# Patient Record
Sex: FEMALE | Race: WHITE | NOT HISPANIC OR LATINO | Employment: OTHER | URBAN - METROPOLITAN AREA
[De-identification: names, ages, dates, MRNs, and addresses within clinical notes are randomized per-mention and may not be internally consistent; named-entity substitution may affect disease eponyms.]

---

## 2017-02-24 RX ORDER — BUDESONIDE AND FORMOTEROL FUMARATE DIHYDRATE 160; 4.5 UG/1; UG/1
AEROSOL RESPIRATORY (INHALATION)
Qty: 10.2 G | Refills: 0 | Status: SHIPPED | OUTPATIENT
Start: 2017-02-24 | End: 2017-03-21 | Stop reason: SDUPTHER

## 2017-03-02 ENCOUNTER — OFFICE VISIT (OUTPATIENT)
Dept: FAMILY MEDICINE CLINIC | Facility: CLINIC | Age: 37
End: 2017-03-02

## 2017-03-02 VITALS
BODY MASS INDEX: 39.38 KG/M2 | DIASTOLIC BLOOD PRESSURE: 68 MMHG | TEMPERATURE: 97.8 F | SYSTOLIC BLOOD PRESSURE: 138 MMHG | OXYGEN SATURATION: 97 % | HEART RATE: 86 BPM | HEIGHT: 62 IN | WEIGHT: 214 LBS

## 2017-03-02 DIAGNOSIS — J02.9 SORE THROAT: ICD-10-CM

## 2017-03-02 DIAGNOSIS — J01.00 ACUTE NON-RECURRENT MAXILLARY SINUSITIS: Primary | ICD-10-CM

## 2017-03-02 DIAGNOSIS — H66.001 ACUTE SUPPURATIVE OTITIS MEDIA OF RIGHT EAR WITHOUT SPONTANEOUS RUPTURE OF TYMPANIC MEMBRANE, RECURRENCE NOT SPECIFIED: ICD-10-CM

## 2017-03-02 LAB
EXPIRATION DATE: NORMAL
INTERNAL CONTROL: NORMAL
Lab: NORMAL
S PYO AG THROAT QL: NEGATIVE

## 2017-03-02 PROCEDURE — 87880 STREP A ASSAY W/OPTIC: CPT | Performed by: FAMILY MEDICINE

## 2017-03-02 PROCEDURE — 99213 OFFICE O/P EST LOW 20 MIN: CPT | Performed by: FAMILY MEDICINE

## 2017-03-02 RX ORDER — FLUTICASONE PROPIONATE 50 MCG
2 SPRAY, SUSPENSION (ML) NASAL DAILY PRN
COMMUNITY
End: 2020-12-14

## 2017-03-02 RX ORDER — AMOXICILLIN 875 MG/1
875 TABLET, COATED ORAL 2 TIMES DAILY
Qty: 20 TABLET | Refills: 0 | Status: SHIPPED | OUTPATIENT
Start: 2017-03-02 | End: 2017-07-10 | Stop reason: HOSPADM

## 2017-03-02 NOTE — PROGRESS NOTES
"Subjective   Mona Castillo is a 36 y.o. female.   Chief Complaint   Patient presents with   • Sore Throat     Sore Throat    This is a new problem. The current episode started yesterday. The problem has been gradually worsening. There has been no fever. Associated symptoms include congestion, coughing and ear pain. Pertinent negatives include no abdominal pain, diarrhea, shortness of breath or vomiting.        The following portions of the patient's history were reviewed and updated as appropriate: allergies, current medications, past medical history and problem list.    Review of Systems   Constitutional: Negative for chills and fever.   HENT: Positive for congestion, ear pain, postnasal drip, sinus pressure and sore throat.    Respiratory: Positive for cough. Negative for shortness of breath and wheezing.    Gastrointestinal: Negative for abdominal pain, diarrhea, nausea and vomiting.       Vitals:    03/02/17 1505   BP: 138/68   Pulse: 86   Temp: 97.8 °F (36.6 °C)   TempSrc: Oral   SpO2: 97%   Weight: 214 lb (97.1 kg)   Height: 62\" (157.5 cm)       Objective   Physical Exam   Constitutional: She appears well-developed and well-nourished.   HENT:   Head: Normocephalic and atraumatic.   Right Ear: Hearing, tympanic membrane, external ear and ear canal normal.   Left Ear: External ear and ear canal normal. Tympanic membrane is injected. A middle ear effusion is present.   Nose: Mucosal edema present. Right sinus exhibits maxillary sinus tenderness and frontal sinus tenderness.   Mouth/Throat: Uvula is midline and mucous membranes are normal. Posterior oropharyngeal erythema present. No oropharyngeal exudate or posterior oropharyngeal edema.   Neck: Neck supple. No JVD present. No thyromegaly present.   Cardiovascular: Normal rate, regular rhythm and normal heart sounds.  Exam reveals no gallop and no friction rub.    No murmur heard.  Pulmonary/Chest: Effort normal and breath sounds normal. No respiratory " distress. She has no wheezes. She has no rales.   Abdominal: Soft. Bowel sounds are normal. She exhibits no distension. There is no tenderness. There is no rebound and no guarding.   Musculoskeletal: She exhibits no edema.   Neurological: She is alert.   Skin: Skin is warm and dry.   Psychiatric: She has a normal mood and affect. Her behavior is normal.   Nursing note and vitals reviewed.  RST:    Assessment/Plan   Mona was seen today for sore throat.    Diagnoses and all orders for this visit:    Acute non-recurrent maxillary sinusitis  -     amoxicillin (AMOXIL) 875 MG tablet; Take 1 tablet by mouth 2 (Two) Times a Day.    Sore throat  -     POC Rapid Strep A    Acute suppurative otitis media of right ear without spontaneous rupture of tympanic membrane, recurrence not specified  -     amoxicillin (AMOXIL) 875 MG tablet; Take 1 tablet by mouth 2 (Two) Times a Day.      -fluids, rest;  Salt water gargles    Prn - RTC if worse or no improvement.

## 2017-03-22 RX ORDER — BUDESONIDE AND FORMOTEROL FUMARATE DIHYDRATE 160; 4.5 UG/1; UG/1
AEROSOL RESPIRATORY (INHALATION)
Qty: 10.2 G | Refills: 0 | Status: SHIPPED | OUTPATIENT
Start: 2017-03-22 | End: 2017-04-18 | Stop reason: SDUPTHER

## 2017-04-19 RX ORDER — BUDESONIDE AND FORMOTEROL FUMARATE DIHYDRATE 160; 4.5 UG/1; UG/1
AEROSOL RESPIRATORY (INHALATION)
Qty: 10.2 G | Refills: 0 | Status: SHIPPED | OUTPATIENT
Start: 2017-04-19 | End: 2017-05-16 | Stop reason: SDUPTHER

## 2017-05-12 RX ORDER — ALBUTEROL SULFATE 90 UG/1
AEROSOL, METERED RESPIRATORY (INHALATION)
Qty: 18 G | Refills: 0 | Status: SHIPPED | OUTPATIENT
Start: 2017-05-12 | End: 2017-07-01 | Stop reason: SDUPTHER

## 2017-05-16 RX ORDER — BUDESONIDE AND FORMOTEROL FUMARATE DIHYDRATE 160; 4.5 UG/1; UG/1
AEROSOL RESPIRATORY (INHALATION)
Qty: 10.2 G | Refills: 2 | Status: SHIPPED | OUTPATIENT
Start: 2017-05-16 | End: 2017-10-08 | Stop reason: SDUPTHER

## 2017-07-03 RX ORDER — ALBUTEROL SULFATE 90 UG/1
AEROSOL, METERED RESPIRATORY (INHALATION)
Qty: 18 G | Refills: 0 | Status: SHIPPED | OUTPATIENT
Start: 2017-07-03 | End: 2017-07-28 | Stop reason: SDUPTHER

## 2017-07-10 ENCOUNTER — OFFICE VISIT (OUTPATIENT)
Dept: FAMILY MEDICINE CLINIC | Facility: CLINIC | Age: 37
End: 2017-07-10

## 2017-07-10 VITALS
WEIGHT: 214 LBS | DIASTOLIC BLOOD PRESSURE: 70 MMHG | HEIGHT: 62 IN | BODY MASS INDEX: 39.38 KG/M2 | HEART RATE: 91 BPM | SYSTOLIC BLOOD PRESSURE: 110 MMHG | OXYGEN SATURATION: 99 %

## 2017-07-10 DIAGNOSIS — M54.16 LUMBAR RADICULOPATHY: Primary | ICD-10-CM

## 2017-07-10 PROCEDURE — 99213 OFFICE O/P EST LOW 20 MIN: CPT | Performed by: FAMILY MEDICINE

## 2017-07-10 PROCEDURE — 96372 THER/PROPH/DIAG INJ SC/IM: CPT | Performed by: FAMILY MEDICINE

## 2017-07-10 RX ORDER — MELOXICAM 7.5 MG/1
7.5 TABLET ORAL
COMMUNITY
Start: 2017-07-06 | End: 2017-08-05

## 2017-07-10 RX ORDER — METHYLPREDNISOLONE ACETATE 80 MG/ML
80 INJECTION, SUSPENSION INTRA-ARTICULAR; INTRALESIONAL; INTRAMUSCULAR; SOFT TISSUE ONCE
Status: COMPLETED | OUTPATIENT
Start: 2017-07-10 | End: 2017-07-10

## 2017-07-10 RX ORDER — GABAPENTIN 300 MG/1
CAPSULE ORAL
Qty: 60 CAPSULE | Refills: 5 | Status: SHIPPED | OUTPATIENT
Start: 2017-07-10 | End: 2018-02-09

## 2017-07-10 RX ORDER — OXYCODONE HYDROCHLORIDE AND ACETAMINOPHEN 5; 325 MG/1; MG/1
1 TABLET ORAL
COMMUNITY
Start: 2017-07-03 | End: 2018-02-09

## 2017-07-10 RX ORDER — NORETHINDRONE ACETATE AND ETHINYL ESTRADIOL 1; .02 MG/1; MG/1
TABLET ORAL
COMMUNITY
Start: 2017-06-20 | End: 2018-06-19

## 2017-07-10 RX ORDER — OMEPRAZOLE 20 MG/1
CAPSULE, DELAYED RELEASE ORAL
COMMUNITY
End: 2018-06-19

## 2017-07-10 RX ADMIN — METHYLPREDNISOLONE ACETATE 80 MG: 80 INJECTION, SUSPENSION INTRA-ARTICULAR; INTRALESIONAL; INTRAMUSCULAR; SOFT TISSUE at 13:56

## 2017-07-10 NOTE — PROGRESS NOTES
Subjective   Mona Castillo is a 36 y.o. female. Presents today for   Chief Complaint   Patient presents with   • Back Pain     discuss pain meds       Back Pain   This is a recurrent (Had fall 2 years ago with off/on pain since then, normally goes to chiropractor and went after doing yard work, carrying ellen sized mattress and felt better when saw, but temporarily and pain recurred with pain down left leg with numbness/tingling. ) problem. The current episode started 1 to 4 weeks ago. The problem occurs constantly. The problem is unchanged. The pain is present in the lumbar spine. The quality of the pain is described as aching. The pain radiates to the left thigh, left knee and left foot. The pain is moderate. The symptoms are aggravated by twisting, bending and sitting. Associated symptoms include numbness, paresthesias and tingling. Pertinent negatives include no abdominal pain, bladder incontinence, bowel incontinence, dysuria, fever, perianal numbness or weakness. ([facility] ([facility] for MRI for C-spine and L-spine), has this afternoon.  Having c-spine done as hyper-reflexia of extremities.  Patient mostly back pain, radiating into left hip laterally.  Numbness in toes and anterior lower extremity.  Cannot find comfortable position at night.) She has tried muscle relaxant, chiropractic manipulation and analgesics (Was placed on prednisone with some relief, then directed to start meloxicam 7.5mg, to pickup yet and start date.   MR with relief.) for the symptoms. The treatment provided mild relief.       Saw PA at [facility] center wet read and a/p as follows on 7/6/17:  Review of diagnostic data   Her x-rays of her lumbar showed no apparent spinal listhesis or fractures, her cervical x-ray showed cervical kyphosis.  Assessment and Plan   Ms. Castillo is a 36 yr/o with chronic low back and left leg pain. 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 The plan at this time is to  explore her cervical spine with the MRI to evaluate for spinal cord compression as well as a lumbar MRI to evaluate her left lower extremity pain. We also started her on a nonsteroidal anti-inflammatory. She has no contraindications to this. She is not to take any other nonsteroidal anti-inflammatories while on this and she is going to start at once her oral steroid is completed. We will see her back once her MRIs are complete or sooner if any problems arise  NORA Calderon 7/6/2017     IMAGING completed 7/3/17:  LSpine xray IMPRESSION:  1. Normal alignment of the lumbar spine. No acute traumatic deformities are seen.    Left Hip xray IMPRESSION:   1.Radiographically normal left hip.      Review of Systems   Constitutional: Negative for fever.   Gastrointestinal: Negative for abdominal pain and bowel incontinence.   Genitourinary: Negative for bladder incontinence and dysuria.   Musculoskeletal: Positive for back pain.   Neurological: Positive for tingling, numbness and paresthesias. Negative for weakness.       The following portions of the patient's history were reviewed and updated as appropriate: allergies, current medications, past medical history and problem list.    Patient Active Problem List   Diagnosis   • Asthma   • Seasonal allergic rhinitis       No Known Allergies    Current Outpatient Prescriptions on File Prior to Visit   Medication Sig Dispense Refill   • cyclobenzaprine (FLEXERIL) 10 MG tablet Take 1 tablet by mouth 3 (Three) Times a Day As Needed for muscle spasms. 30 tablet 0   • fexofenadine (ALLEGRA) 180 MG tablet TAKE 1 TABLET BY MOUTH EVERY DAY 30 tablet 3   • fluticasone (FLONASE) 50 MCG/ACT nasal spray 2 sprays into each nostril Daily.     • Prenatal Vit-DSS-Fe Cbn-FA (PRENATAL AD) tablet Take 1 tablet by mouth daily.     • SYMBICORT 160-4.5 MCG/ACT inhaler INHALE 2 PUFFS BY MOUTH TWICE DAILY. RINSE MOUTH AFTER USE 10.2 g 2   • VENTOLIN  (90 BASE) MCG/ACT inhaler INHALE 2 PUFFS AS  "NEEDED EVERY 4-6 HOURS AS NEEDED FOR COUGHING, WHEEZING, AND SHORTNESS OF BREATH 18 g 0   • HYDROcodone-acetaminophen (NORCO) 5-325 MG per tablet Take 1-2 tablets by mouth Every 6 (Six) Hours As Needed for moderate pain (4-6). 15 tablet 0   • [DISCONTINUED] amoxicillin (AMOXIL) 875 MG tablet Take 1 tablet by mouth 2 (Two) Times a Day. 20 tablet 0   • [DISCONTINUED] baclofen (LIORESAL) 10 MG tablet Take 1 tablet by mouth 3 (three) times a day. (Patient taking differently: Take 10 mg by mouth 3 (three) times a day as needed.) 90 tablet 1   • [DISCONTINUED] diclofenac (VOLTAREN) 50 MG EC tablet Take 1 tablet by mouth 3 (Three) Times a Day. 30 tablet 0   • [DISCONTINUED] letrozole (FEMARA) 2.5 MG tablet TK 2 TS PO QD ON DAYS 3 THROUGH 7 OF CYCLE  0   • [DISCONTINUED] nystatin (MYCOSTATIN) 271553 UNIT/GM cream Apply  topically 2 (two) times a day. 30 g 4   • [DISCONTINUED] progesterone (PROMETRIUM) 200 MG capsule INSERT 1 C VAGINALLY QHS FOR 3 DAYS AFTER POSITIVE OPK FOR 12 DAYS  12   • [DISCONTINUED] triamcinolone (KENALOG) 0.1 % cream Apply  topically 2 (two) times a day. 45 g 2     No current facility-administered medications on file prior to visit.        Objective   Vitals:    07/10/17 1307   BP: 110/70   BP Location: Right arm   Patient Position: Sitting   Cuff Size: Large Adult   Pulse: 91   SpO2: 99%   Weight: 214 lb (97.1 kg)   Height: 62\" (157.5 cm)       Physical Exam   Constitutional: She is oriented to person, place, and time. She appears well-developed and well-nourished.   Musculoskeletal:        Lumbar back: She exhibits decreased range of motion and spasm. She exhibits no tenderness, no bony tenderness and no deformity.   Left SLR 85 deg with pain down leg noted    B/l hip flexors +5/5;  B/l lower ext +5/5    B/l DTRs symmetric but brisk.   Neurological: She is alert and oriented to person, place, and time.   Skin: Skin is warm and dry.   Psychiatric: She has a normal mood and affect. Her behavior is " normal.   Nursing note and vitals reviewed.      Assessment/Plan   Mona was seen today for back pain.    Diagnoses and all orders for this visit:    Lumbar radiculopathy  -     Ambulatory Referral to Pain Management  -     gabapentin (NEURONTIN) 300 MG capsule; 1 po nightly x 7 days, then 1 po bid.  -     methylPREDNISolone acetate (DEPO-medrol) injection 80 mg; Inject 1 mL into the shoulder, thigh, or buttocks 1 (One) Time.    -warned gabapentin now controlled, has not tried before;  Will give temporarily until can undergo further treatment;  Is improving with steroids, will try depo medrol.  Start nsaid as directed.  -reviewed willie         -Follow up: with pain mgmt and Spine specialist        Current Outpatient Prescriptions:   •  cyclobenzaprine (FLEXERIL) 10 MG tablet, Take 1 tablet by mouth 3 (Three) Times a Day As Needed for muscle spasms., Disp: 30 tablet, Rfl: 0  •  fexofenadine (ALLEGRA) 180 MG tablet, TAKE 1 TABLET BY MOUTH EVERY DAY, Disp: 30 tablet, Rfl: 3  •  fluticasone (FLONASE) 50 MCG/ACT nasal spray, 2 sprays into each nostril Daily., Disp: , Rfl:   •  meloxicam (MOBIC) 7.5 MG tablet, Take 7.5 mg by mouth., Disp: , Rfl:   •  norethindrone-ethinyl estradiol (MICROGESTIN) 1-20 MG-MCG per tablet, TK 1 T PO QD, Disp: , Rfl:   •  omeprazole (priLOSEC) 20 MG capsule, Take  by mouth., Disp: , Rfl:   •  oxyCODONE-acetaminophen (PERCOCET) 5-325 MG per tablet, Take 1 tablet by mouth., Disp: , Rfl:   •  Prenatal Vit-DSS-Fe Cbn-FA (PRENATAL AD) tablet, Take 1 tablet by mouth daily., Disp: , Rfl:   •  SYMBICORT 160-4.5 MCG/ACT inhaler, INHALE 2 PUFFS BY MOUTH TWICE DAILY. RINSE MOUTH AFTER USE, Disp: 10.2 g, Rfl: 2  •  VENTOLIN  (90 BASE) MCG/ACT inhaler, INHALE 2 PUFFS AS NEEDED EVERY 4-6 HOURS AS NEEDED FOR COUGHING, WHEEZING, AND SHORTNESS OF BREATH, Disp: 18 g, Rfl: 0  •  HYDROcodone-acetaminophen (NORCO) 5-325 MG per tablet, Take 1-2 tablets by mouth Every 6 (Six) Hours As Needed for moderate  pain (4-6)., Disp: 15 tablet, Rfl: 0

## 2017-07-22 RX ORDER — ALBUTEROL SULFATE 90 UG/1
AEROSOL, METERED RESPIRATORY (INHALATION)
Qty: 18 G | Refills: 0 | Status: CANCELLED | OUTPATIENT
Start: 2017-07-22

## 2017-07-24 RX ORDER — ALBUTEROL SULFATE 90 UG/1
AEROSOL, METERED RESPIRATORY (INHALATION)
Qty: 18 G | Refills: 0 | Status: CANCELLED | OUTPATIENT
Start: 2017-07-24

## 2017-07-28 RX ORDER — ALBUTEROL SULFATE 90 UG/1
2 AEROSOL, METERED RESPIRATORY (INHALATION) EVERY 4 HOURS PRN
Qty: 18 G | Refills: 1 | Status: SHIPPED | OUTPATIENT
Start: 2017-07-28 | End: 2017-11-30 | Stop reason: SDUPTHER

## 2017-10-09 RX ORDER — FEXOFENADINE HCL 180 MG/1
TABLET ORAL
Qty: 30 TABLET | Refills: 0 | Status: SHIPPED | OUTPATIENT
Start: 2017-10-09 | End: 2017-11-30 | Stop reason: SDUPTHER

## 2017-10-09 RX ORDER — BUDESONIDE AND FORMOTEROL FUMARATE DIHYDRATE 160; 4.5 UG/1; UG/1
AEROSOL RESPIRATORY (INHALATION)
Qty: 10.2 G | Refills: 0 | Status: SHIPPED | OUTPATIENT
Start: 2017-10-09 | End: 2017-11-03 | Stop reason: SDUPTHER

## 2017-11-06 ENCOUNTER — OFFICE VISIT (OUTPATIENT)
Dept: FAMILY MEDICINE CLINIC | Facility: CLINIC | Age: 37
End: 2017-11-06

## 2017-11-06 VITALS
HEART RATE: 110 BPM | WEIGHT: 211 LBS | SYSTOLIC BLOOD PRESSURE: 144 MMHG | TEMPERATURE: 98 F | OXYGEN SATURATION: 97 % | HEIGHT: 62 IN | DIASTOLIC BLOOD PRESSURE: 72 MMHG | BODY MASS INDEX: 38.83 KG/M2

## 2017-11-06 DIAGNOSIS — J01.90 ACUTE SINUSITIS, RECURRENCE NOT SPECIFIED, UNSPECIFIED LOCATION: Primary | ICD-10-CM

## 2017-11-06 PROCEDURE — 99213 OFFICE O/P EST LOW 20 MIN: CPT | Performed by: NURSE PRACTITIONER

## 2017-11-06 RX ORDER — BUDESONIDE AND FORMOTEROL FUMARATE DIHYDRATE 160; 4.5 UG/1; UG/1
AEROSOL RESPIRATORY (INHALATION)
Qty: 10.2 G | Refills: 0 | Status: SHIPPED | OUTPATIENT
Start: 2017-11-06 | End: 2017-11-29 | Stop reason: SDUPTHER

## 2017-11-06 RX ORDER — AMOXICILLIN 875 MG/1
875 TABLET, COATED ORAL 2 TIMES DAILY
Qty: 20 TABLET | Refills: 0 | Status: SHIPPED | OUTPATIENT
Start: 2017-11-06 | End: 2018-02-09

## 2017-11-06 RX ORDER — BENZONATATE 100 MG/1
100 CAPSULE ORAL 3 TIMES DAILY PRN
Qty: 30 CAPSULE | Refills: 0 | Status: SHIPPED | OUTPATIENT
Start: 2017-11-06 | End: 2018-02-09

## 2017-11-06 NOTE — PROGRESS NOTES
Subjective   Mona Castillo is a 37 y.o. female.  Nasal congestion, cough, earache. She went to clinic at [facility] yesterday and was told she had a cold but was not given any medication. Started getting sick on Thurs last week, progressively worse since then. No has productive cough and drainage with yellow color. + headache and today with ear pain. She has taken her Allegra but was not able to take the Flonase because her nose was so stopped up.    Sinusitis   This is a new problem. The current episode started in the past 7 days. The problem has been gradually worsening since onset. Maximum temperature: didn't take. Associated symptoms include chills, congestion, coughing, ear pain, headaches, shortness of breath (little), sinus pressure and a sore throat. Pertinent negatives include no sneezing or swollen glands. Treatments tried: antihistamine. The treatment provided no relief.        The following portions of the patient's history were reviewed and updated as appropriate: allergies, current medications, past medical history, past social history, past surgical history and problem list.    Review of Systems   Constitutional: Positive for chills.   HENT: Positive for congestion, ear pain, postnasal drip, rhinorrhea, sinus pressure and sore throat. Negative for sneezing.    Respiratory: Positive for cough and shortness of breath (little).    Cardiovascular: Negative.    Neurological: Positive for headaches.       Objective   Physical Exam   Constitutional: Vital signs are normal. She appears well-developed and well-nourished. She is cooperative.   HENT:   Right Ear: Hearing normal. A middle ear effusion (cloudy) is present.   Left Ear: Hearing normal. A middle ear effusion (cloudy) is present.   Nose: Mucosal edema and rhinorrhea present. Right sinus exhibits frontal sinus tenderness. Left sinus exhibits frontal sinus tenderness.   Mouth/Throat: Uvula is midline and mucous membranes are normal. Posterior  "oropharyngeal erythema present. No tonsillar exudate.   Cardiovascular: Normal rate, regular rhythm and normal heart sounds.    Pulmonary/Chest: Effort normal and breath sounds normal.   Neurological: She is alert.   Nursing note and vitals reviewed.    Vitals:    11/06/17 0908   BP: 144/72   Pulse: 110   Temp: 98 °F (36.7 °C)   TempSrc: Oral   SpO2: 97%   Weight: 211 lb (95.7 kg)   Height: 62\" (157.5 cm)       Assessment/Plan   Diagnoses and all orders for this visit:    Acute sinusitis, recurrence not specified, unspecified location  -     amoxicillin (AMOXIL) 875 MG tablet; Take 1 tablet by mouth 2 (Two) Times a Day.  -     benzonatate (TESSALON PERLES) 100 MG capsule; Take 1 capsule by mouth 3 (Three) Times a Day As Needed for Cough.      Sinusitis: Treat with antibiotics, nasal corticosteroids, antihistamines and cough medication as needed.  Off work until Wed, note given  RTC if not improve         "

## 2017-11-29 RX ORDER — BUDESONIDE AND FORMOTEROL FUMARATE DIHYDRATE 160; 4.5 UG/1; UG/1
AEROSOL RESPIRATORY (INHALATION)
Qty: 10.2 G | Refills: 0 | Status: SHIPPED | OUTPATIENT
Start: 2017-11-29 | End: 2017-12-26 | Stop reason: SDUPTHER

## 2017-11-30 RX ORDER — FEXOFENADINE HCL 180 MG/1
TABLET ORAL
Qty: 30 TABLET | Refills: 0 | Status: SHIPPED | OUTPATIENT
Start: 2017-11-30 | End: 2017-12-26 | Stop reason: SDUPTHER

## 2017-12-01 RX ORDER — ALBUTEROL SULFATE 90 UG/1
AEROSOL, METERED RESPIRATORY (INHALATION)
Qty: 18 G | Refills: 0 | Status: SHIPPED | OUTPATIENT
Start: 2017-12-01 | End: 2017-12-26 | Stop reason: SDUPTHER

## 2017-12-27 RX ORDER — FEXOFENADINE HCL 180 MG/1
TABLET ORAL
Qty: 30 TABLET | Refills: 0 | Status: SHIPPED | OUTPATIENT
Start: 2017-12-27 | End: 2019-07-25

## 2017-12-27 RX ORDER — BUDESONIDE AND FORMOTEROL FUMARATE DIHYDRATE 160; 4.5 UG/1; UG/1
AEROSOL RESPIRATORY (INHALATION)
Qty: 10.2 G | Refills: 0 | Status: SHIPPED | OUTPATIENT
Start: 2017-12-27 | End: 2018-01-30 | Stop reason: SDUPTHER

## 2017-12-27 RX ORDER — ALBUTEROL SULFATE 90 UG/1
AEROSOL, METERED RESPIRATORY (INHALATION)
Qty: 18 G | Refills: 0 | Status: SHIPPED | OUTPATIENT
Start: 2017-12-27 | End: 2018-02-09 | Stop reason: SDUPTHER

## 2018-01-24 RX ORDER — BUDESONIDE AND FORMOTEROL FUMARATE DIHYDRATE 160; 4.5 UG/1; UG/1
AEROSOL RESPIRATORY (INHALATION)
Qty: 10.2 G | Refills: 0 | OUTPATIENT
Start: 2018-01-24

## 2018-01-30 ENCOUNTER — TELEPHONE (OUTPATIENT)
Dept: FAMILY MEDICINE CLINIC | Facility: CLINIC | Age: 38
End: 2018-01-30

## 2018-01-30 RX ORDER — BUDESONIDE AND FORMOTEROL FUMARATE DIHYDRATE 160; 4.5 UG/1; UG/1
2 AEROSOL RESPIRATORY (INHALATION)
Qty: 10.2 G | Refills: 0 | Status: SHIPPED | OUTPATIENT
Start: 2018-01-30 | End: 2018-03-04 | Stop reason: SDUPTHER

## 2018-02-09 ENCOUNTER — OFFICE VISIT (OUTPATIENT)
Dept: FAMILY MEDICINE CLINIC | Facility: CLINIC | Age: 38
End: 2018-02-09

## 2018-02-09 VITALS
BODY MASS INDEX: 39.01 KG/M2 | WEIGHT: 212 LBS | OXYGEN SATURATION: 96 % | HEART RATE: 86 BPM | HEIGHT: 62 IN | TEMPERATURE: 98.3 F | SYSTOLIC BLOOD PRESSURE: 108 MMHG | DIASTOLIC BLOOD PRESSURE: 72 MMHG

## 2018-02-09 DIAGNOSIS — J45.40 MODERATE PERSISTENT ASTHMA WITHOUT COMPLICATION: Primary | ICD-10-CM

## 2018-02-09 DIAGNOSIS — L30.8 OTHER ECZEMA: ICD-10-CM

## 2018-02-09 PROCEDURE — 99213 OFFICE O/P EST LOW 20 MIN: CPT | Performed by: FAMILY MEDICINE

## 2018-02-09 PROCEDURE — 96372 THER/PROPH/DIAG INJ SC/IM: CPT | Performed by: FAMILY MEDICINE

## 2018-02-09 RX ORDER — ALBUTEROL SULFATE 90 UG/1
2 AEROSOL, METERED RESPIRATORY (INHALATION) EVERY 6 HOURS PRN
Qty: 18 G | Refills: 3 | Status: SHIPPED | OUTPATIENT
Start: 2018-02-09 | End: 2018-07-11 | Stop reason: SDUPTHER

## 2018-02-09 RX ORDER — METHYLPREDNISOLONE ACETATE 80 MG/ML
80 INJECTION, SUSPENSION INTRA-ARTICULAR; INTRALESIONAL; INTRAMUSCULAR; SOFT TISSUE ONCE
Status: COMPLETED | OUTPATIENT
Start: 2018-02-09 | End: 2018-02-09

## 2018-02-09 RX ADMIN — METHYLPREDNISOLONE ACETATE 80 MG: 80 INJECTION, SUSPENSION INTRA-ARTICULAR; INTRALESIONAL; INTRAMUSCULAR; SOFT TISSUE at 13:44

## 2018-02-09 NOTE — PROGRESS NOTES
Subjective   Mona Castillo is a 37 y.o. female. Presents today for   Chief Complaint   Patient presents with   • Appointment     pt here for appt, she received letter in the mail to make an appt.       History of Present Illness  Hrre for asthma, doing well, as long as takes allergy medicaiton and uses symbicort every day.  Allergies stable on medications;  Has pet dog;   Rare wheeze, dyspnea;  No coughign; no fevers chills;  Asthma hx moderate persistent.    Went throught fertility tx, had 2 embryos implanted, but lost both.  Has 1 more, but taking.  Review of Systems   Constitutional: Negative for fever.   Respiratory: Negative for cough and wheezing.    Cardiovascular: Negative for chest pain.       The following portions of the patient's history were reviewed and updated as appropriate: allergies, current medications, past medical history and problem list.    Patient Active Problem List   Diagnosis   • Asthma   • Seasonal allergic rhinitis       Allergies   Allergen Reactions   • Gabapentin Mental Status Change       Current Outpatient Prescriptions on File Prior to Visit   Medication Sig Dispense Refill   • budesonide-formoterol (SYMBICORT) 160-4.5 MCG/ACT inhaler Inhale 2 puffs 2 (Two) Times a Day. 10.2 g 0   • fexofenadine (ALLEGRA) 180 MG tablet TAKE 1 TABLET BY MOUTH EVERY DAY 30 tablet 0   • fluticasone (FLONASE) 50 MCG/ACT nasal spray 2 sprays into each nostril Daily.     • omeprazole (priLOSEC) 20 MG capsule Take  by mouth.     • Prenatal Vit-DSS-Fe Cbn-FA (PRENATAL AD) tablet Take 1 tablet by mouth daily.     • VENTOLIN  (90 Base) MCG/ACT inhaler INHALE 2 PUFFS BY MOUTH EVERY 4 HOURS AS NEEDED FOR WHEEZING 18 g 0   • norethindrone-ethinyl estradiol (MICROGESTIN) 1-20 MG-MCG per tablet TK 1 T PO QD     • [DISCONTINUED] amoxicillin (AMOXIL) 875 MG tablet Take 1 tablet by mouth 2 (Two) Times a Day. 20 tablet 0   • [DISCONTINUED] benzonatate (TESSALON PERLES) 100 MG capsule Take 1 capsule by  "mouth 3 (Three) Times a Day As Needed for Cough. 30 capsule 0   • [DISCONTINUED] cyclobenzaprine (FLEXERIL) 10 MG tablet Take 1 tablet by mouth 3 (Three) Times a Day As Needed for muscle spasms. 30 tablet 0   • [DISCONTINUED] gabapentin (NEURONTIN) 300 MG capsule 1 po nightly x 7 days, then 1 po bid. 60 capsule 5   • [DISCONTINUED] HYDROcodone-acetaminophen (NORCO) 5-325 MG per tablet Take 1-2 tablets by mouth Every 6 (Six) Hours As Needed for moderate pain (4-6). 15 tablet 0   • [DISCONTINUED] oxyCODONE-acetaminophen (PERCOCET) 5-325 MG per tablet Take 1 tablet by mouth.       No current facility-administered medications on file prior to visit.        Objective   Vitals:    02/09/18 1301   BP: 108/72   BP Location: Left arm   Patient Position: Sitting   Cuff Size: Large Adult   Pulse: 86   Temp: 98.3 °F (36.8 °C)   TempSrc: Oral   SpO2: 96%   Weight: 96.2 kg (212 lb)   Height: 157.5 cm (62.01\")       Physical Exam   Constitutional: She appears well-developed and well-nourished.   HENT:   Head: Normocephalic and atraumatic.   Neck: Neck supple. No JVD present. No thyromegaly present.   Cardiovascular: Normal rate, regular rhythm and normal heart sounds.  Exam reveals no gallop and no friction rub.    No murmur heard.  Pulmonary/Chest: Effort normal and breath sounds normal. No respiratory distress. She has no wheezes. She has no rales.   Abdominal: Soft. Bowel sounds are normal. She exhibits no distension. There is no tenderness. There is no rebound and no guarding.   Musculoskeletal: She exhibits no edema.   Neurological: She is alert.   Skin: Skin is warm and dry.   Eczema -red, scaling   Psychiatric: She has a normal mood and affect. Her behavior is normal.   Nursing note and vitals reviewed.      Assessment/Plan   Mona was seen today for appointment.    Diagnoses and all orders for this visit:    Moderate persistent asthma without complication  -     albuterol (VENTOLIN HFA) 108 (90 Base) MCG/ACT inhaler; " Inhale 2 puffs Every 6 (Six) Hours As Needed for Wheezing.      Derm gave steroid injection for eczema and resolved, would like today in lieu of topical  Ok refill symbicort over next 12 months when receive message.         -Follow up: 1 year       Current Outpatient Prescriptions:   •  budesonide-formoterol (SYMBICORT) 160-4.5 MCG/ACT inhaler, Inhale 2 puffs 2 (Two) Times a Day., Disp: 10.2 g, Rfl: 0  •  fexofenadine (ALLEGRA) 180 MG tablet, TAKE 1 TABLET BY MOUTH EVERY DAY, Disp: 30 tablet, Rfl: 0  •  fluticasone (FLONASE) 50 MCG/ACT nasal spray, 2 sprays into each nostril Daily., Disp: , Rfl:   •  omeprazole (priLOSEC) 20 MG capsule, Take  by mouth., Disp: , Rfl:   •  Prenatal Vit-DSS-Fe Cbn-FA (PRENATAL AD) tablet, Take 1 tablet by mouth daily., Disp: , Rfl:   •  VENTOLIN  (90 Base) MCG/ACT inhaler, INHALE 2 PUFFS BY MOUTH EVERY 4 HOURS AS NEEDED FOR WHEEZING, Disp: 18 g, Rfl: 0  •  norethindrone-ethinyl estradiol (MICROGESTIN) 1-20 MG-MCG per tablet, TK 1 T PO QD, Disp: , Rfl:

## 2018-03-05 RX ORDER — BUDESONIDE AND FORMOTEROL FUMARATE DIHYDRATE 160; 4.5 UG/1; UG/1
AEROSOL RESPIRATORY (INHALATION)
Qty: 10.2 G | Refills: 11 | Status: SHIPPED | OUTPATIENT
Start: 2018-03-05 | End: 2018-08-24 | Stop reason: CLARIF

## 2018-06-19 ENCOUNTER — OFFICE VISIT (OUTPATIENT)
Dept: FAMILY MEDICINE CLINIC | Facility: CLINIC | Age: 38
End: 2018-06-19

## 2018-06-19 VITALS
OXYGEN SATURATION: 95 % | HEART RATE: 81 BPM | HEIGHT: 62 IN | DIASTOLIC BLOOD PRESSURE: 60 MMHG | TEMPERATURE: 98.5 F | BODY MASS INDEX: 38.64 KG/M2 | SYSTOLIC BLOOD PRESSURE: 118 MMHG | WEIGHT: 210 LBS

## 2018-06-19 DIAGNOSIS — L30.9 ECZEMA OF BOTH HANDS: Primary | ICD-10-CM

## 2018-06-19 DIAGNOSIS — M25.532 ACUTE PAIN OF LEFT WRIST: ICD-10-CM

## 2018-06-19 PROCEDURE — 96372 THER/PROPH/DIAG INJ SC/IM: CPT | Performed by: FAMILY MEDICINE

## 2018-06-19 PROCEDURE — 99213 OFFICE O/P EST LOW 20 MIN: CPT | Performed by: FAMILY MEDICINE

## 2018-06-19 RX ORDER — TRIAMCINOLONE ACETONIDE 1 MG/G
CREAM TOPICAL 2 TIMES DAILY PRN
Qty: 60 G | Refills: 2 | Status: SHIPPED | OUTPATIENT
Start: 2018-06-19 | End: 2019-02-08

## 2018-06-19 RX ORDER — METHYLPREDNISOLONE ACETATE 80 MG/ML
80 INJECTION, SUSPENSION INTRA-ARTICULAR; INTRALESIONAL; INTRAMUSCULAR; SOFT TISSUE ONCE
Status: COMPLETED | OUTPATIENT
Start: 2018-06-19 | End: 2018-06-19

## 2018-06-19 RX ADMIN — METHYLPREDNISOLONE ACETATE 80 MG: 80 INJECTION, SUSPENSION INTRA-ARTICULAR; INTRALESIONAL; INTRAMUSCULAR; SOFT TISSUE at 08:04

## 2018-06-19 NOTE — PROGRESS NOTES
Subjective   Mona Castillo is a 37 y.o. female. Presents today for   Chief Complaint   Patient presents with   • Rash     pt hands are getting worse again. they cleared up for a little bit but its back. she was seeing dermatologist for this but she does not have health insurance right now.       Rash   This is a recurrent problem. The current episode started in the past 7 days. The problem has been waxing and waning since onset. The affected locations include the left hand and right hand. The rash is characterized by itchiness and dryness. She was exposed to a new detergent/soap. Pertinent negatives include no shortness of breath or sore throat. 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 The treatment provided no relief. Her past medical history is significant for eczema.     Left wrist pain few days;  Doing truck washing and manual jobs;  Hurts to flex with pain over dorsal surface of left wrist.    Review of Systems   HENT: Negative for sore throat.    Respiratory: Negative for shortness of breath.    Skin: Positive for rash.       Patient Active Problem List   Diagnosis   • Asthma   • Seasonal allergic rhinitis       Social History     Social History   • Marital status:      Social History Main Topics   • Smoking status: Former Smoker   • Smokeless tobacco: Never Used      Comment: quit 10/2013   • Alcohol use Yes      Comment: Social use   • Drug use: No   • Sexual activity: Yes     Partners: Male     Birth control/ protection: None     Other Topics Concern   • Not on file       Allergies   Allergen Reactions   • Gabapentin Mental Status Change       Current Outpatient Prescriptions on File Prior to Visit   Medication Sig Dispense Refill   • albuterol (VENTOLIN HFA) 108 (90 Base) MCG/ACT inhaler Inhale 2 puffs Every 6 (Six) Hours As Needed for Wheezing. 18 g 3   •  "fexofenadine (ALLEGRA) 180 MG tablet TAKE 1 TABLET BY MOUTH EVERY DAY 30 tablet 0   • fluticasone (FLONASE) 50 MCG/ACT nasal spray 2 sprays into each nostril Daily.     • SYMBICORT 160-4.5 MCG/ACT inhaler INHALE 2 PUFFS BY MOUTH TWICE DAILY 10.2 g 11   • [DISCONTINUED] norethindrone-ethinyl estradiol (MICROGESTIN) 1-20 MG-MCG per tablet TK 1 T PO QD     • [DISCONTINUED] omeprazole (priLOSEC) 20 MG capsule Take  by mouth.     • [DISCONTINUED] Prenatal Vit-DSS-Fe Cbn-FA (PRENATAL AD) tablet Take 1 tablet by mouth daily.       No current facility-administered medications on file prior to visit.        Objective   Vitals:    06/19/18 0734   BP: 118/60   BP Location: Right arm   Patient Position: Sitting   Cuff Size: Large Adult   Pulse: 81   Temp: 98.5 °F (36.9 °C)   TempSrc: Oral   SpO2: 95%   Weight: 95.3 kg (210 lb)   Height: 157.5 cm (62.01\")       Physical Exam   Constitutional: She is oriented to person, place, and time. She appears well-developed and well-nourished.   Musculoskeletal:        Left wrist: She exhibits tenderness. She exhibits normal range of motion, no bony tenderness, no swelling, no effusion, no crepitus, no deformity and no laceration.   Neurological: She is alert and oriented to person, place, and time.   Skin: Skin is warm and dry. Capillary refill takes less than 2 seconds.   Web spaces red, scaly cracking.   Psychiatric: She has a normal mood and affect. Her behavior is normal.   Nursing note and vitals reviewed.      Assessment/Plan   Mona was seen today for rash.    Diagnoses and all orders for this visit:    Eczema of both hands  -     methylPREDNISolone acetate (DEPO-medrol) injection 80 mg; Inject 1 mL into the shoulder, thigh, or buttocks 1 (One) Time.  -     triamcinolone (KENALOG) 0.1 % cream; Apply  topically 2 (Two) Times a Day As Needed for Rash.      -left wrist pain - depo medrol 80mg IMx1  -eczema - continue gloves when handling detergents;  Topical steroid as directed.   "     -Follow up:  Prn - RTC if worse or no improvement.

## 2018-07-11 DIAGNOSIS — J45.40 MODERATE PERSISTENT ASTHMA WITHOUT COMPLICATION: ICD-10-CM

## 2018-07-11 RX ORDER — ALBUTEROL SULFATE 90 UG/1
AEROSOL, METERED RESPIRATORY (INHALATION)
Qty: 18 G | Refills: 0 | Status: SHIPPED | OUTPATIENT
Start: 2018-07-11 | End: 2018-08-04 | Stop reason: SDUPTHER

## 2018-08-04 DIAGNOSIS — J45.40 MODERATE PERSISTENT ASTHMA WITHOUT COMPLICATION: ICD-10-CM

## 2018-08-06 RX ORDER — ALBUTEROL SULFATE 90 UG/1
AEROSOL, METERED RESPIRATORY (INHALATION)
Qty: 18 G | Refills: 0 | Status: SHIPPED | OUTPATIENT
Start: 2018-08-06 | End: 2018-12-22 | Stop reason: SDUPTHER

## 2018-08-24 ENCOUNTER — TELEPHONE (OUTPATIENT)
Dept: FAMILY MEDICINE CLINIC | Facility: CLINIC | Age: 38
End: 2018-08-24

## 2018-12-22 DIAGNOSIS — J45.40 MODERATE PERSISTENT ASTHMA WITHOUT COMPLICATION: ICD-10-CM

## 2018-12-24 RX ORDER — ALBUTEROL SULFATE 90 UG/1
AEROSOL, METERED RESPIRATORY (INHALATION)
Qty: 18 G | Refills: 0 | Status: SHIPPED | OUTPATIENT
Start: 2018-12-24 | End: 2019-01-15 | Stop reason: SDUPTHER

## 2019-01-15 DIAGNOSIS — J45.40 MODERATE PERSISTENT ASTHMA WITHOUT COMPLICATION: ICD-10-CM

## 2019-01-15 RX ORDER — ALBUTEROL SULFATE 90 UG/1
AEROSOL, METERED RESPIRATORY (INHALATION)
Qty: 18 G | Refills: 0 | Status: SHIPPED | OUTPATIENT
Start: 2019-01-15 | End: 2019-02-07 | Stop reason: SDUPTHER

## 2019-02-07 DIAGNOSIS — J45.40 MODERATE PERSISTENT ASTHMA WITHOUT COMPLICATION: ICD-10-CM

## 2019-02-07 RX ORDER — ALBUTEROL SULFATE 90 UG/1
AEROSOL, METERED RESPIRATORY (INHALATION)
Qty: 18 G | Refills: 0 | Status: SHIPPED | OUTPATIENT
Start: 2019-02-07 | End: 2019-02-08 | Stop reason: SDUPTHER

## 2019-02-07 RX ORDER — ALBUTEROL SULFATE 90 UG/1
AEROSOL, METERED RESPIRATORY (INHALATION)
Qty: 18 G | Refills: 0 | Status: SHIPPED | OUTPATIENT
Start: 2019-02-07 | End: 2019-03-07 | Stop reason: SDUPTHER

## 2019-02-08 ENCOUNTER — OFFICE VISIT (OUTPATIENT)
Dept: FAMILY MEDICINE CLINIC | Facility: CLINIC | Age: 39
End: 2019-02-08

## 2019-02-08 VITALS
HEIGHT: 62 IN | BODY MASS INDEX: 38.83 KG/M2 | OXYGEN SATURATION: 100 % | SYSTOLIC BLOOD PRESSURE: 98 MMHG | DIASTOLIC BLOOD PRESSURE: 68 MMHG | HEART RATE: 81 BPM | WEIGHT: 211 LBS

## 2019-02-08 DIAGNOSIS — R55 NEAR SYNCOPE: ICD-10-CM

## 2019-02-08 DIAGNOSIS — R53.83 OTHER FATIGUE: ICD-10-CM

## 2019-02-08 DIAGNOSIS — R07.89 ATYPICAL CHEST PAIN: Primary | ICD-10-CM

## 2019-02-08 PROCEDURE — 93000 ELECTROCARDIOGRAM COMPLETE: CPT | Performed by: FAMILY MEDICINE

## 2019-02-08 PROCEDURE — 99214 OFFICE O/P EST MOD 30 MIN: CPT | Performed by: FAMILY MEDICINE

## 2019-02-08 NOTE — PROGRESS NOTES
Subjective   Mona Castillo is a 38 y.o. female. Presents today for   Chief Complaint   Patient presents with   • Dizziness     x2 weeks       Dizziness   This is a new problem. The current episode started in the past 7 days (2 episodes last week.). The problem occurs intermittently. The problem has been resolved. Associated symptoms include chest pain (heaviness couple times) and diaphoresis. Pertinent negatives include no fever, headaches, nausea, vertigo or vomiting. Associated symptoms comments: +lightheaded sensation;  Felt hot.   Did not have chest heaviness same time as light headed.  No syncope, but felt like going to pass out.  No family hx of cardiac issues, but not certain Father's history.  May be uncle hx of dm2;  Reports skips meals a lot, but doesn't recall during episodes.  Eating still felt not right.. Nothing aggravates the symptoms. She has tried eating for the symptoms. The treatment provided no relief.       Review of Systems   Constitutional: Positive for diaphoresis. Negative for fever.   Cardiovascular: Positive for chest pain (heaviness couple times).   Gastrointestinal: Negative for nausea and vomiting.   Neurological: Positive for dizziness. Negative for vertigo and headaches.       Patient Active Problem List   Diagnosis   • Asthma   • Seasonal allergic rhinitis       Social History     Socioeconomic History   • Marital status:      Spouse name: Not on file   • Number of children: Not on file   • Years of education: Not on file   • Highest education level: Not on file   Tobacco Use   • Smoking status: Former Smoker   • Smokeless tobacco: Never Used   • Tobacco comment: quit 10/2013   Substance and Sexual Activity   • Alcohol use: Yes     Comment: Social use   • Drug use: No   • Sexual activity: Yes     Partners: Male     Birth control/protection: None       Allergies   Allergen Reactions   • [place] Itching     SOMETIMES HAS SOA   • Gabapentin Mental Status Change       Current  "Outpatient Medications on File Prior to Visit   Medication Sig Dispense Refill   • fexofenadine (ALLEGRA) 180 MG tablet TAKE 1 TABLET BY MOUTH EVERY DAY 30 tablet 0   • fluticasone (FLONASE) 50 MCG/ACT nasal spray 2 sprays into each nostril Daily.     • fluticasone-salmeterol (ADVAIR DISKUS) 250-50 MCG/DOSE DISKUS Inhale 1 puff 2 (Two) Times a Day. 60 each 5   • VENTOLIN  (90 Base) MCG/ACT inhaler INHALE 2 PUFFS BY MOUTH EVERY 6 HOURS AS NEEDED FOR WHEEZING 18 g 0   • [DISCONTINUED] triamcinolone (KENALOG) 0.1 % cream Apply  topically 2 (Two) Times a Day As Needed for Rash. 60 g 2   • [DISCONTINUED] VENTOLIN  (90 Base) MCG/ACT inhaler INHALE 2 PUFFS BY MOUTH EVERY 6 HOURS AS NEEDED FOR WHEEZING 18 g 0     No current facility-administered medications on file prior to visit.        Objective   Vitals:    02/08/19 1355   BP: 98/68   BP Location: Right arm   Patient Position: Sitting   Cuff Size: Adult   Pulse: 81   SpO2: 100%   Weight: 95.7 kg (211 lb)   Height: 157.5 cm (62\")       Physical Exam   Constitutional: She appears well-developed and well-nourished.   HENT:   Head: Normocephalic and atraumatic.   Nose: Nose normal.   Mouth/Throat: Uvula is midline, oropharynx is clear and moist and mucous membranes are normal.   Eyes: Conjunctivae, EOM and lids are normal. Pupils are equal, round, and reactive to light.   Neck: Neck supple. No JVD present. No thyromegaly present.   Cardiovascular: Normal rate, regular rhythm and normal heart sounds. Exam reveals no gallop and no friction rub.   No murmur heard.  Pulmonary/Chest: Effort normal and breath sounds normal. No respiratory distress. She has no wheezes. She has no rales.   Abdominal: Soft. Bowel sounds are normal. She exhibits no distension. There is no tenderness. There is no rebound and no guarding.   Musculoskeletal: She exhibits no edema.   Neurological: She is alert.   Skin: Skin is warm and dry.   Psychiatric: She has a normal mood and affect. " Her behavior is normal.   Nursing note and vitals reviewed.    Had a misscarriage last summer;  Was going through fertility tx;   and Wife are self employed so stressed and not eating healthy.    ECG 12 Lead - atypical cp, near syncope  Date/Time: 2/8/2019 2:47 PM  Performed by: David Fernandez DO  Authorized by: David Fernandez DO   Rhythm: sinus rhythm  Rate: normal  BPM: 74  Conduction: incomplete RBBB  Conduction comments: QRS 90 ms  QTc 422 ms  ST Segments: ST segments normal  T Waves: T waves normal  QRS axis: normal  Other: no other findings  Clinical impression comment: 1) NSR 2) Incomlete RBB            Assessment/Plan   Mona was seen today for dizziness.    Diagnoses and all orders for this visit:    Atypical chest pain  -     ECG 12 Lead - atypical cp, near syncope  -     Comprehensive Metabolic Panel  -     TSH  -     Vitamin B12  -     CBC & Differential  -     Ambulatory Referral to Cardiology    Near syncope  -     ECG 12 Lead - atypical cp, near syncope  -     Comprehensive Metabolic Panel  -     TSH  -     Vitamin B12  -     CBC & Differential  -     Ambulatory Referral to Cardiology    Other fatigue  -     ECG 12 Lead - atypical cp, near syncope  -     Comprehensive Metabolic Panel  -     TSH  -     Vitamin B12  -     CBC & Differential  -     Ambulatory Referral to Cardiology    -d/w patient and  if recurs go ER;  Suspect component of stress and also ? Hypoglycemia;  Will check labs and refer to cardiology for chest heaviness, near syncope.  Recommend in interim hydrate daily, make sure avoid skipped meals         -Follow up: 10 to 12 weeks and prn

## 2019-02-09 LAB
ALBUMIN SERPL-MCNC: 4.3 G/DL (ref 3.5–5.5)
ALBUMIN/GLOB SERPL: 1.6 {RATIO} (ref 1.2–2.2)
ALP SERPL-CCNC: 84 IU/L (ref 39–117)
ALT SERPL-CCNC: 11 IU/L (ref 0–32)
AST SERPL-CCNC: 10 IU/L (ref 0–40)
BASOPHILS # BLD AUTO: 0 X10E3/UL (ref 0–0.2)
BASOPHILS NFR BLD AUTO: 0 %
BILIRUB SERPL-MCNC: 0.2 MG/DL (ref 0–1.2)
BUN SERPL-MCNC: 9 MG/DL (ref 6–20)
BUN/CREAT SERPL: 10 (ref 9–23)
CALCIUM SERPL-MCNC: 9.6 MG/DL (ref 8.7–10.2)
CHLORIDE SERPL-SCNC: 103 MMOL/L (ref 96–106)
CO2 SERPL-SCNC: 24 MMOL/L (ref 20–29)
CREAT SERPL-MCNC: 0.94 MG/DL (ref 0.57–1)
EOSINOPHIL # BLD AUTO: 0.4 X10E3/UL (ref 0–0.4)
EOSINOPHIL NFR BLD AUTO: 5 %
ERYTHROCYTE [DISTWIDTH] IN BLOOD BY AUTOMATED COUNT: 14 % (ref 12.3–15.4)
GLOBULIN SER CALC-MCNC: 2.7 G/DL (ref 1.5–4.5)
GLUCOSE SERPL-MCNC: 86 MG/DL (ref 65–99)
HCT VFR BLD AUTO: 38.2 % (ref 34–46.6)
HGB BLD-MCNC: 12.5 G/DL (ref 11.1–15.9)
IMM GRANULOCYTES # BLD AUTO: 0 X10E3/UL (ref 0–0.1)
IMM GRANULOCYTES NFR BLD AUTO: 0 %
LYMPHOCYTES # BLD AUTO: 2.1 X10E3/UL (ref 0.7–3.1)
LYMPHOCYTES NFR BLD AUTO: 24 %
MCH RBC QN AUTO: 29.6 PG (ref 26.6–33)
MCHC RBC AUTO-ENTMCNC: 32.7 G/DL (ref 31.5–35.7)
MCV RBC AUTO: 91 FL (ref 79–97)
MONOCYTES # BLD AUTO: 0.6 X10E3/UL (ref 0.1–0.9)
MONOCYTES NFR BLD AUTO: 7 %
NEUTROPHILS # BLD AUTO: 5.7 X10E3/UL (ref 1.4–7)
NEUTROPHILS NFR BLD AUTO: 64 %
PLATELET # BLD AUTO: 407 X10E3/UL (ref 150–379)
POTASSIUM SERPL-SCNC: 4.4 MMOL/L (ref 3.5–5.2)
PROT SERPL-MCNC: 7 G/DL (ref 6–8.5)
RBC # BLD AUTO: 4.22 X10E6/UL (ref 3.77–5.28)
SODIUM SERPL-SCNC: 141 MMOL/L (ref 134–144)
TSH SERPL DL<=0.005 MIU/L-ACNC: 1.41 UIU/ML (ref 0.45–4.5)
VIT B12 SERPL-MCNC: 364 PG/ML (ref 232–1245)
WBC # BLD AUTO: 8.9 X10E3/UL (ref 3.4–10.8)

## 2019-02-13 NOTE — PROGRESS NOTES
Call and mail copy of results to patient.  Kidney, liver function, electrolytes, thyroid and blood counts are normal.  B12 level is normal, but low normal.  Recommend OTC B12 500mcg po daily

## 2019-02-22 ENCOUNTER — OFFICE VISIT (OUTPATIENT)
Dept: CARDIOLOGY | Facility: CLINIC | Age: 39
End: 2019-02-22

## 2019-02-22 VITALS
SYSTOLIC BLOOD PRESSURE: 138 MMHG | DIASTOLIC BLOOD PRESSURE: 62 MMHG | WEIGHT: 207 LBS | HEART RATE: 79 BPM | HEIGHT: 62 IN | BODY MASS INDEX: 38.09 KG/M2

## 2019-02-22 DIAGNOSIS — R42 DIZZINESS: Primary | ICD-10-CM

## 2019-02-22 PROCEDURE — 93000 ELECTROCARDIOGRAM COMPLETE: CPT | Performed by: INTERNAL MEDICINE

## 2019-02-22 PROCEDURE — 99203 OFFICE O/P NEW LOW 30 MIN: CPT | Performed by: INTERNAL MEDICINE

## 2019-02-22 RX ORDER — BUDESONIDE AND FORMOTEROL FUMARATE DIHYDRATE 160; 4.5 UG/1; UG/1
2 AEROSOL RESPIRATORY (INHALATION)
COMMUNITY
End: 2019-05-15

## 2019-02-22 NOTE — PROGRESS NOTES
Subjective:     Encounter Date:02/22/2019      Patient ID: Mona Castillo is a 38 y.o. female.    Chief Complaint:  This is a 38-year-old lady with a past medical history of asthma.  She presents for evaluation of dizziness.  She has had 2 episodes of profound dizziness which occurred 2 or 3 weeks ago.  It was associated with changes in position, diaphoresis, lightheadedness.  She is never had a syncopal event.  Since then she has had almost daily episodes of dizziness which occur both while standing and occasionally while sitting.  Her blood pressure has been as low as 80s systolic while this occurs.  She admits to only drinking diet Pepsi throughout the day.  She rarely drinks water.  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  Because they are so busy she rarely eats regularly scheduled meals.  She has no new life changes.  She is not pregnant, she does not drink a lot of alcohol, she is not a smoker currently.  There is no family history of cardiac disease or sudden death.        The following portions of the patient's history were reviewed and updated as appropriate: allergies, current medications, past family history, past medical history, past social history, past surgical history and problem list.    Past Medical History:   Diagnosis Date   • Allergic rhinitis    • Asthma        Family History   Adopted: Yes   Problem Relation Age of Onset   • No Known Problems Mother    • No Known Problems Father    • Stroke Maternal Grandmother    • Cirrhosis Maternal Grandmother    • No Known Problems Maternal Grandfather        Social History     Socioeconomic History   • Marital status:      Spouse name: Not on file   • Number of children: Not on file   • Years of education: Not on file   • Highest education level: Not on file   Tobacco Use   • Smoking status: Former Smoker   • Smokeless tobacco: Never Used   • Tobacco comment: quit 10/2013   Substance and  Sexual Activity   • Alcohol use: Yes     Comment: Social use   • Drug use: No   • Sexual activity: Yes     Partners: Male     Birth control/protection: None       Allergies   Allergen Reactions   • [place] Itching     SOMETIMES HAS SOA   • Gabapentin Mental Status Change       Past Surgical History:   Procedure Laterality Date   • APPENDECTOMY     • CHOLECYSTECTOMY     • UTERINE FIBROID SURGERY         Review of Systems   Constitution: Positive for diaphoresis.   Cardiovascular: Positive for near-syncope. Negative for syncope.   Respiratory: Negative.    Neurological: Positive for dizziness.         ECG 12 Lead  Date/Time: 2/22/2019 11:33 AM  Performed by: Kathy Cowan MD  Authorized by: Kathy Cowan MD   Comparison: compared with previous ECG from 2/8/2019  Similar to previous ECG  Rhythm: sinus rhythm  Rate: normal  QRS axis: normal  Other findings: early transition    Clinical impression: normal ECG               Objective:     Physical Exam  GEN: no distress, alert and oriented.   HEENT: hair is pink  Lungs CTAB, no rales or wheezes  Chest: no abnormalities  Heart: RRR, no murmurs  Abdo: soft,  Nontender, nondistended  Extr: no edema, +2 DP and 2+ carotid pulses b/l  Skin: no rash or bruising  Psych: organized thought, normal behavior and affect    Lab Review:         Assessment:          Diagnosis Plan   1. Dizziness            Plan:       This is a 38-year-old with dizziness.    1.  Dizziness  I have encouraged her to drink 2-3 large bottles of water a day.  She should reduce the amount of diet Pepsi or other diet dehydrating fluids.  She needs to eat regularly scheduled MiraLAX meals throughout the day.  If this does not improve her dizziness then we can discuss using something like Midrin or fludrocortisone during increase her blood pressure.      She should follow-up with me as needed.  Thank you for referring this very kind patient to me.         Kathy Cowan MD  02/22/19

## 2019-03-07 DIAGNOSIS — J45.40 MODERATE PERSISTENT ASTHMA WITHOUT COMPLICATION: ICD-10-CM

## 2019-03-07 RX ORDER — ALBUTEROL SULFATE 90 UG/1
AEROSOL, METERED RESPIRATORY (INHALATION)
Qty: 18 G | Refills: 0 | Status: SHIPPED | OUTPATIENT
Start: 2019-03-07 | End: 2019-04-01 | Stop reason: SDUPTHER

## 2019-04-01 DIAGNOSIS — J45.40 MODERATE PERSISTENT ASTHMA WITHOUT COMPLICATION: ICD-10-CM

## 2019-04-01 RX ORDER — ALBUTEROL SULFATE 90 UG/1
AEROSOL, METERED RESPIRATORY (INHALATION)
Qty: 18 G | Refills: 0 | Status: SHIPPED | OUTPATIENT
Start: 2019-04-01 | End: 2019-05-12 | Stop reason: SDUPTHER

## 2019-05-02 ENCOUNTER — OFFICE VISIT (OUTPATIENT)
Dept: FAMILY MEDICINE CLINIC | Facility: CLINIC | Age: 39
End: 2019-05-02

## 2019-05-02 VITALS
BODY MASS INDEX: 39.51 KG/M2 | SYSTOLIC BLOOD PRESSURE: 116 MMHG | WEIGHT: 216 LBS | DIASTOLIC BLOOD PRESSURE: 60 MMHG | TEMPERATURE: 98.2 F

## 2019-05-02 DIAGNOSIS — J45.40 MODERATE PERSISTENT ASTHMA WITHOUT COMPLICATION: Primary | ICD-10-CM

## 2019-05-02 DIAGNOSIS — E53.8 B12 DEFICIENCY: ICD-10-CM

## 2019-05-02 DIAGNOSIS — J30.1 SEASONAL ALLERGIC RHINITIS DUE TO POLLEN: ICD-10-CM

## 2019-05-02 PROBLEM — O02.1 MISSED ABORTION: Status: ACTIVE | Noted: 2018-07-16

## 2019-05-02 PROCEDURE — 99213 OFFICE O/P EST LOW 20 MIN: CPT | Performed by: FAMILY MEDICINE

## 2019-05-02 PROCEDURE — 96372 THER/PROPH/DIAG INJ SC/IM: CPT | Performed by: FAMILY MEDICINE

## 2019-05-02 RX ORDER — CYANOCOBALAMIN 1000 UG/ML
1000 INJECTION, SOLUTION INTRAMUSCULAR; SUBCUTANEOUS ONCE
Status: COMPLETED | OUTPATIENT
Start: 2019-05-02 | End: 2019-05-02

## 2019-05-02 RX ADMIN — CYANOCOBALAMIN 1000 MCG: 1000 INJECTION, SOLUTION INTRAMUSCULAR; SUBCUTANEOUS at 14:01

## 2019-05-02 NOTE — PROGRESS NOTES
Subjective   Mona Castillo is a 38 y.o. female. Presents today for   Chief Complaint   Patient presents with   • Follow-up     having issues with symbicort and advair with insurance.  b12 level low       Shortness of Breath   This is a chronic (Hx of asthma) problem. The current episode started more than 1 year ago. The problem occurs intermittently. The problem has been waxing and waning. Pertinent negatives include no fever, hemoptysis, orthopnea, PND, sputum production or wheezing. Associated symptoms comments: Reports symbicort changed to advair, bu tnow generic advair wixela inhub, however has 1 year worth of symbicort so using.. She has tried beta agonist inhalers and steroid inhalers for the symptoms. The treatment provided moderate (stable on inhalers and allergy medications) relief. Her past medical history is significant for asthma.     Had episode of dizziness syncope;  Cardiology directed improve hydration, cut back on caffeine;  Had w/u again and was normal.  B12 was lower normal and notes fatigue    Review of Systems   Constitutional: Negative for fever.   Respiratory: Positive for shortness of breath. Negative for hemoptysis, sputum production and wheezing.    Cardiovascular: Negative for orthopnea and PND.       Patient Active Problem List   Diagnosis   • Asthma   • Seasonal allergic rhinitis   • Dizziness   • Missed        Social History     Socioeconomic History   • Marital status:      Spouse name: Not on file   • Number of children: Not on file   • Years of education: Not on file   • Highest education level: Not on file   Tobacco Use   • Smoking status: Former Smoker   • Smokeless tobacco: Never Used   • Tobacco comment: quit 10/2013   Substance and Sexual Activity   • Alcohol use: Yes     Comment: Social use   • Drug use: No   • Sexual activity: Yes     Partners: Male     Birth control/protection: None       Allergies   Allergen Reactions   • [place] Itching     SOMETIMES HAS  SOA   • Gabapentin Mental Status Change       Current Outpatient Medications on File Prior to Visit   Medication Sig Dispense Refill   • ALBUTEROL SULFATE  (90 Base) MCG/ACT inhaler INHALE 2 PUFFS BY MOUTH EVERY 6 HOURS AS NEEDED FOR WHEEZING 18 g 0   • budesonide-formoterol (SYMBICORT) 160-4.5 MCG/ACT inhaler Inhale 2 puffs 2 (Two) Times a Day.     • fexofenadine (ALLEGRA) 180 MG tablet TAKE 1 TABLET BY MOUTH EVERY DAY 30 tablet 0   • fluticasone (FLONASE) 50 MCG/ACT nasal spray 2 sprays into each nostril Daily.     • [DISCONTINUED] fluticasone-salmeterol (ADVAIR DISKUS) 250-50 MCG/DOSE DISKUS Inhale 1 puff 2 (Two) Times a Day. 60 each 5     No current facility-administered medications on file prior to visit.        Objective   Vitals:    05/02/19 1317   BP: 116/60   Temp: 98.2 °F (36.8 °C)   Weight: 98 kg (216 lb)       Physical Exam   Constitutional: She appears well-developed and well-nourished.   HENT:   Head: Normocephalic and atraumatic.   Neck: Neck supple. No JVD present. No thyromegaly present.   Cardiovascular: Normal rate, regular rhythm and normal heart sounds. Exam reveals no gallop and no friction rub.   No murmur heard.  Pulmonary/Chest: Effort normal and breath sounds normal. No respiratory distress. She has no wheezes. She has no rales.   Abdominal: Soft. Bowel sounds are normal. She exhibits no distension. There is no tenderness. There is no rebound and no guarding.   Musculoskeletal: She exhibits no edema.   Neurological: She is alert.   Skin: Skin is warm and dry.   Psychiatric: She has a normal mood and affect. Her behavior is normal.   Nursing note and vitals reviewed.      Assessment/Plan   Mona was seen today for follow-up.    Diagnoses and all orders for this visit:    Moderate persistent asthma without complication    Seasonal allergic rhinitis due to pollen    B12 deficiency  -     cyanocobalamin injection 1,000 mcg    -will try b12 injections; monthly x 6 months  -asthma  controlled, continue symbicort until gone since has a large supply  -encouraged to maintain hydration, sit down when feel lightheaded  -cntinue allergy medication         -Follow up: 6 months and prn

## 2019-05-12 DIAGNOSIS — J45.40 MODERATE PERSISTENT ASTHMA WITHOUT COMPLICATION: ICD-10-CM

## 2019-05-13 RX ORDER — ALBUTEROL SULFATE 90 UG/1
AEROSOL, METERED RESPIRATORY (INHALATION)
Qty: 18 G | Refills: 0 | Status: SHIPPED | OUTPATIENT
Start: 2019-05-13 | End: 2019-06-06 | Stop reason: SDUPTHER

## 2019-05-15 NOTE — TELEPHONE ENCOUNTER
Pt's insurance will not pay for the symbicort inhaler and she is completely out of symbicort. Insurance will only pay for Wixela inhaler. I have sent that in for pt.

## 2019-06-03 ENCOUNTER — CLINICAL SUPPORT (OUTPATIENT)
Dept: FAMILY MEDICINE CLINIC | Facility: CLINIC | Age: 39
End: 2019-06-03

## 2019-06-03 DIAGNOSIS — E53.8 B12 DEFICIENCY: Primary | ICD-10-CM

## 2019-06-03 PROCEDURE — 96372 THER/PROPH/DIAG INJ SC/IM: CPT | Performed by: FAMILY MEDICINE

## 2019-06-03 RX ORDER — CYANOCOBALAMIN 1000 UG/ML
1000 INJECTION, SOLUTION INTRAMUSCULAR; SUBCUTANEOUS
Status: COMPLETED | OUTPATIENT
Start: 2019-06-03 | End: 2019-09-30

## 2019-06-03 RX ADMIN — CYANOCOBALAMIN 1000 MCG: 1000 INJECTION, SOLUTION INTRAMUSCULAR; SUBCUTANEOUS at 13:35

## 2019-06-06 DIAGNOSIS — J45.40 MODERATE PERSISTENT ASTHMA WITHOUT COMPLICATION: ICD-10-CM

## 2019-06-06 RX ORDER — ALBUTEROL SULFATE 90 UG/1
AEROSOL, METERED RESPIRATORY (INHALATION)
Qty: 18 G | Refills: 0 | Status: SHIPPED | OUTPATIENT
Start: 2019-06-06 | End: 2019-07-25

## 2019-07-10 ENCOUNTER — CLINICAL SUPPORT (OUTPATIENT)
Dept: FAMILY MEDICINE CLINIC | Facility: CLINIC | Age: 39
End: 2019-07-10

## 2019-07-10 DIAGNOSIS — E53.8 B12 DEFICIENCY: ICD-10-CM

## 2019-07-10 PROCEDURE — 96372 THER/PROPH/DIAG INJ SC/IM: CPT | Performed by: FAMILY MEDICINE

## 2019-07-10 RX ADMIN — CYANOCOBALAMIN 1000 MCG: 1000 INJECTION, SOLUTION INTRAMUSCULAR; SUBCUTANEOUS at 11:11

## 2019-07-22 ENCOUNTER — OFFICE VISIT (OUTPATIENT)
Dept: FAMILY MEDICINE CLINIC | Facility: CLINIC | Age: 39
End: 2019-07-22

## 2019-07-22 VITALS
WEIGHT: 214 LBS | SYSTOLIC BLOOD PRESSURE: 94 MMHG | TEMPERATURE: 98.4 F | OXYGEN SATURATION: 98 % | BODY MASS INDEX: 39.38 KG/M2 | HEART RATE: 99 BPM | DIASTOLIC BLOOD PRESSURE: 70 MMHG | HEIGHT: 62 IN

## 2019-07-22 DIAGNOSIS — J06.9 ACUTE URI: ICD-10-CM

## 2019-07-22 DIAGNOSIS — R52 BODY ACHES: ICD-10-CM

## 2019-07-22 DIAGNOSIS — A08.4 VIRAL GASTROENTERITIS: Primary | ICD-10-CM

## 2019-07-22 DIAGNOSIS — J02.9 SORE THROAT: ICD-10-CM

## 2019-07-22 DIAGNOSIS — J45.41 MODERATE PERSISTENT ASTHMA WITH ACUTE EXACERBATION: ICD-10-CM

## 2019-07-22 LAB
EXPIRATION DATE: NORMAL
INTERNAL CONTROL: NORMAL
Lab: NORMAL
S PYO AG THROAT QL: NEGATIVE

## 2019-07-22 PROCEDURE — 99213 OFFICE O/P EST LOW 20 MIN: CPT | Performed by: FAMILY MEDICINE

## 2019-07-22 PROCEDURE — 87880 STREP A ASSAY W/OPTIC: CPT | Performed by: FAMILY MEDICINE

## 2019-07-22 PROCEDURE — 96372 THER/PROPH/DIAG INJ SC/IM: CPT | Performed by: FAMILY MEDICINE

## 2019-07-22 RX ORDER — PROMETHAZINE HCL/CODEINE 6.25-10/5
5 SYRUP ORAL EVERY 4 HOURS PRN
Qty: 240 ML | Refills: 0 | Status: SHIPPED | OUTPATIENT
Start: 2019-07-22 | End: 2019-07-30 | Stop reason: HOSPADM

## 2019-07-22 RX ORDER — KETOROLAC TROMETHAMINE 30 MG/ML
60 INJECTION, SOLUTION INTRAMUSCULAR; INTRAVENOUS ONCE
Status: COMPLETED | OUTPATIENT
Start: 2019-07-22 | End: 2019-07-22

## 2019-07-22 RX ORDER — METHYLPREDNISOLONE ACETATE 80 MG/ML
80 INJECTION, SUSPENSION INTRA-ARTICULAR; INTRALESIONAL; INTRAMUSCULAR; SOFT TISSUE ONCE
Status: COMPLETED | OUTPATIENT
Start: 2019-07-22 | End: 2019-07-22

## 2019-07-22 RX ORDER — LOPERAMIDE HYDROCHLORIDE 2 MG/1
2 TABLET ORAL 4 TIMES DAILY PRN
Qty: 24 TABLET | Refills: 0 | Status: SHIPPED | OUTPATIENT
Start: 2019-07-22 | End: 2019-07-30 | Stop reason: HOSPADM

## 2019-07-22 RX ADMIN — KETOROLAC TROMETHAMINE 60 MG: 30 INJECTION, SOLUTION INTRAMUSCULAR; INTRAVENOUS at 18:11

## 2019-07-22 RX ADMIN — METHYLPREDNISOLONE ACETATE 80 MG: 80 INJECTION, SUSPENSION INTRA-ARTICULAR; INTRALESIONAL; INTRAMUSCULAR; SOFT TISSUE at 18:11

## 2019-07-22 NOTE — PROGRESS NOTES
Subjective   Mona Castillo is a 38 y.o. female. Presents today for   Chief Complaint   Patient presents with   • Cough     dry   • Headache   • Diarrhea   • Sinusitis   • Sore Throat       Sinusitis   This is a new problem. The current episode started in the past 7 days. The problem has been gradually worsening since onset. There has been no fever. Associated symptoms include chills, coughing, headaches, shortness of breath and a sore throat (severe).   Diarrhea    This is a new problem. The current episode started in the past 7 days. The problem has been waxing and waning. The stool consistency is described as watery. The patient states that diarrhea does not awaken her from sleep. Associated symptoms include chills, coughing, a fever, headaches and myalgias. Pertinent negatives include no abdominal pain or vomiting. Risk factors include suspect food intake. She has tried nothing for the symptoms. The treatment provided no relief.       Review of Systems   Constitutional: Positive for chills and fever.   HENT: Positive for sore throat (severe).    Respiratory: Positive for cough, shortness of breath and wheezing.    Gastrointestinal: Positive for diarrhea. Negative for abdominal pain and vomiting.   Musculoskeletal: Positive for myalgias.   Allergic/Immunologic: Positive for environmental allergies.   Neurological: Positive for headaches.       Patient Active Problem List   Diagnosis   • Asthma   • Seasonal allergic rhinitis   • Dizziness   • Missed        Social History     Socioeconomic History   • Marital status:      Spouse name: Not on file   • Number of children: Not on file   • Years of education: Not on file   • Highest education level: Not on file   Tobacco Use   • Smoking status: Former Smoker   • Smokeless tobacco: Never Used   • Tobacco comment: quit 10/2013   Substance and Sexual Activity   • Alcohol use: Yes     Comment: Social use   • Drug use: No   • Sexual activity: Yes      "Partners: Male     Birth control/protection: None       Allergies   Allergen Reactions   • [place] Itching     SOMETIMES HAS SOA   • Gabapentin Mental Status Change       Current Outpatient Medications on File Prior to Visit   Medication Sig Dispense Refill   • ALBUTEROL SULFATE  (90 Base) MCG/ACT inhaler INHALE 2 PUFFS BY MOUTH EVERY 6 HOURS AS NEEDED FOR WHEEZING 18 g 0   • fexofenadine (ALLEGRA) 180 MG tablet TAKE 1 TABLET BY MOUTH EVERY DAY 30 tablet 0   • fluticasone (FLONASE) 50 MCG/ACT nasal spray 2 sprays into each nostril Daily.     • Fluticasone Furoate-Vilanterol (BREO ELLIPTA) 200-25 MCG/INH inhaler Inhale 1 puff Daily. 60 each 5   • fluticasone-salmeterol (ADVAIR) 250-50 MCG/DOSE DISKUS Inhale 1 puff 2 (Two) Times a Day. 60 each 5     Current Facility-Administered Medications on File Prior to Visit   Medication Dose Route Frequency Provider Last Rate Last Dose   • cyanocobalamin injection 1,000 mcg  1,000 mcg Intramuscular Q28 Days David Fernandez DO   1,000 mcg at 07/10/19 1111       Objective   Vitals:    07/22/19 1716   BP: 94/70   BP Location: Left arm   Patient Position: Sitting   Cuff Size: Adult   Pulse: 99   Temp: 98.4 °F (36.9 °C)   TempSrc: Oral   SpO2: 98%   Weight: 97.1 kg (214 lb)   Height: 157.5 cm (62\")       Physical Exam   Constitutional: She appears well-developed and well-nourished.   HENT:   Head: Normocephalic and atraumatic.   Nose: Mucosal edema present.   Mouth/Throat: Uvula is midline. Posterior oropharyngeal erythema present. No oropharyngeal exudate or posterior oropharyngeal edema.   Neck: Neck supple. No JVD present. No thyromegaly present.   Cardiovascular: Normal rate, regular rhythm and normal heart sounds. Exam reveals no gallop and no friction rub.   No murmur heard.  Pulmonary/Chest: Effort normal. No accessory muscle usage. No tachypnea. No respiratory distress. She has decreased breath sounds. She has no wheezes. She has no rales.   Abdominal: Soft. Bowel " sounds are normal. She exhibits no distension. There is no tenderness. There is no rebound and no guarding.   Musculoskeletal: She exhibits no edema.   Neurological: She is alert.   Skin: Skin is warm and dry.   Psychiatric: She has a normal mood and affect. Her behavior is normal.   Nursing note and vitals reviewed.      RST Negative  Assessment/Plan   Mona was seen today for cough, headache, diarrhea, sinusitis and sore throat.    Diagnoses and all orders for this visit:    Viral gastroenteritis  -     loperamide (IMODIUM A-D) 2 MG tablet; Take 1 tablet by mouth 4 (Four) Times a Day As Needed for Diarrhea.    Sore throat  -     POC Rapid Strep A    Moderate persistent asthma with acute exacerbation  -     methylPREDNISolone acetate (DEPO-medrol) injection 80 mg    Body aches  -     ketorolac (TORADOL) injection 60 mg    Acute URI  -     promethazine-codeine (PHENERGAN with CODEINE) 6.25-10 MG/5ML syrup; Take 5 mL by mouth Every 4 (Four) Hours As Needed for Cough.    push fluids, rest  Injections as above  Continue inhalers as Rx         -Follow up: Prn - RTC if worse or no improvement.

## 2019-07-25 ENCOUNTER — APPOINTMENT (OUTPATIENT)
Dept: CT IMAGING | Facility: HOSPITAL | Age: 39
End: 2019-07-25

## 2019-07-25 ENCOUNTER — HOSPITAL ENCOUNTER (INPATIENT)
Facility: HOSPITAL | Age: 39
LOS: 5 days | Discharge: HOME | End: 2019-07-30
Attending: EMERGENCY MEDICINE | Admitting: INTERNAL MEDICINE

## 2019-07-25 DIAGNOSIS — R19.7 DIARRHEA, UNSPECIFIED TYPE: ICD-10-CM

## 2019-07-25 DIAGNOSIS — N17.9 ACUTE RENAL FAILURE, UNSPECIFIED ACUTE RENAL FAILURE TYPE (HCC): Primary | ICD-10-CM

## 2019-07-25 PROBLEM — D64.9 ANEMIA: Status: ACTIVE | Noted: 2019-07-25

## 2019-07-25 PROBLEM — R10.9 ABDOMINAL PAIN: Status: ACTIVE | Noted: 2019-07-25

## 2019-07-25 LAB
ALBUMIN SERPL-MCNC: 3.5 G/DL (ref 3.5–5.2)
ALBUMIN/GLOB SERPL: 1.1 G/DL
ALP SERPL-CCNC: 81 U/L (ref 39–117)
ALT SERPL W P-5'-P-CCNC: 13 U/L (ref 1–33)
ANION GAP SERPL CALCULATED.3IONS-SCNC: 12.4 MMOL/L (ref 5–15)
AST SERPL-CCNC: 28 U/L (ref 1–32)
BASOPHILS # BLD AUTO: 0.02 10*3/MM3 (ref 0–0.2)
BASOPHILS NFR BLD AUTO: 0.2 % (ref 0–1.5)
BILIRUB SERPL-MCNC: 0.6 MG/DL (ref 0.2–1.2)
BUN BLD-MCNC: 21 MG/DL (ref 6–20)
BUN/CREAT SERPL: 8.7 (ref 7–25)
CALCIUM SPEC-SCNC: 8.7 MG/DL (ref 8.6–10.5)
CHLORIDE SERPL-SCNC: 104 MMOL/L (ref 98–107)
CK SERPL-CCNC: 81 U/L (ref 20–180)
CO2 SERPL-SCNC: 20.6 MMOL/L (ref 22–29)
CREAT BLD-MCNC: 2.41 MG/DL (ref 0.57–1)
CREAT UR-MCNC: 41.7 MG/DL
DEPRECATED RDW RBC AUTO: 46.2 FL (ref 37–54)
EOSINOPHIL # BLD AUTO: 0.13 10*3/MM3 (ref 0–0.4)
EOSINOPHIL NFR BLD AUTO: 1.5 % (ref 0.3–6.2)
ERYTHROCYTE [DISTWIDTH] IN BLOOD BY AUTOMATED COUNT: 13.2 % (ref 12.3–15.4)
GFR SERPL CREATININE-BSD FRML MDRD: 23 ML/MIN/1.73
GLOBULIN UR ELPH-MCNC: 3.2 GM/DL
GLUCOSE BLD-MCNC: 91 MG/DL (ref 65–99)
HCG SERPL QL: NEGATIVE
HCT VFR BLD AUTO: 29.9 % (ref 34–46.6)
HGB BLD-MCNC: 9.4 G/DL (ref 12–15.9)
IMM GRANULOCYTES # BLD AUTO: 0.03 10*3/MM3 (ref 0–0.05)
IMM GRANULOCYTES NFR BLD AUTO: 0.3 % (ref 0–0.5)
INR PPP: 1 (ref 0.9–1.1)
LIPASE SERPL-CCNC: 20 U/L (ref 13–60)
LYMPHOCYTES # BLD AUTO: 1.32 10*3/MM3 (ref 0.7–3.1)
LYMPHOCYTES NFR BLD AUTO: 15.4 % (ref 19.6–45.3)
MCH RBC QN AUTO: 30 PG (ref 26.6–33)
MCHC RBC AUTO-ENTMCNC: 31.4 G/DL (ref 31.5–35.7)
MCV RBC AUTO: 95.5 FL (ref 79–97)
MONOCYTES # BLD AUTO: 0.66 10*3/MM3 (ref 0.1–0.9)
MONOCYTES NFR BLD AUTO: 7.7 % (ref 5–12)
NEUTROPHILS # BLD AUTO: 6.42 10*3/MM3 (ref 1.7–7)
NEUTROPHILS NFR BLD AUTO: 74.9 % (ref 42.7–76)
NRBC BLD AUTO-RTO: 0 /100 WBC (ref 0–0.2)
PLATELET # BLD AUTO: 210 10*3/MM3 (ref 140–450)
PMV BLD AUTO: 9.9 FL (ref 6–12)
POTASSIUM BLD-SCNC: 5.3 MMOL/L (ref 3.5–5.2)
PROT SERPL-MCNC: 6.7 G/DL (ref 6–8.5)
PROTHROMBIN TIME: 12.9 SECONDS (ref 11.7–14.2)
RBC # BLD AUTO: 3.13 10*6/MM3 (ref 3.77–5.28)
SODIUM BLD-SCNC: 137 MMOL/L (ref 136–145)
SODIUM UR-SCNC: 37 MMOL/L
WBC NRBC COR # BLD: 8.58 10*3/MM3 (ref 3.4–10.8)

## 2019-07-25 PROCEDURE — 85025 COMPLETE CBC W/AUTO DIFF WBC: CPT | Performed by: EMERGENCY MEDICINE

## 2019-07-25 PROCEDURE — 80053 COMPREHEN METABOLIC PANEL: CPT | Performed by: EMERGENCY MEDICINE

## 2019-07-25 PROCEDURE — 82550 ASSAY OF CK (CPK): CPT | Performed by: INTERNAL MEDICINE

## 2019-07-25 PROCEDURE — 84300 ASSAY OF URINE SODIUM: CPT | Performed by: INTERNAL MEDICINE

## 2019-07-25 PROCEDURE — 84703 CHORIONIC GONADOTROPIN ASSAY: CPT | Performed by: EMERGENCY MEDICINE

## 2019-07-25 PROCEDURE — 81001 URINALYSIS AUTO W/SCOPE: CPT | Performed by: EMERGENCY MEDICINE

## 2019-07-25 PROCEDURE — 85610 PROTHROMBIN TIME: CPT | Performed by: EMERGENCY MEDICINE

## 2019-07-25 PROCEDURE — 74176 CT ABD & PELVIS W/O CONTRAST: CPT

## 2019-07-25 PROCEDURE — 25010000002 HYDROMORPHONE PER 4 MG: Performed by: EMERGENCY MEDICINE

## 2019-07-25 PROCEDURE — 87086 URINE CULTURE/COLONY COUNT: CPT | Performed by: INTERNAL MEDICINE

## 2019-07-25 PROCEDURE — 36415 COLL VENOUS BLD VENIPUNCTURE: CPT

## 2019-07-25 PROCEDURE — 83690 ASSAY OF LIPASE: CPT | Performed by: EMERGENCY MEDICINE

## 2019-07-25 PROCEDURE — 99284 EMERGENCY DEPT VISIT MOD MDM: CPT

## 2019-07-25 PROCEDURE — 82570 ASSAY OF URINE CREATININE: CPT | Performed by: INTERNAL MEDICINE

## 2019-07-25 PROCEDURE — 25010000002 ONDANSETRON PER 1 MG: Performed by: EMERGENCY MEDICINE

## 2019-07-25 RX ORDER — ACETAMINOPHEN 325 MG/1
650 TABLET ORAL EVERY 4 HOURS PRN
Status: DISCONTINUED | OUTPATIENT
Start: 2019-07-25 | End: 2019-07-30 | Stop reason: HOSPADM

## 2019-07-25 RX ORDER — CETIRIZINE HYDROCHLORIDE 10 MG/1
5 TABLET ORAL DAILY
Status: DISCONTINUED | OUTPATIENT
Start: 2019-07-25 | End: 2019-07-25

## 2019-07-25 RX ORDER — SODIUM CHLORIDE 0.9 % (FLUSH) 0.9 %
3-10 SYRINGE (ML) INJECTION AS NEEDED
Status: DISCONTINUED | OUTPATIENT
Start: 2019-07-25 | End: 2019-07-30 | Stop reason: HOSPADM

## 2019-07-25 RX ORDER — FEXOFENADINE HCL 180 MG/1
180 TABLET ORAL DAILY PRN
COMMUNITY
End: 2020-12-14

## 2019-07-25 RX ORDER — SODIUM CHLORIDE 9 MG/ML
125 INJECTION, SOLUTION INTRAVENOUS CONTINUOUS
Status: DISCONTINUED | OUTPATIENT
Start: 2019-07-25 | End: 2019-07-28

## 2019-07-25 RX ORDER — OXYCODONE HYDROCHLORIDE AND ACETAMINOPHEN 5; 325 MG/1; MG/1
1 TABLET ORAL EVERY 4 HOURS PRN
Status: DISCONTINUED | OUTPATIENT
Start: 2019-07-25 | End: 2019-07-30 | Stop reason: HOSPADM

## 2019-07-25 RX ORDER — FLUTICASONE PROPIONATE 50 MCG
2 SPRAY, SUSPENSION (ML) NASAL DAILY
Status: DISCONTINUED | OUTPATIENT
Start: 2019-07-26 | End: 2019-07-30 | Stop reason: HOSPADM

## 2019-07-25 RX ORDER — BISACODYL 5 MG/1
5 TABLET, DELAYED RELEASE ORAL DAILY PRN
Status: DISCONTINUED | OUTPATIENT
Start: 2019-07-25 | End: 2019-07-30 | Stop reason: HOSPADM

## 2019-07-25 RX ORDER — SODIUM CHLORIDE 0.9 % (FLUSH) 0.9 %
3 SYRINGE (ML) INJECTION EVERY 12 HOURS SCHEDULED
Status: DISCONTINUED | OUTPATIENT
Start: 2019-07-25 | End: 2019-07-30 | Stop reason: HOSPADM

## 2019-07-25 RX ORDER — ONDANSETRON 2 MG/ML
4 INJECTION INTRAMUSCULAR; INTRAVENOUS ONCE
Status: COMPLETED | OUTPATIENT
Start: 2019-07-25 | End: 2019-07-25

## 2019-07-25 RX ORDER — ALBUTEROL SULFATE 90 UG/1
2 AEROSOL, METERED RESPIRATORY (INHALATION) EVERY 6 HOURS PRN
COMMUNITY
End: 2020-01-17

## 2019-07-25 RX ORDER — BUDESONIDE AND FORMOTEROL FUMARATE DIHYDRATE 80; 4.5 UG/1; UG/1
2 AEROSOL RESPIRATORY (INHALATION)
Status: DISCONTINUED | OUTPATIENT
Start: 2019-07-25 | End: 2019-07-30 | Stop reason: HOSPADM

## 2019-07-25 RX ORDER — CETIRIZINE HYDROCHLORIDE 10 MG/1
5 TABLET ORAL DAILY
Status: DISCONTINUED | OUTPATIENT
Start: 2019-07-26 | End: 2019-07-30 | Stop reason: HOSPADM

## 2019-07-25 RX ORDER — ALBUTEROL SULFATE 2.5 MG/3ML
2 SOLUTION RESPIRATORY (INHALATION) EVERY 6 HOURS PRN
Status: DISCONTINUED | OUTPATIENT
Start: 2019-07-25 | End: 2019-07-30 | Stop reason: HOSPADM

## 2019-07-25 RX ORDER — ONDANSETRON 2 MG/ML
4 INJECTION INTRAMUSCULAR; INTRAVENOUS EVERY 6 HOURS PRN
Status: DISCONTINUED | OUTPATIENT
Start: 2019-07-25 | End: 2019-07-30 | Stop reason: HOSPADM

## 2019-07-25 RX ORDER — FLUTICASONE PROPIONATE 50 MCG
2 SPRAY, SUSPENSION (ML) NASAL DAILY
Status: DISCONTINUED | OUTPATIENT
Start: 2019-07-25 | End: 2019-07-25

## 2019-07-25 RX ORDER — PSEUDOEPHEDRINE HYDROCHLORIDE 60 MG/1
60 TABLET, FILM COATED ORAL DAILY PRN
COMMUNITY
End: 2019-07-30 | Stop reason: HOSPADM

## 2019-07-25 RX ORDER — HYDROMORPHONE HYDROCHLORIDE 1 MG/ML
0.5 INJECTION, SOLUTION INTRAMUSCULAR; INTRAVENOUS; SUBCUTANEOUS ONCE
Status: COMPLETED | OUTPATIENT
Start: 2019-07-25 | End: 2019-07-25

## 2019-07-25 RX ORDER — LOPERAMIDE HYDROCHLORIDE 2 MG/1
2 CAPSULE ORAL 3 TIMES DAILY PRN
Status: DISCONTINUED | OUTPATIENT
Start: 2019-07-25 | End: 2019-07-30 | Stop reason: HOSPADM

## 2019-07-25 RX ORDER — ONDANSETRON 4 MG/1
4 TABLET, FILM COATED ORAL EVERY 6 HOURS PRN
Status: DISCONTINUED | OUTPATIENT
Start: 2019-07-25 | End: 2019-07-30 | Stop reason: HOSPADM

## 2019-07-25 RX ORDER — FAMOTIDINE 10 MG/ML
20 INJECTION, SOLUTION INTRAVENOUS DAILY
Status: DISCONTINUED | OUTPATIENT
Start: 2019-07-25 | End: 2019-07-29

## 2019-07-25 RX ADMIN — HYDROMORPHONE HYDROCHLORIDE 0.5 MG: 1 INJECTION, SOLUTION INTRAMUSCULAR; INTRAVENOUS; SUBCUTANEOUS at 16:21

## 2019-07-25 RX ADMIN — ONDANSETRON HYDROCHLORIDE 4 MG: 2 SOLUTION INTRAMUSCULAR; INTRAVENOUS at 16:19

## 2019-07-25 RX ADMIN — SODIUM CHLORIDE 1000 ML: 9 INJECTION, SOLUTION INTRAVENOUS at 16:18

## 2019-07-25 RX ADMIN — SODIUM CHLORIDE 125 ML/HR: 9 INJECTION, SOLUTION INTRAVENOUS at 18:21

## 2019-07-25 RX ADMIN — OXYCODONE HYDROCHLORIDE AND ACETAMINOPHEN 1 TABLET: 5; 325 TABLET ORAL at 20:08

## 2019-07-26 ENCOUNTER — INPATIENT HOSPITAL (OUTPATIENT)
Dept: URBAN - METROPOLITAN AREA HOSPITAL 113 | Facility: HOSPITAL | Age: 39
End: 2019-07-26
Payer: COMMERCIAL

## 2019-07-26 DIAGNOSIS — R63.0 ANOREXIA: ICD-10-CM

## 2019-07-26 DIAGNOSIS — K59.03 DRUG INDUCED CONSTIPATION: ICD-10-CM

## 2019-07-26 DIAGNOSIS — R10.13 EPIGASTRIC PAIN: ICD-10-CM

## 2019-07-26 DIAGNOSIS — N17.9 ACUTE KIDNEY FAILURE, UNSPECIFIED: ICD-10-CM

## 2019-07-26 DIAGNOSIS — R11.0 NAUSEA: ICD-10-CM

## 2019-07-26 DIAGNOSIS — R19.7 DIARRHEA, UNSPECIFIED: ICD-10-CM

## 2019-07-26 LAB
ANION GAP SERPL CALCULATED.3IONS-SCNC: 10.8 MMOL/L (ref 5–15)
BACTERIA UR QL AUTO: ABNORMAL /HPF
BASOPHILS # BLD AUTO: 0.04 10*3/MM3 (ref 0–0.2)
BASOPHILS NFR BLD AUTO: 0.5 % (ref 0–1.5)
BILIRUB UR QL STRIP: NEGATIVE
BUN BLD-MCNC: 22 MG/DL (ref 6–20)
BUN/CREAT SERPL: 7.9 (ref 7–25)
CALCIUM SPEC-SCNC: 8 MG/DL (ref 8.6–10.5)
CHLORIDE SERPL-SCNC: 107 MMOL/L (ref 98–107)
CLARITY UR: CLEAR
CO2 SERPL-SCNC: 20.2 MMOL/L (ref 22–29)
COLOR UR: YELLOW
CREAT BLD-MCNC: 2.79 MG/DL (ref 0.57–1)
DEPRECATED RDW RBC AUTO: 47.5 FL (ref 37–54)
EOSINOPHIL # BLD AUTO: 0.22 10*3/MM3 (ref 0–0.4)
EOSINOPHIL NFR BLD AUTO: 2.7 % (ref 0.3–6.2)
ERYTHROCYTE [DISTWIDTH] IN BLOOD BY AUTOMATED COUNT: 13.3 % (ref 12.3–15.4)
GFR SERPL CREATININE-BSD FRML MDRD: 19 ML/MIN/1.73
GLUCOSE BLD-MCNC: 86 MG/DL (ref 65–99)
GLUCOSE UR STRIP-MCNC: NEGATIVE MG/DL
HCT VFR BLD AUTO: 36.6 % (ref 34–46.6)
HGB BLD-MCNC: 11.3 G/DL (ref 12–15.9)
HGB UR QL STRIP.AUTO: NEGATIVE
HYALINE CASTS UR QL AUTO: ABNORMAL /LPF
IMM GRANULOCYTES # BLD AUTO: 0.03 10*3/MM3 (ref 0–0.05)
IMM GRANULOCYTES NFR BLD AUTO: 0.4 % (ref 0–0.5)
KETONES UR QL STRIP: NEGATIVE
LEUKOCYTE ESTERASE UR QL STRIP.AUTO: NEGATIVE
LYMPHOCYTES # BLD AUTO: 1.5 10*3/MM3 (ref 0.7–3.1)
LYMPHOCYTES NFR BLD AUTO: 18.4 % (ref 19.6–45.3)
MCH RBC QN AUTO: 29.7 PG (ref 26.6–33)
MCHC RBC AUTO-ENTMCNC: 30.9 G/DL (ref 31.5–35.7)
MCV RBC AUTO: 96.3 FL (ref 79–97)
MONOCYTES # BLD AUTO: 0.79 10*3/MM3 (ref 0.1–0.9)
MONOCYTES NFR BLD AUTO: 9.7 % (ref 5–12)
NEUTROPHILS # BLD AUTO: 5.56 10*3/MM3 (ref 1.7–7)
NEUTROPHILS NFR BLD AUTO: 68.3 % (ref 42.7–76)
NITRITE UR QL STRIP: NEGATIVE
NRBC BLD AUTO-RTO: 0 /100 WBC (ref 0–0.2)
PH UR STRIP.AUTO: 5.5 [PH] (ref 5–8)
PLATELET # BLD AUTO: 259 10*3/MM3 (ref 140–450)
PMV BLD AUTO: 10.1 FL (ref 6–12)
POTASSIUM BLD-SCNC: 4.3 MMOL/L (ref 3.5–5.2)
PROT UR QL STRIP: ABNORMAL
RBC # BLD AUTO: 3.8 10*6/MM3 (ref 3.77–5.28)
RBC # UR: ABNORMAL /HPF
REF LAB TEST METHOD: ABNORMAL
SODIUM BLD-SCNC: 138 MMOL/L (ref 136–145)
SP GR UR STRIP: 1.01 (ref 1–1.03)
SQUAMOUS #/AREA URNS HPF: ABNORMAL /HPF
UROBILINOGEN UR QL STRIP: ABNORMAL
WBC NRBC COR # BLD: 8.14 10*3/MM3 (ref 3.4–10.8)
WBC UR QL AUTO: ABNORMAL /HPF

## 2019-07-26 PROCEDURE — 94640 AIRWAY INHALATION TREATMENT: CPT

## 2019-07-26 PROCEDURE — 94799 UNLISTED PULMONARY SVC/PX: CPT

## 2019-07-26 PROCEDURE — 99223 1ST HOSP IP/OBS HIGH 75: CPT | Performed by: INTERNAL MEDICINE

## 2019-07-26 PROCEDURE — 85025 COMPLETE CBC W/AUTO DIFF WBC: CPT | Performed by: INTERNAL MEDICINE

## 2019-07-26 PROCEDURE — 25010000002 ONDANSETRON PER 1 MG: Performed by: INTERNAL MEDICINE

## 2019-07-26 PROCEDURE — 80048 BASIC METABOLIC PNL TOTAL CA: CPT | Performed by: INTERNAL MEDICINE

## 2019-07-26 RX ORDER — SUCRALFATE ORAL 1 G/10ML
1 SUSPENSION ORAL
Status: DISCONTINUED | OUTPATIENT
Start: 2019-07-26 | End: 2019-07-30 | Stop reason: HOSPADM

## 2019-07-26 RX ADMIN — OXYCODONE HYDROCHLORIDE AND ACETAMINOPHEN 1 TABLET: 5; 325 TABLET ORAL at 05:37

## 2019-07-26 RX ADMIN — SODIUM CHLORIDE 125 ML/HR: 9 INJECTION, SOLUTION INTRAVENOUS at 06:53

## 2019-07-26 RX ADMIN — FLUTICASONE PROPIONATE 2 SPRAY: 50 SPRAY, METERED NASAL at 09:10

## 2019-07-26 RX ADMIN — OXYCODONE HYDROCHLORIDE AND ACETAMINOPHEN 1 TABLET: 5; 325 TABLET ORAL at 22:20

## 2019-07-26 RX ADMIN — FAMOTIDINE 20 MG: 10 INJECTION INTRAVENOUS at 00:06

## 2019-07-26 RX ADMIN — FAMOTIDINE 20 MG: 10 INJECTION INTRAVENOUS at 09:11

## 2019-07-26 RX ADMIN — SUCRALFATE 1 G: 1 SUSPENSION ORAL at 20:28

## 2019-07-26 RX ADMIN — OXYCODONE HYDROCHLORIDE AND ACETAMINOPHEN 1 TABLET: 5; 325 TABLET ORAL at 13:44

## 2019-07-26 RX ADMIN — ONDANSETRON HYDROCHLORIDE 4 MG: 2 SOLUTION INTRAMUSCULAR; INTRAVENOUS at 00:06

## 2019-07-26 RX ADMIN — BUDESONIDE AND FORMOTEROL FUMARATE DIHYDRATE 2 PUFF: 80; 4.5 AEROSOL RESPIRATORY (INHALATION) at 20:13

## 2019-07-26 RX ADMIN — CETIRIZINE HYDROCHLORIDE 5 MG: 10 TABLET, FILM COATED ORAL at 09:11

## 2019-07-26 RX ADMIN — BUDESONIDE AND FORMOTEROL FUMARATE DIHYDRATE 2 PUFF: 80; 4.5 AEROSOL RESPIRATORY (INHALATION) at 08:11

## 2019-07-26 RX ADMIN — SUCRALFATE 1 G: 1 SUSPENSION ORAL at 16:33

## 2019-07-27 LAB
ANION GAP SERPL CALCULATED.3IONS-SCNC: 13.3 MMOL/L (ref 5–15)
BACTERIA SPEC AEROBE CULT: NO GROWTH
BASOPHILS # BLD AUTO: 0.03 10*3/MM3 (ref 0–0.2)
BASOPHILS NFR BLD AUTO: 0.3 % (ref 0–1.5)
BILIRUB UR QL STRIP: NEGATIVE
BUN BLD-MCNC: 22 MG/DL (ref 6–20)
BUN/CREAT SERPL: 7.5 (ref 7–25)
CALCIUM SPEC-SCNC: 7.6 MG/DL (ref 8.6–10.5)
CHLORIDE SERPL-SCNC: 112 MMOL/L (ref 98–107)
CHLORIDE UR-SCNC: 60 MMOL/L
CLARITY UR: CLEAR
CO2 SERPL-SCNC: 18.7 MMOL/L (ref 22–29)
COLOR UR: YELLOW
CREAT BLD-MCNC: 2.95 MG/DL (ref 0.57–1)
DEPRECATED RDW RBC AUTO: 46.6 FL (ref 37–54)
EOSINOPHIL # BLD AUTO: 0.25 10*3/MM3 (ref 0–0.4)
EOSINOPHIL NFR BLD AUTO: 2.8 % (ref 0.3–6.2)
ERYTHROCYTE [DISTWIDTH] IN BLOOD BY AUTOMATED COUNT: 13.3 % (ref 12.3–15.4)
GFR SERPL CREATININE-BSD FRML MDRD: 18 ML/MIN/1.73
GLUCOSE BLD-MCNC: 88 MG/DL (ref 65–99)
GLUCOSE UR STRIP-MCNC: NEGATIVE MG/DL
HCT VFR BLD AUTO: 33 % (ref 34–46.6)
HGB BLD-MCNC: 10.5 G/DL (ref 12–15.9)
HGB UR QL STRIP.AUTO: NEGATIVE
IMM GRANULOCYTES # BLD AUTO: 0.04 10*3/MM3 (ref 0–0.05)
IMM GRANULOCYTES NFR BLD AUTO: 0.4 % (ref 0–0.5)
KETONES UR QL STRIP: NEGATIVE
LEUKOCYTE ESTERASE UR QL STRIP.AUTO: NEGATIVE
LYMPHOCYTES # BLD AUTO: 1.75 10*3/MM3 (ref 0.7–3.1)
LYMPHOCYTES NFR BLD AUTO: 19.7 % (ref 19.6–45.3)
MCH RBC QN AUTO: 30.5 PG (ref 26.6–33)
MCHC RBC AUTO-ENTMCNC: 31.8 G/DL (ref 31.5–35.7)
MCV RBC AUTO: 95.9 FL (ref 79–97)
MONOCYTES # BLD AUTO: 0.72 10*3/MM3 (ref 0.1–0.9)
MONOCYTES NFR BLD AUTO: 8.1 % (ref 5–12)
NEUTROPHILS # BLD AUTO: 6.11 10*3/MM3 (ref 1.7–7)
NEUTROPHILS NFR BLD AUTO: 68.7 % (ref 42.7–76)
NITRITE UR QL STRIP: NEGATIVE
NRBC BLD AUTO-RTO: 0 /100 WBC (ref 0–0.2)
PH UR STRIP.AUTO: <=5 [PH] (ref 5–8)
PLATELET # BLD AUTO: 233 10*3/MM3 (ref 140–450)
PMV BLD AUTO: 9.9 FL (ref 6–12)
POTASSIUM BLD-SCNC: 4 MMOL/L (ref 3.5–5.2)
PROT UR QL STRIP: NEGATIVE
RBC # BLD AUTO: 3.44 10*6/MM3 (ref 3.77–5.28)
SODIUM BLD-SCNC: 144 MMOL/L (ref 136–145)
SODIUM UR-SCNC: 69 MMOL/L
SP GR UR STRIP: 1.01 (ref 1–1.03)
UROBILINOGEN UR QL STRIP: NORMAL
WBC NRBC COR # BLD: 8.9 10*3/MM3 (ref 3.4–10.8)

## 2019-07-27 PROCEDURE — 81003 URINALYSIS AUTO W/O SCOPE: CPT | Performed by: INTERNAL MEDICINE

## 2019-07-27 PROCEDURE — 99232 SBSQ HOSP IP/OBS MODERATE 35: CPT | Performed by: INTERNAL MEDICINE

## 2019-07-27 PROCEDURE — 85025 COMPLETE CBC W/AUTO DIFF WBC: CPT | Performed by: HOSPITALIST

## 2019-07-27 PROCEDURE — 94799 UNLISTED PULMONARY SVC/PX: CPT

## 2019-07-27 PROCEDURE — 25010000002 CALCIUM GLUCONATE PER 10 ML: Performed by: INTERNAL MEDICINE

## 2019-07-27 PROCEDURE — 82436 ASSAY OF URINE CHLORIDE: CPT | Performed by: INTERNAL MEDICINE

## 2019-07-27 PROCEDURE — 84300 ASSAY OF URINE SODIUM: CPT | Performed by: INTERNAL MEDICINE

## 2019-07-27 PROCEDURE — 80048 BASIC METABOLIC PNL TOTAL CA: CPT | Performed by: HOSPITALIST

## 2019-07-27 RX ORDER — DICYCLOMINE HYDROCHLORIDE 10 MG/1
10 CAPSULE ORAL 4 TIMES DAILY
Status: DISCONTINUED | OUTPATIENT
Start: 2019-07-27 | End: 2019-07-30 | Stop reason: HOSPADM

## 2019-07-27 RX ORDER — DOCUSATE SODIUM 100 MG/1
100 CAPSULE, LIQUID FILLED ORAL DAILY
Status: DISCONTINUED | OUTPATIENT
Start: 2019-07-27 | End: 2019-07-30 | Stop reason: HOSPADM

## 2019-07-27 RX ORDER — SODIUM BICARBONATE 650 MG/1
650 TABLET ORAL 2 TIMES DAILY
Status: DISCONTINUED | OUTPATIENT
Start: 2019-07-27 | End: 2019-07-30

## 2019-07-27 RX ADMIN — DOCUSATE SODIUM 100 MG: 100 CAPSULE, LIQUID FILLED ORAL at 09:45

## 2019-07-27 RX ADMIN — DICYCLOMINE HYDROCHLORIDE 10 MG: 10 CAPSULE ORAL at 20:01

## 2019-07-27 RX ADMIN — OXYCODONE HYDROCHLORIDE AND ACETAMINOPHEN 1 TABLET: 5; 325 TABLET ORAL at 10:41

## 2019-07-27 RX ADMIN — ONDANSETRON 4 MG: 4 TABLET, FILM COATED ORAL at 09:45

## 2019-07-27 RX ADMIN — OXYCODONE HYDROCHLORIDE AND ACETAMINOPHEN 1 TABLET: 5; 325 TABLET ORAL at 17:20

## 2019-07-27 RX ADMIN — FAMOTIDINE 20 MG: 10 INJECTION INTRAVENOUS at 09:45

## 2019-07-27 RX ADMIN — SUCRALFATE 1 G: 1 SUSPENSION ORAL at 17:20

## 2019-07-27 RX ADMIN — CALCIUM GLUCONATE 1 G: 98 INJECTION, SOLUTION INTRAVENOUS at 17:20

## 2019-07-27 RX ADMIN — SUCRALFATE 1 G: 1 SUSPENSION ORAL at 20:01

## 2019-07-27 RX ADMIN — BUDESONIDE AND FORMOTEROL FUMARATE DIHYDRATE 2 PUFF: 80; 4.5 AEROSOL RESPIRATORY (INHALATION) at 07:48

## 2019-07-27 RX ADMIN — DICYCLOMINE HYDROCHLORIDE 10 MG: 10 CAPSULE ORAL at 17:20

## 2019-07-27 RX ADMIN — SUCRALFATE 1 G: 1 SUSPENSION ORAL at 06:40

## 2019-07-27 RX ADMIN — SODIUM BICARBONATE 650 MG: 650 TABLET ORAL at 20:01

## 2019-07-27 RX ADMIN — FLUTICASONE PROPIONATE 2 SPRAY: 50 SPRAY, METERED NASAL at 09:46

## 2019-07-27 RX ADMIN — CETIRIZINE HYDROCHLORIDE 5 MG: 10 TABLET, FILM COATED ORAL at 09:46

## 2019-07-27 RX ADMIN — OXYCODONE HYDROCHLORIDE AND ACETAMINOPHEN 1 TABLET: 5; 325 TABLET ORAL at 06:40

## 2019-07-28 LAB
ALBUMIN SERPL-MCNC: 3.3 G/DL (ref 3.5–5.2)
ANION GAP SERPL CALCULATED.3IONS-SCNC: 12.6 MMOL/L (ref 5–15)
BASOPHILS # BLD AUTO: 0.02 10*3/MM3 (ref 0–0.2)
BASOPHILS NFR BLD AUTO: 0.2 % (ref 0–1.5)
BUN BLD-MCNC: 19 MG/DL (ref 6–20)
BUN/CREAT SERPL: 6.3 (ref 7–25)
CALCIUM SPEC-SCNC: 8.1 MG/DL (ref 8.6–10.5)
CHLORIDE SERPL-SCNC: 113 MMOL/L (ref 98–107)
CO2 SERPL-SCNC: 20.4 MMOL/L (ref 22–29)
CREAT BLD-MCNC: 3.04 MG/DL (ref 0.57–1)
DEPRECATED RDW RBC AUTO: 46.5 FL (ref 37–54)
EOSINOPHIL # BLD AUTO: 0.24 10*3/MM3 (ref 0–0.4)
EOSINOPHIL NFR BLD AUTO: 2.8 % (ref 0.3–6.2)
ERYTHROCYTE [DISTWIDTH] IN BLOOD BY AUTOMATED COUNT: 13.1 % (ref 12.3–15.4)
GFR SERPL CREATININE-BSD FRML MDRD: 17 ML/MIN/1.73
GLUCOSE BLD-MCNC: 130 MG/DL (ref 65–99)
HCT VFR BLD AUTO: 31.7 % (ref 34–46.6)
HGB BLD-MCNC: 10.1 G/DL (ref 12–15.9)
IMM GRANULOCYTES # BLD AUTO: 0.03 10*3/MM3 (ref 0–0.05)
IMM GRANULOCYTES NFR BLD AUTO: 0.3 % (ref 0–0.5)
LYMPHOCYTES # BLD AUTO: 0.86 10*3/MM3 (ref 0.7–3.1)
LYMPHOCYTES NFR BLD AUTO: 10 % (ref 19.6–45.3)
MAGNESIUM SERPL-MCNC: 1.9 MG/DL (ref 1.6–2.6)
MCH RBC QN AUTO: 30.7 PG (ref 26.6–33)
MCHC RBC AUTO-ENTMCNC: 31.9 G/DL (ref 31.5–35.7)
MCV RBC AUTO: 96.4 FL (ref 79–97)
MONOCYTES # BLD AUTO: 0.68 10*3/MM3 (ref 0.1–0.9)
MONOCYTES NFR BLD AUTO: 7.9 % (ref 5–12)
NEUTROPHILS # BLD AUTO: 6.8 10*3/MM3 (ref 1.7–7)
NEUTROPHILS NFR BLD AUTO: 78.8 % (ref 42.7–76)
NRBC BLD AUTO-RTO: 0 /100 WBC (ref 0–0.2)
PHOSPHATE SERPL-MCNC: 3 MG/DL (ref 2.5–4.5)
PLATELET # BLD AUTO: 235 10*3/MM3 (ref 140–450)
PMV BLD AUTO: 10 FL (ref 6–12)
POTASSIUM BLD-SCNC: 4.1 MMOL/L (ref 3.5–5.2)
RBC # BLD AUTO: 3.29 10*6/MM3 (ref 3.77–5.28)
SODIUM BLD-SCNC: 146 MMOL/L (ref 136–145)
WBC NRBC COR # BLD: 8.63 10*3/MM3 (ref 3.4–10.8)

## 2019-07-28 PROCEDURE — 83735 ASSAY OF MAGNESIUM: CPT | Performed by: INTERNAL MEDICINE

## 2019-07-28 PROCEDURE — 85025 COMPLETE CBC W/AUTO DIFF WBC: CPT | Performed by: HOSPITALIST

## 2019-07-28 PROCEDURE — 94640 AIRWAY INHALATION TREATMENT: CPT

## 2019-07-28 PROCEDURE — 99232 SBSQ HOSP IP/OBS MODERATE 35: CPT | Performed by: INTERNAL MEDICINE

## 2019-07-28 PROCEDURE — 94799 UNLISTED PULMONARY SVC/PX: CPT

## 2019-07-28 PROCEDURE — 80069 RENAL FUNCTION PANEL: CPT | Performed by: HOSPITALIST

## 2019-07-28 RX ORDER — SODIUM CHLORIDE 450 MG/100ML
100 INJECTION, SOLUTION INTRAVENOUS CONTINUOUS
Status: ACTIVE | OUTPATIENT
Start: 2019-07-28 | End: 2019-07-28

## 2019-07-28 RX ADMIN — SODIUM CHLORIDE 100 ML/HR: 4.5 INJECTION, SOLUTION INTRAVENOUS at 12:04

## 2019-07-28 RX ADMIN — DICYCLOMINE HYDROCHLORIDE 10 MG: 10 CAPSULE ORAL at 20:42

## 2019-07-28 RX ADMIN — ACETAMINOPHEN 650 MG: 325 TABLET, FILM COATED ORAL at 09:02

## 2019-07-28 RX ADMIN — ALBUTEROL SULFATE 2.5 MG: 2.5 SOLUTION RESPIRATORY (INHALATION) at 05:24

## 2019-07-28 RX ADMIN — CETIRIZINE HYDROCHLORIDE 5 MG: 10 TABLET, FILM COATED ORAL at 08:45

## 2019-07-28 RX ADMIN — ALBUTEROL SULFATE 2.5 MG: 2.5 SOLUTION RESPIRATORY (INHALATION) at 19:46

## 2019-07-28 RX ADMIN — SUCRALFATE 1 G: 1 SUSPENSION ORAL at 12:03

## 2019-07-28 RX ADMIN — DICYCLOMINE HYDROCHLORIDE 10 MG: 10 CAPSULE ORAL at 12:03

## 2019-07-28 RX ADMIN — DOCUSATE SODIUM 100 MG: 100 CAPSULE, LIQUID FILLED ORAL at 08:46

## 2019-07-28 RX ADMIN — DICYCLOMINE HYDROCHLORIDE 10 MG: 10 CAPSULE ORAL at 18:34

## 2019-07-28 RX ADMIN — OXYCODONE HYDROCHLORIDE AND ACETAMINOPHEN 1 TABLET: 5; 325 TABLET ORAL at 11:01

## 2019-07-28 RX ADMIN — BUDESONIDE AND FORMOTEROL FUMARATE DIHYDRATE 2 PUFF: 80; 4.5 AEROSOL RESPIRATORY (INHALATION) at 21:45

## 2019-07-28 RX ADMIN — BUDESONIDE AND FORMOTEROL FUMARATE DIHYDRATE 2 PUFF: 80; 4.5 AEROSOL RESPIRATORY (INHALATION) at 12:08

## 2019-07-28 RX ADMIN — SUCRALFATE 1 G: 1 SUSPENSION ORAL at 18:34

## 2019-07-28 RX ADMIN — DICYCLOMINE HYDROCHLORIDE 10 MG: 10 CAPSULE ORAL at 08:00

## 2019-07-28 RX ADMIN — SODIUM BICARBONATE 650 MG: 650 TABLET ORAL at 08:45

## 2019-07-28 RX ADMIN — BUDESONIDE AND FORMOTEROL FUMARATE DIHYDRATE 2 PUFF: 80; 4.5 AEROSOL RESPIRATORY (INHALATION) at 04:54

## 2019-07-28 RX ADMIN — OXYCODONE HYDROCHLORIDE AND ACETAMINOPHEN 1 TABLET: 5; 325 TABLET ORAL at 00:03

## 2019-07-28 RX ADMIN — FLUTICASONE PROPIONATE 2 SPRAY: 50 SPRAY, METERED NASAL at 08:46

## 2019-07-28 RX ADMIN — SODIUM CHLORIDE, PRESERVATIVE FREE 3 ML: 5 INJECTION INTRAVENOUS at 20:43

## 2019-07-28 RX ADMIN — SUCRALFATE 1 G: 1 SUSPENSION ORAL at 20:42

## 2019-07-28 RX ADMIN — SODIUM BICARBONATE 650 MG: 650 TABLET ORAL at 20:42

## 2019-07-28 RX ADMIN — ACETAMINOPHEN 650 MG: 325 TABLET, FILM COATED ORAL at 23:05

## 2019-07-28 RX ADMIN — FAMOTIDINE 20 MG: 10 INJECTION INTRAVENOUS at 08:46

## 2019-07-29 ENCOUNTER — APPOINTMENT (OUTPATIENT)
Dept: GENERAL RADIOLOGY | Facility: HOSPITAL | Age: 39
End: 2019-07-29

## 2019-07-29 PROBLEM — J45.909 BRONCHITIS WITH ASTHMA, ACUTE: Status: ACTIVE | Noted: 2019-07-29

## 2019-07-29 PROBLEM — J20.9 BRONCHITIS WITH ASTHMA, ACUTE: Status: ACTIVE | Noted: 2019-07-29

## 2019-07-29 LAB
ALBUMIN SERPL-MCNC: 3.3 G/DL (ref 3.5–5.2)
ANION GAP SERPL CALCULATED.3IONS-SCNC: 13.8 MMOL/L (ref 5–15)
B PARAPERT DNA SPEC QL NAA+PROBE: NOT DETECTED
B PERT DNA SPEC QL NAA+PROBE: NOT DETECTED
BASOPHILS # BLD AUTO: 0.02 10*3/MM3 (ref 0–0.2)
BASOPHILS NFR BLD AUTO: 0.2 % (ref 0–1.5)
BUN BLD-MCNC: 21 MG/DL (ref 6–20)
BUN/CREAT SERPL: 7.9 (ref 7–25)
C PNEUM DNA NPH QL NAA+NON-PROBE: NOT DETECTED
CALCIUM SPEC-SCNC: 8.8 MG/DL (ref 8.6–10.5)
CHLORIDE SERPL-SCNC: 106 MMOL/L (ref 98–107)
CO2 SERPL-SCNC: 21.2 MMOL/L (ref 22–29)
CREAT BLD-MCNC: 2.66 MG/DL (ref 0.57–1)
DEPRECATED RDW RBC AUTO: 44.3 FL (ref 37–54)
EOSINOPHIL # BLD AUTO: 0.32 10*3/MM3 (ref 0–0.4)
EOSINOPHIL NFR BLD AUTO: 3.2 % (ref 0.3–6.2)
ERYTHROCYTE [DISTWIDTH] IN BLOOD BY AUTOMATED COUNT: 13 % (ref 12.3–15.4)
FLUAV H1 2009 PAND RNA NPH QL NAA+PROBE: NOT DETECTED
FLUAV H1 HA GENE NPH QL NAA+PROBE: NOT DETECTED
FLUAV H3 RNA NPH QL NAA+PROBE: NOT DETECTED
FLUAV SUBTYP SPEC NAA+PROBE: NOT DETECTED
FLUBV RNA ISLT QL NAA+PROBE: NOT DETECTED
GFR SERPL CREATININE-BSD FRML MDRD: 20 ML/MIN/1.73
GLUCOSE BLD-MCNC: 100 MG/DL (ref 65–99)
HADV DNA SPEC NAA+PROBE: NOT DETECTED
HCOV 229E RNA SPEC QL NAA+PROBE: NOT DETECTED
HCOV HKU1 RNA SPEC QL NAA+PROBE: NOT DETECTED
HCOV NL63 RNA SPEC QL NAA+PROBE: NOT DETECTED
HCOV OC43 RNA SPEC QL NAA+PROBE: NOT DETECTED
HCT VFR BLD AUTO: 33.8 % (ref 34–46.6)
HGB BLD-MCNC: 11 G/DL (ref 12–15.9)
HMPV RNA NPH QL NAA+NON-PROBE: NOT DETECTED
HPIV1 RNA SPEC QL NAA+PROBE: NOT DETECTED
HPIV2 RNA SPEC QL NAA+PROBE: NOT DETECTED
HPIV3 RNA NPH QL NAA+PROBE: NOT DETECTED
HPIV4 P GENE NPH QL NAA+PROBE: NOT DETECTED
IMM GRANULOCYTES # BLD AUTO: 0.02 10*3/MM3 (ref 0–0.05)
IMM GRANULOCYTES NFR BLD AUTO: 0.2 % (ref 0–0.5)
LYMPHOCYTES # BLD AUTO: 1.24 10*3/MM3 (ref 0.7–3.1)
LYMPHOCYTES NFR BLD AUTO: 12.5 % (ref 19.6–45.3)
M PNEUMO IGG SER IA-ACNC: NOT DETECTED
MCH RBC QN AUTO: 30.4 PG (ref 26.6–33)
MCHC RBC AUTO-ENTMCNC: 32.5 G/DL (ref 31.5–35.7)
MCV RBC AUTO: 93.4 FL (ref 79–97)
MONOCYTES # BLD AUTO: 0.8 10*3/MM3 (ref 0.1–0.9)
MONOCYTES NFR BLD AUTO: 8.1 % (ref 5–12)
NEUTROPHILS # BLD AUTO: 7.51 10*3/MM3 (ref 1.7–7)
NEUTROPHILS NFR BLD AUTO: 75.8 % (ref 42.7–76)
NRBC BLD AUTO-RTO: 0 /100 WBC (ref 0–0.2)
PHOSPHATE SERPL-MCNC: 4.1 MG/DL (ref 2.5–4.5)
PLATELET # BLD AUTO: 253 10*3/MM3 (ref 140–450)
PMV BLD AUTO: 10.6 FL (ref 6–12)
POTASSIUM BLD-SCNC: 4.1 MMOL/L (ref 3.5–5.2)
RBC # BLD AUTO: 3.62 10*6/MM3 (ref 3.77–5.28)
RHINOVIRUS RNA SPEC NAA+PROBE: NOT DETECTED
RSV RNA NPH QL NAA+NON-PROBE: NOT DETECTED
SODIUM BLD-SCNC: 141 MMOL/L (ref 136–145)
WBC NRBC COR # BLD: 9.91 10*3/MM3 (ref 3.4–10.8)

## 2019-07-29 PROCEDURE — 99232 SBSQ HOSP IP/OBS MODERATE 35: CPT | Performed by: INTERNAL MEDICINE

## 2019-07-29 PROCEDURE — 83631 LACTOFERRIN FECAL (QUANT): CPT | Performed by: INTERNAL MEDICINE

## 2019-07-29 PROCEDURE — 80069 RENAL FUNCTION PANEL: CPT | Performed by: HOSPITALIST

## 2019-07-29 PROCEDURE — 94799 UNLISTED PULMONARY SVC/PX: CPT

## 2019-07-29 PROCEDURE — 0100U HC BIOFIRE FILMARRAY RESP PANEL 2: CPT | Performed by: HOSPITALIST

## 2019-07-29 PROCEDURE — 85025 COMPLETE CBC W/AUTO DIFF WBC: CPT | Performed by: HOSPITALIST

## 2019-07-29 PROCEDURE — 0097U HC BIOFIRE FILMARRAY GI PANEL: CPT | Performed by: INTERNAL MEDICINE

## 2019-07-29 PROCEDURE — 71045 X-RAY EXAM CHEST 1 VIEW: CPT

## 2019-07-29 RX ORDER — FAMOTIDINE 20 MG/1
20 TABLET, FILM COATED ORAL DAILY
Status: DISCONTINUED | OUTPATIENT
Start: 2019-07-30 | End: 2019-07-30 | Stop reason: HOSPADM

## 2019-07-29 RX ORDER — MAGNESIUM CARB/ALUMINUM HYDROX 105-160MG
296 TABLET,CHEWABLE ORAL ONCE
Status: DISCONTINUED | OUTPATIENT
Start: 2019-07-29 | End: 2019-07-29

## 2019-07-29 RX ADMIN — FAMOTIDINE 20 MG: 10 INJECTION INTRAVENOUS at 09:16

## 2019-07-29 RX ADMIN — SODIUM BICARBONATE 650 MG: 650 TABLET ORAL at 20:44

## 2019-07-29 RX ADMIN — ALBUTEROL SULFATE 2 MG: 2.5 SOLUTION RESPIRATORY (INHALATION) at 14:19

## 2019-07-29 RX ADMIN — POLYETHYLENE GLYCOL 3350 17 G: 17 POWDER, FOR SOLUTION ORAL at 12:05

## 2019-07-29 RX ADMIN — CETIRIZINE HYDROCHLORIDE 5 MG: 10 TABLET, FILM COATED ORAL at 09:16

## 2019-07-29 RX ADMIN — BUDESONIDE AND FORMOTEROL FUMARATE DIHYDRATE 2 PUFF: 80; 4.5 AEROSOL RESPIRATORY (INHALATION) at 20:40

## 2019-07-29 RX ADMIN — FLUTICASONE PROPIONATE 2 SPRAY: 50 SPRAY, METERED NASAL at 09:16

## 2019-07-29 RX ADMIN — SODIUM CHLORIDE, PRESERVATIVE FREE 3 ML: 5 INJECTION INTRAVENOUS at 09:25

## 2019-07-29 RX ADMIN — SUCRALFATE 1 G: 1 SUSPENSION ORAL at 18:41

## 2019-07-29 RX ADMIN — OXYCODONE HYDROCHLORIDE AND ACETAMINOPHEN 1 TABLET: 5; 325 TABLET ORAL at 06:58

## 2019-07-29 RX ADMIN — DICYCLOMINE HYDROCHLORIDE 10 MG: 10 CAPSULE ORAL at 12:05

## 2019-07-29 RX ADMIN — SUCRALFATE 1 G: 1 SUSPENSION ORAL at 06:54

## 2019-07-29 RX ADMIN — ALBUTEROL SULFATE 2 MG: 2.5 SOLUTION RESPIRATORY (INHALATION) at 20:40

## 2019-07-29 RX ADMIN — DOCUSATE SODIUM 100 MG: 100 CAPSULE, LIQUID FILLED ORAL at 09:25

## 2019-07-29 RX ADMIN — DICYCLOMINE HYDROCHLORIDE 10 MG: 10 CAPSULE ORAL at 09:00

## 2019-07-29 RX ADMIN — DICYCLOMINE HYDROCHLORIDE 10 MG: 10 CAPSULE ORAL at 23:09

## 2019-07-29 RX ADMIN — SUCRALFATE 1 G: 1 SUSPENSION ORAL at 23:09

## 2019-07-29 RX ADMIN — SUCRALFATE 1 G: 1 SUSPENSION ORAL at 12:05

## 2019-07-29 RX ADMIN — DICYCLOMINE HYDROCHLORIDE 10 MG: 10 CAPSULE ORAL at 18:41

## 2019-07-29 RX ADMIN — ALBUTEROL SULFATE 2 MG: 2.5 SOLUTION RESPIRATORY (INHALATION) at 06:57

## 2019-07-29 RX ADMIN — BUDESONIDE AND FORMOTEROL FUMARATE DIHYDRATE 2 PUFF: 80; 4.5 AEROSOL RESPIRATORY (INHALATION) at 06:57

## 2019-07-29 RX ADMIN — SODIUM BICARBONATE 650 MG: 650 TABLET ORAL at 09:15

## 2019-07-30 VITALS
HEART RATE: 64 BPM | WEIGHT: 218.3 LBS | TEMPERATURE: 97.4 F | DIASTOLIC BLOOD PRESSURE: 73 MMHG | BODY MASS INDEX: 38.68 KG/M2 | SYSTOLIC BLOOD PRESSURE: 129 MMHG | HEIGHT: 63 IN | RESPIRATION RATE: 16 BRPM | OXYGEN SATURATION: 91 %

## 2019-07-30 PROBLEM — D50.9 IRON DEFICIENCY ANEMIA: Chronic | Status: ACTIVE | Noted: 2019-07-30

## 2019-07-30 PROBLEM — A04.0 INTESTINAL INFECTION DUE TO ENTEROPATHOGENIC E. COLI: Status: ACTIVE | Noted: 2019-07-30

## 2019-07-30 LAB
ADV 40+41 DNA STL QL NAA+NON-PROBE: NOT DETECTED
ALBUMIN SERPL-MCNC: 3.3 G/DL (ref 3.5–5.2)
ANION GAP SERPL CALCULATED.3IONS-SCNC: 14.8 MMOL/L (ref 5–15)
ASTRO TYP 1-8 RNA STL QL NAA+NON-PROBE: NOT DETECTED
BUN BLD-MCNC: 19 MG/DL (ref 6–20)
BUN/CREAT SERPL: 10.2 (ref 7–25)
C CAYETANENSIS DNA STL QL NAA+NON-PROBE: NOT DETECTED
CALCIUM SPEC-SCNC: 9.1 MG/DL (ref 8.6–10.5)
CAMPY SP DNA.DIARRHEA STL QL NAA+PROBE: NOT DETECTED
CHLORIDE SERPL-SCNC: 105 MMOL/L (ref 98–107)
CO2 SERPL-SCNC: 24.2 MMOL/L (ref 22–29)
CREAT BLD-MCNC: 1.87 MG/DL (ref 0.57–1)
CRYPTOSP STL CULT: NOT DETECTED
DEPRECATED RDW RBC AUTO: 45 FL (ref 37–54)
E COLI DNA SPEC QL NAA+PROBE: NOT DETECTED
E HISTOLYT AG STL-ACNC: NOT DETECTED
EAEC PAA PLAS AGGR+AATA ST NAA+NON-PRB: NOT DETECTED
EC STX1 + STX2 GENES STL NAA+PROBE: NOT DETECTED
EPEC EAE GENE STL QL NAA+NON-PROBE: DETECTED
ERYTHROCYTE [DISTWIDTH] IN BLOOD BY AUTOMATED COUNT: 13.1 % (ref 12.3–15.4)
ETEC LTA+ST1A+ST1B TOX ST NAA+NON-PROBE: NOT DETECTED
FERRITIN SERPL-MCNC: 125 NG/ML (ref 13–150)
FOLATE SERPL-MCNC: 11.8 NG/ML (ref 4.78–24.2)
G LAMBLIA DNA SPEC QL NAA+PROBE: NOT DETECTED
GFR SERPL CREATININE-BSD FRML MDRD: 30 ML/MIN/1.73
GLUCOSE BLD-MCNC: 114 MG/DL (ref 65–99)
HCT VFR BLD AUTO: 32.1 % (ref 34–46.6)
HGB BLD-MCNC: 10.5 G/DL (ref 12–15.9)
IRON 24H UR-MRATE: 16 MCG/DL (ref 37–145)
IRON SATN MFR SERPL: 6 % (ref 20–50)
LACTOFERRIN STL QL LA: NEGATIVE
MCH RBC QN AUTO: 30.6 PG (ref 26.6–33)
MCHC RBC AUTO-ENTMCNC: 32.7 G/DL (ref 31.5–35.7)
MCV RBC AUTO: 93.6 FL (ref 79–97)
NOROVIRUS GI+II RNA STL QL NAA+NON-PROBE: NOT DETECTED
P SHIGELLOIDES DNA STL QL NAA+PROBE: NOT DETECTED
PHOSPHATE SERPL-MCNC: 4.1 MG/DL (ref 2.5–4.5)
PLATELET # BLD AUTO: 232 10*3/MM3 (ref 140–450)
PMV BLD AUTO: 11.1 FL (ref 6–12)
POTASSIUM BLD-SCNC: 3.5 MMOL/L (ref 3.5–5.2)
RBC # BLD AUTO: 3.43 10*6/MM3 (ref 3.77–5.28)
RV RNA STL NAA+PROBE: NOT DETECTED
SALMONELLA DNA SPEC QL NAA+PROBE: NOT DETECTED
SAPO I+II+IV+V RNA STL QL NAA+NON-PROBE: NOT DETECTED
SHIGELLA SP+EIEC IPAH STL QL NAA+PROBE: NOT DETECTED
SODIUM BLD-SCNC: 144 MMOL/L (ref 136–145)
TIBC SERPL-MCNC: 250 MCG/DL (ref 298–536)
TRANSFERRIN SERPL-MCNC: 168 MG/DL (ref 200–360)
V CHOLERAE DNA SPEC QL NAA+PROBE: NOT DETECTED
VIBRIO DNA SPEC NAA+PROBE: NOT DETECTED
VIT B12 BLD-MCNC: 828 PG/ML (ref 211–946)
WBC NRBC COR # BLD: 7.03 10*3/MM3 (ref 3.4–10.8)
YERSINIA STL CULT: NOT DETECTED

## 2019-07-30 PROCEDURE — 80069 RENAL FUNCTION PANEL: CPT | Performed by: INTERNAL MEDICINE

## 2019-07-30 PROCEDURE — 94799 UNLISTED PULMONARY SVC/PX: CPT

## 2019-07-30 PROCEDURE — 82728 ASSAY OF FERRITIN: CPT | Performed by: HOSPITALIST

## 2019-07-30 PROCEDURE — 82746 ASSAY OF FOLIC ACID SERUM: CPT | Performed by: HOSPITALIST

## 2019-07-30 PROCEDURE — 84466 ASSAY OF TRANSFERRIN: CPT | Performed by: HOSPITALIST

## 2019-07-30 PROCEDURE — 83540 ASSAY OF IRON: CPT | Performed by: HOSPITALIST

## 2019-07-30 PROCEDURE — 82607 VITAMIN B-12: CPT | Performed by: HOSPITALIST

## 2019-07-30 PROCEDURE — 85027 COMPLETE CBC AUTOMATED: CPT | Performed by: HOSPITALIST

## 2019-07-30 RX ORDER — FERROUS SULFATE 325(65) MG
325 TABLET ORAL EVERY OTHER DAY
Qty: 30 TABLET | Refills: 0 | Status: SHIPPED | OUTPATIENT
Start: 2019-07-30 | End: 2020-12-14

## 2019-07-30 RX ORDER — FAMOTIDINE 20 MG/1
20 TABLET, FILM COATED ORAL DAILY
Qty: 30 TABLET | Refills: 0 | Status: SHIPPED | OUTPATIENT
Start: 2019-07-31 | End: 2019-09-30 | Stop reason: DRUGHIGH

## 2019-07-30 RX ORDER — FERROUS SULFATE 325(65) MG
325 TABLET ORAL
Status: DISCONTINUED | OUTPATIENT
Start: 2019-07-30 | End: 2019-07-30 | Stop reason: HOSPADM

## 2019-07-30 RX ADMIN — FERROUS SULFATE TAB 325 MG (65 MG ELEMENTAL FE) 325 MG: 325 (65 FE) TAB at 12:09

## 2019-07-30 RX ADMIN — CETIRIZINE HYDROCHLORIDE 5 MG: 10 TABLET, FILM COATED ORAL at 09:41

## 2019-07-30 RX ADMIN — FAMOTIDINE 20 MG: 20 TABLET, FILM COATED ORAL at 09:41

## 2019-07-30 RX ADMIN — SUCRALFATE 1 G: 1 SUSPENSION ORAL at 06:40

## 2019-07-30 RX ADMIN — BUDESONIDE AND FORMOTEROL FUMARATE DIHYDRATE 2 PUFF: 80; 4.5 AEROSOL RESPIRATORY (INHALATION) at 09:17

## 2019-07-30 RX ADMIN — FLUTICASONE PROPIONATE 2 SPRAY: 50 SPRAY, METERED NASAL at 09:42

## 2019-07-30 RX ADMIN — SODIUM BICARBONATE 650 MG: 650 TABLET ORAL at 09:41

## 2019-07-30 RX ADMIN — DICYCLOMINE HYDROCHLORIDE 10 MG: 10 CAPSULE ORAL at 09:42

## 2019-07-30 RX ADMIN — ALBUTEROL SULFATE 2 MG: 2.5 SOLUTION RESPIRATORY (INHALATION) at 11:51

## 2019-07-30 RX ADMIN — DICYCLOMINE HYDROCHLORIDE 10 MG: 10 CAPSULE ORAL at 12:09

## 2019-07-31 ENCOUNTER — READMISSION MANAGEMENT (OUTPATIENT)
Dept: CALL CENTER | Facility: HOSPITAL | Age: 39
End: 2019-07-31

## 2019-07-31 DIAGNOSIS — J45.40 MODERATE PERSISTENT ASTHMA WITHOUT COMPLICATION: ICD-10-CM

## 2019-07-31 RX ORDER — ALBUTEROL SULFATE 90 UG/1
AEROSOL, METERED RESPIRATORY (INHALATION)
Qty: 18 G | Refills: 5 | Status: SHIPPED | OUTPATIENT
Start: 2019-07-31 | End: 2019-09-30 | Stop reason: SDUPTHER

## 2019-08-01 ENCOUNTER — READMISSION MANAGEMENT (OUTPATIENT)
Dept: CALL CENTER | Facility: HOSPITAL | Age: 39
End: 2019-08-01

## 2019-08-01 NOTE — OUTREACH NOTE
Medical Week 1 Survey      Responses   Facility patient discharged from?  [facility]   Does the patient have one of the following disease processes/diagnoses(primary or secondary)?  Other   Is there a successful TCM telephone encounter documented?  No   Week 1 attempt successful?  Yes   Call start time  1210   Call end time  1212   Discharge diagnosis  Abdominal pain,      Diarrhea,       Acute renal failure    Meds reviewed with patient/caregiver?  Yes   Is the patient having any side effects they believe may be caused by any medication additions or changes?  No   Does the patient have all medications ordered at discharge?  Yes   Is the patient taking all medications as directed (includes completed medication regime)?  Yes   Does the patient have a primary care provider?   Yes   What is the patient's perception of their health status since discharge?  Worsening   Additional teach back comments  Pt says she is still in pain and having cramping, she is calling PCP today to see them ASAP, I encouraged her to be sure to call PCP   Week 1 call completed?  Yes   Wrap up additional comments  quick conversation since patient wanted to call PCP          Cindy Camarena RN

## 2019-08-05 ENCOUNTER — OFFICE VISIT (OUTPATIENT)
Dept: FAMILY MEDICINE CLINIC | Facility: CLINIC | Age: 39
End: 2019-08-05

## 2019-08-05 VITALS
DIASTOLIC BLOOD PRESSURE: 70 MMHG | SYSTOLIC BLOOD PRESSURE: 102 MMHG | BODY MASS INDEX: 37.73 KG/M2 | HEART RATE: 72 BPM | WEIGHT: 213 LBS | TEMPERATURE: 98 F | OXYGEN SATURATION: 98 %

## 2019-08-05 DIAGNOSIS — D64.9 ANEMIA, UNSPECIFIED TYPE: ICD-10-CM

## 2019-08-05 DIAGNOSIS — N17.9 AKI (ACUTE KIDNEY INJURY) (HCC): ICD-10-CM

## 2019-08-05 DIAGNOSIS — E53.8 B12 DEFICIENCY: ICD-10-CM

## 2019-08-05 DIAGNOSIS — A04.0 INTESTINAL INFECTION DUE TO ENTEROPATHOGENIC E. COLI: Primary | ICD-10-CM

## 2019-08-05 PROCEDURE — 96372 THER/PROPH/DIAG INJ SC/IM: CPT | Performed by: FAMILY MEDICINE

## 2019-08-05 PROCEDURE — 99213 OFFICE O/P EST LOW 20 MIN: CPT | Performed by: FAMILY MEDICINE

## 2019-08-05 RX ORDER — SACCHAROMYCES BOULARDII 250 MG
250 CAPSULE ORAL 2 TIMES DAILY
Qty: 60 CAPSULE | Refills: 0 | Status: SHIPPED | OUTPATIENT
Start: 2019-08-05 | End: 2020-12-14

## 2019-08-05 RX ADMIN — CYANOCOBALAMIN 1000 MCG: 1000 INJECTION, SOLUTION INTRAMUSCULAR; SUBCUTANEOUS at 14:56

## 2019-08-05 NOTE — PROGRESS NOTES
Subjective   Mona Castillo is a 38 y.o. female. Presents today for   Chief Complaint   Patient presents with   • Follow-up     hospital follow up seen at [facility] for soa       History of Present Illness  Patient here for f/u of diarrhea;  Reports was doing better, ate at mexican restaurant and had sudden onset flank pain;  Due to flank pain went to ER, subsequently developed return of diarrhea had for nearly 2 weeks.  Developed LATISHA with hit peak at 3.04, down to 1.87 CR time of d/c;  Was anemic as well, curently taking iron daily.  She reports very fatigued;  Diarrhea has mostly resolved.  Flank pain improved as well.  No black or bloody stools.  Has hx of b12 def, just started on injections.    Review of Systems   Constitutional: Positive for fatigue. Negative for chills and fever.   Respiratory: Negative for shortness of breath.    Cardiovascular: Negative for chest pain.   Gastrointestinal: Positive for diarrhea and nausea. Negative for abdominal pain, anal bleeding, blood in stool and vomiting.       Patient Active Problem List   Diagnosis   • Asthma   • Seasonal allergic rhinitis   • Dizziness   • Missed    • Acute renal failure (CMS/HCC)   • Abdominal pain   • Diarrhea   • Bronchitis with asthma, acute   • Iron deficiency anemia   • Intestinal infection due to enteropathogenic E. coli       Social History     Socioeconomic History   • Marital status:      Spouse name: Not on file   • Number of children: Not on file   • Years of education: Not on file   • Highest education level: Not on file   Tobacco Use   • Smoking status: Former Smoker   • Smokeless tobacco: Never Used   • Tobacco comment: quit 10/2013   Substance and Sexual Activity   • Alcohol use: Yes     Comment: Social use   • Drug use: No   • Sexual activity: Yes     Partners: Male     Birth control/protection: None       Allergies   Allergen Reactions   • Turkey Itching     SOMETIMES HAS SOA   • Gabapentin Mental Status Change        Current Outpatient Medications on File Prior to Visit   Medication Sig Dispense Refill   • albuterol sulfate  (90 Base) MCG/ACT inhaler Inhale 2 puffs Every 6 (Six) Hours As Needed for Wheezing.     • ALBUTEROL SULFATE  (90 Base) MCG/ACT inhaler INHALE 2 PUFFS BY MOUTH EVERY 6 HOURS AS NEEDED FOR WHEEZING 18 g 5   • famotidine (PEPCID) 20 MG tablet Take 1 tablet by mouth Daily. 30 tablet 0   • ferrous sulfate 325 (65 FE) MG tablet Take 1 tablet by mouth Every Other Day. 30 tablet 0   • fexofenadine (ALLEGRA) 180 MG tablet Take 180 mg by mouth Daily As Needed.     • fluticasone (FLONASE) 50 MCG/ACT nasal spray 2 sprays into the nostril(s) as directed by provider Daily As Needed.     • Fluticasone Furoate-Vilanterol (BREO ELLIPTA) 200-25 MCG/INH inhaler Inhale 1 puff Daily. 60 each 5   • polyethylene glycol (MIRALAX) pack packet Take 17 g by mouth Daily.       Current Facility-Administered Medications on File Prior to Visit   Medication Dose Route Frequency Provider Last Rate Last Dose   • cyanocobalamin injection 1,000 mcg  1,000 mcg Intramuscular Q28 Days David Fernandez DO   1,000 mcg at 07/10/19 1111       Objective   Vitals:    08/05/19 1352   BP: 102/70   Pulse: 72   Temp: 98 °F (36.7 °C)   SpO2: 98%   Weight: 96.6 kg (213 lb)       Physical Exam   Constitutional: She appears well-developed and well-nourished.   HENT:   Head: Normocephalic and atraumatic.   Neck: Neck supple. No JVD present. No thyromegaly present.   Cardiovascular: Normal rate, regular rhythm and normal heart sounds. Exam reveals no gallop and no friction rub.   No murmur heard.  Pulmonary/Chest: Effort normal and breath sounds normal. No respiratory distress. She has no wheezes. She has no rales.   Abdominal: Soft. Bowel sounds are normal. She exhibits no distension. There is no tenderness. There is no rebound and no guarding.   Musculoskeletal: She exhibits no edema.   Neurological: She is alert.   Skin: Skin is warm  and dry.   Psychiatric: She has a normal mood and affect. Her behavior is normal.   Nursing note and vitals reviewed.    Component      Latest Ref Rng & Units 7/29/2019 7/30/2019   Glucose      65 - 99 mg/dL  114 (H)   BUN      6 - 20 mg/dL  19   Creatinine      0.57 - 1.00 mg/dL  1.87 (H)   Sodium      136 - 145 mmol/L  144   Potassium      3.5 - 5.2 mmol/L  3.5   Chloride      98 - 107 mmol/L  105   CO2      22.0 - 29.0 mmol/L  24.2   Calcium      8.6 - 10.5 mg/dL  9.1   Albumin      3.50 - 5.20 g/dL  3.30 (L)   Phosphorus      2.5 - 4.5 mg/dL  4.1   Anion Gap      5.0 - 15.0 mmol/L  14.8   BUN/Creatinine Ratio      7.0 - 25.0  10.2   eGFR Non African Am      >60 mL/min/1.73  30 (L)   WBC      3.40 - 10.80 10*3/mm3  7.03   RBC      3.77 - 5.28 10*6/mm3  3.43 (L)   Hemoglobin      12.0 - 15.9 g/dL  10.5 (L)   Hematocrit      34.0 - 46.6 %  32.1 (L)   MCV      79.0 - 97.0 fL  93.6   MCH      26.6 - 33.0 pg  30.6   MCHC      31.5 - 35.7 g/dL  32.7   RDW      12.3 - 15.4 %  13.1   RDW-SD      37.0 - 54.0 fl  45.0   MPV      6.0 - 12.0 fL  11.1   Platelets      140 - 450 10*3/mm3  232   Iron      37 - 145 mcg/dL  16 (L)   Iron Saturation      20 - 50 %  6 (L)   Transferrin      200 - 360 mg/dL  168 (L)   TIBC      298 - 536 mcg/dL  250 (L)   Lactoferrin, Qual      Negative Negative    Ferritin      13.00 - 150.00 ng/mL  125.00   Folate      4.78 - 24.20 ng/mL  11.80   Vitamin B-12      211 - 946 pg/mL  828     Assessment/Plan   Mona was seen today for follow-up.    Diagnoses and all orders for this visit:    Intestinal infection due to enteropathogenic E. coli  -     saccharomyces boulardii (FLORASTOR) 250 MG capsule; Take 1 capsule by mouth 2 (Two) Times a Day.    LATISHA (acute kidney injury) (CMS/HCC)  -     CBC & Differential  -     Basic Metabolic Panel    Anemia, unspecified type  -     CBC & Differential  -     Basic Metabolic Panel  -     Ferritin  -     Iron Profile    B12 deficiency    -b12 injection  today  -LATISHA - contineu push fluids;  Avoid dairy as may cause diarrhea to recur.  -recheck blood counts and renal function         -Follow up:  6 weeks and prn

## 2019-08-06 LAB
BASOPHILS # BLD AUTO: 0 X10E3/UL (ref 0–0.2)
BASOPHILS NFR BLD AUTO: 0 %
BUN SERPL-MCNC: 13 MG/DL (ref 6–20)
BUN/CREAT SERPL: 11 (ref 9–23)
CALCIUM SERPL-MCNC: 9.5 MG/DL (ref 8.7–10.2)
CHLORIDE SERPL-SCNC: 105 MMOL/L (ref 96–106)
CO2 SERPL-SCNC: 23 MMOL/L (ref 20–29)
CREAT SERPL-MCNC: 1.23 MG/DL (ref 0.57–1)
EOSINOPHIL # BLD AUTO: 0.3 X10E3/UL (ref 0–0.4)
EOSINOPHIL NFR BLD AUTO: 3 %
ERYTHROCYTE [DISTWIDTH] IN BLOOD BY AUTOMATED COUNT: 13.5 % (ref 12.3–15.4)
FERRITIN SERPL-MCNC: 169 NG/ML (ref 15–150)
GLUCOSE SERPL-MCNC: 74 MG/DL (ref 65–99)
HCT VFR BLD AUTO: 39.8 % (ref 34–46.6)
HGB BLD-MCNC: 12.9 G/DL (ref 11.1–15.9)
IMM GRANULOCYTES # BLD AUTO: 0 X10E3/UL (ref 0–0.1)
IMM GRANULOCYTES NFR BLD AUTO: 0 %
IRON SATN MFR SERPL: 25 % (ref 15–55)
IRON SERPL-MCNC: 68 UG/DL (ref 27–159)
LYMPHOCYTES # BLD AUTO: 2.9 X10E3/UL (ref 0.7–3.1)
LYMPHOCYTES NFR BLD AUTO: 27 %
MCH RBC QN AUTO: 29.5 PG (ref 26.6–33)
MCHC RBC AUTO-ENTMCNC: 32.4 G/DL (ref 31.5–35.7)
MCV RBC AUTO: 91 FL (ref 79–97)
MONOCYTES # BLD AUTO: 0.9 X10E3/UL (ref 0.1–0.9)
MONOCYTES NFR BLD AUTO: 8 %
NEUTROPHILS # BLD AUTO: 6.5 X10E3/UL (ref 1.4–7)
NEUTROPHILS NFR BLD AUTO: 62 %
PLATELET # BLD AUTO: 426 X10E3/UL (ref 150–450)
POTASSIUM SERPL-SCNC: 4.4 MMOL/L (ref 3.5–5.2)
RBC # BLD AUTO: 4.38 X10E6/UL (ref 3.77–5.28)
SODIUM SERPL-SCNC: 143 MMOL/L (ref 134–144)
TIBC SERPL-MCNC: 270 UG/DL (ref 250–450)
UIBC SERPL-MCNC: 202 UG/DL (ref 131–425)
WBC # BLD AUTO: 10.7 X10E3/UL (ref 3.4–10.8)

## 2019-08-08 ENCOUNTER — READMISSION MANAGEMENT (OUTPATIENT)
Dept: CALL CENTER | Facility: HOSPITAL | Age: 39
End: 2019-08-08

## 2019-08-08 NOTE — OUTREACH NOTE
Medical Week 2 Survey      Responses   Facility patient discharged from?  [facility]   Does the patient have one of the following disease processes/diagnoses(primary or secondary)?  Other   Week 2 attempt successful?  Yes   Call start time  1440   Discharge diagnosis  Abdominal pain,      Diarrhea,       Acute renal failure    Call end time  1442   Is patient permission given to speak with other caregiver?  No   Meds reviewed with patient/caregiver?  Yes   Is the patient taking all medications as directed (includes completed medication regime)?  Yes   Has the patient kept scheduled appointments due by today?  Yes   Has home health visited the patient within 72 hours of discharge?  N/A   Psychosocial issues?  No   Did the patient receive a copy of their discharge instructions?  Yes   Nursing interventions  Reviewed instructions with patient   What is the patient's perception of their health status since discharge?  Improving   Is the patient/caregiver able to teach back signs and symptoms related to disease process for when to call PCP?  Yes   Is the patient/caregiver able to teach back signs and symptoms related to disease process for when to call 911?  Yes   Is the patient/caregiver able to teach back the hierarchy of who to call/visit for symptoms/problems? PCP, Specialist, Home health nurse, Urgent Care, ED, 911  Yes   Additional teach back comments  Got B12 injection at her appt. Labs were done, kidney function starting to improve.    Week 2 Call Completed?  Yes   Wrap up additional comments  Pt is slowly starting to feel better. It is encouraging her greatly. Has had an appt, B12 injection and labs done.  Kidney function improving.          Yanira Hamm, RN

## 2019-08-13 ENCOUNTER — OFFICE (OUTPATIENT)
Dept: URBAN - METROPOLITAN AREA CLINIC 66 | Facility: CLINIC | Age: 39
End: 2019-08-13

## 2019-08-13 VITALS
HEART RATE: 80 BPM | SYSTOLIC BLOOD PRESSURE: 122 MMHG | DIASTOLIC BLOOD PRESSURE: 74 MMHG | HEIGHT: 63 IN | WEIGHT: 217 LBS

## 2019-08-13 DIAGNOSIS — K30 FUNCTIONAL DYSPEPSIA: ICD-10-CM

## 2019-08-13 DIAGNOSIS — D50.9 IRON DEFICIENCY ANEMIA, UNSPECIFIED: ICD-10-CM

## 2019-08-13 DIAGNOSIS — K21.9 GASTRO-ESOPHAGEAL REFLUX DISEASE WITHOUT ESOPHAGITIS: ICD-10-CM

## 2019-08-13 DIAGNOSIS — B94.9 SEQUELAE OF UNSPECIFIED INFECTIOUS AND PARASITIC DISEASE: ICD-10-CM

## 2019-08-13 DIAGNOSIS — R19.5 OTHER FECAL ABNORMALITIES: ICD-10-CM

## 2019-08-13 PROCEDURE — 99214 OFFICE O/P EST MOD 30 MIN: CPT | Performed by: NURSE PRACTITIONER

## 2019-08-16 ENCOUNTER — READMISSION MANAGEMENT (OUTPATIENT)
Dept: CALL CENTER | Facility: HOSPITAL | Age: 39
End: 2019-08-16

## 2019-08-16 NOTE — OUTREACH NOTE
Medical Week 3 Survey      Responses   Facility patient discharged from?  [place]   Does the patient have one of the following disease processes/diagnoses(primary or secondary)?  Other   Week 3 attempt successful?  Yes   Call start time  1356   Call end time  1357   Discharge diagnosis  Abdominal pain,      Diarrhea,       Acute renal failure    Meds reviewed with patient/caregiver?  Yes   Is the patient having any side effects they believe may be caused by any medication additions or changes?  No   Does the patient have all medications ordered at discharge?  Yes   Is the patient taking all medications as directed (includes completed medication regime)?  Yes   Does the patient have a primary care provider?   Yes   Has the patient kept scheduled appointments due by today?  Yes   Has home health visited the patient within 72 hours of discharge?  N/A   Psychosocial issues?  No   Did the patient receive a copy of their discharge instructions?  Yes   Nursing interventions  Reviewed instructions with patient   What is the patient's perception of their health status since discharge?  Improving   Is the patient/caregiver able to teach back signs and symptoms related to disease process for when to call PCP?  Yes   Is the patient/caregiver able to teach back signs and symptoms related to disease process for when to call 911?  Yes   Is the patient/caregiver able to teach back the hierarchy of who to call/visit for symptoms/problems? PCP, Specialist, Home health nurse, Urgent Care, ED, 911  Yes   Additional teach back comments  At Colonoscopy appt today, doing well.   Week 3 Call Completed?  Yes   Graduated  Yes   Did the patient feel the follow up calls were helpful during their recovery period?  Yes   Was the number of calls appropriate?  Yes          Khloe Coleman RN

## 2019-08-30 ENCOUNTER — ON CAMPUS - OUTPATIENT (OUTPATIENT)
Dept: URBAN - METROPOLITAN AREA HOSPITAL 114 | Facility: HOSPITAL | Age: 39
End: 2019-08-30
Payer: COMMERCIAL

## 2019-08-30 ENCOUNTER — ANESTHESIA (OUTPATIENT)
Dept: GASTROENTEROLOGY | Facility: HOSPITAL | Age: 39
End: 2019-08-30

## 2019-08-30 ENCOUNTER — HOSPITAL ENCOUNTER (OUTPATIENT)
Facility: HOSPITAL | Age: 39
Setting detail: HOSPITAL OUTPATIENT SURGERY
Discharge: HOME | End: 2019-08-30
Attending: INTERNAL MEDICINE | Admitting: INTERNAL MEDICINE

## 2019-08-30 ENCOUNTER — ANESTHESIA EVENT (OUTPATIENT)
Dept: GASTROENTEROLOGY | Facility: HOSPITAL | Age: 39
End: 2019-08-30

## 2019-08-30 VITALS
WEIGHT: 213.31 LBS | DIASTOLIC BLOOD PRESSURE: 64 MMHG | BODY MASS INDEX: 37.8 KG/M2 | OXYGEN SATURATION: 100 % | HEIGHT: 63 IN | TEMPERATURE: 98.5 F | RESPIRATION RATE: 16 BRPM | HEART RATE: 89 BPM | SYSTOLIC BLOOD PRESSURE: 102 MMHG

## 2019-08-30 DIAGNOSIS — K44.9 DIAPHRAGMATIC HERNIA WITHOUT OBSTRUCTION OR GANGRENE: ICD-10-CM

## 2019-08-30 DIAGNOSIS — R19.7 DIARRHEA: ICD-10-CM

## 2019-08-30 DIAGNOSIS — K29.00 ACUTE GASTRITIS WITHOUT BLEEDING: ICD-10-CM

## 2019-08-30 DIAGNOSIS — R10.13 EPIGASTRIC PAIN: ICD-10-CM

## 2019-08-30 DIAGNOSIS — K21.0 GASTRO-ESOPHAGEAL REFLUX DISEASE WITH ESOPHAGITIS: ICD-10-CM

## 2019-08-30 DIAGNOSIS — R19.7 DIARRHEA, UNSPECIFIED: ICD-10-CM

## 2019-08-30 DIAGNOSIS — R19.4 CHANGE IN BOWEL HABIT: ICD-10-CM

## 2019-08-30 DIAGNOSIS — K21.9 GERD (GASTROESOPHAGEAL REFLUX DISEASE): ICD-10-CM

## 2019-08-30 PROCEDURE — 88305 TISSUE EXAM BY PATHOLOGIST: CPT | Performed by: INTERNAL MEDICINE

## 2019-08-30 PROCEDURE — 43239 EGD BIOPSY SINGLE/MULTIPLE: CPT | Performed by: INTERNAL MEDICINE

## 2019-08-30 PROCEDURE — 87071 CULTURE AEROBIC QUANT OTHER: CPT | Performed by: INTERNAL MEDICINE

## 2019-08-30 PROCEDURE — 25010000002 PROPOFOL 10 MG/ML EMULSION: Performed by: ANESTHESIOLOGY

## 2019-08-30 PROCEDURE — 25010000002 PROPOFOL 1000 MG/ML EMULSION: Performed by: ANESTHESIOLOGY

## 2019-08-30 PROCEDURE — 88342 IMHCHEM/IMCYTCHM 1ST ANTB: CPT | Performed by: INTERNAL MEDICINE

## 2019-08-30 PROCEDURE — 45380 COLONOSCOPY AND BIOPSY: CPT | Performed by: INTERNAL MEDICINE

## 2019-08-30 RX ORDER — LIDOCAINE HYDROCHLORIDE 20 MG/ML
INJECTION, SOLUTION INFILTRATION; PERINEURAL AS NEEDED
Status: DISCONTINUED | OUTPATIENT
Start: 2019-08-30 | End: 2019-08-30 | Stop reason: SURG

## 2019-08-30 RX ORDER — SODIUM CHLORIDE, SODIUM LACTATE, POTASSIUM CHLORIDE, CALCIUM CHLORIDE 600; 310; 30; 20 MG/100ML; MG/100ML; MG/100ML; MG/100ML
1000 INJECTION, SOLUTION INTRAVENOUS CONTINUOUS
Status: DISCONTINUED | OUTPATIENT
Start: 2019-08-30 | End: 2019-08-30 | Stop reason: HOSPADM

## 2019-08-30 RX ORDER — PROPOFOL 10 MG/ML
VIAL (ML) INTRAVENOUS AS NEEDED
Status: DISCONTINUED | OUTPATIENT
Start: 2019-08-30 | End: 2019-08-30 | Stop reason: SURG

## 2019-08-30 RX ORDER — ONDANSETRON 2 MG/ML
4 INJECTION INTRAMUSCULAR; INTRAVENOUS ONCE AS NEEDED
Status: DISCONTINUED | OUTPATIENT
Start: 2019-08-30 | End: 2019-08-30 | Stop reason: HOSPADM

## 2019-08-30 RX ORDER — EPHEDRINE SULFATE 50 MG/ML
5 INJECTION, SOLUTION INTRAVENOUS ONCE AS NEEDED
Status: DISCONTINUED | OUTPATIENT
Start: 2019-08-30 | End: 2019-08-30 | Stop reason: HOSPADM

## 2019-08-30 RX ORDER — DIPHENHYDRAMINE HYDROCHLORIDE 50 MG/ML
6.25 INJECTION INTRAMUSCULAR; INTRAVENOUS
Status: DISCONTINUED | OUTPATIENT
Start: 2019-08-30 | End: 2019-08-30 | Stop reason: HOSPADM

## 2019-08-30 RX ORDER — FENTANYL CITRATE 50 UG/ML
25 INJECTION, SOLUTION INTRAMUSCULAR; INTRAVENOUS
Status: DISCONTINUED | OUTPATIENT
Start: 2019-08-30 | End: 2019-08-30 | Stop reason: HOSPADM

## 2019-08-30 RX ORDER — LIDOCAINE HYDROCHLORIDE 10 MG/ML
0.5 INJECTION, SOLUTION INFILTRATION; PERINEURAL ONCE AS NEEDED
Status: DISCONTINUED | OUTPATIENT
Start: 2019-08-30 | End: 2019-08-30 | Stop reason: HOSPADM

## 2019-08-30 RX ORDER — HYDRALAZINE HYDROCHLORIDE 20 MG/ML
5 INJECTION INTRAMUSCULAR; INTRAVENOUS
Status: DISCONTINUED | OUTPATIENT
Start: 2019-08-30 | End: 2019-08-30 | Stop reason: HOSPADM

## 2019-08-30 RX ORDER — SODIUM CHLORIDE 0.9 % (FLUSH) 0.9 %
10 SYRINGE (ML) INJECTION AS NEEDED
Status: DISCONTINUED | OUTPATIENT
Start: 2019-08-30 | End: 2019-08-30 | Stop reason: HOSPADM

## 2019-08-30 RX ORDER — NALOXONE HCL 0.4 MG/ML
0.4 VIAL (ML) INJECTION AS NEEDED
Status: DISCONTINUED | OUTPATIENT
Start: 2019-08-30 | End: 2019-08-30 | Stop reason: HOSPADM

## 2019-08-30 RX ADMIN — PROPOFOL 100 MG: 10 INJECTION, EMULSION INTRAVENOUS at 09:01

## 2019-08-30 RX ADMIN — PROPOFOL 200 MCG/KG/MIN: 10 INJECTION, EMULSION INTRAVENOUS at 08:58

## 2019-08-30 RX ADMIN — LIDOCAINE HYDROCHLORIDE 60 MG: 20 INJECTION, SOLUTION INFILTRATION; PERINEURAL at 08:58

## 2019-08-30 RX ADMIN — SODIUM CHLORIDE, POTASSIUM CHLORIDE, SODIUM LACTATE AND CALCIUM CHLORIDE 1000 ML: 600; 310; 30; 20 INJECTION, SOLUTION INTRAVENOUS at 08:49

## 2019-08-30 RX ADMIN — PROPOFOL 150 MG: 10 INJECTION, EMULSION INTRAVENOUS at 08:58

## 2019-08-30 NOTE — ANESTHESIA POSTPROCEDURE EVALUATION
"Patient: Mona Castillo    Procedure Summary     Date:  08/30/19 Room / Location:  [facility] ENDOSCOPY 9 / [facility] ENDOSCOPY    Anesthesia Start:  0854 Anesthesia Stop:  0930    Procedures:       ESOPHAGOGASTRODUODENOSCOPY with biopsies and aspirate (N/A Esophagus)      COLONOSCOPY with biopsies (N/A ) Diagnosis:      Surgeon:  Philip Winter MD Provider:  Edwin Mcconnell MD    Anesthesia Type:  MAC ASA Status:  3          Anesthesia Type: MAC  Last vitals  BP   105/72 (08/30/19 0930)   Temp   36.9 °C (98.5 °F) (08/30/19 0820)   Pulse   77 (08/30/19 0930)   Resp   16 (08/30/19 0930)     SpO2   96 % (08/30/19 0930)     Post Anesthesia Care and Evaluation    Patient location during evaluation: bedside  Patient participation: complete - patient participated  Level of consciousness: awake  Pain management: adequate  Airway patency: patent  Anesthetic complications: No anesthetic complications    Cardiovascular status: acceptable  Respiratory status: acceptable  Hydration status: acceptable    Comments: */72   Pulse 77   Temp 36.9 °C (98.5 °F) (Oral)   Resp 16   Ht 160 cm (63\")   Wt 96.8 kg (213 lb 5 oz)   LMP 08/29/2019 (Exact Date)   SpO2 96%   BMI 37.79 kg/m²         "

## 2019-08-30 NOTE — ANESTHESIA PREPROCEDURE EVALUATION
Anesthesia Evaluation     no history of anesthetic complications:               Airway   Mallampati: II  no difficulty expected  Dental - normal exam     Pulmonary - normal exam   (+) a smoker Former, asthma,   (-) COPD, sleep apnea    PE comment: nonlabored  Cardiovascular - negative cardio ROS and normal exam    Rhythm: regular  Rate: normal    (-) hypertension, valvular problems/murmurs, past MI, CAD, dysrhythmias, angina      Neuro/Psych- negative ROS  (-) seizures, TIA, CVA  GI/Hepatic/Renal/Endo    (+) morbid obesity,  renal disease (h/o LATISHA for which pt recently discharged from hospital),   (-) GERD, liver disease, diabetes, hypothyroidism, hyperthyroidism    ROS Comment: Abd Pain;  Diarrhea;  H/o E.Coli     Musculoskeletal (-) negative ROS    Abdominal    Substance History      OB/GYN          Other   (+) blood dyscrasia (FeDef Anemia)                   Anesthesia Plan    ASA 3     MAC     Anesthetic plan, all risks, benefits, and alternatives have been provided, discussed and informed consent has been obtained with: patient.

## 2019-09-01 LAB — BACTERIA SPEC AEROBE CULT: NORMAL

## 2019-09-05 LAB
CYTO UR: NORMAL
LAB AP CASE REPORT: NORMAL
LAB AP DIAGNOSIS COMMENT: NORMAL
PATH REPORT.ADDENDUM SPEC: NORMAL
PATH REPORT.FINAL DX SPEC: NORMAL
PATH REPORT.GROSS SPEC: NORMAL

## 2019-09-10 ENCOUNTER — CLINICAL SUPPORT (OUTPATIENT)
Dept: FAMILY MEDICINE CLINIC | Facility: CLINIC | Age: 39
End: 2019-09-10

## 2019-09-10 DIAGNOSIS — E53.8 B12 DEFICIENCY: ICD-10-CM

## 2019-09-10 PROCEDURE — 96372 THER/PROPH/DIAG INJ SC/IM: CPT | Performed by: FAMILY MEDICINE

## 2019-09-10 RX ADMIN — CYANOCOBALAMIN 1000 MCG: 1000 INJECTION, SOLUTION INTRAMUSCULAR; SUBCUTANEOUS at 12:16

## 2019-09-18 ENCOUNTER — OFFICE (OUTPATIENT)
Dept: URBAN - METROPOLITAN AREA CLINIC 66 | Facility: CLINIC | Age: 39
End: 2019-09-18

## 2019-09-18 VITALS
HEIGHT: 63 IN | HEART RATE: 80 BPM | SYSTOLIC BLOOD PRESSURE: 131 MMHG | WEIGHT: 220 LBS | DIASTOLIC BLOOD PRESSURE: 84 MMHG

## 2019-09-18 DIAGNOSIS — K30 FUNCTIONAL DYSPEPSIA: ICD-10-CM

## 2019-09-18 DIAGNOSIS — K21.9 GASTRO-ESOPHAGEAL REFLUX DISEASE WITHOUT ESOPHAGITIS: ICD-10-CM

## 2019-09-18 DIAGNOSIS — Z90.49 ACQUIRED ABSENCE OF OTHER SPECIFIED PARTS OF DIGESTIVE TRACT: ICD-10-CM

## 2019-09-18 PROCEDURE — 99214 OFFICE O/P EST MOD 30 MIN: CPT

## 2019-09-18 RX ORDER — FAMOTIDINE 40 MG/1
80 TABLET, FILM COATED ORAL
Qty: 60 | Refills: 3 | Status: ACTIVE
Start: 2019-09-18

## 2019-09-30 ENCOUNTER — OFFICE VISIT (OUTPATIENT)
Dept: FAMILY MEDICINE CLINIC | Facility: CLINIC | Age: 39
End: 2019-09-30

## 2019-09-30 VITALS
OXYGEN SATURATION: 97 % | WEIGHT: 218 LBS | SYSTOLIC BLOOD PRESSURE: 104 MMHG | HEART RATE: 88 BPM | HEIGHT: 63 IN | BODY MASS INDEX: 38.62 KG/M2 | DIASTOLIC BLOOD PRESSURE: 74 MMHG

## 2019-09-30 DIAGNOSIS — D50.9 IRON DEFICIENCY ANEMIA, UNSPECIFIED IRON DEFICIENCY ANEMIA TYPE: Primary | Chronic | ICD-10-CM

## 2019-09-30 DIAGNOSIS — G43.009 MIGRAINE WITHOUT AURA AND WITHOUT STATUS MIGRAINOSUS, NOT INTRACTABLE: ICD-10-CM

## 2019-09-30 DIAGNOSIS — E53.8 B12 DEFICIENCY: ICD-10-CM

## 2019-09-30 DIAGNOSIS — N17.9 ACUTE RENAL FAILURE, UNSPECIFIED ACUTE RENAL FAILURE TYPE (HCC): ICD-10-CM

## 2019-09-30 PROCEDURE — 96372 THER/PROPH/DIAG INJ SC/IM: CPT | Performed by: FAMILY MEDICINE

## 2019-09-30 PROCEDURE — 99214 OFFICE O/P EST MOD 30 MIN: CPT | Performed by: FAMILY MEDICINE

## 2019-09-30 RX ORDER — SUMATRIPTAN 100 MG/1
TABLET, FILM COATED ORAL
Qty: 10 TABLET | Refills: 5 | Status: SHIPPED | OUTPATIENT
Start: 2019-09-30 | End: 2020-03-02 | Stop reason: SDUPTHER

## 2019-09-30 RX ORDER — FAMOTIDINE 40 MG/1
40 TABLET, FILM COATED ORAL 2 TIMES DAILY
Refills: 1 | COMMUNITY
Start: 2019-09-18

## 2019-09-30 RX ADMIN — CYANOCOBALAMIN 1000 MCG: 1000 INJECTION, SOLUTION INTRAMUSCULAR; SUBCUTANEOUS at 13:44

## 2019-10-01 LAB
ALBUMIN SERPL-MCNC: 4.4 G/DL (ref 3.5–5.5)
ALBUMIN/GLOB SERPL: 1.8 {RATIO} (ref 1.2–2.2)
ALP SERPL-CCNC: 96 IU/L (ref 39–117)
ALT SERPL-CCNC: 11 IU/L (ref 0–32)
AST SERPL-CCNC: 12 IU/L (ref 0–40)
BASOPHILS # BLD AUTO: 0 X10E3/UL (ref 0–0.2)
BASOPHILS NFR BLD AUTO: 1 %
BILIRUB SERPL-MCNC: 0.3 MG/DL (ref 0–1.2)
BUN SERPL-MCNC: 8 MG/DL (ref 6–20)
BUN/CREAT SERPL: 8 (ref 9–23)
CALCIUM SERPL-MCNC: 9.8 MG/DL (ref 8.7–10.2)
CHLORIDE SERPL-SCNC: 102 MMOL/L (ref 96–106)
CO2 SERPL-SCNC: 22 MMOL/L (ref 20–29)
CREAT SERPL-MCNC: 1.01 MG/DL (ref 0.57–1)
EOSINOPHIL # BLD AUTO: 0.2 X10E3/UL (ref 0–0.4)
EOSINOPHIL NFR BLD AUTO: 3 %
ERYTHROCYTE [DISTWIDTH] IN BLOOD BY AUTOMATED COUNT: 15.3 % (ref 12.3–15.4)
GLOBULIN SER CALC-MCNC: 2.5 G/DL (ref 1.5–4.5)
GLUCOSE SERPL-MCNC: 87 MG/DL (ref 65–99)
HCT VFR BLD AUTO: 39.9 % (ref 34–46.6)
HGB BLD-MCNC: 13.3 G/DL (ref 11.1–15.9)
IMM GRANULOCYTES # BLD AUTO: 0 X10E3/UL (ref 0–0.1)
IMM GRANULOCYTES NFR BLD AUTO: 0 %
LYMPHOCYTES # BLD AUTO: 2.2 X10E3/UL (ref 0.7–3.1)
LYMPHOCYTES NFR BLD AUTO: 25 %
MCH RBC QN AUTO: 31.2 PG (ref 26.6–33)
MCHC RBC AUTO-ENTMCNC: 33.3 G/DL (ref 31.5–35.7)
MCV RBC AUTO: 94 FL (ref 79–97)
MONOCYTES # BLD AUTO: 0.6 X10E3/UL (ref 0.1–0.9)
MONOCYTES NFR BLD AUTO: 7 %
NEUTROPHILS # BLD AUTO: 5.8 X10E3/UL (ref 1.4–7)
NEUTROPHILS NFR BLD AUTO: 64 %
PLATELET # BLD AUTO: 415 X10E3/UL (ref 150–450)
POTASSIUM SERPL-SCNC: 4.2 MMOL/L (ref 3.5–5.2)
PROT SERPL-MCNC: 6.9 G/DL (ref 6–8.5)
RBC # BLD AUTO: 4.26 X10E6/UL (ref 3.77–5.28)
SODIUM SERPL-SCNC: 142 MMOL/L (ref 134–144)
WBC # BLD AUTO: 8.8 X10E3/UL (ref 3.4–10.8)
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
## 2019-10-03 NOTE — PROGRESS NOTES
Call and mail copy of results to patient.  Blood counts completely normal now.  Kidney function is down to 1.01 and greatly improved.  I would recheck in 6 months.

## 2020-01-17 RX ORDER — ALBUTEROL SULFATE 90 UG/1
AEROSOL, METERED RESPIRATORY (INHALATION)
Qty: 18 G | Refills: 5 | Status: SHIPPED | OUTPATIENT
Start: 2020-01-17 | End: 2020-03-02 | Stop reason: SDUPTHER

## 2020-03-02 ENCOUNTER — OFFICE VISIT (OUTPATIENT)
Dept: FAMILY MEDICINE CLINIC | Facility: CLINIC | Age: 40
End: 2020-03-02

## 2020-03-02 VITALS
BODY MASS INDEX: 38.41 KG/M2 | HEART RATE: 90 BPM | SYSTOLIC BLOOD PRESSURE: 120 MMHG | WEIGHT: 225 LBS | DIASTOLIC BLOOD PRESSURE: 66 MMHG | OXYGEN SATURATION: 97 % | TEMPERATURE: 98.5 F | HEIGHT: 64 IN

## 2020-03-02 DIAGNOSIS — J45.41 MODERATE PERSISTENT ASTHMA WITH ACUTE EXACERBATION: ICD-10-CM

## 2020-03-02 DIAGNOSIS — G43.009 MIGRAINE WITHOUT AURA AND WITHOUT STATUS MIGRAINOSUS, NOT INTRACTABLE: ICD-10-CM

## 2020-03-02 DIAGNOSIS — M62.830 BACK SPASM: Primary | ICD-10-CM

## 2020-03-02 PROBLEM — Z67.91 RH NEGATIVE, ANTEPARTUM: Status: ACTIVE | Noted: 2020-01-24

## 2020-03-02 PROBLEM — O26.899 RH NEGATIVE, ANTEPARTUM: Status: ACTIVE | Noted: 2020-01-24

## 2020-03-02 PROCEDURE — 99214 OFFICE O/P EST MOD 30 MIN: CPT | Performed by: FAMILY MEDICINE

## 2020-03-02 RX ORDER — ALBUTEROL SULFATE 90 UG/1
2 AEROSOL, METERED RESPIRATORY (INHALATION) EVERY 6 HOURS PRN
Qty: 18 G | Refills: 5 | Status: SHIPPED | OUTPATIENT
Start: 2020-03-02 | End: 2020-03-23

## 2020-03-02 RX ORDER — METHOCARBAMOL 750 MG/1
750 TABLET, FILM COATED ORAL 4 TIMES DAILY PRN
Qty: 40 TABLET | Refills: 0 | Status: SHIPPED | OUTPATIENT
Start: 2020-03-02 | End: 2020-12-14

## 2020-03-02 RX ORDER — SUMATRIPTAN 100 MG/1
TABLET, FILM COATED ORAL
Qty: 10 TABLET | Refills: 5 | Status: SHIPPED | OUTPATIENT
Start: 2020-03-02 | End: 2021-05-24
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
## 2020-03-22 DIAGNOSIS — J45.41 MODERATE PERSISTENT ASTHMA WITH ACUTE EXACERBATION: ICD-10-CM

## 2020-03-23 RX ORDER — ALBUTEROL SULFATE 90 UG/1
AEROSOL, METERED RESPIRATORY (INHALATION)
Qty: 18 G | Refills: 5 | Status: SHIPPED | OUTPATIENT
Start: 2020-03-23 | End: 2021-04-24

## 2020-04-27 ENCOUNTER — TELEPHONE (OUTPATIENT)
Dept: FAMILY MEDICINE CLINIC | Facility: CLINIC | Age: 40
End: 2020-04-27

## 2020-04-27 RX ORDER — TRIAMCINOLONE ACETONIDE 5 MG/G
CREAM TOPICAL 2 TIMES DAILY
Qty: 60 G | Refills: 1 | Status: SHIPPED | OUTPATIENT
Start: 2020-04-27 | End: 2020-12-14

## 2020-04-27 NOTE — TELEPHONE ENCOUNTER
IT would be very risky to given injectable steroid in 1st trimester of pregnancy as has been associated with premature delivery and low birth weight with some animal studies suggesting birth defects.  I sent a much more potent form of her cream that should work.  I would also recommend frequent and liberal use of EUCERIN dry skin therapy.

## 2020-04-27 NOTE — TELEPHONE ENCOUNTER
Pt wants to know if she can come in to get a steroid shot for eczema on her hands. Pt's hands are swollen and tight. The topical med you gave her isn't helping.  Pt is pregnant (8 weeks on 4/30). Please advise.

## 2020-12-14 ENCOUNTER — OFFICE VISIT (OUTPATIENT)
Dept: FAMILY MEDICINE CLINIC | Facility: CLINIC | Age: 40
End: 2020-12-14

## 2020-12-14 VITALS
HEIGHT: 64 IN | DIASTOLIC BLOOD PRESSURE: 78 MMHG | HEART RATE: 90 BPM | SYSTOLIC BLOOD PRESSURE: 128 MMHG | BODY MASS INDEX: 36.37 KG/M2 | TEMPERATURE: 97.7 F | OXYGEN SATURATION: 99 % | WEIGHT: 213 LBS

## 2020-12-14 DIAGNOSIS — R31.29 OTHER MICROSCOPIC HEMATURIA: ICD-10-CM

## 2020-12-14 DIAGNOSIS — D75.839 THROMBOCYTOSIS: Primary | ICD-10-CM

## 2020-12-14 DIAGNOSIS — R10.12 LUQ ABDOMINAL PAIN: ICD-10-CM

## 2020-12-14 PROCEDURE — 99214 OFFICE O/P EST MOD 30 MIN: CPT | Performed by: NURSE PRACTITIONER

## 2020-12-14 RX ORDER — CETIRIZINE HYDROCHLORIDE 10 MG/1
10 TABLET ORAL DAILY
COMMUNITY

## 2020-12-14 RX ORDER — TRIAMCINOLONE ACETONIDE 55 UG/1
2 SPRAY, METERED NASAL DAILY
COMMUNITY

## 2020-12-14 NOTE — PROGRESS NOTES
"Subjective   Mona Castillo is a 40 y.o. female who presents c/o abdominal pain that started on 11/30/20. Went to [facility] on 12/4/20 due to vomiting. Had c section on 11/24/20, OB recommended ER. Vomiting and diarrhea has resolved since ER visit but still with abd pain.     History of Present Illness   Stomach ache up high. Can't eat eggs now as make gag. No fever. Pain comes in waves. Can occur with activity or rest. At times worse than others. Treated in ER for N/V/D GI bug, then next day UTI. Nitrofurantoin. Never had any urinary symptoms. NVD have settled. Pain is worse in LUQ when occurs. Blood sugar normal when sporadically checked since home. Blood pressure normal when sporadically checked since home.  Pain severe enough at times can only crawl from toilet into melendez and ends up sleeping there. Trying to enjoy baby. She is trying to breast feed, but supply low secondary to length and debility of pain. She needs to figure out what is going on.  The following portions of the patient's history were reviewed and updated as appropriate: allergies, current medications, past family history, past medical history, past social history, past surgical history and problem list.    Review of Systems   Constitutional: Positive for activity change and appetite change. Negative for chills, fever, unexpected weight gain and unexpected weight loss.   Cardiovascular: Negative.    Gastrointestinal: Positive for abdominal pain, diarrhea, nausea and vomiting. Negative for abdominal distention, blood in stool and GERD.   Musculoskeletal: Negative for arthralgias.   Skin:        Incision looks great   Hematological: Negative.    Psychiatric/Behavioral: Positive for sleep disturbance and stress. Negative for depressed mood.     /78   Pulse 90   Temp 97.7 °F (36.5 °C) (Infrared)   Ht 162.6 cm (64\")   Wt 96.6 kg (213 lb)   SpO2 99%   Breastfeeding Yes   BMI 36.56 kg/m²     Objective   Physical Exam  Constitutional:     "   Appearance: She is well-developed. She is not diaphoretic.   HENT:      Head: Normocephalic and atraumatic.   Neck:      Musculoskeletal: Normal range of motion and neck supple.      Thyroid: No thyromegaly.   Cardiovascular:      Rate and Rhythm: Normal rate and regular rhythm.      Pulses:           Carotid pulses are 2+ on the right side and 2+ on the left side.     Heart sounds: Normal heart sounds.   Pulmonary:      Effort: Pulmonary effort is normal.      Breath sounds: Normal breath sounds.   Abdominal:      General: Abdomen is protuberant. Bowel sounds are normal.      Palpations: Abdomen is soft.      Tenderness: There is abdominal tenderness (minimal) in the left upper quadrant. There is no right CVA tenderness, left CVA tenderness, guarding or rebound. Negative signs include Huerta's sign, Rovsing's sign, McBurney's sign, psoas sign and obturator sign.   Lymphadenopathy:      Cervical: No cervical adenopathy.   Psychiatric:         Behavior: Behavior normal.         Thought Content: Thought content normal.         Judgment: Judgment normal.       Assessment/Plan   Problems Addressed this Visit     None      Visit Diagnoses     Thrombocytosis (CMS/HCC)    -  Primary    Relevant Orders    CBC & Differential    C-reactive Protein    Sedimentation Rate    US abdomen limited    LUQ abdominal pain        Relevant Orders    US abdomen limited    Other microscopic hematuria        Relevant Orders    Urine Culture - , Urine, Clean Catch      Diagnoses       Codes Comments    Thrombocytosis (CMS/HCC)    -  Primary ICD-10-CM: D47.3  ICD-9-CM: 238.71     LUQ abdominal pain     ICD-10-CM: R10.12  ICD-9-CM: 789.02     Other microscopic hematuria     ICD-10-CM: R31.29  ICD-9-CM: 599.72         Reviewed chart and COVID testing 12/3 in ER was negative, no urine culture. Blood counts rebounding nicely since delivery, platelets were 543    Thrombocytosis--consider acute phase reactant secondary to N/V at the time. Would  recheck to assess significance as N/V resolved for days now  LUQ pain--concern of splenomegaly--difficult to assess secondary to habitus--uterus appropriate and no tenderness. Scar with no sign of infection  Hematuria--send UA for culture    US results--preliminary with mild splenomegaly--pt to go home. Will determine treatment pending lab results.

## 2020-12-15 PROBLEM — O13.3 PREGNANCY-INDUCED HYPERTENSION IN THIRD TRIMESTER: Status: ACTIVE | Noted: 2020-11-23

## 2020-12-15 PROBLEM — O24.419 GESTATIONAL DIABETES: Status: ACTIVE | Noted: 2020-10-02

## 2020-12-16 ENCOUNTER — TELEPHONE (OUTPATIENT)
Dept: FAMILY MEDICINE CLINIC | Facility: CLINIC | Age: 40
End: 2020-12-16

## 2020-12-16 DIAGNOSIS — R16.1 SPLENOMEGALY: Primary | ICD-10-CM

## 2020-12-16 DIAGNOSIS — D75.839 THROMBOCYTOSIS: Primary | ICD-10-CM

## 2020-12-16 DIAGNOSIS — R51.9 PREGNANCY HEADACHE, POSTPARTUM: ICD-10-CM

## 2020-12-16 DIAGNOSIS — D75.839 THROMBOCYTOSIS: ICD-10-CM

## 2020-12-16 LAB
BACTERIA UR CULT: NORMAL
BACTERIA UR CULT: NORMAL
BASOPHILS # BLD AUTO: 0.1 X10E3/UL (ref 0–0.2)
BASOPHILS NFR BLD AUTO: 1 %
CRP SERPL-MCNC: 7 MG/L (ref 0–10)
EOSINOPHIL # BLD AUTO: 0.9 X10E3/UL (ref 0–0.4)
EOSINOPHIL NFR BLD AUTO: 12 %
ERYTHROCYTE [DISTWIDTH] IN BLOOD BY AUTOMATED COUNT: 14.8 % (ref 11.7–15.4)
ERYTHROCYTE [SEDIMENTATION RATE] IN BLOOD BY WESTERGREN METHOD: 41 MM/HR (ref 0–32)
HCT VFR BLD AUTO: 42.7 % (ref 34–46.6)
HGB BLD-MCNC: 13.5 G/DL (ref 11.1–15.9)
IMM GRANULOCYTES # BLD AUTO: 0 X10E3/UL (ref 0–0.1)
IMM GRANULOCYTES NFR BLD AUTO: 0 %
LYMPHOCYTES # BLD AUTO: 1.4 X10E3/UL (ref 0.7–3.1)
LYMPHOCYTES NFR BLD AUTO: 18 %
MCH RBC QN AUTO: 29 PG (ref 26.6–33)
MCHC RBC AUTO-ENTMCNC: 31.6 G/DL (ref 31.5–35.7)
MCV RBC AUTO: 92 FL (ref 79–97)
MONOCYTES # BLD AUTO: 0.5 X10E3/UL (ref 0.1–0.9)
MONOCYTES NFR BLD AUTO: 6 %
NEUTROPHILS # BLD AUTO: 4.6 X10E3/UL (ref 1.4–7)
NEUTROPHILS NFR BLD AUTO: 63 %
PLATELET # BLD AUTO: 517 X10E3/UL (ref 150–450)
RBC # BLD AUTO: 4.65 X10E6/UL (ref 3.77–5.28)
WBC # BLD AUTO: 7.5 X10E3/UL (ref 3.4–10.8)

## 2020-12-16 NOTE — TELEPHONE ENCOUNTER
Patient called in stating the Ct Scan did not show any problems, patient want to know if she can speak with the nurse regarding it?    Best call back # [phone]

## 2020-12-16 NOTE — PROGRESS NOTES
Please call the patient regarding her abnormal result. Platelet count is still up and sed rate is elevated. Spleen only mild enlarged on US, but if still with pain--definitely CT abd  With and without contrast.

## 2020-12-18 ENCOUNTER — TELEPHONE (OUTPATIENT)
Dept: FAMILY MEDICINE CLINIC | Facility: CLINIC | Age: 40
End: 2020-12-18

## 2020-12-18 ENCOUNTER — OFFICE VISIT (OUTPATIENT)
Dept: FAMILY MEDICINE CLINIC | Facility: CLINIC | Age: 40
End: 2020-12-18

## 2020-12-18 VITALS
TEMPERATURE: 97.5 F | DIASTOLIC BLOOD PRESSURE: 74 MMHG | HEART RATE: 90 BPM | OXYGEN SATURATION: 96 % | SYSTOLIC BLOOD PRESSURE: 102 MMHG | WEIGHT: 213 LBS | BODY MASS INDEX: 36.37 KG/M2 | HEIGHT: 64 IN

## 2020-12-18 DIAGNOSIS — R23.9 ALTERATION IN SKIN INTEGRITY RELATED TO SURGICAL INCISION: Primary | ICD-10-CM

## 2020-12-18 DIAGNOSIS — D75.839 THROMBOCYTOSIS: ICD-10-CM

## 2020-12-18 PROCEDURE — 99214 OFFICE O/P EST MOD 30 MIN: CPT | Performed by: NURSE PRACTITIONER

## 2020-12-18 RX ORDER — CEPHALEXIN 500 MG/1
500 CAPSULE ORAL 3 TIMES DAILY
Qty: 21 CAPSULE | Refills: 0 | Status: SHIPPED | OUTPATIENT
Start: 2020-12-18 | End: 2021-01-20

## 2020-12-18 NOTE — TELEPHONE ENCOUNTER
PT CALLED STATING SHE IS HAVING A PINKISH LIQUID SEEPING OUT OF HER C SECTION SCAR. SHE IS REQUESTING A CALL BACK TO DISCUSS IF THIS NEEDS TO BE ADDRESSED. SHE IS WORRIED WITH HER RECENT LAB RESULTS.     CALL BACK [phone]

## 2020-12-18 NOTE — PROGRESS NOTES
"Subjective   Mona Castillo is a 40 y.o. female patient stated she is having a lot of drainage from  site.    History of Present Illness   Drainage started in the middle of the night.   Stomach pain LUQ still severe intermittent. Has appt with hematology in January.   The following portions of the patient's history were reviewed and updated as appropriate: allergies, current medications, past family history, past medical history, past social history, past surgical history and problem list.    Review of Systems   Constitutional: Negative.    HENT: Negative.    Gastrointestinal: Positive for abdominal pain (LUQ only). Negative for constipation and diarrhea.   Endocrine: Negative.    Genitourinary: Negative for difficulty urinating, pelvic pain and vaginal bleeding.   Musculoskeletal: Positive for arthralgias.   Skin: Positive for skin lesions.   Allergic/Immunologic: Negative.    Neurological: Negative for headache.   Hematological: Negative.    Psychiatric/Behavioral: Positive for sleep disturbance and stress. Negative for depressed mood.     /74   Pulse 90   Temp 97.5 °F (36.4 °C) (Infrared)   Ht 162.6 cm (64\")   Wt 96.6 kg (213 lb)   SpO2 96%   BMI 36.56 kg/m²     Objective   Physical Exam  Vitals signs and nursing note reviewed.   Abdominal:      General: Bowel sounds are normal.      Palpations: Abdomen is soft.      Tenderness: There is no abdominal tenderness. There is no right CVA tenderness, left CVA tenderness or rebound.   Genitourinary:     Uterus: Not tender.       Adnexa:         Right: No tenderness.          Left: No tenderness.     Skin:     Findings: Wound (near fully healed transverse scar, no superficial or deep tenderness. no redness. scant R lateral serous discharge) present.       Assessment/Plan   Problems Addressed this Visit        Hematopoietic and Hemostatic    Thrombocytosis (CMS/HCC)      Other Visit Diagnoses     Alteration in skin integrity related to surgical " incision    -  Primary      Diagnoses       Codes Comments    Alteration in skin integrity related to surgical incision    -  Primary ICD-10-CM: R23.9  ICD-9-CM: 782.9     Thrombocytosis (CMS/HCC)     ICD-10-CM: D47.3  ICD-9-CM: 238.71         Surgical incision with very difficult to express scant serous drainage. Cultured, R margin of non tender incision without redness. Will place on keflex as suspect superficial. Uterus is not tender.  Thrombocytosis with LUQ pain--to take CDs of her CT and US to her hematology appt.  until hematology clears.

## 2020-12-20 LAB
BACTERIA SPEC AEROBE CULT: NORMAL
BACTERIA SPEC CULT: NORMAL

## 2021-01-20 ENCOUNTER — CONSULT (OUTPATIENT)
Dept: ONCOLOGY | Facility: CLINIC | Age: 41
End: 2021-01-20

## 2021-01-20 ENCOUNTER — LAB (OUTPATIENT)
Dept: LAB | Facility: HOSPITAL | Age: 41
End: 2021-01-20

## 2021-01-20 VITALS
HEART RATE: 71 BPM | TEMPERATURE: 97.8 F | OXYGEN SATURATION: 95 % | HEIGHT: 64 IN | SYSTOLIC BLOOD PRESSURE: 111 MMHG | RESPIRATION RATE: 18 BRPM | WEIGHT: 213 LBS | DIASTOLIC BLOOD PRESSURE: 75 MMHG | BODY MASS INDEX: 36.37 KG/M2

## 2021-01-20 DIAGNOSIS — D50.9 IRON DEFICIENCY ANEMIA, UNSPECIFIED IRON DEFICIENCY ANEMIA TYPE: Primary | ICD-10-CM

## 2021-01-20 DIAGNOSIS — D75.839 THROMBOCYTOSIS: Primary | ICD-10-CM

## 2021-01-20 LAB
BASOPHILS # BLD AUTO: 0.06 10*3/MM3 (ref 0–0.2)
BASOPHILS NFR BLD AUTO: 0.8 % (ref 0–1.5)
DEPRECATED RDW RBC AUTO: 47.4 FL (ref 37–54)
EOSINOPHIL # BLD AUTO: 0.72 10*3/MM3 (ref 0–0.4)
EOSINOPHIL NFR BLD AUTO: 9.5 % (ref 0.3–6.2)
ERYTHROCYTE [DISTWIDTH] IN BLOOD BY AUTOMATED COUNT: 14.8 % (ref 12.3–15.4)
HCT VFR BLD AUTO: 41 % (ref 34–46.6)
HGB BLD-MCNC: 13.3 G/DL (ref 12–15.9)
IMM GRANULOCYTES # BLD AUTO: 0.05 10*3/MM3 (ref 0–0.05)
IMM GRANULOCYTES NFR BLD AUTO: 0.7 % (ref 0–0.5)
LYMPHOCYTES # BLD AUTO: 2.06 10*3/MM3 (ref 0.7–3.1)
LYMPHOCYTES NFR BLD AUTO: 27.1 % (ref 19.6–45.3)
MCH RBC QN AUTO: 28.7 PG (ref 26.6–33)
MCHC RBC AUTO-ENTMCNC: 32.4 G/DL (ref 31.5–35.7)
MCV RBC AUTO: 88.4 FL (ref 79–97)
MONOCYTES # BLD AUTO: 0.58 10*3/MM3 (ref 0.1–0.9)
MONOCYTES NFR BLD AUTO: 7.6 % (ref 5–12)
NEUTROPHILS NFR BLD AUTO: 4.13 10*3/MM3 (ref 1.7–7)
NEUTROPHILS NFR BLD AUTO: 54.3 % (ref 42.7–76)
NRBC BLD AUTO-RTO: 0 /100 WBC (ref 0–0.2)
PLATELET # BLD AUTO: 375 10*3/MM3 (ref 140–450)
PMV BLD AUTO: 9.9 FL (ref 6–12)
RBC # BLD AUTO: 4.64 10*6/MM3 (ref 3.77–5.28)
WBC # BLD AUTO: 7.6 10*3/MM3 (ref 3.4–10.8)

## 2021-01-20 PROCEDURE — 85025 COMPLETE CBC W/AUTO DIFF WBC: CPT

## 2021-01-20 PROCEDURE — 36415 COLL VENOUS BLD VENIPUNCTURE: CPT

## 2021-01-20 PROCEDURE — 99243 OFF/OP CNSLTJ NEW/EST LOW 30: CPT | Performed by: INTERNAL MEDICINE

## 2021-01-20 NOTE — PROGRESS NOTES
Subjective Patient describes post pregnancy discomfort and findings of elevated platelet count.    REASON FOR CONSULTATION: Thrombocytosis  Provide an opinion on any further workup or treatment                             REQUESTING PHYSICIAN: Gives a irrigated jealousy is under becoming    RECORDS OBTAINED:  Records of the patients history including those obtained from the referring provider were reviewed and summarized in detail.    HISTORY OF PRESENT ILLNESS:  The patient is a 40 y.o. year old female who is here for an opinion about the above issue.    History of Present Illness      The patient is a 40-year-old female followed by primary care with a history of back pain/spasm, migraine, moderate persistent asthma seen in March 2020 for follow-up.  She was able to proceed with pregnancy through pregnancy that was marked by prolonged labor and postpartum she had abdominal pain leading to abdominal ultrasound demonstrating the pancreas to have benign fatty atrophy, normal liver without lesion, normal common bile duct, spleen at 14.7 cephalocaudal by 3.4 cm AP and 14.3 cm transverse, no ascites, normal kidneys  A follow-up CT scan of the abdomen December 2020 found the spleen to be at the upper limit of normal size at 14.6 x 4.3 cm with no other abnormalities.  We are asked to see the patient for developing thrombocytosis with previous laboratory studies demonstrating platelet counts between approximately 2-300,000 but, more recently after her pregnancy peaked in the mid 500,000 range.  Past Medical History:   Diagnosis Date   • Allergic rhinitis    • Asthma    • DDD (degenerative disc disease), lumbar    • Diabetes mellitus (CMS/HCC)    • GERD (gastroesophageal reflux disease)    • H/O HSV-2 infection    • History of back pain    • History of miscarriage    • Migraine         Past Surgical History:   Procedure Laterality Date   • APPENDECTOMY     • CHOLECYSTECTOMY     • COLONOSCOPY N/A 8/30/2019    Procedure:  COLONOSCOPY with biopsies;  Surgeon: Philip Winter MD;  Location: [facility] ENDOSCOPY;  Service: Gastroenterology   • DILATATION AND CURETTAGE  2018, 2020   • ENDOSCOPY N/A 8/30/2019    Procedure: ESOPHAGOGASTRODUODENOSCOPY with biopsies and aspirate;  Surgeon: Philip Winter MD;  Location: [facility] ENDOSCOPY;  Service: Gastroenterology   • UTERINE FIBROID SURGERY          Current Outpatient Medications on File Prior to Visit   Medication Sig Dispense Refill   • ALBUTEROL SULFATE  (90 Base) MCG/ACT inhaler INHALE 2 PUFFS BY MOUTH EVERY 6 HOURS AS NEEDED FOR WHEEZING 18 g 5   • cetirizine (zyrTEC) 10 MG tablet Take 10 mg by mouth Daily.     • famotidine (PEPCID) 40 MG tablet Take 40 mg by mouth 2 (Two) Times a Day.  1   • Fluticasone Furoate-Vilanterol (BREO ELLIPTA) 200-25 MCG/INH inhaler Inhale 1 puff Daily. 1 inhaler 5   • SUMAtriptan (IMITREX) 100 MG tablet Take one tablet at onset of headache. May repeat dose one time in 2 hours if headache not relieved. 10 tablet 5   • Triamcinolone Acetonide (NASACORT) 55 MCG/ACT nasal inhaler 2 sprays into the nostril(s) as directed by provider Daily.     • [DISCONTINUED] cephalexin (Keflex) 500 MG capsule Take 1 capsule by mouth 3 (Three) Times a Day. 21 capsule 0     No current facility-administered medications on file prior to visit.         ALLERGIES:    Allergies   Allergen Reactions   • [place] Itching     SOMETIMES HAS SOA   • Gabapentin Mental Status Change        Social History     Socioeconomic History   • Marital status:      Spouse name: Polo   • Number of children: Not on file   • Years of education: Not on file   • Highest education level: Not on file   Occupational History     Employer: SELF-EMPLOYED   Tobacco Use   • Smoking status: Former Smoker   • Smokeless tobacco: Never Used   • Tobacco comment: quit 10/2013   Substance and Sexual Activity   • Alcohol use: Yes     Comment: Social use   • Drug use: No   • Sexual activity: Defer        Family  "History   Adopted: Yes   Problem Relation Age of Onset   • Thyroid disease Mother    • No Known Problems Father    • Stroke Maternal Grandmother    • Cirrhosis Maternal Grandmother    • Anemia Maternal Grandmother    • Depression Maternal Grandmother    • Hypertension Maternal Grandmother    • Mental illness Maternal Grandmother    • No Known Problems Maternal Grandfather         Review of Systems   Constitutional: Positive for activity change.   HENT: Negative.    Eyes: Negative.    Respiratory: Negative.    Cardiovascular: Negative.    Gastrointestinal: Positive for abdominal pain.   Endocrine: Negative.    Genitourinary: Negative.    Musculoskeletal: Negative.    Allergic/Immunologic: Negative.    Neurological: Negative.    Psychiatric/Behavioral: Negative.         Objective     Vitals:    01/20/21 1621   BP: 111/75   Pulse: 71   Resp: 18   Temp: 97.8 °F (36.6 °C)   TempSrc: Temporal   SpO2: 95%   Weight: 96.6 kg (213 lb)   Height: 162.6 cm (64.02\")  Comment: new patient height   PainSc: 0-No pain     Current Status 1/20/2021   ECOG score 0       Physical Exam  Constitutional:       Appearance: Normal appearance.   HENT:      Head: Normocephalic and atraumatic.      Nose: Nose normal.      Mouth/Throat:      Mouth: Mucous membranes are moist.      Pharynx: Oropharynx is clear.   Eyes:      Extraocular Movements: Extraocular movements intact.      Conjunctiva/sclera: Conjunctivae normal.      Pupils: Pupils are equal, round, and reactive to light.   Neck:      Musculoskeletal: Normal range of motion and neck supple.   Cardiovascular:      Rate and Rhythm: Normal rate and regular rhythm.      Pulses: Normal pulses.      Heart sounds: Normal heart sounds.   Pulmonary:      Effort: Pulmonary effort is normal.      Breath sounds: Normal breath sounds.   Abdominal:      General: Abdomen is flat. Bowel sounds are normal.      Palpations: Abdomen is soft.      Comments: Negative hepatosplenomegaly   Musculoskeletal: " Normal range of motion.      Right lower leg: No edema.   Skin:     General: Skin is warm and dry.   Neurological:      General: No focal deficit present.      Mental Status: She is alert and oriented to person, place, and time. Mental status is at baseline.   Psychiatric:         Mood and Affect: Mood normal.         Thought Content: Thought content normal.          RECENT LABS:  Hematology WBC   Date Value Ref Range Status   01/20/2021 7.60 3.40 - 10.80 10*3/mm3 Final   12/14/2020 7.5 3.4 - 10.8 x10E3/uL Final   12/03/2020 9.73 4.5 - 11.0 10*3/uL Final     RBC   Date Value Ref Range Status   01/20/2021 4.64 3.77 - 5.28 10*6/mm3 Final   12/14/2020 4.65 3.77 - 5.28 x10E6/uL Final   12/03/2020 3.92 (L) 4.0 - 5.2 10*6/uL Final     Hemoglobin   Date Value Ref Range Status   01/20/2021 13.3 12.0 - 15.9 g/dL Final   12/03/2020 11.8 (L) 12.0 - 16.0 g/dL Final     Hematocrit   Date Value Ref Range Status   01/20/2021 41.0 34.0 - 46.6 % Final   12/03/2020 36.8 36.0 - 46.0 % Final     Platelets   Date Value Ref Range Status   01/20/2021 375 140 - 450 10*3/mm3 Final   12/03/2020 540 (H) 140 - 440 10*3/uL Final          Assessment/Plan           40-year-old female who we are asked to see concerning thrombocytosis.  She has been followed by primary care with back pain/spasm, migraine, moderate persistent asthma and recently had her first pregnancy delivered in late November 2020.  Following her delivery she experienced abdominal discomfort and worsening GI symptoms for several weeks thought to possibly be viral and also demonstrated mild thrombocytosis.  Her symptoms have now abated and her platelet count has returned to normal levels.  This is felt to have been a reactive thrombocytosis with the patient not demonstrating any findings that would be consistent with a primary hematologic disorder.  Again her studies have now normalized.  She should have her routine follow-ups at this point and would not require any additional  evaluation concerning this.    Thank you very much for let us see her in consultation and please let me know if you have any questions concerning this case.

## 2021-04-16 ENCOUNTER — BULK ORDERING (OUTPATIENT)
Dept: CASE MANAGEMENT | Facility: OTHER | Age: 41
End: 2021-04-16

## 2021-04-16 DIAGNOSIS — Z23 IMMUNIZATION DUE: ICD-10-CM

## 2021-04-24 DIAGNOSIS — J45.41 MODERATE PERSISTENT ASTHMA WITH ACUTE EXACERBATION: ICD-10-CM

## 2021-04-24 RX ORDER — ALBUTEROL SULFATE 90 UG/1
AEROSOL, METERED RESPIRATORY (INHALATION)
Qty: 18 G | Refills: 5 | Status: SHIPPED | OUTPATIENT
Start: 2021-04-24

## 2021-05-19 ENCOUNTER — TELEPHONE (OUTPATIENT)
Dept: FAMILY MEDICINE CLINIC | Facility: CLINIC | Age: 41
End: 2021-05-19

## 2021-05-20 NOTE — TELEPHONE ENCOUNTER
Sure, sent letter.  Can use face shield instead.  Also if vaccinated, does not have to wear mask according to CDC.  RRj

## 2021-05-22 DIAGNOSIS — G43.009 MIGRAINE WITHOUT AURA AND WITHOUT STATUS MIGRAINOSUS, NOT INTRACTABLE: ICD-10-CM

## 2021-05-24 RX ORDER — SUMATRIPTAN 100 MG/1
TABLET, FILM COATED ORAL
Qty: 10 TABLET | Refills: 5 | Status: SHIPPED | OUTPATIENT
Start: 2021-05-24

## 2022-01-01 NOTE — TELEPHONE ENCOUNTER
History & Physical    Boy Sigrid Fuller is a 22-hour old male infant, delivered via , Low Transverse [251] at Gestational Age: 39w1d due to breech noted at 36+ weeks gestation. Infant weighed 6 lb 10.2 oz (3010 g) with a Classification: AGA (10/26/22 1306).    Infant Jorje seen with mom and dad     Family history of jaundice requiring phototherapy: yes - both parents - mother born \"early\" and unknown reason father needed tx - he is O+ also.    Family history of DDH:  no      MATERNAL INFORMATION:     From mom's admit note:     Sigrid Fuller is a 34 year old  female at 39w1d  gestation who presents with scheduled primary  section due to fetal breech presentation.  Her current pregnancy is significant for nothing.  Patient reports no complaints.    Age, /Para, PATEL:   Information for the patient's mother:  Sigrid Fuller [[id number]]   33 year old          2022, by Last Menstrual Period       steroids during pregnancy:       Information for the patient's mother:  Sigrid Fuller [[id number]]     Social History     Tobacco Use   • Smoking status: Never Smoker   • Smokeless tobacco: Never Used   Substance Use Topics   • Alcohol use: Yes     Comment: 2 drinks weekly not drinking during pregnancy      Information for the patient's mother:  Sigrid Fuller [[id number]]     Drug Use:    No              Information for the patient's mother:  Sigrid Fuller [[id number]]   History reviewed. No pertinent past medical history.     Information for the patient's mother:  Sigrid Fuller [[id number]]     Patient Active Problem List   Diagnosis   • Breech presentation   • Breech presentation of fetus delivered   • Breech presentation at birth        Prenatal Labs:    Prenatal Ultrasound:  IMPRESSION:   Single live intrauterine gestation, estimated gestational age of 18 weeks 1  days for an PATEL of 2022.      Normal fetal anatomic survey. Incidental note is made of a suspected  succenturiate placenta. The  Work in   placenta is low-lying at this time. Suggest  additional follow-up imaging studies be obtained to assess for expected  placental migration.    3rd Trimester Summary  - Single living fetus with a gestational age of 34w 2d based on the reported clinical dates.  - Current growth parameters are consistent with the clinical date, indicating normal fetal growth.  - Estimated fetal weight corresponds to the 26th percentile.  - BPP is 8/8. Amniotic fluid is normal.  - No structural abnormalities are noted.    Information for the patient's mother:  Sigrid Fuller [[id number]]     HIV Antigen/ Antibody Combo Screen (no units)   Date Value   2022 Nonreactive     Hepatitis B Surface Antigen (no units)   Date Value   2022 Negative     Hepatitis C Antibody (no units)   Date Value   2022 Negative     RPR (no units)   Date Value   2022 Nonreactive   2022 Nonreactive     Neisseria Gonorrhoeae by Nucleic Acid Amplification (no units)   Date Value   2019 NEGATIVE     Chlamydia Trachomatis by Nucleic Acid Amplification (no units)   Date Value   2019 NEGATIVE     Rubella Antibody, IgG (Units/mL)   Date Value   2022     ABO/RH(D) (no units)   Date Value   2022 O Rh Positive     ANTIBODY SCREEN (no units)   Date Value   2022 Negative        GBS:   Information for the patient's mother:  Sigrid Fuller [[id number]]     CULTURE, STREPTOCOCCUS GROUP B WITH SENSITIVITIES (PENICILLIN ALLERGIC) (no units)   Date Value   2022     Negative for  Streptococcus agalactiae (Strep group B)        Antibiotics: No antibiotics needed.  Doses of antibiotics received: one - at time of c-sxn delivery for maternal prophylaxis.      Immunizations: TdaP received and influenza not received during this pregnancy.  COVID immunization x 2 doses - over-due for booster.      BIRTH HISTORY:     Delivery Method: , Low Transverse [251]   Rupture date & time:       Date & time of birth 2022 12:18 PM    Induction:       Labor complications: None     Placenta appearance: Intact   Cord info/complications: 3 Vessels None       Delayed cord clamping: No   Indications for : Breech   Presentation/position: Breech         Forceps attempted? No     Vacuum attempted? No     Shoulder dystocia? No                          INFORMATION   Resuscitation:  Suctioning        APGARS  One minute Five minutes   Skin color: 0  1    Heart rate: 2  2     Reflex: 2  2    Muscle tone: 2  2    Breathin  2    Totals:   8  9      Cord Blood/Zortman Evaluation (CRD)  Recent Labs   Lab 10/26/22  1225   ABORHDABR O Rh Positive   DIGG Negative     Blood gases sent? No   Cord pH No results found    Zortman Medications  Current Facility-Administered Medications   Medication Dose Route Frequency Provider Last Rate Last Admin   • lidocaine HCl (PF) (XYLOCAINE) 1 % injection 4 mg  0.4 mL Subcutaneous PRN Cheikh G Saikie, DO   4 mg at 10/27/22 0720   • acetaminophen (TYLENOL) oral suspension 30.08 mg  10 mg/kg (Dosing Weight) Oral Q6H PRN Cheikh G Saikie, DO   30.08 mg at 10/27/22 0721   • hepatitis B IMMUNE GLOBULIN (HYPERHEP B) injection 0.5 mL  0.5 mL Intramuscular Once PRN Diana Hennessy MD       • dextrose (GLUTOSE) 0.4 g/mL (40%) gel 1.5 mL  0.5 mL/kg (Dosing Weight) Buccal PRN Diana Hennessy MD           PHYSICAL EXAM     VITALS:   Visit Vitals  Pulse 124   Temp 98.2 °F (36.8 °C) (Axillary)   Resp 32   Ht 19\" (48.3 cm) Comment: Filed from Delivery Summary   Wt 3002 g   HC 34.5 cm (13.58\") Comment: Filed from Delivery Summary   BMI 12.89 kg/m²     Intake/Output  Report      10/26 0700  10/27 0659 10/27 0700  10/28 0659          Urine Occurrence 6 x 1 x    Stool Occurrence 2 x 2 x    Unmeasured Breast Milk Occurrence 6 x 1 x          Birth Measurements:        Weight: 6 lb 10.2 oz (3010 g)        Length: 19\"        Head circumference: 34.5 cm        Classification: AGA (10/26/22 1306)    GENERAL:Baby Boy is  an alert, vigorous male with appropriate behavior. He is in no acute distress.  SKIN: His skin is warm with normal turgor. The skin is pink and not visibly jaundiced. There are no bruises or other signs of injury except left thigh streak of purplish bruise and on plantar surface of right foot and 3-5th toes without swelling or bony deformity.   HEAD: The head is atraumatic and normocephalic. The anterior fontanel is open and flat.  EYES: The conjunctivae appear normal with neither icterus nor subconjunctival hemorrhage.   Red reflexes are seen bilaterally.  EARS: Pinnae normal.  NOSE: There is no nasal flaring, nares patent bilaterally.  THROAT:  The oropharynx is normal.  There is no cleft of the palate.  NECK: Clavicles without crepitus.  TRUNK AND THORAX: There are no lesions on the trunk; there is no dimple over the presacral area. There are no retractions.  LUNGS: The lung fields are clear to auscultation.  HEART: The precordium is quiet. The heart rhythm is grossly regular. S1 and S2 are normal. There are no murmurs. Normal femoral pulses.  ABDOMEN: The umbilical cord stump is normal. There is not an umbilical hernia. No HSM/masses. The abdomen is flat and soft.   GENITALIA: normal male penis, without hypospadias  bilaterally descended testes; no hydrocele, newly circumcised with normal appearance without significant bleeding with surgical gauze in place, large void in diaper at time of exam.  Mild irritation of buttocks with skin intact.    RECTAL: anus patent  EXTREMITIES: Moving moving bilateral upper and lower extremities, symmetrically.  Noted skin bruising as above.     The hip exam is normal. There are no hip clicks or clunks, has symmetric ROM/thigh/buttocks creases and bent knee hip heights.    NEUROLOGIC: He displays normal tone throughout. He is not jittery.    Immunization History   Administered Date(s) Administered   • Hep B, adolescent or pediatric 2022      Screens:  Hearing passed  bilaterally   ASSESSMENT     Well 22-hour old male infant.    Patient Active Problem List   Diagnosis   • Term  delivered by  section, current hospitalization   • Breech presentation   • Bruising - right leg/foot c/w birth process due to breech position     PLAN      Sepsis:    Risk Scores: Risk - Well Appearin.01; Risk - Equivocal: 0.14   Sepsis Classification: (most recent) Well Appearing (10/27/22 0520)  Plan: Based on risk scores and a current classification of Well Appearing, routine vital signs. no CBC or blood cultures. No antibiotics.       Routine  care.  Expect mild bruising to fade in upcoming days d/w parents.  Small degree of bruising unlikely to contribute to significant jaundice but will monitor in next days also due to FH.      Outpatient hip ultrasound for breech position per PMD recommended in 1-2 months of life with serial hip exams/monitoring until walking well established.      Rufe is currently being fed Breast milk only.  Doing well with latching/feeding thus far.    I am aware that mother's feeding choice upon admission was the following:   Information for the patient's mother:  Sigrid Fuller [[id number]]   Exclusive breast milk feeding   There are no medical contraindications to exclusive breast milk feeding, and the benefits of exclusively breastfeeding were discussed/reinforced with the mother.     Disp:  Family plans to be discharged tomorrow and will contact PMD for appt on 10/31/22.      Signed by: Jeffy Burr MD   2022

## 2022-01-18 DIAGNOSIS — J45.41 MODERATE PERSISTENT ASTHMA WITH ACUTE EXACERBATION: ICD-10-CM

## 2022-01-19 ENCOUNTER — TELEPHONE (OUTPATIENT)
Dept: FAMILY MEDICINE CLINIC | Facility: CLINIC | Age: 42
End: 2022-01-19

## 2022-01-19 RX ORDER — ALBUTEROL SULFATE 90 UG/1
AEROSOL, METERED RESPIRATORY (INHALATION)
Qty: 18 G | Refills: 5 | OUTPATIENT
Start: 2022-01-19

## 2022-01-19 NOTE — TELEPHONE ENCOUNTER
Caller: Mona Castillo    Relationship: Self    Best call back number: [phone]    What is the best time to reach you: ANY    Who are you requesting to speak with (clinical staff, provider,  specific staff member): CLINICAL    What was the call regarding: PHARMACY CALLED PATIENT TO TELL HER THAT HER PRESCRIPTION FOR HER albuterol sulfate  (90 Base) MCG/ACT inhaler WAS DENIED. PATIENT WOULD LIKE A CALL BACK ABOUT THIS.     Do you require a callback: YES

## 2022-01-19 NOTE — TELEPHONE ENCOUNTER
Pt is aware she has not been seen since 12/18/2020 and this is why med was denied. She said she now works for the [employer] so she will call the wellness center because it is cheaper for her to go there.

## 2022-01-25 DIAGNOSIS — J45.41 MODERATE PERSISTENT ASTHMA WITH ACUTE EXACERBATION: ICD-10-CM

## 2022-01-25 RX ORDER — ALBUTEROL SULFATE 90 UG/1
AEROSOL, METERED RESPIRATORY (INHALATION)
Qty: 18 G | Refills: 5 | OUTPATIENT
Start: 2022-01-25

## 2023-06-15 ENCOUNTER — TELEPHONE (OUTPATIENT)
Dept: FAMILY MEDICINE CLINIC | Facility: CLINIC | Age: 43
End: 2023-06-15
Payer: COMMERCIAL

## 2023-06-15 NOTE — TELEPHONE ENCOUNTER
Patient has been experiencing sob and chest tightness after doing everything tasks like walking from the kitchen to the living room. Occasionally she will get out of breath just sitting on her couch. This morning she said while laying in bed she was having trouble breathing.     Typically she uses her inhaler 1x a week. Her recently she's been using it every other day.     Pt is concerned and would like to speak to a nurse. Please advise.     [phone]

## 2023-07-25 ENCOUNTER — TELEPHONE (OUTPATIENT)
Dept: FAMILY MEDICINE CLINIC | Facility: CLINIC | Age: 43
End: 2023-07-25
Payer: COMMERCIAL

## 2023-07-25 DIAGNOSIS — M54.50 ACUTE BILATERAL LOW BACK PAIN WITHOUT SCIATICA: Primary | ICD-10-CM

## 2023-07-25 RX ORDER — LIDOCAINE 50 MG/G
1 PATCH TOPICAL EVERY 24 HOURS
Qty: 30 PATCH | Refills: 12 | Status: SHIPPED | OUTPATIENT
Start: 2023-07-25 | End: 2023-07-27 | Stop reason: SDUPTHER

## 2023-07-25 RX ORDER — PREDNISONE 20 MG/1
20 TABLET ORAL DAILY
Qty: 5 TABLET | Refills: 0 | Status: SHIPPED | OUTPATIENT
Start: 2023-07-25

## 2023-07-25 NOTE — TELEPHONE ENCOUNTER
Caller: Mona Castillo    Relationship: Self    Best call back number: [phone]     What medication are you requesting: SOMETHING FOR BACK PAIN DUE TO NOT ABLE TO TAKE ADVILL    What are your current symptoms: BACK PAIN    How long have you been experiencing symptoms: YEARS    Have you had these symptoms before:    [x] Yes  [] No    Have you been treated for these symptoms before:   [x] Yes  [] No    If a prescription is needed, what is your preferred pharmacy and phone number: [facility] #[id number] [facility] [address] AT [facility] [address]  - [phone]  - [phone]      Additional notes: PATIENT RECEIVED MESSAGE FROM MY CHART TO NOT TAKE THE ADVIL - NSAIDS AND HAD BEEN TAKEN TYLENOL BUT IT IS NOT HELPING WITH THE BACK PAIN.  SHE IS REALLY HURTING BAD AND WANTS TO KNOW WHAT SHE CAN TAKE OR IF  DR MCMAHAN CAN PRESCRIBE SOMETHING FOR THE PAIN.              Addended by: Grace Anguiano on: 6/21/2018 05:02 PM     Modules accepted: Orders

## 2023-07-26 ENCOUNTER — PRIOR AUTHORIZATION (OUTPATIENT)
Dept: FAMILY MEDICINE CLINIC | Facility: CLINIC | Age: 43
End: 2023-07-26
Payer: COMMERCIAL

## 2023-07-26 NOTE — TELEPHONE ENCOUNTER
Pa pending for Lidocaine patches spoke with prior auth at [facility] ref# [id number]  Ov labs faxed to [facility] for appeals for Wegovy [phone]

## 2023-07-27 ENCOUNTER — TELEPHONE (OUTPATIENT)
Dept: FAMILY MEDICINE CLINIC | Facility: CLINIC | Age: 43
End: 2023-07-27
Payer: COMMERCIAL

## 2023-07-27 DIAGNOSIS — N18.2 CKD (CHRONIC KIDNEY DISEASE), STAGE II: Primary | ICD-10-CM

## 2023-07-27 DIAGNOSIS — M54.50 ACUTE BILATERAL LOW BACK PAIN WITHOUT SCIATICA: ICD-10-CM

## 2023-07-27 RX ORDER — LIDOCAINE 50 MG/G
1 PATCH TOPICAL EVERY 24 HOURS
Qty: 30 PATCH | Refills: 1 | Status: SHIPPED | OUTPATIENT
Start: 2023-07-27

## 2023-07-27 NOTE — TELEPHONE ENCOUNTER
PT had labs done recently and her ETFR came back low and she has problems with this and kidney problems in the past and is requesting a referral to a specialist.        Pt is requesting to see Bobby Griffith at [address].   [phone]

## 2023-08-15 ENCOUNTER — TELEPHONE (OUTPATIENT)
Dept: FAMILY MEDICINE CLINIC | Facility: CLINIC | Age: 43
End: 2023-08-15
Payer: COMMERCIAL

## 2023-08-15 DIAGNOSIS — M54.41 CHRONIC BILATERAL LOW BACK PAIN WITH BILATERAL SCIATICA: Primary | ICD-10-CM

## 2023-08-15 DIAGNOSIS — M54.42 CHRONIC BILATERAL LOW BACK PAIN WITH BILATERAL SCIATICA: Primary | ICD-10-CM

## 2023-08-15 DIAGNOSIS — G89.29 CHRONIC BILATERAL LOW BACK PAIN WITH BILATERAL SCIATICA: Primary | ICD-10-CM

## 2023-08-15 RX ORDER — PREGABALIN 75 MG/1
75 CAPSULE ORAL 2 TIMES DAILY
Qty: 60 CAPSULE | Refills: 5 | Status: SHIPPED | OUTPATIENT
Start: 2023-08-15

## 2023-08-15 NOTE — TELEPHONE ENCOUNTER
Pt would like to try Lyrica because she can't keep the lidocaine patches on. She's tried different times of day/night but they won't stay on. Please send to Tim at [facility].  She is fine with being seen every 6 months.

## 2023-08-15 NOTE — TELEPHONE ENCOUNTER
Caller: Mona Castilol    Relationship: Self    Best call back number: [phone]     What was the call regarding: PATIENT STATED SHE RECEIVED A CALL FROM HER INSURANCE REGARDING A CHEAPER ALTERNATIVE TO LIDOCAINE PATCHES, POSSIBLY CALLED [facility].  PATIENT STATED THAT INSURANCE WAS SENDING A FAX TO DR MCMAHAN LAST WEEK BUT SHE HAS NOT HEARD BACK ABOUT IT SINCE. PLEASE ADVISE.

## 2023-09-20 ENCOUNTER — TRANSCRIBE ORDERS (OUTPATIENT)
Dept: ADMINISTRATIVE | Facility: HOSPITAL | Age: 43
End: 2023-09-20
Payer: COMMERCIAL

## 2023-09-20 DIAGNOSIS — N18.2 CHRONIC KIDNEY DISEASE, STAGE II (MILD): Primary | ICD-10-CM

## 2023-09-28 ENCOUNTER — HOSPITAL ENCOUNTER (OUTPATIENT)
Dept: ULTRASOUND IMAGING | Facility: HOSPITAL | Age: 43
Discharge: HOME | End: 2023-09-28
Admitting: INTERNAL MEDICINE
Payer: COMMERCIAL

## 2023-09-28 DIAGNOSIS — N18.2 CHRONIC KIDNEY DISEASE, STAGE II (MILD): ICD-10-CM

## 2023-09-28 PROCEDURE — 76775 US EXAM ABDO BACK WALL LIM: CPT

## 2023-11-02 DIAGNOSIS — J45.41 MODERATE PERSISTENT ASTHMA WITH ACUTE EXACERBATION: ICD-10-CM

## 2023-11-02 RX ORDER — ALBUTEROL SULFATE 90 UG/1
2 AEROSOL, METERED RESPIRATORY (INHALATION) EVERY 6 HOURS PRN
Qty: 18 G | Refills: 5 | Status: SHIPPED | OUTPATIENT
Start: 2023-11-02 | End: 2023-11-06 | Stop reason: SDUPTHER

## 2023-11-06 ENCOUNTER — TELEPHONE (OUTPATIENT)
Dept: FAMILY MEDICINE CLINIC | Facility: CLINIC | Age: 43
End: 2023-11-06
Payer: COMMERCIAL

## 2023-11-06 DIAGNOSIS — J45.41 MODERATE PERSISTENT ASTHMA WITH ACUTE EXACERBATION: ICD-10-CM

## 2023-11-06 RX ORDER — ALBUTEROL SULFATE 90 UG/1
2 AEROSOL, METERED RESPIRATORY (INHALATION) EVERY 6 HOURS PRN
Qty: 18 G | Refills: 5 | Status: SHIPPED | OUTPATIENT
Start: 2023-11-06 | End: 2023-11-08 | Stop reason: ALTCHOICE

## 2023-11-06 NOTE — TELEPHONE ENCOUNTER
Caller: Jonathan Mona LAWSON    Relationship: Self    Best call back number: [phone]     Requested Prescriptions:   Requested Prescriptions     Pending Prescriptions Disp Refills    albuterol sulfate  (90 Base) MCG/ACT inhaler 18 g 5     Sig: Inhale 2 puffs Every 6 (Six) Hours As Needed for Wheezing.        Pharmacy where request should be sent: [facility]     Last office visit with prescribing clinician: 7/19/2023   Last telemedicine visit with prescribing clinician: Visit date not found   Next office visit with prescribing clinician: 1/22/2024     Additional details provided by patient: PATIENT STATES THAT SHE CONFIRMED THAT THE HOSPITAL PHARMACY HAS THE MEDICATION IN STOCK, AND NEEDS TO HAVE PRESCRIPTION SENT THERE. PLEASE CALL TO CONFIRM    Does the patient have less than a 3 day supply:  [x] Yes  [] No    Would you like a call back once the refill request has been completed: [x] Yes [] No    If the office needs to give you a call back, can they leave a voicemail: [x] Yes [] No    Ishaan Conley Rep   11/06/23 11:08 EST

## 2023-11-06 NOTE — TELEPHONE ENCOUNTER
Caller: Mona Castillo    Relationship: Self    Best call back number: [phone]    What is the best time to reach you: ANYTIME     Who are you requesting to speak with (clinical staff, provider,  specific staff member): CLINICAL     What was the call regarding: PATIENT STATES SHE WENT TO GET HER ALBUTEROL INHALER PRESCRIPTION AT [facility] AND THEY TOLD HER THAT THE INHALER IS NEEDING A PRIOR AUTHORIZATION.     PATIENT IS REQUESTING A CALL BACK.

## 2023-11-08 RX ORDER — ALBUTEROL SULFATE 90 UG/1
2 AEROSOL, METERED RESPIRATORY (INHALATION) EVERY 4 HOURS PRN
Qty: 18 G | Refills: 3 | Status: SHIPPED | OUTPATIENT
Start: 2023-11-08

## 2024-04-19 ENCOUNTER — OFFICE VISIT (OUTPATIENT)
Dept: FAMILY MEDICINE CLINIC | Facility: CLINIC | Age: 44
End: 2024-04-19
Payer: COMMERCIAL

## 2024-04-19 VITALS
BODY MASS INDEX: 39.95 KG/M2 | DIASTOLIC BLOOD PRESSURE: 72 MMHG | SYSTOLIC BLOOD PRESSURE: 110 MMHG | HEIGHT: 64 IN | WEIGHT: 234 LBS | OXYGEN SATURATION: 99 % | HEART RATE: 74 BPM

## 2024-04-19 DIAGNOSIS — R53.83 OTHER FATIGUE: ICD-10-CM

## 2024-04-19 DIAGNOSIS — J45.40 MODERATE PERSISTENT ASTHMA WITHOUT COMPLICATION: Primary | ICD-10-CM

## 2024-04-19 DIAGNOSIS — L65.9 ALOPECIA: ICD-10-CM

## 2024-04-19 PROCEDURE — 99214 OFFICE O/P EST MOD 30 MIN: CPT | Performed by: FAMILY MEDICINE

## 2024-04-19 RX ORDER — FLUTICASONE FUROATE, UMECLIDINIUM BROMIDE AND VILANTEROL TRIFENATATE 200; 62.5; 25 UG/1; UG/1; UG/1
1 POWDER RESPIRATORY (INHALATION)
Qty: 1 EACH | Refills: 12 | Status: SHIPPED | OUTPATIENT
Start: 2024-04-19

## 2024-04-19 RX ORDER — ALBUTEROL SULFATE 2.5 MG/3ML
2.5 SOLUTION RESPIRATORY (INHALATION) EVERY 4 HOURS PRN
Qty: 120 EACH | Refills: 12 | Status: SHIPPED | OUTPATIENT
Start: 2024-04-19

## 2024-04-19 NOTE — PROGRESS NOTES
Subjective   Mona Castillo is a 43 y.o. female. Presents today for   Chief Complaint   Patient presents with    Asthma    Annual Exam       History of Present Illness  Patient 44 y/o female with asthma here for recheck;  Still on albuterol prn;  on symbicort; still having issues and feels needs nebulizer when has flare.   Patient also struggling with severe fatigue and hair loss.    Review of Systems   Respiratory:  Positive for wheezing. Negative for shortness of breath.    Cardiovascular:  Negative for chest pain and palpitations.   Gastrointestinal:  Negative for abdominal pain.       Patient Active Problem List   Diagnosis    Asthma    Seasonal allergic rhinitis    Dizziness    Missed     Acute renal failure    Abdominal pain    Diarrhea    Bronchitis with asthma, acute    Iron deficiency anemia    Intestinal infection due to enteropathogenic E. coli    Rh negative, antepartum    Gestational diabetes    Pregnancy-induced hypertension in third trimester    Thrombocytosis       Social History     Socioeconomic History    Marital status:      Spouse name: Polo    Number of children: 1    Years of education: College   Tobacco Use    Smoking status: Former    Smokeless tobacco: Never    Tobacco comments:     quit 10/2013   Substance and Sexual Activity    Alcohol use: Yes     Comment: Social use    Drug use: No    Sexual activity: Defer       Allergies   Allergen Reactions    [place] Itching     SOMETIMES HAS SOA    Gabapentin Mental Status Change       Current Outpatient Medications on File Prior to Visit   Medication Sig Dispense Refill    budesonide-formoterol (SYMBICORT) 160-4.5 MCG/ACT inhaler Inhale 2 puffs 2 (Two) Times a Day. 10.2 g 12    cetirizine (zyrTEC) 10 MG tablet Take 1 tablet by mouth Daily.      lidocaine (LIDODERM) 5 % Place 1 patch on the skin as directed by provider Daily. Remove & Discard patch within 12 hours or as directed by MD 30 patch 1    Ventolin  (90 Base)  MCG/ACT inhaler Inhale 2 puffs Every 4 (Four) Hours As Needed for Wheezing. 18 g 3    [DISCONTINUED] Cholecalciferol (Vitamin D-3) 125 MCG (5000 UT) tablet Take 1 tablet by mouth Daily. (Patient not taking: Reported on 4/19/2024) 90 tablet 1    [DISCONTINUED] famotidine (PEPCID) 40 MG tablet Take 1 tablet by mouth 2 (Two) Times a Day. (Patient not taking: Reported on 4/19/2024)  1    [DISCONTINUED] Fluticasone Furoate-Vilanterol (BREO ELLIPTA) 200-25 MCG/INH inhaler Inhale 1 puff Daily. (Patient not taking: Reported on 4/19/2024) 1 inhaler 5    [DISCONTINUED] methylPREDNISolone (MEDROL) 4 MG dose pack Take as directed on package instructions. (Patient not taking: Reported on 4/19/2024) 21 tablet 0    [DISCONTINUED] montelukast (SINGULAIR) 10 MG tablet Take 1 tablet by mouth Every Evening. (Patient not taking: Reported on 4/19/2024)      [DISCONTINUED] predniSONE (DELTASONE) 20 MG tablet Take 1 tablet by mouth Daily. (Patient not taking: Reported on 4/19/2024) 5 tablet 0    [DISCONTINUED] pregabalin (LYRICA) 75 MG capsule Take 1 capsule by mouth 2 (Two) Times a Day. Call for dose adjustments if not working (Patient not taking: Reported on 4/19/2024) 60 capsule 5    [DISCONTINUED] Semaglutide-Weight Management (Wegovy) 0.25 MG/0.5ML solution auto-injector Inject 0.25 mg under the skin into the appropriate area as directed 1 (One) Time Per Week. X 4 weeks then go to 0.5mg 2 mL 0    [DISCONTINUED] Semaglutide-Weight Management (Wegovy) 0.5 MG/0.5ML solution auto-injector Inject 0.5 mL under the skin into the appropriate area as directed 1 (One) Time Per Week. 2 mL 2    [DISCONTINUED] SUMAtriptan (IMITREX) 100 MG tablet TAKE 1 TABLET BY MOUTH AT ONSET OF HEADACHE. MAY REPEAT DOSE 1 TIME IN 2 HOURS IF HEADACHE NOT RELIEVED (Patient not taking: Reported on 4/19/2024) 10 tablet 5    [DISCONTINUED] Triamcinolone Acetonide (NASACORT) 55 MCG/ACT nasal inhaler 2 sprays into the nostril(s) as directed by provider Daily. (Patient  "not taking: Reported on 4/19/2024)       No current facility-administered medications on file prior to visit.       Objective   Vitals:    04/19/24 1536   BP: 110/72   Pulse: 74   SpO2: 99%   Weight: 106 kg (234 lb)   Height: 162.6 cm (64\")     Body mass index is 40.17 kg/m².    Physical Exam  Vitals and nursing note reviewed.   Constitutional:       Appearance: She is well-developed.   HENT:      Head: Normocephalic and atraumatic.   Neck:      Thyroid: No thyromegaly.      Vascular: No JVD.   Cardiovascular:      Rate and Rhythm: Normal rate and regular rhythm.      Heart sounds: Normal heart sounds. No murmur heard.     No friction rub. No gallop.   Pulmonary:      Effort: Pulmonary effort is normal. No respiratory distress.      Breath sounds: Normal breath sounds. No wheezing or rales.   Abdominal:      General: Bowel sounds are normal. There is no distension.      Palpations: Abdomen is soft.      Tenderness: There is no abdominal tenderness. There is no guarding or rebound.   Musculoskeletal:      Cervical back: Neck supple.   Skin:     General: Skin is warm and dry.   Neurological:      Mental Status: She is alert.   Psychiatric:         Behavior: Behavior normal.         Assessment & Plan   Diagnoses and all orders for this visit:    1. Wellness examination (Primary)    2. Moderate persistent asthma without complication  -     Fluticasone-Umeclidin-Vilant (Trelegy Ellipta) 200-62.5-25 MCG/ACT inhaler; Inhale 1 puff Daily.  Dispense: 1 each; Refill: 12  -     albuterol (PROVENTIL) (2.5 MG/3ML) 0.083% nebulizer solution; Take 2.5 mg by nebulization Every 4 (Four) Hours As Needed for Wheezing (use with neb).  Dispense: 120 each; Refill: 12    3. Alopecia  -     CBC (No Diff)  -     Comprehensive Metabolic Panel  -     TSH  -     Iron Profile  -     Ferritin  -     Vitamin B12    4. Other fatigue  -     CBC (No Diff)  -     Comprehensive Metabolic Panel  -     TSH  -     Iron Profile  -     Ferritin  -     " Vitamin B12    Would medically benefit from nebulizer, [facility];  will also add lama to laba/ics therapy to reduce flares  Check labs for hair lsos and faitgue as above;  can also try OTC biotin and saw palmetto                 -Follow up: 6 months and prn

## 2024-05-30 ENCOUNTER — TELEPHONE (OUTPATIENT)
Dept: FAMILY MEDICINE CLINIC | Facility: CLINIC | Age: 44
End: 2024-05-30

## 2024-05-30 DIAGNOSIS — R92.1 BREAST CALCIFICATION SEEN ON MAMMOGRAM: Primary | ICD-10-CM

## 2024-05-30 NOTE — TELEPHONE ENCOUNTER
Caller: Mona Castillo    Relationship: Self    Best call back number:     What is the medical concern/diagnosis: SPOT FOUND ON BREAST, LEFT SIDE     What specialty or service is being requested: ORDER FOR A MAMMOGRAM     PATIENT WOULD LIKE THIS DONE AT [facility]      Any additional details:     PATIENT WANTS TO GET THIS DONE TOWARDS END OF JUNE EARLY JULY  LAST MAMMOGRAM WAS ABOUT 6 MONTHS PRIOR TO THE END OF JUNE.

## 2024-06-14 LAB
ALBUMIN SERPL-MCNC: 4.1 G/DL (ref 3.9–4.9)
ALBUMIN/GLOB SERPL: 1.6 {RATIO}
ALP SERPL-CCNC: 86 IU/L (ref 44–121)
ALT SERPL-CCNC: 9 IU/L (ref 0–32)
AST SERPL-CCNC: 13 IU/L (ref 0–40)
BILIRUB SERPL-MCNC: 0.4 MG/DL (ref 0–1.2)
BUN SERPL-MCNC: 9 MG/DL (ref 6–24)
BUN/CREAT SERPL: 9 (ref 9–23)
CALCIUM SERPL-MCNC: 9.2 MG/DL (ref 8.7–10.2)
CHLORIDE SERPL-SCNC: 104 MMOL/L (ref 96–106)
CO2 SERPL-SCNC: 24 MMOL/L (ref 20–29)
CREAT SERPL-MCNC: 1 MG/DL (ref 0.57–1)
EGFRCR SERPLBLD CKD-EPI 2021: 72 ML/MIN/1.73
ERYTHROCYTE [DISTWIDTH] IN BLOOD BY AUTOMATED COUNT: 13.3 % (ref 11.7–15.4)
FERRITIN SERPL-MCNC: 64 NG/ML (ref 15–150)
GLOBULIN SER CALC-MCNC: 2.6 G/DL (ref 1.5–4.5)
GLUCOSE SERPL-MCNC: 77 MG/DL (ref 70–99)
HCT VFR BLD AUTO: 40.9 % (ref 34–46.6)
HGB BLD-MCNC: 13.2 G/DL (ref 11.1–15.9)
IRON SATN MFR SERPL: 18 % (ref 15–55)
IRON SERPL-MCNC: 44 UG/DL (ref 27–159)
MCH RBC QN AUTO: 30.1 PG (ref 26.6–33)
MCHC RBC AUTO-ENTMCNC: 32.3 G/DL (ref 31.5–35.7)
MCV RBC AUTO: 93 FL (ref 79–97)
PLATELET # BLD AUTO: 366 X10E3/UL (ref 150–450)
POTASSIUM SERPL-SCNC: 4.2 MMOL/L (ref 3.5–5.2)
PROT SERPL-MCNC: 6.7 G/DL (ref 6–8.5)
RBC # BLD AUTO: 4.38 X10E6/UL (ref 3.77–5.28)
SODIUM SERPL-SCNC: 142 MMOL/L (ref 134–144)
TIBC SERPL-MCNC: 243 UG/DL (ref 250–450)
TSH SERPL DL<=0.005 MIU/L-ACNC: 1.91 UIU/ML (ref 0.45–4.5)
UIBC SERPL-MCNC: 199 UG/DL (ref 131–425)
VIT B12 SERPL-MCNC: 463 PG/ML (ref 232–1245)
WBC # BLD AUTO: 7.9 X10E3/UL (ref 3.4–10.8)

## 2024-07-08 ENCOUNTER — APPOINTMENT (OUTPATIENT)
Dept: ULTRASOUND IMAGING | Facility: HOSPITAL | Age: 44
End: 2024-07-08
Payer: COMMERCIAL

## 2024-07-08 ENCOUNTER — HOSPITAL ENCOUNTER (OUTPATIENT)
Dept: MAMMOGRAPHY | Facility: HOSPITAL | Age: 44
Discharge: HOME | End: 2024-07-08
Admitting: FAMILY MEDICINE
Payer: COMMERCIAL

## 2024-07-08 DIAGNOSIS — R92.1 BREAST CALCIFICATION SEEN ON MAMMOGRAM: ICD-10-CM

## 2024-07-08 PROCEDURE — 77065 DX MAMMO INCL CAD UNI: CPT

## 2024-07-08 PROCEDURE — G0279 TOMOSYNTHESIS, MAMMO: HCPCS

## 2024-07-08 NOTE — PROGRESS NOTES
Call results to patient.  Diagnostic mammo looks benign, but they recommend do a bilateral diagnostic mammo in 6 months when due for screening again, to make sure remains stable.

## 2024-10-10 ENCOUNTER — TELEPHONE (OUTPATIENT)
Dept: FAMILY MEDICINE CLINIC | Facility: CLINIC | Age: 44
End: 2024-10-10

## 2024-10-10 RX ORDER — ALBUTEROL SULFATE 90 UG/1
2 AEROSOL, METERED RESPIRATORY (INHALATION) EVERY 4 HOURS PRN
Qty: 18 G | Refills: 3 | Status: SHIPPED | OUTPATIENT
Start: 2024-10-10

## 2024-10-10 NOTE — TELEPHONE ENCOUNTER
Caller: Mona Castillo    Relationship: Self    Best call back number: [phone]     Which medication are you concerned about: Ventolin  (90 Base) MCG/ACT inhaler     Who prescribed you this medication: MARTIR      What are your concerns: INSURANCE HAS DENIED THIS MEDICATION. PATIENT WOULD LIKE TO SEE IF PCP CAN DO SOMETHING TO OVERRIDE THEIR DECISION WHERE THE OTHER BRAND OF INHALER IS NOT WORKING FOR HER. PLEASE CALL TO LET HER KNOW IF THIS IS POSSIBLE    [id number] - [facility] [address] AT [facility] [address] [facility] [phone] [facility] [phone]

## 2024-10-21 ENCOUNTER — OFFICE VISIT (OUTPATIENT)
Dept: FAMILY MEDICINE CLINIC | Facility: CLINIC | Age: 44
End: 2024-10-21
Payer: COMMERCIAL

## 2024-10-21 VITALS
DIASTOLIC BLOOD PRESSURE: 72 MMHG | WEIGHT: 239 LBS | BODY MASS INDEX: 40.8 KG/M2 | OXYGEN SATURATION: 99 % | HEART RATE: 69 BPM | SYSTOLIC BLOOD PRESSURE: 112 MMHG | HEIGHT: 64 IN

## 2024-10-21 DIAGNOSIS — J45.40 MODERATE PERSISTENT ASTHMA WITHOUT COMPLICATION: Primary | ICD-10-CM

## 2024-10-21 PROCEDURE — 99214 OFFICE O/P EST MOD 30 MIN: CPT | Performed by: FAMILY MEDICINE

## 2024-10-21 RX ORDER — MONTELUKAST SODIUM 10 MG/1
10 TABLET ORAL DAILY
COMMUNITY
Start: 2024-10-11 | End: 2025-10-11

## 2024-10-21 RX ORDER — BUDESONIDE, GLYCOPYRROLATE, AND FORMOTEROL FUMARATE 160; 9; 4.8 UG/1; UG/1; UG/1
2 AEROSOL, METERED RESPIRATORY (INHALATION) 2 TIMES DAILY
Qty: 10.7 G | Refills: 12 | Status: SHIPPED | OUTPATIENT
Start: 2024-10-21

## 2024-10-21 NOTE — PROGRESS NOTES
Subjective   Mona Castillo is a 43 y.o. female. Presents today for   Chief Complaint   Patient presents with    Asthma       History of Present Illness  Patient 44 y/o with allergy/asthma using rescue inhaler several times a week;   On symbicort, singulair and compliant;   Wheezing and coughing throughout.   Trelegy was $600.      Review of Systems   Respiratory:  Positive for cough, shortness of breath and wheezing.    Cardiovascular:  Negative for chest pain and palpitations.       Patient Active Problem List   Diagnosis    Asthma    Seasonal allergic rhinitis    Dizziness    Missed     Acute renal failure    Abdominal pain    Diarrhea    Bronchitis with asthma, acute    Iron deficiency anemia    Intestinal infection due to enteropathogenic E. coli    Rh negative, antepartum    Gestational diabetes    Pregnancy-induced hypertension in third trimester    Thrombocytosis       Social History     Socioeconomic History    Marital status:      Spouse name: Polo    Number of children: 1    Years of education: College   Tobacco Use    Smoking status: Former     Passive exposure: Past    Smokeless tobacco: Never    Tobacco comments:     quit 10/2013   Substance and Sexual Activity    Alcohol use: Yes     Comment: Social use    Drug use: No    Sexual activity: Defer       Allergies   Allergen Reactions    Turkey Itching     SOMETIMES HAS SOA    Gabapentin Mental Status Change       Current Outpatient Medications on File Prior to Visit   Medication Sig Dispense Refill    albuterol (PROVENTIL) (2.5 MG/3ML) 0.083% nebulizer solution Take 2.5 mg by nebulization Every 4 (Four) Hours As Needed for Wheezing (use with neb). 120 each 12    montelukast (SINGULAIR) 10 MG tablet Take 1 tablet by mouth Daily.      Ventolin  (90 Base) MCG/ACT inhaler Inhale 2 puffs Every 4 (Four) Hours As Needed for Wheezing. 18 g 3    [DISCONTINUED] budesonide-formoterol (SYMBICORT) 160-4.5 MCG/ACT inhaler Inhale 2 puffs 2  "(Two) Times a Day. 10.2 g 12    [DISCONTINUED] cetirizine (zyrTEC) 10 MG tablet Take 1 tablet by mouth Daily. (Patient not taking: Reported on 10/21/2024)      [DISCONTINUED] Fluticasone-Umeclidin-Vilant (Trelegy Ellipta) 200-62.5-25 MCG/ACT inhaler Inhale 1 puff Daily. (Patient not taking: Reported on 10/21/2024) 1 each 12    [DISCONTINUED] lidocaine (LIDODERM) 5 % Place 1 patch on the skin as directed by provider Daily. Remove & Discard patch within 12 hours or as directed by MD (Patient not taking: Reported on 10/21/2024) 30 patch 1     No current facility-administered medications on file prior to visit.       Objective   Vitals:    10/21/24 0900   BP: 112/72   Pulse: 69   SpO2: 99%   Weight: 108 kg (239 lb)   Height: 162.6 cm (64\")     Body mass index is 41.02 kg/m².    Physical Exam  Vitals and nursing note reviewed.   Constitutional:       Appearance: She is well-developed.   HENT:      Head: Normocephalic and atraumatic.   Neck:      Thyroid: No thyromegaly.      Vascular: No JVD.   Cardiovascular:      Rate and Rhythm: Normal rate and regular rhythm.      Heart sounds: Normal heart sounds. No murmur heard.     No friction rub. No gallop.   Pulmonary:      Effort: Pulmonary effort is normal. No respiratory distress.      Breath sounds: Normal breath sounds. No wheezing or rales.   Abdominal:      General: Bowel sounds are normal. There is no distension.      Palpations: Abdomen is soft.      Tenderness: There is no abdominal tenderness. There is no guarding or rebound.   Musculoskeletal:      Cervical back: Neck supple.   Skin:     General: Skin is warm and dry.   Neurological:      Mental Status: She is alert.      Gait: Gait normal.   Psychiatric:         Behavior: Behavior normal.         Assessment & Plan   Diagnoses and all orders for this visit:    1. Moderate persistent asthma without complication (Primary)    Did get ventolin PA, but discussed that should go up on treatment optoins;  gave 2 samples " breztri and try discount card           Class 3 Severe Obesity (BMI >=40). Obesity-related health conditions include the following: none. Obesity is unchanged. BMI is is above average; BMI management plan is completed. We discussed portion control and increasing exercise.     -Follow up: 6 months and prn

## 2024-12-08 ENCOUNTER — TELEPHONE (OUTPATIENT)
Dept: FAMILY MEDICINE CLINIC | Facility: CLINIC | Age: 44
End: 2024-12-08
Payer: COMMERCIAL

## 2024-12-08 DIAGNOSIS — R92.8 ABNORMAL MAMMOGRAM: Primary | ICD-10-CM

## 2024-12-08 NOTE — TELEPHONE ENCOUNTER
----- Message from David Fernandez sent at 7/8/2024 12:43 PM EDT -----  Regarding: bilateral diagnostic mammo  Due bilateral diagnostic mammo 6 month f/u at [facility].

## 2025-01-02 ENCOUNTER — APPOINTMENT (OUTPATIENT)
Dept: GENERAL RADIOLOGY | Facility: HOSPITAL | Age: 45
End: 2025-01-02
Payer: COMMERCIAL

## 2025-01-02 ENCOUNTER — HOSPITAL ENCOUNTER (EMERGENCY)
Facility: HOSPITAL | Age: 45
Discharge: HOME | End: 2025-01-02
Attending: EMERGENCY MEDICINE
Payer: COMMERCIAL

## 2025-01-02 VITALS
OXYGEN SATURATION: 98 % | RESPIRATION RATE: 18 BRPM | SYSTOLIC BLOOD PRESSURE: 100 MMHG | TEMPERATURE: 101 F | DIASTOLIC BLOOD PRESSURE: 60 MMHG | HEART RATE: 100 BPM

## 2025-01-02 DIAGNOSIS — J18.9 PNEUMONIA OF LEFT LOWER LOBE DUE TO INFECTIOUS ORGANISM: ICD-10-CM

## 2025-01-02 DIAGNOSIS — J10.1 INFLUENZA A: Primary | ICD-10-CM

## 2025-01-02 LAB
ALBUMIN SERPL-MCNC: 3.7 G/DL (ref 3.5–5.2)
ALBUMIN/GLOB SERPL: 1.2 G/DL
ALP SERPL-CCNC: 79 U/L (ref 39–117)
ALT SERPL W P-5'-P-CCNC: 26 U/L (ref 1–33)
ANION GAP SERPL CALCULATED.3IONS-SCNC: 10.9 MMOL/L (ref 5–15)
AST SERPL-CCNC: 17 U/L (ref 1–32)
B PARAPERT DNA SPEC QL NAA+PROBE: NOT DETECTED
B PERT DNA SPEC QL NAA+PROBE: NOT DETECTED
BASOPHILS # BLD AUTO: 0.03 10*3/MM3 (ref 0–0.2)
BASOPHILS NFR BLD AUTO: 0.2 % (ref 0–1.5)
BILIRUB SERPL-MCNC: 0.4 MG/DL (ref 0–1.2)
BUN SERPL-MCNC: 9 MG/DL (ref 6–20)
BUN/CREAT SERPL: 8 (ref 7–25)
C PNEUM DNA NPH QL NAA+NON-PROBE: NOT DETECTED
CALCIUM SPEC-SCNC: 8.5 MG/DL (ref 8.6–10.5)
CHLORIDE SERPL-SCNC: 105 MMOL/L (ref 98–107)
CO2 SERPL-SCNC: 26.1 MMOL/L (ref 22–29)
CREAT SERPL-MCNC: 1.13 MG/DL (ref 0.57–1)
DEPRECATED RDW RBC AUTO: 44.9 FL (ref 37–54)
EGFRCR SERPLBLD CKD-EPI 2021: 62 ML/MIN/1.73
EOSINOPHIL # BLD AUTO: 0.03 10*3/MM3 (ref 0–0.4)
EOSINOPHIL NFR BLD AUTO: 0.2 % (ref 0.3–6.2)
ERYTHROCYTE [DISTWIDTH] IN BLOOD BY AUTOMATED COUNT: 13.1 % (ref 12.3–15.4)
FLUAV H3 RNA NPH QL NAA+PROBE: DETECTED
FLUBV RNA ISLT QL NAA+PROBE: NOT DETECTED
GEN 5 1HR TROPONIN T REFLEX: 7 NG/L
GLOBULIN UR ELPH-MCNC: 3.2 GM/DL
GLUCOSE SERPL-MCNC: 122 MG/DL (ref 65–99)
HADV DNA SPEC NAA+PROBE: NOT DETECTED
HCOV 229E RNA SPEC QL NAA+PROBE: NOT DETECTED
HCOV HKU1 RNA SPEC QL NAA+PROBE: NOT DETECTED
HCOV NL63 RNA SPEC QL NAA+PROBE: NOT DETECTED
HCOV OC43 RNA SPEC QL NAA+PROBE: NOT DETECTED
HCT VFR BLD AUTO: 37 % (ref 34–46.6)
HGB BLD-MCNC: 12.6 G/DL (ref 12–15.9)
HMPV RNA NPH QL NAA+NON-PROBE: NOT DETECTED
HOLD SPECIMEN: NORMAL
HPIV1 RNA ISLT QL NAA+PROBE: NOT DETECTED
HPIV2 RNA SPEC QL NAA+PROBE: NOT DETECTED
HPIV3 RNA NPH QL NAA+PROBE: NOT DETECTED
HPIV4 P GENE NPH QL NAA+PROBE: NOT DETECTED
IMM GRANULOCYTES # BLD AUTO: 0.05 10*3/MM3 (ref 0–0.05)
IMM GRANULOCYTES NFR BLD AUTO: 0.3 % (ref 0–0.5)
LYMPHOCYTES # BLD AUTO: 2.08 10*3/MM3 (ref 0.7–3.1)
LYMPHOCYTES NFR BLD AUTO: 13.6 % (ref 19.6–45.3)
M PNEUMO IGG SER IA-ACNC: NOT DETECTED
MCH RBC QN AUTO: 31.6 PG (ref 26.6–33)
MCHC RBC AUTO-ENTMCNC: 34.1 G/DL (ref 31.5–35.7)
MCV RBC AUTO: 92.7 FL (ref 79–97)
MONOCYTES # BLD AUTO: 0.84 10*3/MM3 (ref 0.1–0.9)
MONOCYTES NFR BLD AUTO: 5.5 % (ref 5–12)
NEUTROPHILS NFR BLD AUTO: 12.28 10*3/MM3 (ref 1.7–7)
NEUTROPHILS NFR BLD AUTO: 80.2 % (ref 42.7–76)
NRBC BLD AUTO-RTO: 0 /100 WBC (ref 0–0.2)
NT-PROBNP SERPL-MCNC: 111 PG/ML (ref 0–450)
PLATELET # BLD AUTO: 368 10*3/MM3 (ref 140–450)
PMV BLD AUTO: 9.7 FL (ref 6–12)
POTASSIUM SERPL-SCNC: 3.3 MMOL/L (ref 3.5–5.2)
PROT SERPL-MCNC: 6.9 G/DL (ref 6–8.5)
RBC # BLD AUTO: 3.99 10*6/MM3 (ref 3.77–5.28)
RHINOVIRUS RNA SPEC NAA+PROBE: NOT DETECTED
RSV RNA NPH QL NAA+NON-PROBE: NOT DETECTED
SARS-COV-2 RNA NPH QL NAA+NON-PROBE: NOT DETECTED
SODIUM SERPL-SCNC: 142 MMOL/L (ref 136–145)
TROPONIN T NUMERIC DELTA: 0 NG/L
TROPONIN T SERPL HS-MCNC: 7 NG/L
WBC NRBC COR # BLD AUTO: 15.31 10*3/MM3 (ref 3.4–10.8)
WHOLE BLOOD HOLD COAG: NORMAL
WHOLE BLOOD HOLD SPECIMEN: NORMAL

## 2025-01-02 PROCEDURE — 25010000002 KETOROLAC TROMETHAMINE PER 15 MG: Performed by: EMERGENCY MEDICINE

## 2025-01-02 PROCEDURE — 84484 ASSAY OF TROPONIN QUANT: CPT | Performed by: EMERGENCY MEDICINE

## 2025-01-02 PROCEDURE — 85025 COMPLETE CBC W/AUTO DIFF WBC: CPT

## 2025-01-02 PROCEDURE — 96372 THER/PROPH/DIAG INJ SC/IM: CPT

## 2025-01-02 PROCEDURE — 83880 ASSAY OF NATRIURETIC PEPTIDE: CPT

## 2025-01-02 PROCEDURE — 71045 X-RAY EXAM CHEST 1 VIEW: CPT

## 2025-01-02 PROCEDURE — 0202U NFCT DS 22 TRGT SARS-COV-2: CPT | Performed by: EMERGENCY MEDICINE

## 2025-01-02 PROCEDURE — 25010000002 CEFTRIAXONE PER 250 MG: Performed by: EMERGENCY MEDICINE

## 2025-01-02 PROCEDURE — 36415 COLL VENOUS BLD VENIPUNCTURE: CPT

## 2025-01-02 PROCEDURE — 93005 ELECTROCARDIOGRAM TRACING: CPT

## 2025-01-02 PROCEDURE — 99284 EMERGENCY DEPT VISIT MOD MDM: CPT

## 2025-01-02 PROCEDURE — 93005 ELECTROCARDIOGRAM TRACING: CPT | Performed by: EMERGENCY MEDICINE

## 2025-01-02 PROCEDURE — 96365 THER/PROPH/DIAG IV INF INIT: CPT

## 2025-01-02 PROCEDURE — 80053 COMPREHEN METABOLIC PANEL: CPT

## 2025-01-02 PROCEDURE — 84484 ASSAY OF TROPONIN QUANT: CPT

## 2025-01-02 RX ORDER — CEFUROXIME AXETIL 500 MG/1
500 TABLET ORAL 2 TIMES DAILY
Qty: 14 TABLET | Refills: 0 | Status: SHIPPED | OUTPATIENT
Start: 2025-01-02

## 2025-01-02 RX ORDER — KETOROLAC TROMETHAMINE 30 MG/ML
30 INJECTION, SOLUTION INTRAMUSCULAR; INTRAVENOUS ONCE
Status: COMPLETED | OUTPATIENT
Start: 2025-01-02 | End: 2025-01-02

## 2025-01-02 RX ORDER — ACETAMINOPHEN 500 MG
1000 TABLET ORAL ONCE
Status: COMPLETED | OUTPATIENT
Start: 2025-01-02 | End: 2025-01-02

## 2025-01-02 RX ORDER — SODIUM CHLORIDE 0.9 % (FLUSH) 0.9 %
10 SYRINGE (ML) INJECTION AS NEEDED
Status: DISCONTINUED | OUTPATIENT
Start: 2025-01-02 | End: 2025-01-03 | Stop reason: HOSPADM

## 2025-01-02 RX ADMIN — CEFTRIAXONE SODIUM 1000 MG: 1 INJECTION, POWDER, FOR SOLUTION INTRAMUSCULAR; INTRAVENOUS at 22:35

## 2025-01-02 RX ADMIN — KETOROLAC TROMETHAMINE 30 MG: 30 INJECTION, SOLUTION INTRAMUSCULAR at 21:18

## 2025-01-02 RX ADMIN — ACETAMINOPHEN 1000 MG: 500 TABLET, FILM COATED ORAL at 21:19

## 2025-01-02 NOTE — Clinical Note
[facility] EMERGENCY DEPARTMENT  [address] [facility] [address]  Phone: [phone]    Mona Castillo was seen and treated in our emergency department on 1/2/2025.  She may return to work on 01/06/2025.         Thank you for choosing [facility].    Nathanael Blackburn MD

## 2025-01-03 LAB
QT INTERVAL: 331 MS
QTC INTERVAL: 429 MS

## 2025-01-03 NOTE — ED PROVIDER NOTES
EMERGENCY DEPARTMENT ENCOUNTER    Room Number:    PCP: David Fernandez DO  Historian: Patient      HPI:  Chief Complaint: Pleuritic chest pain  A complete HPI/ROS/PMH/PSH/SH/FH are unobtainable due to: None   Context: Mona Castillo is a 44 y.o. female who presents to the ED c/o pleuritic chest pain.  Patient states she has had asthma.  Has been coughing recently.  Has been exposed to influenza.  Patient states pain started left chest before she got here.  Has had some cough.  Has been on steroids for her asthma.  Has had no fever.  Has had no vomiting or diarrhea.  Has had no leg swelling.            PAST MEDICAL HISTORY  Active Ambulatory Problems     Diagnosis Date Noted    Asthma 2016    Seasonal allergic rhinitis 2016    Dizziness 2019    Missed  2018    Acute renal failure 2019    Abdominal pain 2019    Diarrhea 2019    Bronchitis with asthma, acute 2019    Iron deficiency anemia 2019    Intestinal infection due to enteropathogenic E. coli 2019    Rh negative, antepartum 2020    Gestational diabetes 10/02/2020    Pregnancy-induced hypertension in third trimester 2020    Thrombocytosis 2020     Resolved Ambulatory Problems     Diagnosis Date Noted    No Resolved Ambulatory Problems     Past Medical History:   Diagnosis Date    Allergic rhinitis     DDD (degenerative disc disease), lumbar     Diabetes mellitus     GERD (gastroesophageal reflux disease)     H/O HSV-2 infection     History of back pain     History of miscarriage     Migraine     Tattoos          PAST SURGICAL HISTORY  Past Surgical History:   Procedure Laterality Date    APPENDECTOMY       SECTION  2020    CHOLECYSTECTOMY      COLONOSCOPY N/A 2019    Procedure: COLONOSCOPY with biopsies;  Surgeon: Philip Winter MD;  Location: [facility] ENDOSCOPY;  Service: Gastroenterology    DILATATION AND CURETTAGE  2020    ENDOSCOPY N/A  8/30/2019    Procedure: ESOPHAGOGASTRODUODENOSCOPY with biopsies and aspirate;  Surgeon: Philip Winter MD;  Location: [facility] ENDOSCOPY;  Service: Gastroenterology    UTERINE FIBROID SURGERY           FAMILY HISTORY  Family History   Adopted: Yes   Problem Relation Age of Onset    Thyroid disease Mother     No Known Problems Father     Stroke Maternal Grandmother     Cirrhosis Maternal Grandmother     Anemia Maternal Grandmother     Depression Maternal Grandmother     Hypertension Maternal Grandmother     Mental illness Maternal Grandmother     No Known Problems Maternal Grandfather          SOCIAL HISTORY  Social History     Socioeconomic History    Marital status:      Spouse name: Polo    Number of children: 1    Years of education: College   Tobacco Use    Smoking status: Former     Passive exposure: Past    Smokeless tobacco: Never    Tobacco comments:     quit 10/2013   Substance and Sexual Activity    Alcohol use: Yes     Comment: Social use    Drug use: No    Sexual activity: Defer         ALLERGIES  Turkey and Gabapentin        REVIEW OF SYSTEMS  Review of Systems   Cough pleuritic chest pain      PHYSICAL EXAM  ED Triage Vitals [01/02/25 1906]   Temp Heart Rate Resp BP SpO2   (!) 101 °F (38.3 °C) 100 18 100/60 98 %      Temp src Heart Rate Source Patient Position BP Location FiO2 (%)   -- -- -- -- --       Physical Exam      GENERAL: no acute distress  HENT: nares patent  EYES: no scleral icterus  CV: regular rhythm, normal rate  RESPIRATORY: normal effort  ABDOMEN: soft, nontender  MUSCULOSKELETAL: no deformity  NEURO: alert, moves all extremities, follows commands  PSYCH:  calm, cooperative  SKIN: warm, dry    Vital signs and nursing notes reviewed.          LAB RESULTS  Recent Results (from the past 24 hours)   ECG 12 Lead ED Triage Standing Order; SOA    Collection Time: 01/02/25  7:21 PM   Result Value Ref Range    QT Interval 331 ms    QTC Interval 429 ms   Respiratory Panel PCR  w/COVID-19(SARS-CoV-2) SHARA/MARIA T/COSME/PAD/COR/DAGOBERTO In-House, NP Swab in UTM/VTM, 2 HR TAT - Swab, Nasopharynx    Collection Time: 01/02/25  7:40 PM    Specimen: Nasopharynx; Swab   Result Value Ref Range    ADENOVIRUS, PCR Not Detected Not Detected    Coronavirus 229E Not Detected Not Detected    Coronavirus HKU1 Not Detected Not Detected    Coronavirus NL63 Not Detected Not Detected    Coronavirus OC43 Not Detected Not Detected    COVID19 Not Detected Not Detected - Ref. Range    Human Metapneumovirus Not Detected Not Detected    Human Rhinovirus/Enterovirus Not Detected Not Detected    Influenza A H3 Detected (A) Not Detected    Influenza B PCR Not Detected Not Detected    Parainfluenza Virus 1 Not Detected Not Detected    Parainfluenza Virus 2 Not Detected Not Detected    Parainfluenza Virus 3 Not Detected Not Detected    Parainfluenza Virus 4 Not Detected Not Detected    RSV, PCR Not Detected Not Detected    Bordetella pertussis pcr Not Detected Not Detected    Bordetella parapertussis PCR Not Detected Not Detected    Chlamydophila pneumoniae PCR Not Detected Not Detected    Mycoplasma pneumo by PCR Not Detected Not Detected   Comprehensive Metabolic Panel    Collection Time: 01/02/25  7:44 PM    Specimen: Arm, Right; Blood   Result Value Ref Range    Glucose 122 (H) 65 - 99 mg/dL    BUN 9 6 - 20 mg/dL    Creatinine 1.13 (H) 0.57 - 1.00 mg/dL    Sodium 142 136 - 145 mmol/L    Potassium 3.3 (L) 3.5 - 5.2 mmol/L    Chloride 105 98 - 107 mmol/L    CO2 26.1 22.0 - 29.0 mmol/L    Calcium 8.5 (L) 8.6 - 10.5 mg/dL    Total Protein 6.9 6.0 - 8.5 g/dL    Albumin 3.7 3.5 - 5.2 g/dL    ALT (SGPT) 26 1 - 33 U/L    AST (SGOT) 17 1 - 32 U/L    Alkaline Phosphatase 79 39 - 117 U/L    Total Bilirubin 0.4 0.0 - 1.2 mg/dL    Globulin 3.2 gm/dL    A/G Ratio 1.2 g/dL    BUN/Creatinine Ratio 8.0 7.0 - 25.0    Anion Gap 10.9 5.0 - 15.0 mmol/L    eGFR 62.0 >60.0 mL/min/1.73   BNP    Collection Time: 01/02/25  7:44 PM    Specimen: Arm,  Right; Blood   Result Value Ref Range    proBNP 111.0 0.0 - 450.0 pg/mL   High Sensitivity Troponin T    Collection Time: 01/02/25  7:44 PM    Specimen: Arm, Right; Blood   Result Value Ref Range    HS Troponin T 7 <14 ng/L   Green Top (Gel)    Collection Time: 01/02/25  7:44 PM   Result Value Ref Range    Extra Tube Hold for add-ons.    Lavender Top    Collection Time: 01/02/25  7:44 PM   Result Value Ref Range    Extra Tube hold for add-on    Gold Top - SST    Collection Time: 01/02/25  7:44 PM   Result Value Ref Range    Extra Tube Hold for add-ons.    Light Blue Top    Collection Time: 01/02/25  7:44 PM   Result Value Ref Range    Extra Tube Hold for add-ons.    CBC Auto Differential    Collection Time: 01/02/25  7:44 PM    Specimen: Arm, Right; Blood   Result Value Ref Range    WBC 15.31 (H) 3.40 - 10.80 10*3/mm3    RBC 3.99 3.77 - 5.28 10*6/mm3    Hemoglobin 12.6 12.0 - 15.9 g/dL    Hematocrit 37.0 34.0 - 46.6 %    MCV 92.7 79.0 - 97.0 fL    MCH 31.6 26.6 - 33.0 pg    MCHC 34.1 31.5 - 35.7 g/dL    RDW 13.1 12.3 - 15.4 %    RDW-SD 44.9 37.0 - 54.0 fl    MPV 9.7 6.0 - 12.0 fL    Platelets 368 140 - 450 10*3/mm3    Neutrophil % 80.2 (H) 42.7 - 76.0 %    Lymphocyte % 13.6 (L) 19.6 - 45.3 %    Monocyte % 5.5 5.0 - 12.0 %    Eosinophil % 0.2 (L) 0.3 - 6.2 %    Basophil % 0.2 0.0 - 1.5 %    Immature Grans % 0.3 0.0 - 0.5 %    Neutrophils, Absolute 12.28 (H) 1.70 - 7.00 10*3/mm3    Lymphocytes, Absolute 2.08 0.70 - 3.10 10*3/mm3    Monocytes, Absolute 0.84 0.10 - 0.90 10*3/mm3    Eosinophils, Absolute 0.03 0.00 - 0.40 10*3/mm3    Basophils, Absolute 0.03 0.00 - 0.20 10*3/mm3    Immature Grans, Absolute 0.05 0.00 - 0.05 10*3/mm3    nRBC 0.0 0.0 - 0.2 /100 WBC   Gray Top    Collection Time: 01/02/25  7:44 PM   Result Value Ref Range    Extra Tube Hold for add-ons.    High Sensitivity Troponin T 1Hr    Collection Time: 01/02/25  8:57 PM    Specimen: Blood   Result Value Ref Range    HS Troponin T 7 <14 ng/L    Troponin T  Numeric Delta 0 Abnormal if >/=3 ng/L       Ordered the above labs and reviewed the results.        RADIOLOGY  XR Chest 1 View    Result Date: 1/2/2025  XR CHEST 1 VW-1/2/2025  HISTORY: Shortness of breath.  Heart size is within normal limits. There is mild atelectasis or infiltrate in the left lung base. Left upper lung and right lung appear clear. Bony structures appear unremarkable.      1. Mild atelectasis or pneumonia in the left lung base.   This report was finalized on 1/2/2025 8:41 PM by Dr. Tunde Bello M.D on Workstation: [employer]       Ordered the above noted radiological studies.  Chest x-ray independent interpreted by me and shows no evidence of pneumothorax          PROCEDURES  Procedures          MEDICATIONS GIVEN IN ER  Medications   sodium chloride 0.9 % flush 10 mL (has no administration in time range)   ketorolac (TORADOL) injection 30 mg (30 mg Intramuscular Given 1/2/25 2118)   acetaminophen (TYLENOL) tablet 1,000 mg (1,000 mg Oral Given 1/2/25 2119)   cefTRIAXone (ROCEPHIN) 1,000 mg in sodium chloride 0.9 % 100 mL MBP (0 mg Intravenous Stopped 1/2/25 2301)                   MEDICAL DECISION MAKING, PROGRESS, and CONSULTS    All labs have been independently reviewed by me.  All radiology studies have been reviewed by me and I have also reviewed the radiology report.   EKG's independently viewed and interpreted by me.  Discussion below represents my analysis of pertinent findings related to patient's condition, differential diagnosis, treatment plan and final disposition.      Additional sources:  - Discussed/ obtained information from independent historians: None    - External (non-ED) record review: Epic reviewed patient seen by primary provider 10/21/2024 for moderate asthma    - Chronic or social conditions impacting care: None    - Shared decision making: None      Orders placed during this visit:  Orders Placed This Encounter   Procedures    Respiratory Panel PCR  w/COVID-19(SARS-CoV-2) SHARA/MARIA T/COSME/PAD/COR/DAGOBERTO In-House, NP Swab in UTM/VTM, 2 HR TAT - Swab, Nasopharynx    XR Chest 1 View    [place] Draw    Comprehensive Metabolic Panel    BNP    High Sensitivity Troponin T    CBC Auto Differential    [place] Draw    High Sensitivity Troponin T 1Hr    NPO Diet NPO Type: Strict NPO    Undress & Gown    Continuous Pulse Oximetry    Vital Signs    Oxygen Therapy- Nasal Cannula; Titrate 1-6 LPM Per SpO2; 90 - 95%    ECG 12 Lead ED Triage Standing Order; SOA    Insert Peripheral IV    CBC & Differential    Green Top (Gel)    Lavender Top    Gold Top - SST    Light Blue Top    Gray Top         Additional orders considered but not ordered:  None        Differential diagnosis includes but is not limited to:    Chest wall pain versus pleurisy versus pneumonia versus pneumothorax      Independent interpretation of labs, radiology studies, and discussions with consultants:  ED Course as of 01/02/25 2303   Thu Jan 02, 2025 2225 22:25 EST  Patient presents for pleuritic left chest pain.  Patient has influenza and started developing left-sided pleuritic chest pain.  Appears to have pneumonia on chest x-ray.  Patient has been given Rocephin here.  Will be discharged home.  She is outside of the window for Tamiflu.  She is not hypoxic here.  Antibiotics will be sent in [SL]      ED Course User Index  [SL] Nathanael Blackburn MD               DIAGNOSIS  Final diagnoses:   Influenza A   Pneumonia of left lower lobe due to infectious organism         DISPOSITION  DISCHARGE    Patient discharged in stable condition.    Reviewed implications of results, diagnosis, meds, responsibility to follow up, warning signs and symptoms of possible worsening, potential complications and reasons to return to ER, including worsening symptoms.    Patient/Family voiced understanding of above instructions.    Discussed plan for discharge, as there is no emergent indication for admission. Patient referred to  primary care provider for BP management due to today's BP. Pt/family is agreeable and understands need for follow up and repeat testing.  Pt is aware that discharge does not mean that nothing is wrong but it indicates no emergency is present that requires admission and they must continue care with follow-up as given below or physician of their choice.     FOLLOW-UP  David Fernandez DO  [address]  [address]  [place] [id number] [phone]    Schedule an appointment as soon as possible for a visit            Medication List        New Prescriptions      cefuroxime 500 MG tablet  Commonly known as: CEFTIN  Take 1 tablet by mouth 2 (Two) Times a Day.               Where to Get Your Medications        These medications were sent to [id number] - [place], [place] - [address] AT [facility] [address] - [phone]  - [phone] FX  [address], [place] [address]      Phone: [phone]   cefuroxime 500 MG tablet                  Latest Documented Vital Signs:  As of 23:03 EST  BP- 100/60 HR- 100 Temp- (!) 101 °F (38.3 °C) O2 sat- 98%              --    Please note that portions of this were completed with a voice recognition program.       Note Disclaimer: At [facility], we believe that sharing information builds trust and better relationships. You are receiving this note because you are receiving care at [facility] or recently visited. It is possible you will see health information before a provider has talked with you about it. This kind of information can be easy to misunderstand. To help you fully understand what it means for your health, we urge you to discuss this note with your provider.            Nathanael Blackburn MD  01/02/25 1229

## 2025-01-20 ENCOUNTER — HOSPITAL ENCOUNTER (OUTPATIENT)
Dept: ULTRASOUND IMAGING | Facility: HOSPITAL | Age: 45
Discharge: HOME | End: 2025-01-20
Payer: COMMERCIAL

## 2025-01-20 ENCOUNTER — HOSPITAL ENCOUNTER (OUTPATIENT)
Dept: MAMMOGRAPHY | Facility: HOSPITAL | Age: 45
Discharge: HOME | End: 2025-01-20
Payer: COMMERCIAL

## 2025-01-20 DIAGNOSIS — R92.8 ABNORMAL MAMMOGRAM: ICD-10-CM

## 2025-01-20 DIAGNOSIS — N63.20 MASS OF LEFT BREAST, UNSPECIFIED QUADRANT: Primary | ICD-10-CM

## 2025-01-20 DIAGNOSIS — R92.1 BREAST CALCIFICATION SEEN ON MAMMOGRAM: ICD-10-CM

## 2025-01-20 PROCEDURE — G0279 TOMOSYNTHESIS, MAMMO: HCPCS

## 2025-01-20 PROCEDURE — 76642 ULTRASOUND BREAST LIMITED: CPT

## 2025-01-20 PROCEDURE — 77066 DX MAMMO INCL CAD BI: CPT

## 2025-01-20 NOTE — PROGRESS NOTES
Call results to patient.  Patients diagnostic mammo and us was abnormal.  We need to order a biopsy of this to make sure nothing to worry about.  I put in orders, please let patient know.

## 2025-01-21 ENCOUNTER — TELEPHONE (OUTPATIENT)
Dept: MAMMOGRAPHY | Facility: HOSPITAL | Age: 45
End: 2025-01-21
Payer: COMMERCIAL

## 2025-01-21 NOTE — TELEPHONE ENCOUNTER
Called Mrs. Jeffery Regarding her recent bilateral breast diagnostic mammogram. There was an abnormality seen in the bilateral breast for which biopsy is recommended. She is recommended to proceed with left US guided biopsy. This was schedule for 01/27/2025 2:30.She voiced understanding and will call with further questions or concerns.

## 2025-01-27 ENCOUNTER — HOSPITAL ENCOUNTER (OUTPATIENT)
Dept: ULTRASOUND IMAGING | Facility: HOSPITAL | Age: 45
Discharge: HOME | End: 2025-01-27
Payer: COMMERCIAL

## 2025-01-27 ENCOUNTER — HOSPITAL ENCOUNTER (OUTPATIENT)
Dept: MAMMOGRAPHY | Facility: HOSPITAL | Age: 45
Discharge: HOME | End: 2025-01-27
Payer: COMMERCIAL

## 2025-01-27 VITALS
RESPIRATION RATE: 16 BRPM | HEIGHT: 64 IN | HEART RATE: 77 BPM | SYSTOLIC BLOOD PRESSURE: 135 MMHG | DIASTOLIC BLOOD PRESSURE: 88 MMHG | OXYGEN SATURATION: 99 % | BODY MASS INDEX: 40.8 KG/M2 | TEMPERATURE: 98.2 F | WEIGHT: 239 LBS

## 2025-01-27 DIAGNOSIS — R92.8 ABNORMAL MAMMOGRAM: ICD-10-CM

## 2025-01-27 PROCEDURE — 88360 TUMOR IMMUNOHISTOCHEM/MANUAL: CPT | Performed by: FAMILY MEDICINE

## 2025-01-27 PROCEDURE — 77065 DX MAMMO INCL CAD UNI: CPT

## 2025-01-27 PROCEDURE — 25010000002 LIDOCAINE 1% - EPINEPHRINE 1:100000 1 %-1:100000 SOLUTION: Performed by: RADIOLOGY

## 2025-01-27 PROCEDURE — 88341 IMHCHEM/IMCYTCHM EA ADD ANTB: CPT | Performed by: FAMILY MEDICINE

## 2025-01-27 PROCEDURE — 88305 TISSUE EXAM BY PATHOLOGIST: CPT | Performed by: FAMILY MEDICINE

## 2025-01-27 PROCEDURE — 88342 IMHCHEM/IMCYTCHM 1ST ANTB: CPT | Performed by: FAMILY MEDICINE

## 2025-01-27 PROCEDURE — 25010000002 LIDOCAINE 1 % SOLUTION: Performed by: RADIOLOGY

## 2025-01-27 PROCEDURE — A4648 IMPLANTABLE TISSUE MARKER: HCPCS

## 2025-01-27 RX ORDER — LIDOCAINE HYDROCHLORIDE AND EPINEPHRINE 10; 10 MG/ML; UG/ML
10 INJECTION, SOLUTION INFILTRATION; PERINEURAL ONCE
Status: COMPLETED | OUTPATIENT
Start: 2025-01-27 | End: 2025-01-27

## 2025-01-27 RX ORDER — LIDOCAINE HYDROCHLORIDE 10 MG/ML
3 INJECTION, SOLUTION INFILTRATION; PERINEURAL ONCE
Status: COMPLETED | OUTPATIENT
Start: 2025-01-27 | End: 2025-01-27

## 2025-01-27 RX ORDER — BUDESONIDE AND FORMOTEROL FUMARATE DIHYDRATE 160; 4.5 UG/1; UG/1
2 AEROSOL RESPIRATORY (INHALATION)
COMMUNITY

## 2025-01-27 RX ADMIN — LIDOCAINE HYDROCHLORIDE,EPINEPHRINE BITARTRATE 10 ML: 10; .01 INJECTION, SOLUTION INFILTRATION; PERINEURAL at 14:40

## 2025-01-27 RX ADMIN — LIDOCAINE HYDROCHLORIDE 3 ML: 10 INJECTION, SOLUTION INFILTRATION; PERINEURAL at 14:43

## 2025-01-27 NOTE — NURSING NOTE
Biopsy site to left lower mid breast clear with Exofin dry and intact. No firmness or swelling noted at or around biopsy site. Denies pain. Ice pack with protective covering applied to biopsy site. Discharge instructions discussed with understanding voiced by patient. Copies provided to patient. No distress noted. To home via private vehicle.

## 2025-01-28 ENCOUNTER — TELEPHONE (OUTPATIENT)
Dept: MAMMOGRAPHY | Facility: HOSPITAL | Age: 45
End: 2025-01-28
Payer: COMMERCIAL

## 2025-01-29 DIAGNOSIS — C50.912 MALIGNANT NEOPLASM OF LEFT BREAST IN FEMALE, ESTROGEN RECEPTOR POSITIVE, UNSPECIFIED SITE OF BREAST: Primary | ICD-10-CM

## 2025-01-29 DIAGNOSIS — Z17.0 MALIGNANT NEOPLASM OF LEFT BREAST IN FEMALE, ESTROGEN RECEPTOR POSITIVE, UNSPECIFIED SITE OF BREAST: Primary | ICD-10-CM

## 2025-01-29 LAB
CYTO UR: NORMAL
LAB AP CASE REPORT: NORMAL
LAB AP CLINICAL INFORMATION: NORMAL
LAB AP DIAGNOSIS COMMENT: NORMAL
LAB AP SPECIAL STAINS: NORMAL
LAB AP SYNOPTIC CHECKLIST: NORMAL
PATH REPORT.FINAL DX SPEC: NORMAL
PATH REPORT.GROSS SPEC: NORMAL

## 2025-01-29 NOTE — PROGRESS NOTES
I called and notified patient of results and copying her on information.  I put in urgent referrals for MRI of breasts and referral to Dr. Claros, General Surgery.  Please arrange.

## 2025-01-30 ENCOUNTER — HOSPITAL ENCOUNTER (OUTPATIENT)
Facility: HOSPITAL | Age: 45
Discharge: HOME | End: 2025-01-30
Admitting: FAMILY MEDICINE
Payer: COMMERCIAL

## 2025-01-30 DIAGNOSIS — Z17.0 MALIGNANT NEOPLASM OF LEFT BREAST IN FEMALE, ESTROGEN RECEPTOR POSITIVE, UNSPECIFIED SITE OF BREAST: ICD-10-CM

## 2025-01-30 DIAGNOSIS — C50.912 MALIGNANT NEOPLASM OF LEFT BREAST IN FEMALE, ESTROGEN RECEPTOR POSITIVE, UNSPECIFIED SITE OF BREAST: ICD-10-CM

## 2025-01-30 DIAGNOSIS — C50.919 MALIGNANT NEOPLASM OF FEMALE BREAST, UNSPECIFIED ESTROGEN RECEPTOR STATUS, UNSPECIFIED LATERALITY, UNSPECIFIED SITE OF BREAST: Primary | ICD-10-CM

## 2025-01-30 PROCEDURE — 77049 MRI BREAST C-+ W/CAD BI: CPT

## 2025-01-30 PROCEDURE — A9577 INJ MULTIHANCE: HCPCS | Performed by: FAMILY MEDICINE

## 2025-01-30 PROCEDURE — 25510000002 GADOBENATE DIMEGLUMINE 529 MG/ML SOLUTION: Performed by: FAMILY MEDICINE

## 2025-01-30 RX ADMIN — GADOBENATE DIMEGLUMINE 20 ML: 529 INJECTION, SOLUTION INTRAVENOUS at 19:10

## 2025-01-31 ENCOUNTER — PATIENT OUTREACH (OUTPATIENT)
Dept: OTHER | Facility: HOSPITAL | Age: 45
End: 2025-01-31
Payer: COMMERCIAL

## 2025-01-31 ENCOUNTER — TELEPHONE (OUTPATIENT)
Dept: FAMILY MEDICINE CLINIC | Facility: CLINIC | Age: 45
End: 2025-01-31
Payer: COMMERCIAL

## 2025-01-31 ENCOUNTER — TELEPHONE (OUTPATIENT)
Dept: MAMMOGRAPHY | Facility: HOSPITAL | Age: 45
End: 2025-01-31
Payer: COMMERCIAL

## 2025-01-31 DIAGNOSIS — Z17.0 MALIGNANT NEOPLASM OF LEFT BREAST IN FEMALE, ESTROGEN RECEPTOR POSITIVE, UNSPECIFIED SITE OF BREAST: Primary | ICD-10-CM

## 2025-01-31 DIAGNOSIS — C50.911 BILATERAL MALIGNANT NEOPLASM OF BREAST IN FEMALE, ESTROGEN RECEPTOR POSITIVE, UNSPECIFIED SITE OF BREAST: Primary | ICD-10-CM

## 2025-01-31 DIAGNOSIS — C50.912 BILATERAL MALIGNANT NEOPLASM OF BREAST IN FEMALE, ESTROGEN RECEPTOR POSITIVE, UNSPECIFIED SITE OF BREAST: Primary | ICD-10-CM

## 2025-01-31 DIAGNOSIS — R59.0 AXILLARY LYMPHADENOPATHY: ICD-10-CM

## 2025-01-31 DIAGNOSIS — C50.912 MALIGNANT NEOPLASM OF LEFT BREAST IN FEMALE, ESTROGEN RECEPTOR POSITIVE, UNSPECIFIED SITE OF BREAST: Primary | ICD-10-CM

## 2025-01-31 DIAGNOSIS — Z17.0 BILATERAL MALIGNANT NEOPLASM OF BREAST IN FEMALE, ESTROGEN RECEPTOR POSITIVE, UNSPECIFIED SITE OF BREAST: Primary | ICD-10-CM

## 2025-01-31 RX ORDER — MUPIROCIN 20 MG/G
1 OINTMENT TOPICAL 2 TIMES DAILY
Qty: 30 G | Refills: 0 | Status: SHIPPED | OUTPATIENT
Start: 2025-01-31

## 2025-01-31 NOTE — PROGRESS NOTES
I called and informed patient need for additional breast biopsies MRI guided on right 2 lesions and 1 additional left and us guided for lymph nodes on left.  Please give to staff today as need to make sure getting scheduled as soon as possible.   She has an appointment with Dr. Claros 2/5/25.   I put in referral to oncology to establish care after biopsies done.

## 2025-01-31 NOTE — PROGRESS NOTES
Introductory call placed to MsGenny Jonathan. Introduced myself and navigation services. She stated she is aware of appointment time and place. She will bring her  with her to the consult. She has no immediate needs at this time. Will meet her at her surgery appointment Feb 5th.

## 2025-01-31 NOTE — TELEPHONE ENCOUNTER
Provider: DR JOHNSON    Caller: Mona Castillo    Relationship to Patient: Self    Pharmacy: [id number] [place], [place] - [address] AT [facility] [address] - [phone]  - [phone]  [phone]     Phone Number: [phone]     Reason for Call: PATIENT IS CALLING TO STATE THE AREA OF THE BIOPSY IS GETTING MORE RED AND THERE IS A LIQUID THAT IS COMING FROM THE AREA.  SHE IS WANTING TO KNOW WHAT DR MCMAHAN RECOMMENDS.    PLEASE ADVISE.

## 2025-02-01 ENCOUNTER — TELEMEDICINE (OUTPATIENT)
Dept: FAMILY MEDICINE CLINIC | Facility: TELEHEALTH | Age: 45
End: 2025-02-01
Payer: COMMERCIAL

## 2025-02-01 DIAGNOSIS — L03.818 CELLULITIS OF OTHER SPECIFIED SITE: Primary | ICD-10-CM

## 2025-02-01 RX ORDER — DOXYCYCLINE 100 MG/1
100 CAPSULE ORAL 2 TIMES DAILY
Qty: 14 CAPSULE | Refills: 0 | Status: SHIPPED | OUTPATIENT
Start: 2025-02-01 | End: 2025-02-08

## 2025-02-01 NOTE — PROGRESS NOTES
Subjective   Chief Complaint   Patient presents with    infection concerns post biopsy       I had a breast biopsy done on my left breast. It very red and there is now a line going around the nipple area. I have been diagnosed with cancer from the biopsy          Mona Castillo is a 44 y.o. female.     History of Present Illness  Patient presents with concerns of left breast infection.  She had a breast biopsy 5 days ago.  The following day she noticed redness and irritation at the biopsy site. She was told symptoms may be due to skin glue.  She removed the skin glue and started using prescribed mupirocin ointment.  Today she states symptoms have worsened.  She is now experiencing red streaking on the opposite side of her breast.  Biopsy site is red, warm and tender.  She has had a scant amount of yellow drainage on her bra as well.  Denies fever.       Allergies   Allergen Reactions    [place] Itching     SOMETIMES HAS SOA    Gabapentin Mental Status Change       Past Medical History:   Diagnosis Date    Allergic rhinitis     Asthma     DDD (degenerative disc disease), lumbar     Diabetes mellitus     GERD (gastroesophageal reflux disease)     H/O HSV-2 infection     History of back pain     History of miscarriage     Migraine     Tattoos        Past Surgical History:   Procedure Laterality Date    APPENDECTOMY       SECTION  2020    CHOLECYSTECTOMY      COLONOSCOPY N/A 2019    Procedure: COLONOSCOPY with biopsies;  Surgeon: Philip Winter MD;  Location: [facility] ENDOSCOPY;  Service: Gastroenterology    DILATATION AND CURETTAGE  2020    ENDOSCOPY N/A 2019    Procedure: ESOPHAGOGASTRODUODENOSCOPY with biopsies and aspirate;  Surgeon: Philip Winter MD;  Location: [facility] ENDOSCOPY;  Service: Gastroenterology    UTERINE FIBROID SURGERY         Social History     Socioeconomic History    Marital status:      Spouse name: Polo    Number of children: 1    Years of education:  College   Tobacco Use    Smoking status: Former     Passive exposure: Past    Smokeless tobacco: Never    Tobacco comments:     quit 10/2013   Substance and Sexual Activity    Alcohol use: Yes     Comment: Social use    Drug use: No    Sexual activity: Defer       Family History   Adopted: Yes   Problem Relation Age of Onset    Thyroid disease Mother     No Known Problems Father     No Known Problems Son     Stroke Maternal Grandmother     Cirrhosis Maternal Grandmother     Anemia Maternal Grandmother     Depression Maternal Grandmother     Hypertension Maternal Grandmother     Mental illness Maternal Grandmother     No Known Problems Maternal Grandfather          Current Outpatient Medications:     albuterol (PROVENTIL) (2.5 MG/3ML) 0.083% nebulizer solution, Take 2.5 mg by nebulization Every 4 (Four) Hours As Needed for Wheezing (use with neb)., Disp: 120 each, Rfl: 12    budesonide-formoterol (SYMBICORT) 160-4.5 MCG/ACT inhaler, Inhale 2 puffs 2 (Two) Times a Day., Disp: , Rfl:     doxycycline (VIBRAMYCIN) 100 MG capsule, Take 1 capsule by mouth 2 (Two) Times a Day for 7 days., Disp: 14 capsule, Rfl: 0    montelukast (SINGULAIR) 10 MG tablet, Take 1 tablet by mouth Daily., Disp: , Rfl:     mupirocin (BACTROBAN) 2 % ointment, Apply 1 Application topically to the appropriate area as directed 2 (Two) Times a Day., Disp: 30 g, Rfl: 0    Ventolin  (90 Base) MCG/ACT inhaler, Inhale 2 puffs Every 4 (Four) Hours As Needed for Wheezing., Disp: 18 g, Rfl: 3      Review of Systems   Constitutional:  Negative for chills, diaphoresis, fatigue and fever.   Gastrointestinal:  Positive for diarrhea.   Musculoskeletal:  Negative for myalgias.   Skin:  Positive for color change and rash.   Neurological:  Negative for headache.        There were no vitals filed for this visit.    Objective   Physical Exam  Constitutional:       General: She is not in acute distress.     Appearance: Normal appearance. She is not  ill-appearing, toxic-appearing or diaphoretic.   HENT:      Head: Normocephalic.      Mouth/Throat:      Lips: Pink.      Mouth: Mucous membranes are moist.   Pulmonary:      Effort: Pulmonary effort is normal.   Skin:     Findings: Erythema present.             Comments: Palm-sized area of erythema on the left side of the left breast.  Erythematous streaking on the right side of the left areola.   Neurological:      Mental Status: She is alert and oriented to person, place, and time.          Procedures     Assessment & Plan   Diagnoses and all orders for this visit:    1. Cellulitis of other specified site (Primary)  -     doxycycline (VIBRAMYCIN) 100 MG capsule; Take 1 capsule by mouth 2 (Two) Times a Day for 7 days.  Dispense: 14 capsule; Refill: 0    Reach out to your PCP Monday for follow-up.  Continue to monitor symptoms and redness closely.  If symptoms worsen over the next 24 to 48 hours, go to your nearest ER for evaluation.    If symptoms worsen or do not improve follow up with your PCP or visit your nearest Urgent Care Center or ER.        PLAN: Discussed dosing, side effects, recommended other symptomatic care.  Patient should follow up with primary care provider, Urgent Care or ER if symptoms worsen, fail to resolve or other symptoms need attention. Patient/family agree to the above.         LEYLA Cartagena     Mode of Visit: Video  Location of patient: -HOME-  Location of provider: +HOME+  You have chosen to receive care through a telehealth visit.  The patient has signed the video visit consent form.  The visit included audio and video interaction. No technical issues occurred during this visit.    The use of a video visit has been reviewed with the patient and verbal informed consent has been obtained. Myself and Mona Castillo participated in this visit. The patient is located at [address] [place] [address]. I am located in [place], [place]. Mychart and Zoom were utilized.        This  visit was performed via Telehealth.  This patient has been instructed to follow-up with their primary care provider if their symptoms worsen or the treatment provided does not resolve their illness.

## 2025-02-01 NOTE — PATIENT INSTRUCTIONS
Reach out to your PCP Monday for follow-up.  Continue to monitor symptoms and redness closely.  If symptoms worsen over the next 24 to 48 hours, go to your nearest ER for evaluation.    If symptoms worsen or do not improve follow up with your PCP or visit your nearest Urgent Care Center or ER.

## 2025-02-03 ENCOUNTER — DOCUMENTATION (OUTPATIENT)
Dept: MAMMOGRAPHY | Facility: HOSPITAL | Age: 45
End: 2025-02-03
Payer: COMMERCIAL

## 2025-02-03 ENCOUNTER — OFFICE VISIT (OUTPATIENT)
Dept: FAMILY MEDICINE CLINIC | Facility: CLINIC | Age: 45
End: 2025-02-03
Payer: COMMERCIAL

## 2025-02-03 VITALS
HEIGHT: 64 IN | SYSTOLIC BLOOD PRESSURE: 120 MMHG | HEART RATE: 92 BPM | WEIGHT: 235 LBS | DIASTOLIC BLOOD PRESSURE: 72 MMHG | BODY MASS INDEX: 40.12 KG/M2 | OXYGEN SATURATION: 98 %

## 2025-02-03 DIAGNOSIS — R11.0 NAUSEA: ICD-10-CM

## 2025-02-03 DIAGNOSIS — L03.818 CELLULITIS OF OTHER SPECIFIED SITE: ICD-10-CM

## 2025-02-03 DIAGNOSIS — N61.0 MASTITIS: Primary | ICD-10-CM

## 2025-02-03 PROCEDURE — 96372 THER/PROPH/DIAG INJ SC/IM: CPT | Performed by: FAMILY MEDICINE

## 2025-02-03 PROCEDURE — 99214 OFFICE O/P EST MOD 30 MIN: CPT | Performed by: FAMILY MEDICINE

## 2025-02-03 RX ORDER — ONDANSETRON 4 MG/1
8 TABLET, ORALLY DISINTEGRATING ORAL ONCE
Status: COMPLETED | OUTPATIENT
Start: 2025-02-03 | End: 2025-02-03

## 2025-02-03 RX ORDER — KETOROLAC TROMETHAMINE 30 MG/ML
60 INJECTION, SOLUTION INTRAMUSCULAR; INTRAVENOUS ONCE
Status: COMPLETED | OUTPATIENT
Start: 2025-02-03 | End: 2025-02-03

## 2025-02-03 RX ORDER — CEFTRIAXONE 1 G/1
1 INJECTION, POWDER, FOR SOLUTION INTRAMUSCULAR; INTRAVENOUS ONCE
Status: COMPLETED | OUTPATIENT
Start: 2025-02-03 | End: 2025-02-03

## 2025-02-03 RX ORDER — HYDROCODONE BITARTRATE AND ACETAMINOPHEN 5; 325 MG/1; MG/1
1 TABLET ORAL EVERY 6 HOURS PRN
Qty: 12 TABLET | Refills: 0 | Status: SHIPPED | OUTPATIENT
Start: 2025-02-03

## 2025-02-03 RX ORDER — ONDANSETRON 4 MG/1
4 TABLET, ORALLY DISINTEGRATING ORAL EVERY 8 HOURS PRN
Qty: 24 TABLET | Refills: 0 | Status: SHIPPED | OUTPATIENT
Start: 2025-02-03 | End: 2025-02-04 | Stop reason: SDUPTHER

## 2025-02-03 RX ADMIN — ONDANSETRON 8 MG: 4 TABLET, ORALLY DISINTEGRATING ORAL at 10:25

## 2025-02-03 RX ADMIN — CEFTRIAXONE 1 G: 1 INJECTION, POWDER, FOR SOLUTION INTRAMUSCULAR; INTRAVENOUS at 10:25

## 2025-02-03 RX ADMIN — KETOROLAC TROMETHAMINE 60 MG: 30 INJECTION, SOLUTION INTRAMUSCULAR; INTRAVENOUS at 10:26

## 2025-02-03 NOTE — PROGRESS NOTES
Subjective   Mona Castillo is a 44 y.o. female. Presents today for   Chief Complaint   Patient presents with    Breast Problem     Discolored left        History of Present Illness  Patient 43 y/o female had left breast biopsy, developed redness at biopsy stie that over weekend has spread and now left breast, red, hot to touch, swollen and very painful.  She went to Mammogram dept and nurse navigator looked at and sent back to me.   +chills;  no fevers;  Did get abx yesterday called in, but not started yet.   She did have last Thursday noted on MRI skin thickening with question for possible inflammatory malignancy involving skin, but suspect starting celluliitis.  Review of Systems   Constitutional:  Positive for chills. Negative for fever.   Gastrointestinal:  Positive for nausea. Negative for vomiting.   Skin:  Positive for rash and wound.       Patient Active Problem List   Diagnosis    Asthma    Seasonal allergic rhinitis    Dizziness    Missed     Acute renal failure    Abdominal pain    Diarrhea    Bronchitis with asthma, acute    Iron deficiency anemia    Intestinal infection due to enteropathogenic E. coli    Rh negative, antepartum    Gestational diabetes    Pregnancy-induced hypertension in third trimester    Thrombocytosis       Social History     Socioeconomic History    Marital status:      Spouse name: Polo    Number of children: 1    Years of education: College   Tobacco Use    Smoking status: Former     Passive exposure: Past    Smokeless tobacco: Never    Tobacco comments:     quit 10/2013   Vaping Use    Vaping status: Never Used   Substance and Sexual Activity    Alcohol use: Yes     Comment: Social use    Drug use: No    Sexual activity: Defer       Allergies   Allergen Reactions    [place] Itching     SOMETIMES HAS SOA    Gabapentin Mental Status Change       Current Outpatient Medications on File Prior to Visit   Medication Sig Dispense Refill    albuterol (PROVENTIL) (2.5  "MG/3ML) 0.083% nebulizer solution Take 2.5 mg by nebulization Every 4 (Four) Hours As Needed for Wheezing (use with neb). 120 each 12    budesonide-formoterol (SYMBICORT) 160-4.5 MCG/ACT inhaler Inhale 2 puffs 2 (Two) Times a Day.      montelukast (SINGULAIR) 10 MG tablet Take 1 tablet by mouth Daily.      mupirocin (BACTROBAN) 2 % ointment Apply 1 Application topically to the appropriate area as directed 2 (Two) Times a Day. 30 g 0    Ventolin  (90 Base) MCG/ACT inhaler Inhale 2 puffs Every 4 (Four) Hours As Needed for Wheezing. 18 g 3    doxycycline (VIBRAMYCIN) 100 MG capsule Take 1 capsule by mouth 2 (Two) Times a Day for 7 days. (Patient not taking: Reported on 2/3/2025) 14 capsule 0     No current facility-administered medications on file prior to visit.       Objective   Vitals:    02/03/25 1012   BP: 120/72   Pulse: 92   SpO2: 98%   Weight: 107 kg (235 lb)   Height: 162.6 cm (64\")     Body mass index is 40.34 kg/m².    Physical Exam  Vitals and nursing note reviewed.   Constitutional:       Appearance: Normal appearance. She is not toxic-appearing or diaphoretic.   HENT:      Head: Normocephalic and atraumatic.   Musculoskeletal:      Cervical back: Neck supple.   Skin:     General: Skin is warm and dry.      Capillary Refill: Capillary refill takes less than 2 seconds.             Comments: Left breast rubor, dolor, calor and swelling;  biopsy site close, no drainage;     Neurological:      Mental Status: She is alert.   Psychiatric:         Mood and Affect: Mood normal.         Behavior: Behavior normal.       Study Result    Narrative & Impression   MRI BREAST BILATERAL DIAGNOSTIC W WO CONTRAST-     CLINICAL INDICATION: 44 years old female presents for contrast-enhanced  breast MRI to evaluate extent of disease with recently diagnosed  biopsy-proven left breast malignancy.     COMPARISON: Mammograms and breast ultrasounds dating back to 1/20/2025     TECHNIQUE: Multiplanar T1 and T2-weighted pre " and postcontrast images of  the breast were obtained. Intravenous gadolinium was administered.   Post-processing includes subtraction and MIP images.  Contrast  enhancement kinetics were evaluated using commercial breast MRI CAD  software.  Please note that both nipples are medially directed.     FINDINGS: There is scattered fibroglandular tissue. There is moderate  background parenchymal enhancement.     RIGHT BREAST:    At 1:00 in the posterior right breast, approximately 10 cm posterior the  nipple, there is 3 cm dimension linear non-mass enhancement, which is  suspicious.  At 11:00 in the middle and posterior right breast, centered  approximately 12 cm posterior to the nipple, there is segmental non-mass  enhancement measuring approximately 8.6 cm in AP dimension, 2.5 cm in  transverse dimension, and 2.6 cm in craniocaudal dimension, which is  suspicious.  The visualized axilla is within normal limits.     LEFT BREAST:    At 6:00 in the anterior left breast, there is an approximately 2.5 x 3.7  x 2.9 cm irregular enhancing mass with architectural distortion  representing biopsy-proven malignancy. Within the superior aspect of the  mass, there is a 2.5 x 1.1 x 1.1 cm T1 hyperintense postbiopsy hematoma.  There is a corresponding focus of susceptibility from a biopsy clip. The  mass is located within the anteromedial aspect of regional to segmental  non-mass enhancement extending from 3:00 to 6:00 at anterior, middle,  and posterior depth, which consists of numerous foci and linear  components and measures approximately 11.4 x 14.4 x 7.3 cm. This  includes a dominant somewhat more focal linear area of non-mass  enhancement measuring 4.4 cm. Calcifications adjacent to the biopsied  mass described on the postbiopsy mammogram from 1/27/2025 are located  within the region of the non-mass enhancement on MRI. Additional  previously noted calcifications in the upper posterior left breast on  mammogram may be more  superior than the area of non-mass enhancement.   There is asymmetric anterior predominant left breast skin thickening,  edema, and enhancement, some of which may be reactive to recent biopsy  with inflammatory malignancy not excluded in the appropriate clinical  setting. Additionally, the anterior aspect of non-mass enhancement  extends to the skin inferolateral to the left nipple, where there is  relatively more focal asymmetric skin enhancement measuring up to  approximately 5.6 cm in maximum craniocaudal dimension, which could also  be due to recent biopsy and/or direct skin involvement.  No definite  suspicious enhancement is identified within the left nipple.   No suspicious enhancement is identified in the left chest wall.  There are asymmetric left axillary lymph nodes with cortical thickening,  including a dominant lymph node measuring approximately 2.2 x 1.2 x 1.9  cm, which demonstrates eccentric cortical thickening to 1.3 cm. This is  suspicious for metastatic disease.     EXTRAMAMMARY FINDINGS:  There are no pathologically enlarged internal mammary chain lymph nodes  on either side.             IMPRESSION AND RECOMMENDATION:     1.  A 3.7 cm irregular enhancing mass with architectural distortion at  6:00 in the anterior left breast represents biopsy-proven malignancy and  is located within the anteromedial aspect of regional to segmental  non-mass enhancement measuring up to 14.4 cm at anterior, middle and  posterior depths, which is suspicious for additional malignancy. Some  calcifications described on postbiopsy mammogram from 1/27/2025 are  located within the area of non-mass enhancement, although the degree of  non-mass enhancement is much larger than the extent of calcifications on  mammogram. Recommend surgical management. If breast conservation is  pursued, preoperative stereotactic and/or MRI guided core needle  biopsies of the left breast could be performed.  2.  Asymmetric left axillary lymph  nodes with cortical thickening,  including a dominant lymph node with eccentric cortical thickening.  Recommend targeted ultrasound, followed by possible ultrasound guided  core needle biopsy.  3.  Asymmetric anterior left breast skin thickening, edema, and  enhancement, including a relatively more focal area of skin enhancement,  as above, some of which may be reactive to recent biopsy but also raises  concern for possible inflammatory malignancy and/or direct skin  involvement in the appropriate clinical setting. Recommend clinical  evaluation by breast surgery, possible skin punch biopsy.  4.  Areas of non-mass enhancement measuring 3 cm at 1:00 in the  posterior right breast and 8.6 cm at 11:00 in the middle and posterior  right breast are suspicious. Recommend further evaluation with 2 site  MRI guided core needle biopsy.     An epic in basket message was sent to Dr. Fernandez and Dr. Claros on  1/31/2025.     BI-RADS Category 4: Suspicious           This report was finalized on 1/31/2025 8:24 AM by Dr. Conchis Gannon M.D  on Workstation: [email or web address]       Assessment & Plan   Diagnoses and all orders for this visit:    1. Mastitis (Primary)  -     HYDROcodone-acetaminophen (NORCO) 5-325 MG per tablet; Take 1 tablet by mouth Every 6 (Six) Hours As Needed for Severe Pain.  Dispense: 12 tablet; Refill: 0  -     ondansetron ODT (ZOFRAN-ODT) 4 MG disintegrating tablet; Place 1 tablet on the tongue Every 8 (Eight) Hours As Needed for Nausea or Vomiting.  Dispense: 24 tablet; Refill: 0  -     cefTRIAXone (ROCEPHIN) injection 1 g  -     ketorolac (TORADOL) injection 60 mg    2. Cellulitis of other specified site - left breast  -     HYDROcodone-acetaminophen (NORCO) 5-325 MG per tablet; Take 1 tablet by mouth Every 6 (Six) Hours As Needed for Severe Pain.  Dispense: 12 tablet; Refill: 0  -     ondansetron ODT (ZOFRAN-ODT) 4 MG disintegrating tablet; Place 1 tablet on the tongue Every 8 (Eight) Hours As Needed for Nausea  or Vomiting.  Dispense: 24 tablet; Refill: 0  -     cefTRIAXone (ROCEPHIN) injection 1 g  -     ketorolac (TORADOL) injection 60 mg    3. Nausea  -     ondansetron ODT (ZOFRAN-ODT) disintegrating tablet 8 mg      Patient given in office zofran, 8mg odt for the nausea, rocephin IM, tordol and directed to start abx  once gets home;  Go to ED if progresses and/or high fevers;  She has appt with Dr. Claros in 2 days for f/u of abnormal breast biopsy, which is good so she can evaluate/check as well.   I will follow closely to make sure clears;                  -Follow up: 1 week and prn

## 2025-02-03 NOTE — PROGRESS NOTES
Patient called this morning stating that her left breast is red and swollen and she is concerned it is infected from biopsy on 01/27/25. Advised her to come by and I would be glad to evaluate. I noted in Epic that she had a Telemedicine call with an Urgent Care Facility yesterday. They prescribed Doxycycline, but she has not started taking it as of yet. Patient arrived around 0920 this morning. Her left breast is red over the entire lower half and there is an approximately 5cm [place] of redness with a dime sized area of normal colored skin in the center. The breast is warm to touch and patient reports it is very tender. Dr. Gannon in to evaluate as well. She advised this definitely appears to be a cellulitis. She asked that I call the patient's PCP, Dr. Fernandez to see if he can see her and manage antibiotics. Call placed to his office. Office staff spoke with him and he asked that the patient be sent directly to his office from here. Patient informed of same and she will go directly to his office.

## 2025-02-04 ENCOUNTER — OFFICE VISIT (OUTPATIENT)
Dept: FAMILY MEDICINE CLINIC | Facility: CLINIC | Age: 45
End: 2025-02-04
Payer: COMMERCIAL

## 2025-02-04 ENCOUNTER — TELEPHONE (OUTPATIENT)
Dept: FAMILY MEDICINE CLINIC | Facility: CLINIC | Age: 45
End: 2025-02-04
Payer: COMMERCIAL

## 2025-02-04 VITALS
WEIGHT: 235 LBS | BODY MASS INDEX: 40.12 KG/M2 | HEART RATE: 85 BPM | DIASTOLIC BLOOD PRESSURE: 72 MMHG | OXYGEN SATURATION: 98 % | HEIGHT: 64 IN | SYSTOLIC BLOOD PRESSURE: 102 MMHG

## 2025-02-04 DIAGNOSIS — T78.40XA ALLERGIC REACTION, INITIAL ENCOUNTER: ICD-10-CM

## 2025-02-04 DIAGNOSIS — N61.0 MASTITIS: ICD-10-CM

## 2025-02-04 DIAGNOSIS — L27.0 DRUG ERUPTION: ICD-10-CM

## 2025-02-04 DIAGNOSIS — L03.818 CELLULITIS OF OTHER SPECIFIED SITE: ICD-10-CM

## 2025-02-04 DIAGNOSIS — L03.818 CELLULITIS OF OTHER SPECIFIED SITE: Primary | ICD-10-CM

## 2025-02-04 PROCEDURE — 99214 OFFICE O/P EST MOD 30 MIN: CPT | Performed by: FAMILY MEDICINE

## 2025-02-04 PROCEDURE — 96372 THER/PROPH/DIAG INJ SC/IM: CPT | Performed by: FAMILY MEDICINE

## 2025-02-04 RX ORDER — ONDANSETRON 4 MG/1
4 TABLET, ORALLY DISINTEGRATING ORAL EVERY 8 HOURS PRN
Qty: 24 TABLET | Refills: 0 | Status: SHIPPED | OUTPATIENT
Start: 2025-02-04

## 2025-02-04 RX ORDER — TRIAMCINOLONE ACETONIDE 5 MG/G
1 CREAM TOPICAL 2 TIMES DAILY
Qty: 60 G | Refills: 1 | Status: SHIPPED | OUTPATIENT
Start: 2025-02-04

## 2025-02-04 RX ORDER — CEPHALEXIN 500 MG/1
500 CAPSULE ORAL 4 TIMES DAILY
Qty: 40 CAPSULE | Refills: 0 | Status: SHIPPED | OUTPATIENT
Start: 2025-02-04

## 2025-02-04 RX ORDER — CETIRIZINE HYDROCHLORIDE 10 MG/1
10 TABLET ORAL 2 TIMES DAILY
Qty: 30 TABLET | Refills: 0 | Status: SHIPPED | OUTPATIENT
Start: 2025-02-04

## 2025-02-04 RX ORDER — TRIAMCINOLONE ACETONIDE 40 MG/ML
80 INJECTION, SUSPENSION INTRA-ARTICULAR; INTRAMUSCULAR ONCE
Status: COMPLETED | OUTPATIENT
Start: 2025-02-04 | End: 2025-02-04

## 2025-02-04 RX ORDER — FAMOTIDINE 20 MG/1
20 TABLET, FILM COATED ORAL 2 TIMES DAILY
Qty: 30 TABLET | Refills: 0 | Status: SHIPPED | OUTPATIENT
Start: 2025-02-04

## 2025-02-04 RX ORDER — SULFAMETHOXAZOLE AND TRIMETHOPRIM 800; 160 MG/1; MG/1
1 TABLET ORAL 2 TIMES DAILY
Qty: 20 TABLET | Refills: 0 | Status: SHIPPED | OUTPATIENT
Start: 2025-02-04 | End: 2025-02-06

## 2025-02-04 RX ADMIN — TRIAMCINOLONE ACETONIDE 80 MG: 40 INJECTION, SUSPENSION INTRA-ARTICULAR; INTRAMUSCULAR at 12:43

## 2025-02-04 NOTE — PROGRESS NOTES
General Surgery Breast Cancer History and Physical Exam     Summary:    Mona Castillo is a 44 y.o. lady who presents with a new diagnosis of left breast invasive ductal carcinoma with DCIS: Grade II,  ER+/ID-, Her2-; [id number], Stage II.      A multidisciplinary plan has been formulated for the patient:    (1) Breast Surgical Oncology:  -Follow up [employer] 9 panel genetic testing. I will call her with results.   -Nurse navigator consult.   -R breast MRI guided biopsy 2/12. Axillary US and possible biopsy 2/12.   -Surgical plan: Discussed recommendation for left breast skin sparing mastectomy with sentinel lymph node biopsy, possible axillary dissection pending lymph node biopsy results from 2/12/2025.  -Plastic surgery referral to Dr. Hill.    (2) Medical Oncology:  -Will refer postoperatively for evaluation for endocrine therapy and possible need for chemotherapy.    (3) Radiation Oncology:  -Will refer postoperatively for evaluation for radiation therapy as clinically indicated.    Referring Provider: David Fernandez DO    Chief Complaint: breast mass    History of Present Illness: Ms. Mona Castillo is a 44 y.o. year old lady, seen at the request of David Fernandez DO for a new diagnosis of left breast cancer.      Recently when her son jumped on her, she noticed a knot underneath the NAC. She has had annual mammograms each year; she has been in 6 month surveillance. Her work-up is detailed in the oncologic history below.     She denies any other breast lumps, pain, skin changes, or nipple discharge.     She denies any family history of breast or ovarian cancer.     Workup of Current Diagnosis:    7/8/2024 Left Breast Diagnostic Mammo:   IMPRESSION:  Stable likely benign microcalcifications in the left breast. Recommend continued follow-up in 6 months with bilateral CC and MLO views and spot magnification CC and ML views of the left breast to confirm stability  and correlate with the due date of the  screening mammogram of the right breast.  BI-RADS ASSESSMENT: BI-RADS 3. Probably Benign.     1/20/2025 Bilateral Breast Diagnostic Mammogram:   Standard images and R2 computerized assisted detection were obtained followed by digital tomosynthesis and limited directed left breast ultrasound. Magnification views of the left breast were also performed. The breasts are heterogeneously dense, which may obscure small masses. No abnormality is seen in the right breast. There are loosely grouped microcalcifications in the mid to posterior third of the upper outer left breast and these appear benign and unchanged from 07/08/2024 and 12/21/2023. A marker placed in the area of clinical concern near the left nipple corresponds to a prominent area of stellate architectural distortion in the anterior third of the lower left breast near 6 o'clock that appears to be new since 07/08/2024. This corresponds to a suspicious irregular  solid lesion by ultrasound that is located at 6 o'clock 4 cm from the nipple. It measures 1.5 x 1.1 x 1.6 cm with angular irregular margins and some posterior shadowing and considered highly suspicious.  CONCLUSION: Benign-appearing microcalcifications in the left breast unchanged from earlier studies. Very suspicious left-sided mammogram and directed left breast ultrasound showing an area of stellate  architectural distortion in the lower left breast corresponds to an irregular solid lesion by ultrasound that is located at 6:00 4 cm from the nipple. Further evaluation with ultrasound-guided core biopsy and clip placement is recommended. These findings have been discussed with the patient.  BI-RADS Category 5. Highly suggestive for malignancy.    1/27/2025 Left Breast US Guided Biopsy:   PROCEDURE: The mass at 6:00, 4 cm from the nipple in the left breast was targeted under ultrasound. The skin was cleansed and prepped. 3 mL of 1% lidocaine and 10 mL of 1% lidocaine with epinephrine were used for  local  anesthesia. A small skin incision was then made to permit passage of a 10 g needle with a vacuum-assisted biopsy device. 5 core specimens were obtained. A bowtie clip was then deployed at the biopsy site. The  patient tolerated the procedure well with no immediate complications. Post-procedural digital mammographic views of the left breast demonstrate the bowtie clip along the anterior superior margin of the residual mass.   Calcifications measuring approximately 3.5 cm are noted posterior to and extending to the level of the mass on the postbiopsy mammogram. These are relatively similar in morphology to additional previously followed  calcifications in the upper posterior left breast but are relatively suspicious given the close proximity to biopsy-proven malignancy.     IMPRESSION:   1. Ultrasound-guided core needle biopsy of a 1.6 cm mass with architectural distortion at 6:00, 4 cm from the nipple in the left breast, marked with a bowtie clip. Pathology is malignant and concordant with the imaging assessment. Recommend surgical consultation. Recommend contrast-enhanced breast MRI to evaluate extent of disease.    2. Calcifications are noted posterior to the mass on the post biopsy mammogram, which are similar to additional previously noted calcifications in the upper posterior left breast but are suspicious. Stereotactic core needle biopsy of these calcifications is recommended, as marked on the postbiopsy mammogram. Additional stereotactic core needle biopsy targeting the previously followed calcifications should also be considered given the similar morphology. These should be performed after the above recommended breast MRI.    1/27/2025 Pathology:   Final Diagnosis   1.  Left breast, 6:00, 4 cm from nipple, ultrasound-guided core biopsy, not for calcifications (bow clip):         A.  Invasive ductal carcinoma, moderately-differentiated; [place] histologic grade II/III               (tubule score = 3,  nuclear score = 2, mitoses score = 1), measuring at least 5 mm and involving multiple   core fragments.         B.  Associated intermediate grade ductal carcinoma in situ, solid type with focal single-cell necrosis.         C.  Negative for lymphovascular space invasion.         D.  See biomarker template.       1/30/2025 Bilateral Breast MRI:   IMPRESSION AND RECOMMENDATION:    1.  A 3.7 cm irregular enhancing mass with architectural distortion at 6:00 in the anterior left breast represents biopsy-proven malignancy and is located within the anteromedial aspect of regional to segmental non-mass enhancement measuring up to 14.4 cm at anterior, middle and posterior depths, which is suspicious for additional malignancy. Some calcifications described on postbiopsy mammogram from 1/27/2025 are located within the area of non-mass enhancement, although the degree of non-mass enhancement is much larger than the extent of calcifications on mammogram. Recommend surgical management. If breast conservation is pursued, preoperative stereotactic and/or MRI guided core needle biopsies of the left breast could be performed.  2.  Asymmetric left axillary lymph nodes with cortical thickening, including a dominant lymph node with eccentric cortical thickening. Recommend targeted ultrasound, followed by possible ultrasound guided core needle biopsy.  3.  Asymmetric anterior left breast skin thickening, edema, and enhancement, including a relatively more focal area of skin enhancement, as above, some of which may be reactive to recent biopsy but also raises concern for possible inflammatory malignancy and/or direct skin involvement in the appropriate clinical setting. Recommend clinical evaluation by breast surgery, possible skin punch biopsy.  4.  Areas of non-mass enhancement measuring 3 cm at 1:00 in the posterior right breast and 8.6 cm at 11:00 in the middle and posterior right breast are suspicious. Recommend further evaluation  with 2 site  MRI guided core needle biopsy.    Gynecologic History:   G:3. P:1 AB:2  Age at first childbirth: 40  Lactation/How long: none  Age at menarche: 13  Age at menopause: N/A  Total years of oral contraceptive use: off/on previously  Total years of hormone replacement therapy: none    Past Medical History:   Asthma     Past Surgical History:    Past Surgical History:   Procedure Laterality Date    APPENDECTOMY       SECTION  2020    CHOLECYSTECTOMY      COLONOSCOPY N/A 2019    Procedure: COLONOSCOPY with biopsies;  Surgeon: Philip Winter MD;  Location: [facility] ENDOSCOPY;  Service: Gastroenterology    DILATATION AND CURETTAGE  2020    ENDOSCOPY N/A 2019    Procedure: ESOPHAGOGASTRODUODENOSCOPY with biopsies and aspirate;  Surgeon: Philip Winter MD;  Location: [facility] ENDOSCOPY;  Service: Gastroenterology    UTERINE FIBROID SURGERY       Family History:    As above    Social History:  Denies tobacco use, quit 10/2013  Occasional alcohol use    Allergies:   Allergies   Allergen Reactions    [place] Itching     SOMETIMES HAS SOA    Gabapentin Mental Status Change     Medications:     Current Outpatient Medications:     albuterol (PROVENTIL) (2.5 MG/3ML) 0.083% nebulizer solution, Take 2.5 mg by nebulization Every 4 (Four) Hours As Needed for Wheezing (use with neb)., Disp: 120 each, Rfl: 12    budesonide-formoterol (SYMBICORT) 160-4.5 MCG/ACT inhaler, Inhale 2 puffs 2 (Two) Times a Day., Disp: , Rfl:     doxycycline (VIBRAMYCIN) 100 MG capsule, Take 1 capsule by mouth 2 (Two) Times a Day for 7 days., Disp: 14 capsule, Rfl: 0    HYDROcodone-acetaminophen (NORCO) 5-325 MG per tablet, Take 1 tablet by mouth Every 6 (Six) Hours As Needed for Severe Pain., Disp: 12 tablet, Rfl: 0    montelukast (SINGULAIR) 10 MG tablet, Take 1 tablet by mouth Daily., Disp: , Rfl:     mupirocin (BACTROBAN) 2 % ointment, Apply 1 Application topically to the appropriate area as directed 2 (Two) Times a  Day., Disp: 30 g, Rfl: 0    ondansetron ODT (ZOFRAN-ODT) 4 MG disintegrating tablet, Place 1 tablet on the tongue Every 8 (Eight) Hours As Needed for Nausea or Vomiting., Disp: 24 tablet, Rfl: 0    Ventolin  (90 Base) MCG/ACT inhaler, Inhale 2 puffs Every 4 (Four) Hours As Needed for Wheezing., Disp: 18 g, Rfl: 3    Laboratory Values:    Labs from 2025 reviewed by me     Review of Systems:   Influenza-like illness: no fever, no  cough, no  sore throat, no  body aches, no loss of sense of taste or smell, no known exposure to person with Covid-19.  Constitutional: Negative for fevers or chills  HENT: Negative for hearing loss or runny nose  Eyes: Negative for vision changes or scleral icterus  Respiratory: Negative for cough or shortness of breath  Cardiovascular: Negative for chest pain or heart palpitations  Gastrointestinal: Negative for abdominal pain, nausea, vomiting, constipation, melena, or hematochezia  Genitourinary: Negative for hematuria or dysuria  Musculoskeletal: Negative for joint swelling or gait instability  Neurologic: Negative for tremors or seizures  Psychiatric: Negative for suicidal ideations or depression  All other systems reviewed and negative    Physical Exam:   ECO - Asymptomatic  Constitutional: Well-developed well-nourished, no acute distress  Eyes: Conjunctiva normal, sclera nonicteric  ENMT: Hearing grossly normal, oral mucosa moist  Neck: Supple, no palpable mass, trachea midline  Respiratory: Clear to auscultation, normal inspiratory effort  Cardiovascular: Regular rate, no peripheral edema, no jugular venous distention  Breast: symmetric  Right: No visible abnormalities on inspection while seated, with arms raised or hands on hips. No masses, skin changes, or nipple abnormalities.  Left: No visible abnormalities on inspection while seated, with arms raised or hands on hips. No masses, skin changes, or nipple abnormalities.  Biopsy site appreciated in the left breast,  otherwise no skin changes.   No clinical chest wall involvement.  Gastrointestinal: Soft, nontender  Lymphatics (palpable nodes): No cervical, supraclavicular or axillary lymphadenopathy  Skin:  Warm, dry, no rash on visualized skin surfaces  Musculoskeletal: Symmetric strength, normal gait  Psychiatric: Alert and oriented ×3, normal affect             Discussion:  I had an extensive discussion with the patient and her family about the nature of her breast cancer diagnosis. We reviewed the components of breast tissue including ducts and lobules. We reviewed her pathology report in detail. We reviewed breast cancer histology, including stage, grade, ER/CT receptors, HER2 receptors and how this applies to her diagnosis. We reviewed the basics of systemic and local/regional management of breast cancer.     The patient's clinical stage is documented as above. This was discussed with the patient prior to initiation of treatment. All available pathology reports were discussed with the patient today. All treatment decisions were made via shared decision making with the patient. This patient was evaluated for appropriate ancillary referrals including, pre-treatment functional assessment, exercise program, nutrition program, genetics, and lymphedema clinic. This patient received preoperative and postoperative education. The patient was offered a breast reconstruction referral; risks and benefits were discussed today. The patient was educated on perioperative multimodal pain management strategies. Barriers to effective and efficient care are/will be evaluated by the nurse navigator.    We discussed that most breast cancer is not hereditary, however given her history, this may play a role in her case. I believe genetic testing is warranted and could affect surgical decision making.   We reviewed potential surgical treatments to include partial mastectomy, mastectomy, sentinel lymph node biopsy and axillary node dissection and  discussed the rationale associated with each approach. Regarding radiation therapy, we discussed that radiation is indicated in all cases of breast conservation and in only limited circumstances following mastectomy. We discussed that the primary goal of adjuvant radiation is to decrease the likelihood of local recurrence.     We discussed axillary staging. I described the procedure for sentinel lymph node biopsy in detail, including the preoperative injection of technetium sulfur colloid and intraoperative injection of lymphazurin blue dye. I explained that this is a mapping test and not a cancer test, that all of the lymph nodes containing these dyes will be removed for complete testing by pathology, and that the results could impact the decision for adjuvant treatment or additional surgery.    I described additional risks and potential complications associated with surgery, including, but not limited to, bleeding, infection, complications related to blue dye, lymphedema, deformity/poor cosmetic result, chronic pain, neurovascular injury, numbness, seroma, hematoma, deep venous thrombosis, skin flap necrosis, disease recurrence and the possibility of requiring additional surgery. We also discussed other treatment options including the option of not undergoing any surgical treatment and the risks associated with this including disease progression. She expressed an understanding of these factors and wished to proceed.    We discussed that in her case, systemic treatment would involve endocrine therapy and possibly chemotherapy. She will be referred to medical oncology postoperatively to discuss this further.     SHIRA ALVES M.D.  General and Endoscopic Surgery  [facility]    [address], [address]  [place], [place], [address]  P: [phone]  F: [phone]

## 2025-02-04 NOTE — TELEPHONE ENCOUNTER
Caller: Mona Castillo     Relationship: SELF     Best call back number: [phone]     What is your medical concern? RASH ON LEFT SIDE NEAR BIOPSY SITE    How long has this issue been going on? 1    Is your provider already aware of this issue? NO    Have you been treated for this issue? NO

## 2025-02-04 NOTE — PROGRESS NOTES
Subjective   Mona Castillo is a 44 y.o. female. Presents today for   Chief Complaint   Patient presents with    Rash     Breast, arm & abdomen       History of Present Illness  Patient 43 y/o had recent biopsy for new dx of breast cancer developed progressive redness, warmth and tenderness around breast.  Called oncall and sent doxy though not started when I saw yesterday.  I gave IM rocephen, Tordol and directed to get 2 doses of doxycycline in.  No prior reactions.  Reports last night about 11:30pm developed morbillform rash center of chest that was pruritic and is now spreading over trunk and arms.   NO tongue/throat swelling;  no dyspnea;  left breast redness not worsened, but has changed.    Review of Systems   Constitutional:  Negative for chills and fever.   HENT:  Negative for trouble swallowing.    Respiratory:  Negative for cough and shortness of breath.    Skin:  Positive for rash.       Patient Active Problem List   Diagnosis    Asthma    Seasonal allergic rhinitis    Dizziness    Missed     Acute renal failure    Abdominal pain    Diarrhea    Bronchitis with asthma, acute    Iron deficiency anemia    Intestinal infection due to enteropathogenic E. coli    Rh negative, antepartum    Gestational diabetes    Pregnancy-induced hypertension in third trimester    Thrombocytosis       Social History     Socioeconomic History    Marital status:      Spouse name: Polo    Number of children: 1    Years of education: College   Tobacco Use    Smoking status: Former     Current packs/day: 0.00     Average packs/day: 0.3 packs/day for 18.8 years (4.7 ttl pk-yrs)     Types: Cigarettes     Start date: 1995     Quit date: 10/24/2013     Years since quittin.2     Passive exposure: Past    Smokeless tobacco: Never    Tobacco comments:     quit 10/2013   Vaping Use    Vaping status: Never Used   Substance and Sexual Activity    Alcohol use: Yes     Comment: Social use    Drug use: No     "Sexual activity: Defer       Allergies   Allergen Reactions    Doxycycline Hives     Could have been rocephin or tordol as well    [place] Itching     SOMETIMES HAS SOA    Gabapentin Mental Status Change       Current Outpatient Medications on File Prior to Visit   Medication Sig Dispense Refill    albuterol (PROVENTIL) (2.5 MG/3ML) 0.083% nebulizer solution Take 2.5 mg by nebulization Every 4 (Four) Hours As Needed for Wheezing (use with neb). 120 each 12    budesonide-formoterol (SYMBICORT) 160-4.5 MCG/ACT inhaler Inhale 2 puffs 2 (Two) Times a Day.      doxycycline (VIBRAMYCIN) 100 MG capsule Take 1 capsule by mouth 2 (Two) Times a Day for 7 days. 14 capsule 0    HYDROcodone-acetaminophen (NORCO) 5-325 MG per tablet Take 1 tablet by mouth Every 6 (Six) Hours As Needed for Severe Pain. 12 tablet 0    montelukast (SINGULAIR) 10 MG tablet Take 1 tablet by mouth Daily.      mupirocin (BACTROBAN) 2 % ointment Apply 1 Application topically to the appropriate area as directed 2 (Two) Times a Day. 30 g 0    ondansetron ODT (ZOFRAN-ODT) 4 MG disintegrating tablet Place 1 tablet on the tongue Every 8 (Eight) Hours As Needed for Nausea or Vomiting. 24 tablet 0    Ventolin  (90 Base) MCG/ACT inhaler Inhale 2 puffs Every 4 (Four) Hours As Needed for Wheezing. 18 g 3     No current facility-administered medications on file prior to visit.       Objective   Vitals:    02/04/25 1222   BP: 102/72   Pulse: 85   SpO2: 98%   Weight: 107 kg (235 lb)   Height: 162.6 cm (64\")     Body mass index is 40.34 kg/m².    Physical Exam  Vitals and nursing note reviewed.   Constitutional:       Appearance: Normal appearance. She is not toxic-appearing or diaphoretic.   HENT:      Head: Normocephalic and atraumatic.   Musculoskeletal:      Cervical back: Neck supple.   Skin:     General: Skin is warm and dry.      Capillary Refill: Capillary refill takes less than 2 seconds.      Comments: Left breast redness, warmth and swelling;  No " progression from 2/3/25;   Has wide spread morbilliform rash consistent with drug eruption.   Neurological:      Mental Status: She is alert.   Psychiatric:         Mood and Affect: Mood normal.         Behavior: Behavior normal.         Assessment & Plan   Diagnoses and all orders for this visit:    1. Cellulitis of other specified site (Primary)  -     cephalexin (KEFLEX) 500 MG capsule; Take 1 capsule by mouth 4 (Four) Times a Day.  Dispense: 40 capsule; Refill: 0  -     sulfamethoxazole-trimethoprim (BACTRIM DS,SEPTRA DS) 800-160 MG per tablet; Take 1 tablet by mouth 2 (Two) Times a Day.  Dispense: 20 tablet; Refill: 0  -     CBC & Differential  -     Basic Metabolic Panel    2. Drug eruption  -     triamcinolone acetonide (KENALOG-40) injection 80 mg  -     cetirizine (zyrTEC) 10 MG tablet; Take 1 tablet by mouth 2 (Two) Times a Day.  Dispense: 30 tablet; Refill: 0  -     famotidine (Pepcid) 20 MG tablet; Take 1 tablet by mouth 2 (Two) Times a Day.  Dispense: 30 tablet; Refill: 0  -     triamcinolone (KENALOG) 0.5 % cream; Apply 1 Application topically to the appropriate area as directed 2 (Two) Times a Day.  Dispense: 60 g; Refill: 1    3. Allergic reaction, initial encounter  -     triamcinolone acetonide (KENALOG-40) injection 80 mg  -     cetirizine (zyrTEC) 10 MG tablet; Take 1 tablet by mouth 2 (Two) Times a Day.  Dispense: 30 tablet; Refill: 0  -     famotidine (Pepcid) 20 MG tablet; Take 1 tablet by mouth 2 (Two) Times a Day.  Dispense: 30 tablet; Refill: 0  -     triamcinolone (KENALOG) 0.5 % cream; Apply 1 Application topically to the appropriate area as directed 2 (Two) Times a Day.  Dispense: 60 g; Refill: 1    4. Mastitis  -     CBC & Differential  -     Basic Metabolic Panel    Unfortunately reaction to medication and had 3 new yesterday;  IM rocephin, tordol.  It began an hour or two after 2nd dose of doxycycline which seems like culprit, however I explained to patient it is possible reaction  could be due to one of the other agents given IM.   I will stop doxycycline, gave IM steroid and will start bactrim/cephalexin;  Will give combo as will better cover Staph and Strep;  Warned cephalexin is cephalosporin like rocephin, so if any progression let me know asap.   I eliazar give in addition to IM steroid H1/H2 blocker and ok use kenalog cream to areas of rash/pruritus but avoid face.    I will follow closely until resolution                 -Follow up: 48 hours and prn ;  See surgeon as well tomorrow for new dx of breast cancer.

## 2025-02-04 NOTE — TELEPHONE ENCOUNTER
Caller: Mona Castillo    Relationship to patient: Self    Best call back number:   Telephone Information:   Mobile [phone]     Patient is needing: PATIENT WAS CALLING TO UPDATE THAT THE RASH HAS SPREAD AND IS NOW ON HER LEFT CHEST,STOMACH AND ARM.PLEASE CALLBACK AS SOON AS POSSIBLE.

## 2025-02-05 ENCOUNTER — PATIENT OUTREACH (OUTPATIENT)
Dept: OTHER | Facility: HOSPITAL | Age: 45
End: 2025-02-05
Payer: COMMERCIAL

## 2025-02-05 ENCOUNTER — OFFICE VISIT (OUTPATIENT)
Dept: SURGERY | Facility: CLINIC | Age: 45
End: 2025-02-05
Payer: COMMERCIAL

## 2025-02-05 VITALS
HEART RATE: 82 BPM | WEIGHT: 238.6 LBS | DIASTOLIC BLOOD PRESSURE: 78 MMHG | SYSTOLIC BLOOD PRESSURE: 126 MMHG | HEIGHT: 64 IN | OXYGEN SATURATION: 98 % | BODY MASS INDEX: 40.74 KG/M2

## 2025-02-05 DIAGNOSIS — C50.919 MALIGNANT NEOPLASM OF FEMALE BREAST, UNSPECIFIED ESTROGEN RECEPTOR STATUS, UNSPECIFIED LATERALITY, UNSPECIFIED SITE OF BREAST: Primary | ICD-10-CM

## 2025-02-05 LAB
BASOPHILS # BLD AUTO: 0.1 X10E3/UL (ref 0–0.2)
BASOPHILS NFR BLD AUTO: 1 %
BUN SERPL-MCNC: 11 MG/DL (ref 6–24)
BUN/CREAT SERPL: 9 (ref 9–23)
CALCIUM SERPL-MCNC: 10.1 MG/DL (ref 8.7–10.2)
CHLORIDE SERPL-SCNC: 104 MMOL/L (ref 96–106)
CO2 SERPL-SCNC: 26 MMOL/L (ref 20–29)
CREAT SERPL-MCNC: 1.24 MG/DL (ref 0.57–1)
EGFRCR SERPLBLD CKD-EPI 2021: 55 ML/MIN/1.73
EOSINOPHIL # BLD AUTO: 0.5 X10E3/UL (ref 0–0.4)
EOSINOPHIL NFR BLD AUTO: 5 %
ERYTHROCYTE [DISTWIDTH] IN BLOOD BY AUTOMATED COUNT: 14 % (ref 11.7–15.4)
GLUCOSE SERPL-MCNC: 81 MG/DL (ref 70–99)
HCT VFR BLD AUTO: 40.2 % (ref 34–46.6)
HGB BLD-MCNC: 13.2 G/DL (ref 11.1–15.9)
IMM GRANULOCYTES # BLD AUTO: 0.1 X10E3/UL (ref 0–0.1)
IMM GRANULOCYTES NFR BLD AUTO: 1 %
LYMPHOCYTES # BLD AUTO: 2 X10E3/UL (ref 0.7–3.1)
LYMPHOCYTES NFR BLD AUTO: 20 %
MCH RBC QN AUTO: 30.2 PG (ref 26.6–33)
MCHC RBC AUTO-ENTMCNC: 32.8 G/DL (ref 31.5–35.7)
MCV RBC AUTO: 92 FL (ref 79–97)
MONOCYTES # BLD AUTO: 0.8 X10E3/UL (ref 0.1–0.9)
MONOCYTES NFR BLD AUTO: 8 %
NEUTROPHILS # BLD AUTO: 6.3 X10E3/UL (ref 1.4–7)
NEUTROPHILS NFR BLD AUTO: 65 %
PLATELET # BLD AUTO: 419 X10E3/UL (ref 150–450)
POTASSIUM SERPL-SCNC: 4.6 MMOL/L (ref 3.5–5.2)
RBC # BLD AUTO: 4.37 X10E6/UL (ref 3.77–5.28)
SODIUM SERPL-SCNC: 141 MMOL/L (ref 134–144)
WBC # BLD AUTO: 9.7 X10E3/UL (ref 3.4–10.8)

## 2025-02-05 NOTE — PROGRESS NOTES
Referral received from Dr. Claros's office. Met Ms. Castillo and her  during her surgery consult. I introduced myself and navigational services. She has a new diagnosis of Left breast Invasive Ductal Carcinoma, ER +, [place] -, Her2-, Ki-67 30% Stage II    She has a good understanding of her pathology and treatment options presented to her by Dr. Claros and was able to verbalize teach back. After the consult she is leaning toward having a mastectomy with reconstruction. She will have genetics done today and will have an MRI guided biopsy of the right breast next week and U/S guided biopsy of the left axilla. She is comfortable with this plan and has no questions or concerns following the consult.      We discussed resource needs and she stated she has adequate housing, no food insecurity, adequate transportation to and from appointments, is not concerned with finances at this time, does not drink alcohol regularly, does not feel lonely or isolated, and has not felt depressed, anxious or hopeless in the past two weeks.     We discussed her support system and she stated she has great support with her , mom and best friend . We discussed our supportive oncology clinic if the need arises and she was thankful for the information.     We discussed integrative therapies and other [facility]. I gave her a navigation folder with the following information:     [employer] [facility], [employer], [employer], [employer] program, Guide for the Newly Diagnosed, [employer], Cancer Support Services Brochure, Breast Cancer Program Brochure.     She verbalized appreciation for navigational services and she has my contact information and will call with any questions that arise.

## 2025-02-06 ENCOUNTER — OFFICE VISIT (OUTPATIENT)
Dept: FAMILY MEDICINE CLINIC | Facility: CLINIC | Age: 45
End: 2025-02-06
Payer: COMMERCIAL

## 2025-02-06 VITALS
DIASTOLIC BLOOD PRESSURE: 62 MMHG | BODY MASS INDEX: 40.63 KG/M2 | HEART RATE: 78 BPM | SYSTOLIC BLOOD PRESSURE: 118 MMHG | OXYGEN SATURATION: 98 % | HEIGHT: 64 IN | WEIGHT: 238 LBS

## 2025-02-06 DIAGNOSIS — Z17.0 BILATERAL MALIGNANT NEOPLASM OF BREAST IN FEMALE, ESTROGEN RECEPTOR POSITIVE, UNSPECIFIED SITE OF BREAST: Primary | ICD-10-CM

## 2025-02-06 DIAGNOSIS — L27.0 DRUG ERUPTION: ICD-10-CM

## 2025-02-06 DIAGNOSIS — C50.911 BILATERAL MALIGNANT NEOPLASM OF BREAST IN FEMALE, ESTROGEN RECEPTOR POSITIVE, UNSPECIFIED SITE OF BREAST: Primary | ICD-10-CM

## 2025-02-06 DIAGNOSIS — L03.818 CELLULITIS OF OTHER SPECIFIED SITE: ICD-10-CM

## 2025-02-06 DIAGNOSIS — C50.912 BILATERAL MALIGNANT NEOPLASM OF BREAST IN FEMALE, ESTROGEN RECEPTOR POSITIVE, UNSPECIFIED SITE OF BREAST: Primary | ICD-10-CM

## 2025-02-06 PROCEDURE — 99214 OFFICE O/P EST MOD 30 MIN: CPT | Performed by: FAMILY MEDICINE

## 2025-02-06 NOTE — NURSING NOTE
Precall placed/arriv time 0645 @ [facility] Mammo Dept confirmed/no Aspirin/NSAIDs after today/wear bra/okay to eat/drink prior/would like Valium & will have /left breast is much better/questions addressed.

## 2025-02-06 NOTE — PROGRESS NOTES
Subjective   Mona Castillo is a 44 y.o. female. Presents today for   Chief Complaint   Patient presents with    Cellulitis       History of Present Illness  Patient very sweet 45 y/o female with biopsy proven breast malignancy on 25.  She developed severe redness, warmth, pain and swelling after biopsy done.  After starting doxy did develop also morbilliform drug eruption.  She reports though that 3 other family members has had reactions to tissue adhesive after breast biopsy as well and so adding to list of allergies;  She did have tordol/rocephin day of reaction, but rash 1 hour after 2nd dose of doxy.   She was changed to bactrim/keflex;  The rash has greatly improved (given zyrtec/pepcid and IM kenalog 48 hours ago).   Cellulitis, swelling, redness and pain greatly improved.  Saw surgeon yesterday and gave thorough explanation to patient that she felt helped understand what going on.   She has additional biopsies next week for lesions found right breast and possibly left axilla.      Review of Systems   Constitutional:  Negative for fever.   Skin:  Positive for rash and wound.       Patient Active Problem List   Diagnosis    Asthma    Seasonal allergic rhinitis    Dizziness    Missed     Acute renal failure    Abdominal pain    Diarrhea    Bronchitis with asthma, acute    Iron deficiency anemia    Intestinal infection due to enteropathogenic E. coli    Rh negative, antepartum    Gestational diabetes    Pregnancy-induced hypertension in third trimester    Thrombocytosis       Social History     Socioeconomic History    Marital status:      Spouse name: Polo    Number of children: 1    Years of education: College   Tobacco Use    Smoking status: Former     Current packs/day: 0.00     Average packs/day: 0.3 packs/day for 18.8 years (4.7 ttl pk-yrs)     Types: Cigarettes     Start date: 1995     Quit date: 10/24/2013     Years since quittin.2     Passive exposure: Past     Smokeless tobacco: Never    Tobacco comments:     quit 10/2013   Vaping Use    Vaping status: Never Used   Substance and Sexual Activity    Alcohol use: Yes     Comment: I have maybe 2 beers a month or a glass a wine a month.    Drug use: No    Sexual activity: Yes     Partners: Male     Birth control/protection: None       Allergies   Allergen Reactions    Doxycycline Hives     Could have been rocephin or tordol as well    Glue [Wound Dressing Adhesive] Hives     Had reaction after biopsy    [place] Itching     SOMETIMES HAS SOA    Gabapentin Mental Status Change       Current Outpatient Medications on File Prior to Visit   Medication Sig Dispense Refill    albuterol (PROVENTIL) (2.5 MG/3ML) 0.083% nebulizer solution Take 2.5 mg by nebulization Every 4 (Four) Hours As Needed for Wheezing (use with neb). 120 each 12    budesonide-formoterol (SYMBICORT) 160-4.5 MCG/ACT inhaler Inhale 2 puffs 2 (Two) Times a Day.      cephalexin (KEFLEX) 500 MG capsule Take 1 capsule by mouth 4 (Four) Times a Day. 40 capsule 0    cetirizine (zyrTEC) 10 MG tablet Take 1 tablet by mouth 2 (Two) Times a Day. 30 tablet 0    famotidine (Pepcid) 20 MG tablet Take 1 tablet by mouth 2 (Two) Times a Day. 30 tablet 0    HYDROcodone-acetaminophen (NORCO) 5-325 MG per tablet Take 1 tablet by mouth Every 6 (Six) Hours As Needed for Severe Pain. 12 tablet 0    montelukast (SINGULAIR) 10 MG tablet Take 1 tablet by mouth Daily.      mupirocin (BACTROBAN) 2 % ointment Apply 1 Application topically to the appropriate area as directed 2 (Two) Times a Day. 30 g 0    ondansetron ODT (ZOFRAN-ODT) 4 MG disintegrating tablet Place 1 tablet on the tongue Every 8 (Eight) Hours As Needed for Nausea or Vomiting. 24 tablet 0    triamcinolone (KENALOG) 0.5 % cream Apply 1 Application topically to the appropriate area as directed 2 (Two) Times a Day. 60 g 1    Ventolin  (90 Base) MCG/ACT inhaler Inhale 2 puffs Every 4 (Four) Hours As Needed for Wheezing. 18 g  "3    [DISCONTINUED] doxycycline (VIBRAMYCIN) 100 MG capsule Take 1 capsule by mouth 2 (Two) Times a Day for 7 days. (Patient not taking: Reported on 2/6/2025) 14 capsule 0    [DISCONTINUED] sulfamethoxazole-trimethoprim (BACTRIM DS,SEPTRA DS) 800-160 MG per tablet Take 1 tablet by mouth 2 (Two) Times a Day. (Patient not taking: Reported on 2/6/2025) 20 tablet 0     No current facility-administered medications on file prior to visit.       Objective   Vitals:    02/06/25 0815   BP: 118/62   Pulse: 78   SpO2: 98%   Weight: 108 kg (238 lb)   Height: 162.6 cm (64\")     Body mass index is 40.85 kg/m².    Physical Exam  Vitals and nursing note reviewed.   Constitutional:       Appearance: Normal appearance. She is not toxic-appearing or diaphoretic.   HENT:      Head: Normocephalic and atraumatic.   Musculoskeletal:      Cervical back: Neck supple.   Skin:     General: Skin is warm and dry.      Capillary Refill: Capillary refill takes less than 2 seconds.      Findings: Erythema (over brast, improving;  swelling/pain improved) present. Rash is papular (fading and mostly resovled).   Neurological:      Mental Status: She is alert.   Psychiatric:         Mood and Affect: Mood normal.         Behavior: Behavior normal.       Component      Latest Ref Rng 2/4/2025   WBC      3.4 - 10.8 x10E3/uL 9.7    RBC      3.77 - 5.28 x10E6/uL 4.37    Hemoglobin      11.1 - 15.9 g/dL 13.2    Hematocrit      34.0 - 46.6 % 40.2    MCV      79 - 97 fL 92    MCH      26.6 - 33.0 pg 30.2    MCHC      31.5 - 35.7 g/dL 32.8    RDW      11.7 - 15.4 % 14.0    Platelets      150 - 450 x10E3/uL 419    Neutrophil Rel %      Not Estab. % 65    Lymphocyte Rel %      Not Estab. % 20    Monocyte Rel %      Not Estab. % 8    Eosinophil Rel %      Not Estab. % 5    Basophil Rel %      Not Estab. % 1    Neutrophils Absolute      1.4 - 7.0 x10E3/uL 6.3    Lymphocytes Absolute      0.7 - 3.1 x10E3/uL 2.0    Monocytes Absolute      0.1 - 0.9 x10E3/uL 0.8  "   Eosinophils Absolute      0.0 - 0.4 x10E3/uL 0.5 (H)    Basophils Absolute      0.0 - 0.2 x10E3/uL 0.1    Immature Granulocyte Rel %      Not Estab. % 1    Immature Grans, Absolute      0.0 - 0.1 x10E3/uL 0.1    Glucose      70 - 99 mg/dL 81    BUN      6 - 24 mg/dL 11    Creatinine      0.57 - 1.00 mg/dL 1.24 (H)    EGFR Result      >59 mL/min/1.73 55 (L)    BUN/Creatinine Ratio      9 - 23  9    Sodium      134 - 144 mmol/L 141    Potassium      3.5 - 5.2 mmol/L 4.6    Chloride      96 - 106 mmol/L 104    CO2      20 - 29 mmol/L 26    Calcium      8.7 - 10.2 mg/dL 10.1       Legend:  (H) High  (L) Low  Tissue Pathology Exam: [id number]  Order: [id number]  Status: Final result       Visible to patient: Yes (seen)       Next appt: 02/10/2025 at 12:45 PM in Family Medicine (David Fernandez DO)    Specimen Information: Breast, Left; Tissue   1 Result Note       1 Patient Communication      Component    Case Report   Surgical Pathology Report                         Case: [id number]                                   Authorizing Provider:  David Fernandez DO      Collected:           01/27/2025 02:41 PM           Ordering Location:     [facility]  Received:            01/27/2025 03:05 PM                                                                                                             Pathologist:           Antonieta Berumen MD                                                         Specimen:    Breast, Left, left breast 6:00 4 cmfn, not for calcs, bow clip, 5 cores, 10 gauge,                  formalin [id number]                                                                              Clinical Information    Not for calcifications    Final Diagnosis   1.  Left breast, 6:00, 4 cm from nipple, ultrasound-guided core biopsy, not for calcifications (bow clip):         A.  Invasive ductal carcinoma, moderately-differentiated; [place] histologic grade II/III               (tubule score = 3,  "nuclear score = 2, mitoses score = 1), measuring at least 5 mm and involving multiple   core fragments.         B.  Associated intermediate grade ductal carcinoma in situ, solid type with focal single-cell necrosis.         C.  Negative for lymphovascular space invasion.         D.  See biomarker template.   Electronically signed by Antonieta Berumen MD on 1/29/2025 at 0946   Synoptic Checklist   Breast Biomarker Reporting Template   Protocol posted: 12/13/2023BREAST BIOMARKER REPORTING TEMPLATE - All Specimens  Test(s) Performed     Estrogen Receptor (ER) Status  Positive (greater than 10% of cells demonstrate nuclear positivity)   Percentage of Cells with Nuclear Positivity  %   Average Intensity of Staining  Strong   Test Type  Food and Drug Administration (FDA) cleared (test / vendor): [employer]   Primary Antibody  SP1   Test(s) Performed     Progesterone Receptor (PgR) Status  Negative (less than 1%)     Internal control cells present and stain as expected   Test Type  Food and Drug Administration (FDA) cleared (test / vendor): [employer]   Primary Antibody  1E2   Test(s) Performed     HER2 by Immunohistochemistry  Negative (Score 1+)   Test Type  Food and Drug Administration (FDA) cleared (test / vendor): [employer]   Primary Antibody  4B5   Test(s) Performed  Ki-67   Ki-67 Percentage of Positive Nuclei  30 %   Primary Antibody  30-9   Cold Ischemia and Fixation Times  Meet requirements specified in latest version of the ASCO / CAP Guidelines   Cold Ischemia Time (minutes)  3 min   Fixation Time (hours)  6.25 hours   Testing Performed on Block Number(s)  1A   METHODS   Fixative  Formalin   Image Analysis  Not performed   .      Comment    Dr. David Delgado reviewed select slides and concurs with the above diagnosis.   Gross Description    1. Breast, Left.   Received in formalin, labeled with the patient's name, and designated \" left breast biopsy at 6:00, 4 cm from nipple, not for calcifications\", are 5 " "rubbery, yellow to gray-white core fragments of fibrofatty soft tissue measuring from 0.8 cm up to 1.8 x 0.5 cm in maximal tubal diameter.  The tissue is inked in blue and submitted in 1A.     Cold ischemic time: 3 minutes  Total fixation time: 6 hours and 16 minutes  Clip shape: [place]  Number of cores: 5  Needle gauge: 10     mb/uso/vas         Special Stains    Immunohistochemical stains were performed utilizing appropriate controls.  Malignant cells demonstrate retained membranous E-cadherin expression and membranous p120 expression, confirming ductal phenotype.  p63 shows loss of myoepithelial cells in foci of invasive carcinoma.  The findings support the above diagnosis.     ER / CA Scoring Guide:  Nuclear staining of tumor cells equal to or greater than 1%, of any intensity, is considered a positive result with less than 1% considered negative, when controls are adequate.  ER/CA Disclaimer: All breast markers (Confirm anti-estrogen receptor clone SP1 and Confirm anti-progesterone receptor clone 1E2) are performed utilizing the ultra-View DAB kit on the Benchmark Ultra platform, all manufactured by [employer], [place], [place].  Reagents utilized are FDA approved for in vitro diagnostic use.  These tests are not intended as a confirmation of the original diagnosis.  They should only be used in conjunction with other established risk factors, clinical and diagnostic procedures.      Patients with breast cancers that are HER2 IHC 3+ or IHC 2+/JEFF amplified may be eligible for several therapies that disrupt HER2 signaling pathways. Invasive breast cancers that test \"HER2-negative\" (IHC 0, 1+ or 2+/JEFF not-amplified) are more specifically considered \"HER2-negative for protein overexpression/gene amplification\" since non-overexpressed levels of the HER2 protein may be present in these cases. Patients with breast cancers that are HER2 IHC 1+ or IHC 2+/JEFF not-amplified may be eligible for a treatment that " "targets non-amplified/non-overexpressed levels of HER2 expression for cytotoxic drug delivery (IHC 0 results do not result in eligibility currently).   Microscopic Description    Unless \"gross only\" is specified, the final diagnosis for each specimen is based on microscopic examination of tissue.   Resulting Agency [facility]             Specimen Collected: 01/27/25 14:41 EST Last Resulted: 01/29/25 09:46 EST     Study Result    Narrative & Impression   MRI BREAST BILATERAL DIAGNOSTIC W WO CONTRAST-     CLINICAL INDICATION: 44 years old female presents for contrast-enhanced  breast MRI to evaluate extent of disease with recently diagnosed  biopsy-proven left breast malignancy.     COMPARISON: Mammograms and breast ultrasounds dating back to 1/20/2025     TECHNIQUE: Multiplanar T1 and T2-weighted pre and postcontrast images of  the breast were obtained. Intravenous gadolinium was administered.   Post-processing includes subtraction and MIP images.  Contrast  enhancement kinetics were evaluated using commercial breast MRI CAD  software.  Please note that both nipples are medially directed.     FINDINGS: There is scattered fibroglandular tissue. There is moderate  background parenchymal enhancement.     RIGHT BREAST:    At 1:00 in the posterior right breast, approximately 10 cm posterior the  nipple, there is 3 cm dimension linear non-mass enhancement, which is  suspicious.  At 11:00 in the middle and posterior right breast, centered  approximately 12 cm posterior to the nipple, there is segmental non-mass  enhancement measuring approximately 8.6 cm in AP dimension, 2.5 cm in  transverse dimension, and 2.6 cm in craniocaudal dimension, which is  suspicious.  The visualized axilla is within normal limits.     LEFT BREAST:    At 6:00 in the anterior left breast, there is an approximately 2.5 x 3.7  x 2.9 cm irregular enhancing mass with architectural distortion  representing biopsy-proven malignancy. Within the superior " aspect of the  mass, there is a 2.5 x 1.1 x 1.1 cm T1 hyperintense postbiopsy hematoma.  There is a corresponding focus of susceptibility from a biopsy clip. The  mass is located within the anteromedial aspect of regional to segmental  non-mass enhancement extending from 3:00 to 6:00 at anterior, middle,  and posterior depth, which consists of numerous foci and linear  components and measures approximately 11.4 x 14.4 x 7.3 cm. This  includes a dominant somewhat more focal linear area of non-mass  enhancement measuring 4.4 cm. Calcifications adjacent to the biopsied  mass described on the postbiopsy mammogram from 1/27/2025 are located  within the region of the non-mass enhancement on MRI. Additional  previously noted calcifications in the upper posterior left breast on  mammogram may be more superior than the area of non-mass enhancement.   There is asymmetric anterior predominant left breast skin thickening,  edema, and enhancement, some of which may be reactive to recent biopsy  with inflammatory malignancy not excluded in the appropriate clinical  setting. Additionally, the anterior aspect of non-mass enhancement  extends to the skin inferolateral to the left nipple, where there is  relatively more focal asymmetric skin enhancement measuring up to  approximately 5.6 cm in maximum craniocaudal dimension, which could also  be due to recent biopsy and/or direct skin involvement.  No definite  suspicious enhancement is identified within the left nipple.   No suspicious enhancement is identified in the left chest wall.  There are asymmetric left axillary lymph nodes with cortical thickening,  including a dominant lymph node measuring approximately 2.2 x 1.2 x 1.9  cm, which demonstrates eccentric cortical thickening to 1.3 cm. This is  suspicious for metastatic disease.     EXTRAMAMMARY FINDINGS:  There are no pathologically enlarged internal mammary chain lymph nodes  on either side.             IMPRESSION AND  RECOMMENDATION:     1.  A 3.7 cm irregular enhancing mass with architectural distortion at  6:00 in the anterior left breast represents biopsy-proven malignancy and  is located within the anteromedial aspect of regional to segmental  non-mass enhancement measuring up to 14.4 cm at anterior, middle and  posterior depths, which is suspicious for additional malignancy. Some  calcifications described on postbiopsy mammogram from 1/27/2025 are  located within the area of non-mass enhancement, although the degree of  non-mass enhancement is much larger than the extent of calcifications on  mammogram. Recommend surgical management. If breast conservation is  pursued, preoperative stereotactic and/or MRI guided core needle  biopsies of the left breast could be performed.  2.  Asymmetric left axillary lymph nodes with cortical thickening,  including a dominant lymph node with eccentric cortical thickening.  Recommend targeted ultrasound, followed by possible ultrasound guided  core needle biopsy.  3.  Asymmetric anterior left breast skin thickening, edema, and  enhancement, including a relatively more focal area of skin enhancement,  as above, some of which may be reactive to recent biopsy but also raises  concern for possible inflammatory malignancy and/or direct skin  involvement in the appropriate clinical setting. Recommend clinical  evaluation by breast surgery, possible skin punch biopsy.  4.  Areas of non-mass enhancement measuring 3 cm at 1:00 in the  posterior right breast and 8.6 cm at 11:00 in the middle and posterior  right breast are suspicious. Recommend further evaluation with 2 site  MRI guided core needle biopsy.     An epic in basket message was sent to Dr. Fernandez and Dr. Claros on  1/31/2025.     BI-RADS Category 4: Suspicious           This report was finalized on 1/31/2025 8:24 AM by Dr. Conchis Gannon M.D  on Workstation: [id number]     Assessment & Plan   Diagnoses and all orders for this  visit:    1. Bilateral malignant neoplasm of breast in female, estrogen receptor positive, unspecified site of breast (Primary)    2. Cellulitis of other specified site    3. Drug eruption    Cellulitis improving, finish abx  Drug eruption much better; finish antihistamines;  had IM steroid  Proceed with biopsies next week.                  -Follow up: 2/10/25.

## 2025-02-06 NOTE — PROGRESS NOTES
02/12/25 0002   Pre-Procedure Phone Call   Procedure Time Verified Yes   Arrival Time 0645   Procedure Location Verified Yes   Medical History Reviewed Yes   NPO Status Reinforced No   Ride and Caregiver Arranged Yes   Phone Number for Ride/Caregiver [place] Mungu[employer]ison,    Patient Knows to Bring Current Medications No   Bring Outside Films Requested No

## 2025-02-07 ENCOUNTER — PREP FOR SURGERY (OUTPATIENT)
Dept: OTHER | Facility: HOSPITAL | Age: 45
End: 2025-02-07
Payer: COMMERCIAL

## 2025-02-07 DIAGNOSIS — C50.919 MALIGNANT NEOPLASM OF FEMALE BREAST, UNSPECIFIED ESTROGEN RECEPTOR STATUS, UNSPECIFIED LATERALITY, UNSPECIFIED SITE OF BREAST: Primary | ICD-10-CM

## 2025-02-07 RX ORDER — DIAZEPAM 5 MG/1
10 TABLET ORAL ONCE
OUTPATIENT
Start: 2025-02-07 | End: 2025-02-07

## 2025-02-10 ENCOUNTER — OFFICE VISIT (OUTPATIENT)
Dept: FAMILY MEDICINE CLINIC | Facility: CLINIC | Age: 45
End: 2025-02-10
Payer: COMMERCIAL

## 2025-02-10 VITALS
BODY MASS INDEX: 40.63 KG/M2 | DIASTOLIC BLOOD PRESSURE: 76 MMHG | HEART RATE: 78 BPM | SYSTOLIC BLOOD PRESSURE: 122 MMHG | HEIGHT: 64 IN | OXYGEN SATURATION: 97 % | WEIGHT: 238 LBS

## 2025-02-10 DIAGNOSIS — Z17.0 MALIGNANT NEOPLASM OF LEFT BREAST IN FEMALE, ESTROGEN RECEPTOR POSITIVE, UNSPECIFIED SITE OF BREAST: ICD-10-CM

## 2025-02-10 DIAGNOSIS — L03.818 CELLULITIS OF OTHER SPECIFIED SITE: Primary | ICD-10-CM

## 2025-02-10 DIAGNOSIS — C50.912 MALIGNANT NEOPLASM OF LEFT BREAST IN FEMALE, ESTROGEN RECEPTOR POSITIVE, UNSPECIFIED SITE OF BREAST: ICD-10-CM

## 2025-02-10 PROCEDURE — 99213 OFFICE O/P EST LOW 20 MIN: CPT | Performed by: FAMILY MEDICINE

## 2025-02-10 NOTE — PROGRESS NOTES
Subjective   Mona Castillo is a 44 y.o. female. Presents today for   Chief Complaint   Patient presents with    Cellulitis       History of Present Illness  Patient 43 y/o female had breast bx + cancer that developed cellulitis then drug eruption;  drug eruption resolved;  swelling to left breast greatly improved, much less pain and redness now fading.     Review of Systems   Skin:  Positive for rash and wound.       Patient Active Problem List   Diagnosis    Asthma    Seasonal allergic rhinitis    Dizziness    Missed     Acute renal failure    Abdominal pain    Diarrhea    Bronchitis with asthma, acute    Iron deficiency anemia    Intestinal infection due to enteropathogenic E. coli    Rh negative, antepartum    Gestational diabetes    Pregnancy-induced hypertension in third trimester    Thrombocytosis    Malignant neoplasm of female breast    Breast cancer       Social History     Socioeconomic History    Marital status:      Spouse name: Polo    Number of children: 1    Years of education: College   Tobacco Use    Smoking status: Former     Current packs/day: 0.00     Average packs/day: 0.3 packs/day for 18.8 years (4.7 ttl pk-yrs)     Types: Cigarettes     Start date: 1995     Quit date: 10/24/2013     Years since quittin.3     Passive exposure: Past    Smokeless tobacco: Never    Tobacco comments:     quit 10/2013   Vaping Use    Vaping status: Never Used   Substance and Sexual Activity    Alcohol use: Yes     Comment: I have maybe 2 beers a month or a glass a wine a month.    Drug use: No    Sexual activity: Yes     Partners: Male     Birth control/protection: None       Allergies   Allergen Reactions    Doxycycline Hives     Could have been rocephin or tordol as well    Glue [Wound Dressing Adhesive] Hives     Had reaction after biopsy    Turkey Itching     SOMETIMES HAS SOA    Gabapentin Mental Status Change         Objective   Vitals:    02/10/25 1253   BP: 122/76   Pulse: 78  "  SpO2: 97%   Weight: 108 kg (238 lb)   Height: 162.6 cm (64\")     Body mass index is 40.85 kg/m².    Physical Exam  Vitals and nursing note reviewed. Exam conducted with a chaperone present.   Constitutional:       Appearance: Normal appearance. She is not toxic-appearing or diaphoretic.   HENT:      Head: Normocephalic and atraumatic.   Chest:          Comments: Redness, fading;  no longer tender to palpation  Musculoskeletal:      Cervical back: Neck supple.   Skin:     General: Skin is warm and dry.      Capillary Refill: Capillary refill takes less than 2 seconds.   Neurological:      Mental Status: She is alert.   Psychiatric:         Mood and Affect: Mood normal.         Behavior: Behavior normal.         Assessment & Plan   There are no diagnoses linked to this encounter.       Finsih keflex and bactrim  Proceed with biopsies right breast in 2 days and left axillary us, possible bx          -Follow up: 2 weeks and prn     ________________________________________  David Fernandez DO, MS    Current Outpatient Medications on File Prior to Visit   Medication Sig Dispense Refill    albuterol (PROVENTIL) (2.5 MG/3ML) 0.083% nebulizer solution Take 2.5 mg by nebulization Every 4 (Four) Hours As Needed for Wheezing (use with neb). 120 each 12    budesonide-formoterol (SYMBICORT) 160-4.5 MCG/ACT inhaler Inhale 2 puffs 2 (Two) Times a Day.      cephalexin (KEFLEX) 500 MG capsule Take 1 capsule by mouth 4 (Four) Times a Day. 40 capsule 0    cetirizine (zyrTEC) 10 MG tablet Take 1 tablet by mouth 2 (Two) Times a Day. 30 tablet 0    famotidine (Pepcid) 20 MG tablet Take 1 tablet by mouth 2 (Two) Times a Day. 30 tablet 0    HYDROcodone-acetaminophen (NORCO) 5-325 MG per tablet Take 1 tablet by mouth Every 6 (Six) Hours As Needed for Severe Pain. 12 tablet 0    montelukast (SINGULAIR) 10 MG tablet Take 1 tablet by mouth Daily.      mupirocin (BACTROBAN) 2 % ointment Apply 1 Application topically to the appropriate " area as directed 2 (Two) Times a Day. 30 g 0    ondansetron ODT (ZOFRAN-ODT) 4 MG disintegrating tablet Place 1 tablet on the tongue Every 8 (Eight) Hours As Needed for Nausea or Vomiting. 24 tablet 0    triamcinolone (KENALOG) 0.5 % cream Apply 1 Application topically to the appropriate area as directed 2 (Two) Times a Day. 60 g 1    Ventolin  (90 Base) MCG/ACT inhaler Inhale 2 puffs Every 4 (Four) Hours As Needed for Wheezing. 18 g 3     No current facility-administered medications on file prior to visit.

## 2025-02-11 ENCOUNTER — TELEPHONE (OUTPATIENT)
Dept: SURGERY | Facility: CLINIC | Age: 45
End: 2025-02-11
Payer: COMMERCIAL

## 2025-02-12 ENCOUNTER — HOSPITAL ENCOUNTER (OUTPATIENT)
Dept: ULTRASOUND IMAGING | Facility: HOSPITAL | Age: 45
Discharge: HOME | End: 2025-02-12
Payer: COMMERCIAL

## 2025-02-12 ENCOUNTER — HOSPITAL ENCOUNTER (OUTPATIENT)
Dept: MRI IMAGING | Facility: HOSPITAL | Age: 45
Discharge: HOME | End: 2025-02-12
Payer: COMMERCIAL

## 2025-02-12 ENCOUNTER — HOSPITAL ENCOUNTER (OUTPATIENT)
Dept: MAMMOGRAPHY | Facility: HOSPITAL | Age: 45
Discharge: HOME | End: 2025-02-12
Payer: COMMERCIAL

## 2025-02-12 VITALS
HEART RATE: 89 BPM | OXYGEN SATURATION: 97 % | DIASTOLIC BLOOD PRESSURE: 76 MMHG | RESPIRATION RATE: 15 BRPM | SYSTOLIC BLOOD PRESSURE: 111 MMHG | TEMPERATURE: 98.9 F

## 2025-02-12 VITALS
DIASTOLIC BLOOD PRESSURE: 73 MMHG | SYSTOLIC BLOOD PRESSURE: 128 MMHG | OXYGEN SATURATION: 99 % | HEART RATE: 88 BPM | RESPIRATION RATE: 14 BRPM

## 2025-02-12 DIAGNOSIS — C50.911 BILATERAL MALIGNANT NEOPLASM OF BREAST IN FEMALE, ESTROGEN RECEPTOR POSITIVE, UNSPECIFIED SITE OF BREAST: ICD-10-CM

## 2025-02-12 DIAGNOSIS — C50.912 MALIGNANT NEOPLASM OF LEFT BREAST IN FEMALE, ESTROGEN RECEPTOR POSITIVE, UNSPECIFIED SITE OF BREAST: ICD-10-CM

## 2025-02-12 DIAGNOSIS — Z17.0 BILATERAL MALIGNANT NEOPLASM OF BREAST IN FEMALE, ESTROGEN RECEPTOR POSITIVE, UNSPECIFIED SITE OF BREAST: ICD-10-CM

## 2025-02-12 DIAGNOSIS — C50.912 BILATERAL MALIGNANT NEOPLASM OF BREAST IN FEMALE, ESTROGEN RECEPTOR POSITIVE, UNSPECIFIED SITE OF BREAST: ICD-10-CM

## 2025-02-12 DIAGNOSIS — R59.0 AXILLARY LYMPHADENOPATHY: ICD-10-CM

## 2025-02-12 DIAGNOSIS — Z17.0 MALIGNANT NEOPLASM OF LEFT BREAST IN FEMALE, ESTROGEN RECEPTOR POSITIVE, UNSPECIFIED SITE OF BREAST: ICD-10-CM

## 2025-02-12 LAB — CREAT BLDA-MCNC: 1.4 MG/DL (ref 0.6–1.3)

## 2025-02-12 PROCEDURE — 88342 IMHCHEM/IMCYTCHM 1ST ANTB: CPT | Performed by: FAMILY MEDICINE

## 2025-02-12 PROCEDURE — 88305 TISSUE EXAM BY PATHOLOGIST: CPT | Performed by: FAMILY MEDICINE

## 2025-02-12 PROCEDURE — A4648 IMPLANTABLE TISSUE MARKER: HCPCS

## 2025-02-12 PROCEDURE — 76942 ECHO GUIDE FOR BIOPSY: CPT

## 2025-02-12 PROCEDURE — 77065 DX MAMMO INCL CAD UNI: CPT

## 2025-02-12 PROCEDURE — 82565 ASSAY OF CREATININE: CPT

## 2025-02-12 PROCEDURE — C1894 INTRO/SHEATH, NON-LASER: HCPCS

## 2025-02-12 PROCEDURE — 25510000002 GADOBENATE DIMEGLUMINE 529 MG/ML SOLUTION: Performed by: FAMILY MEDICINE

## 2025-02-12 PROCEDURE — 25010000002 LIDOCAINE 1 % SOLUTION: Performed by: RADIOLOGY

## 2025-02-12 PROCEDURE — 25010000002 LIDOCAINE-EPINEPHRINE (PF) 1 %-1:200000 SOLUTION: Performed by: FAMILY MEDICINE

## 2025-02-12 PROCEDURE — 25010000002 LIDOCAINE 1% - EPINEPHRINE 1:100000 1 %-1:100000 SOLUTION: Performed by: RADIOLOGY

## 2025-02-12 PROCEDURE — A9577 INJ MULTIHANCE: HCPCS | Performed by: FAMILY MEDICINE

## 2025-02-12 PROCEDURE — 25010000002 LIDOCAINE 1 % SOLUTION: Performed by: FAMILY MEDICINE

## 2025-02-12 PROCEDURE — 76882 US LMTD JT/FCL EVL NVASC XTR: CPT

## 2025-02-12 PROCEDURE — 88341 IMHCHEM/IMCYTCHM EA ADD ANTB: CPT | Performed by: FAMILY MEDICINE

## 2025-02-12 RX ORDER — LIDOCAINE HYDROCHLORIDE 10 MG/ML
3 INJECTION, SOLUTION INFILTRATION; PERINEURAL ONCE
Status: COMPLETED | OUTPATIENT
Start: 2025-02-12 | End: 2025-02-12

## 2025-02-12 RX ORDER — LIDOCAINE HYDROCHLORIDE 10 MG/ML
1 INJECTION, SOLUTION INFILTRATION; PERINEURAL ONCE
Status: COMPLETED | OUTPATIENT
Start: 2025-02-12 | End: 2025-02-12

## 2025-02-12 RX ORDER — DIAZEPAM 5 MG/1
5 TABLET ORAL ONCE AS NEEDED
Status: COMPLETED | OUTPATIENT
Start: 2025-02-12 | End: 2025-02-12

## 2025-02-12 RX ORDER — LIDOCAINE HYDROCHLORIDE AND EPINEPHRINE 10; 10 MG/ML; UG/ML
10 INJECTION, SOLUTION INFILTRATION; PERINEURAL ONCE
Status: COMPLETED | OUTPATIENT
Start: 2025-02-12 | End: 2025-02-12

## 2025-02-12 RX ADMIN — GADOBENATE DIMEGLUMINE 20 ML: 529 INJECTION, SOLUTION INTRAVENOUS at 08:45

## 2025-02-12 RX ADMIN — LIDOCAINE HYDROCHLORIDE 2 ML: 10 INJECTION, SOLUTION INFILTRATION; PERINEURAL at 10:15

## 2025-02-12 RX ADMIN — DIAZEPAM 5 MG: 5 TABLET ORAL at 08:15

## 2025-02-12 RX ADMIN — LIDOCAINE HYDROCHLORIDE,EPINEPHRINE BITARTRATE 8 ML: 10; .01 INJECTION, SOLUTION INFILTRATION; PERINEURAL at 10:16

## 2025-02-12 RX ADMIN — LIDOCAINE HYDROCHLORIDE 1 ML: 10 INJECTION, SOLUTION INFILTRATION; PERINEURAL at 09:06

## 2025-02-12 RX ADMIN — LIDOCAINE HYDROCHLORIDE 15 ML: 10; .005 INJECTION, SOLUTION EPIDURAL; INFILTRATION; INTRACAUDAL; PERINEURAL at 09:06

## 2025-02-12 NOTE — NURSING NOTE
Biopsy site to Right breast x 2, and Left Axilla, clear with Steri-strips (3 sites), dry and intact. No firmness or swelling noted at or around biopsy site. Pain at pre-existing level, which was between 2-3. Ice pack with protective covering applied to biopsy sites. Discharge instructions discussed with understanding voiced by patient. Copies provided to patient. No distress noted. To home via private vehicle, driven by her  Polo. Nurse transported Mona to Entrance A, and helped her into her car.

## 2025-02-12 NOTE — NURSING NOTE
VSS. Does have intermittent pain to Left lateral side of breast. States pain feels like 'a shock/zap to the area', rates this pain as a 2-3. Medical/surgical history and medications reviewed. No distress noted. Procedural education completed with patient's questions addressed.

## 2025-02-13 LAB
CYTO UR: NORMAL
CYTO UR: NORMAL
LAB AP CASE REPORT: NORMAL
LAB AP CASE REPORT: NORMAL
LAB AP CLINICAL INFORMATION: NORMAL
LAB AP CLINICAL INFORMATION: NORMAL
LAB AP SPECIAL STAINS: NORMAL
LAB AP SPECIAL STAINS: NORMAL
PATH REPORT.FINAL DX SPEC: NORMAL
PATH REPORT.FINAL DX SPEC: NORMAL
PATH REPORT.GROSS SPEC: NORMAL
PATH REPORT.GROSS SPEC: NORMAL

## 2025-02-14 ENCOUNTER — PATIENT OUTREACH (OUTPATIENT)
Dept: OTHER | Facility: HOSPITAL | Age: 45
End: 2025-02-14
Payer: COMMERCIAL

## 2025-02-14 ENCOUNTER — PREP FOR SURGERY (OUTPATIENT)
Dept: OTHER | Facility: HOSPITAL | Age: 45
End: 2025-02-14
Payer: COMMERCIAL

## 2025-02-14 DIAGNOSIS — C50.919 MALIGNANT NEOPLASM OF FEMALE BREAST, UNSPECIFIED ESTROGEN RECEPTOR STATUS, UNSPECIFIED LATERALITY, UNSPECIFIED SITE OF BREAST: Primary | ICD-10-CM

## 2025-02-14 RX ORDER — DIAZEPAM 5 MG/1
10 TABLET ORAL ONCE
OUTPATIENT
Start: 2025-02-14 | End: 2025-02-14

## 2025-02-14 NOTE — PROGRESS NOTES
Dr. Claros thanks for seeing patient, additional biopsies are back.  Left axillary node negative and smaller, her cellulitis on that side obviously may be causing but I saw her this week and has greatly improved.  The right breast biopsy is positive.   I will let patient know results.

## 2025-02-14 NOTE — PROGRESS NOTES
Ms. Hardison called with questions about bilateral mastectomies and how that works. We discussed her questions and she was thankful for the help. She has a message out to Dr. Claros as well and is waiting to hear back from her biopsy on the right breast to make final decision. Message sent to Dr. Claros as well.

## 2025-02-24 ENCOUNTER — PRE-ADMISSION TESTING (OUTPATIENT)
Dept: PREADMISSION TESTING | Facility: HOSPITAL | Age: 45
End: 2025-02-24
Payer: COMMERCIAL

## 2025-02-24 ENCOUNTER — OFFICE VISIT (OUTPATIENT)
Dept: FAMILY MEDICINE CLINIC | Facility: CLINIC | Age: 45
End: 2025-02-24
Payer: COMMERCIAL

## 2025-02-24 VITALS
SYSTOLIC BLOOD PRESSURE: 138 MMHG | RESPIRATION RATE: 18 BRPM | BODY MASS INDEX: 41.46 KG/M2 | DIASTOLIC BLOOD PRESSURE: 79 MMHG | TEMPERATURE: 98.2 F | WEIGHT: 234 LBS | OXYGEN SATURATION: 97 % | HEART RATE: 88 BPM | HEIGHT: 63 IN

## 2025-02-24 VITALS
OXYGEN SATURATION: 96 % | HEART RATE: 110 BPM | SYSTOLIC BLOOD PRESSURE: 132 MMHG | BODY MASS INDEX: 41.82 KG/M2 | DIASTOLIC BLOOD PRESSURE: 78 MMHG | WEIGHT: 236 LBS | HEIGHT: 63 IN

## 2025-02-24 DIAGNOSIS — C50.911 BILATERAL MALIGNANT NEOPLASM OF BREAST IN FEMALE, ESTROGEN RECEPTOR POSITIVE, UNSPECIFIED SITE OF BREAST: Primary | ICD-10-CM

## 2025-02-24 DIAGNOSIS — C50.912 BILATERAL MALIGNANT NEOPLASM OF BREAST IN FEMALE, ESTROGEN RECEPTOR POSITIVE, UNSPECIFIED SITE OF BREAST: Primary | ICD-10-CM

## 2025-02-24 DIAGNOSIS — N61.0 MASTITIS: ICD-10-CM

## 2025-02-24 DIAGNOSIS — Z17.0 BILATERAL MALIGNANT NEOPLASM OF BREAST IN FEMALE, ESTROGEN RECEPTOR POSITIVE, UNSPECIFIED SITE OF BREAST: Primary | ICD-10-CM

## 2025-02-24 LAB
ANION GAP SERPL CALCULATED.3IONS-SCNC: 12.2 MMOL/L (ref 5–15)
BUN SERPL-MCNC: 9 MG/DL (ref 6–20)
BUN/CREAT SERPL: 8.6 (ref 7–25)
CALCIUM SPEC-SCNC: 9.2 MG/DL (ref 8.6–10.5)
CHLORIDE SERPL-SCNC: 103 MMOL/L (ref 98–107)
CO2 SERPL-SCNC: 25.8 MMOL/L (ref 22–29)
CREAT SERPL-MCNC: 1.05 MG/DL (ref 0.57–1)
DEPRECATED RDW RBC AUTO: 46.6 FL (ref 37–54)
EGFRCR SERPLBLD CKD-EPI 2021: 67.8 ML/MIN/1.73
ERYTHROCYTE [DISTWIDTH] IN BLOOD BY AUTOMATED COUNT: 13.8 % (ref 12.3–15.4)
GLUCOSE SERPL-MCNC: 126 MG/DL (ref 65–99)
HCT VFR BLD AUTO: 37.6 % (ref 34–46.6)
HGB BLD-MCNC: 12.8 G/DL (ref 12–15.9)
MCH RBC QN AUTO: 31.2 PG (ref 26.6–33)
MCHC RBC AUTO-ENTMCNC: 34 G/DL (ref 31.5–35.7)
MCV RBC AUTO: 91.7 FL (ref 79–97)
PLATELET # BLD AUTO: 395 10*3/MM3 (ref 140–450)
PMV BLD AUTO: 9.3 FL (ref 6–12)
POTASSIUM SERPL-SCNC: 3.4 MMOL/L (ref 3.5–5.2)
RBC # BLD AUTO: 4.1 10*6/MM3 (ref 3.77–5.28)
SODIUM SERPL-SCNC: 141 MMOL/L (ref 136–145)
WBC NRBC COR # BLD AUTO: 8.62 10*3/MM3 (ref 3.4–10.8)

## 2025-02-24 PROCEDURE — 80048 BASIC METABOLIC PNL TOTAL CA: CPT

## 2025-02-24 PROCEDURE — 36415 COLL VENOUS BLD VENIPUNCTURE: CPT

## 2025-02-24 PROCEDURE — 85027 COMPLETE CBC AUTOMATED: CPT

## 2025-02-24 PROCEDURE — 99213 OFFICE O/P EST LOW 20 MIN: CPT | Performed by: FAMILY MEDICINE

## 2025-02-24 NOTE — LETTER
February 24, 2025     Patient: Mona Castillo   YOB: 1980   Date of Visit: 2/24/2025       To Whom It May Concern:    It is my medical opinion that Mona Castillo should remain off work starting February 28, 2025 through 6 weeks from this date. I will see back prior to evaluate for return to work.  She is a new diagnosis of breast cancer and will need surgery with recovery.         Sincerely,          David Fernandez DO    CC: No Recipients

## 2025-02-24 NOTE — DISCHARGE INSTRUCTIONS
Take the following medications the morning of surgery: INHALERS      If you are on prescription narcotic pain medication to control your pain you may also take that medication the morning of surgery.      General Instructions:     Do not eat solid food after midnight the night before surgery.  Clear liquids day of surgery are allowed but must be stopped at least two hours before your hospital arrival time.       Allowed clear liquids      Water, sodas, and tea or coffee with no cream or milk added.       12 to 20 ounces of a clear liquid that contains carbohydrates is recommended.  If non-diabetic, have Gatorade or Powerade.  If diabetic, have G2 or Powerade Zero.     Do not have liquids red in color.  Do not consume chicken, beef, pork or vegetable broth or bouillon cubes of any variety as they are not considered clear liquids and are not allowed.      Infants may have breast milk up to four hours before surgery.  Infants drinking formula may drink formula up to six hours before surgery.   Patients who avoid smoking, chewing tobacco and alcohol for 4 weeks prior to surgery have a reduced risk of post-operative complications.  Quit smoking as many days before surgery as you can.  Do not smoke, use chewing tobacco or drink alcohol the day of surgery.   If applicable bring your C-PAP/ BI-PAP machine in with you to preop day of surgery.  Bring any papers given to you in the doctor’s office.  Wear clean comfortable clothes.  Do not wear contact lenses, false eyelashes or make-up.  Bring a case for your glasses.   Bring crutches or walker if applicable.  Remove all piercings.  Leave jewelry and any other valuables at home.  Hair extensions with metal clips must be removed prior to surgery.  The Pre-Admission Testing nurse will instruct you to bring medications if unable to obtain an accurate list in Pre-Admission Testing.    Day of surgery you will need to let the preoperative nurse know the last time you took each of  your medications.      If you were given a blood bank ID arm band remember to bring it with you the day of surgery.    Preventing a Surgical Site Infection:  For 2 to 3 days before surgery, avoid shaving with a razor because the razor can irritate skin and make it easier to develop an infection.    Any areas of open skin can increase the risk of a post-operative wound infection by allowing bacteria to enter and travel throughout the body.  Notify your surgeon if you have any skin wounds / rashes even if it is not near the expected surgical site.  The area will need assessed to determine if surgery should be delayed until it is healed.  The night prior to surgery shower using a fresh bar of anti-bacterial soap (such as Dial) and clean washcloth.  Sleep in a clean bed with clean clothing.  Do not allow pets to sleep with you.  Shower on the morning of surgery using a fresh bar of anti-bacterial soap (such as Dial) and clean washcloth.  Dry with a clean towel and dress in clean clothing.  Ask your surgeon if you will be receiving antibiotics prior to surgery.  Make sure you, your family, and all healthcare providers clean their hands with soap and water or an alcohol based hand  before caring for you or your wound.  CHLORHEXIDINE CLOTH INSTRUCTIONS  The morning of surgery follow these instructions using the Chlorhexidine cloths you've been given.  These steps reduce bacteria on the body.  Do not use the cloths near your eyes, ears mouth, genitalia or on open wounds.  Throw the cloths away after use but do not try to flush them down a toilet.      Open and remove one cloth at a time from the package.    Leave the cloth unfolded and begin the bathing.  Massage the skin with the cloths using gentle pressure to remove bacteria.  Do not scrub harshly.   Follow the steps below with one 2% CHG cloth per area (6 total cloths).  One cloth for neck, shoulders and chest.  One cloth for both arms, hands, fingers and  underarms (do underarms last).  One cloth for the abdomen followed by groin.  One cloth for right leg and foot including between the toes.  One cloth for left leg and foot including between the toes.  The last cloth is to be used for the back of the neck, back and buttocks.    Allow the CHG to air dry 3 minutes on the skin which will give it time to work and decrease the chance of irritation.  The skin may feel sticky until it is dry.  Do not rinse with water or any other liquid or you will lose the beneficial effects of the CHG.  If mild skin irritation occurs, do rinse the skin to remove the CHG.  Report this to the nurse at time of admission.  Do not apply lotions, creams, ointments, deodorants or perfumes after using the clothes. Dress in clean clothes before coming to the hospital.  Day of surgery:  Your arrival time is approximately two hours before your scheduled surgery time.  Please note if you have an early arrival time the surgery doors do not open before 5:00 AM.  Upon arrival, a Pre-op nurse and Anesthesiologist will review your health history, obtain vital signs, and answer questions you may have.  The only belongings needed at this time will be a list of your home medications and if applicable your C-PAP/BI-PAP machine.  A Pre-op nurse will start an IV and you may receive medication in preparation for surgery, including something to help you relax.     Please be aware that surgery does come with discomfort.  We want to make every effort to control your discomfort so please discuss any uncontrolled symptoms with your nurse.   Your doctor will most likely have prescribed pain medications.      If you are going home after surgery you will receive individualized written care instructions before being discharged.  A responsible adult must drive you to and from the hospital on the day of your surgery and ideally stay with you through the night.   .  Discharge prescriptions can be filled by the hospital  pharmacy during regular pharmacy hours.  If you are having surgery late in the day/evening your prescription may be e-prescribed to your pharmacy.  Please verify your pharmacy hours or chose a 24 hour pharmacy to avoid not having access to your prescription because your pharmacy has closed for the day.    If you are staying overnight following surgery, you will be transported to your hospital room following the recovery period.  [facility] has all private rooms.    If you have any questions please call Pre-Admission Testing at [phone].  Deductibles and co-payments are collected on the day of service. Please be prepared to pay the required co-pay, deductible or deposit on the day of service as defined by your plan.    Call your surgeon immediately if you experience any of the following symptoms:  Sore Throat  Shortness of Breath or difficulty breathing  Cough  Chills  Body soreness or muscle pain  Headache  Fever  New loss of taste or smell  Do not arrive for your surgery ill.  Your procedure will need to be rescheduled to another time.  You will need to call your physician before the day of surgery to avoid any unnecessary exposure to hospital staff as well as other patients.

## 2025-03-02 NOTE — PROGRESS NOTES
Subjective   Mona Castillo is a 44 y.o. female. Presents today for   Chief Complaint   Patient presents with    Cellulitis           Cellulitis  Pertinent negative symptoms include no chest pain and no rash.     Patient 43 y/o female with breast cancer bx proven and on right as well with bilateral skin sparing mastectomy, left sentinel lymph node biopsy, possible left axillary dissection scheduled on 3/7/25.  She developed cellulitis left breast after biopsy and then drug eruption to possibly adhesive vs new antibiotic, this all has resolved now.   Swelilng and pain gone and redness mostly gone;  no f/c;   she has paperwork for [employer], etc.    Review of Systems   Respiratory:  Negative for shortness of breath and wheezing.    Cardiovascular:  Negative for chest pain.   Skin:  Negative for rash and wound.       Patient Active Problem List   Diagnosis    Asthma    Seasonal allergic rhinitis    Dizziness    Missed     Acute renal failure    Abdominal pain    Diarrhea    Bronchitis with asthma, acute    Iron deficiency anemia    Intestinal infection due to enteropathogenic E. coli    Rh negative, antepartum    Gestational diabetes    Pregnancy-induced hypertension in third trimester    Thrombocytosis    Malignant neoplasm of female breast    Breast cancer       Social History     Socioeconomic History    Marital status:      Spouse name: Polo    Number of children: 1    Years of education: College   Tobacco Use    Smoking status: Former     Current packs/day: 0.00     Average packs/day: 0.3 packs/day for 18.8 years (4.7 ttl pk-yrs)     Types: Cigarettes     Start date: 1995     Quit date: 10/24/2013     Years since quittin.3     Passive exposure: Past    Smokeless tobacco: Never    Tobacco comments:     quit 10/2013   Vaping Use    Vaping status: Never Used   Substance and Sexual Activity    Alcohol use: Not Currently     Comment: I have maybe 2 beers a month or a glass a wine a month.     "Drug use: Yes     Frequency: 7.0 times per week     Types: Marijuana     Comment: DAILY    Sexual activity: Yes     Partners: Male     Birth control/protection: None       Allergies   Allergen Reactions    Doxycycline Hives     Could have been rocephin or tordol as well    Glue [Wound Dressing Adhesive] Hives     Had reaction after biopsy    Turkey Itching     SOMETIMES HAS SOA    Gabapentin Mental Status Change         Objective   Vitals:    02/24/25 1610   BP: 132/78   Pulse: 110   SpO2: 96%   Weight: 107 kg (236 lb)   Height: 160 cm (63\")     Body mass index is 41.81 kg/m².    Physical Exam  Vitals and nursing note reviewed.   Constitutional:       Appearance: Normal appearance. She is not toxic-appearing or diaphoretic.   HENT:      Head: Normocephalic and atraumatic.   Chest:      Comments: B/l biopsy sites healing  Papular rash resolved  Erythema, warmth, swellign and pain resolved  Musculoskeletal:      Cervical back: Neck supple.   Skin:     General: Skin is warm and dry.      Capillary Refill: Capillary refill takes less than 2 seconds.   Neurological:      Mental Status: She is alert.   Psychiatric:         Mood and Affect: Mood normal.         Behavior: Behavior normal.         Results       Assessment & Plan   Diagnoses and all orders for this visit:    1. Bilateral malignant neoplasm of breast in female, estrogen receptor positive, unspecified site of breast (Primary)    2. Mastitis    Patient's infection cleared and ready for surgery  Completed paperwork for her to be off.  She is anxious to get this over with.       Assessment & Plan            -Follow up: 6 weeks and prn     ________________________________________  David Fernandez DO, MS    Current Outpatient Medications on File Prior to Visit   Medication Sig Dispense Refill    albuterol (PROVENTIL) (2.5 MG/3ML) 0.083% nebulizer solution Take 2.5 mg by nebulization Every 4 (Four) Hours As Needed for Wheezing (use with neb). 120 each 12    " budesonide-formoterol (SYMBICORT) 160-4.5 MCG/ACT inhaler Inhale 2 puffs 2 (Two) Times a Day.      famotidine (Pepcid) 20 MG tablet Take 1 tablet by mouth 2 (Two) Times a Day. (Patient taking differently: Take 1 tablet by mouth 2 (Two) Times a Day As Needed.) 30 tablet 0    montelukast (SINGULAIR) 10 MG tablet Take 1 tablet by mouth Every Night.      ondansetron ODT (ZOFRAN-ODT) 4 MG disintegrating tablet Place 1 tablet on the tongue Every 8 (Eight) Hours As Needed for Nausea or Vomiting. 24 tablet 0    Ventolin  (90 Base) MCG/ACT inhaler Inhale 2 puffs Every 4 (Four) Hours As Needed for Wheezing. 18 g 3    HYDROcodone-acetaminophen (NORCO) 5-325 MG per tablet Take 1 tablet by mouth Every 6 (Six) Hours As Needed for Severe Pain. (Patient not taking: Reported on 2/24/2025) 12 tablet 0     No current facility-administered medications on file prior to visit.

## 2025-03-07 ENCOUNTER — READMISSION MANAGEMENT (OUTPATIENT)
Dept: CALL CENTER | Facility: HOSPITAL | Age: 45
End: 2025-03-07
Payer: COMMERCIAL

## 2025-03-07 ENCOUNTER — ANESTHESIA EVENT (OUTPATIENT)
Dept: PERIOP | Facility: HOSPITAL | Age: 45
End: 2025-03-07
Payer: COMMERCIAL

## 2025-03-07 ENCOUNTER — TRANSCRIBE ORDERS (OUTPATIENT)
Dept: LAB | Facility: HOSPITAL | Age: 45
End: 2025-03-07
Payer: COMMERCIAL

## 2025-03-07 ENCOUNTER — HOSPITAL ENCOUNTER (OUTPATIENT)
Facility: HOSPITAL | Age: 45
Discharge: HOME | End: 2025-03-07
Attending: STUDENT IN AN ORGANIZED HEALTH CARE EDUCATION/TRAINING PROGRAM | Admitting: STUDENT IN AN ORGANIZED HEALTH CARE EDUCATION/TRAINING PROGRAM
Payer: COMMERCIAL

## 2025-03-07 ENCOUNTER — HOSPITAL ENCOUNTER (OUTPATIENT)
Dept: NUCLEAR MEDICINE | Facility: HOSPITAL | Age: 45
Discharge: HOME | End: 2025-03-07
Payer: COMMERCIAL

## 2025-03-07 ENCOUNTER — ANCILLARY PROCEDURE (OUTPATIENT)
Dept: LAB | Facility: HOSPITAL | Age: 45
End: 2025-03-07
Payer: COMMERCIAL

## 2025-03-07 ENCOUNTER — ANESTHESIA (OUTPATIENT)
Dept: PERIOP | Facility: HOSPITAL | Age: 45
End: 2025-03-07
Payer: COMMERCIAL

## 2025-03-07 VITALS
WEIGHT: 235.89 LBS | RESPIRATION RATE: 16 BRPM | HEIGHT: 63 IN | SYSTOLIC BLOOD PRESSURE: 110 MMHG | HEART RATE: 88 BPM | OXYGEN SATURATION: 95 % | TEMPERATURE: 98.2 F | BODY MASS INDEX: 41.8 KG/M2 | DIASTOLIC BLOOD PRESSURE: 58 MMHG

## 2025-03-07 DIAGNOSIS — C50.912 BILATERAL MALIGNANT NEOPLASM OF BREAST IN FEMALE, UNSPECIFIED ESTROGEN RECEPTOR STATUS, UNSPECIFIED SITE OF BREAST: ICD-10-CM

## 2025-03-07 DIAGNOSIS — C50.919 MALIGNANT NEOPLASM OF FEMALE BREAST, UNSPECIFIED ESTROGEN RECEPTOR STATUS, UNSPECIFIED LATERALITY, UNSPECIFIED SITE OF BREAST: Primary | ICD-10-CM

## 2025-03-07 DIAGNOSIS — C50.919 MALIGNANT NEOPLASM OF FEMALE BREAST, UNSPECIFIED ESTROGEN RECEPTOR STATUS, UNSPECIFIED LATERALITY, UNSPECIFIED SITE OF BREAST: ICD-10-CM

## 2025-03-07 DIAGNOSIS — C50.911 BILATERAL MALIGNANT NEOPLASM OF BREAST IN FEMALE, UNSPECIFIED ESTROGEN RECEPTOR STATUS, UNSPECIFIED SITE OF BREAST: Primary | ICD-10-CM

## 2025-03-07 DIAGNOSIS — C50.911 BILATERAL MALIGNANT NEOPLASM OF BREAST IN FEMALE, UNSPECIFIED ESTROGEN RECEPTOR STATUS, UNSPECIFIED SITE OF BREAST: ICD-10-CM

## 2025-03-07 DIAGNOSIS — C50.912 BILATERAL MALIGNANT NEOPLASM OF BREAST IN FEMALE, UNSPECIFIED ESTROGEN RECEPTOR STATUS, UNSPECIFIED SITE OF BREAST: Primary | ICD-10-CM

## 2025-03-07 LAB
B-HCG UR QL: NEGATIVE
EXPIRATION DATE: ABNORMAL
GLUCOSE BLDC GLUCOMTR-MCNC: 180 MG/DL (ref 70–130)
INTERNAL NEGATIVE CONTROL: NEGATIVE
INTERNAL POSITIVE CONTROL: POSITIVE
Lab: ABNORMAL

## 2025-03-07 PROCEDURE — 25810000003 LACTATED RINGERS PER 1000 ML: Performed by: ANESTHESIOLOGY

## 2025-03-07 PROCEDURE — 76098 X-RAY EXAM SURGICAL SPECIMEN: CPT

## 2025-03-07 PROCEDURE — 19303 MAST SIMPLE COMPLETE: CPT | Performed by: STUDENT IN AN ORGANIZED HEALTH CARE EDUCATION/TRAINING PROGRAM

## 2025-03-07 PROCEDURE — 81025 URINE PREGNANCY TEST: CPT | Performed by: ANESTHESIOLOGY

## 2025-03-07 PROCEDURE — 38525 BIOPSY/REMOVAL LYMPH NODES: CPT | Performed by: STUDENT IN AN ORGANIZED HEALTH CARE EDUCATION/TRAINING PROGRAM

## 2025-03-07 PROCEDURE — 25010000002 TOBRAMYCIN PER 80 MG: Performed by: STUDENT IN AN ORGANIZED HEALTH CARE EDUCATION/TRAINING PROGRAM

## 2025-03-07 PROCEDURE — 25010000002 PROPOFOL 10 MG/ML EMULSION

## 2025-03-07 PROCEDURE — 25010000002 CEFAZOLIN PER 500 MG: Performed by: STUDENT IN AN ORGANIZED HEALTH CARE EDUCATION/TRAINING PROGRAM

## 2025-03-07 PROCEDURE — 25010000002 CEFAZOLIN PER 500 MG

## 2025-03-07 PROCEDURE — 25010000002 MAGNESIUM SULFATE PER 500 MG OF MAGNESIUM

## 2025-03-07 PROCEDURE — 25010000002 ISOSULFAN BLUE 1 % SOLUTION: Performed by: STUDENT IN AN ORGANIZED HEALTH CARE EDUCATION/TRAINING PROGRAM

## 2025-03-07 PROCEDURE — 25010000002 HYDRALAZINE PER 20 MG

## 2025-03-07 PROCEDURE — 25010000002 HYDROMORPHONE 1 MG/ML SOLUTION

## 2025-03-07 PROCEDURE — 34310000005 TECHETIUM TC99M TILMANOCEPT: Performed by: STUDENT IN AN ORGANIZED HEALTH CARE EDUCATION/TRAINING PROGRAM

## 2025-03-07 PROCEDURE — 19303 MAST SIMPLE COMPLETE: CPT | Performed by: SPECIALIST/TECHNOLOGIST, OTHER

## 2025-03-07 PROCEDURE — 25010000002 LIDOCAINE PF 2% 2 % SOLUTION

## 2025-03-07 PROCEDURE — 25010000002 PROCHLORPERAZINE 10 MG/2ML SOLUTION

## 2025-03-07 PROCEDURE — 88342 IMHCHEM/IMCYTCHM 1ST ANTB: CPT | Performed by: STUDENT IN AN ORGANIZED HEALTH CARE EDUCATION/TRAINING PROGRAM

## 2025-03-07 PROCEDURE — C1713 ANCHOR/SCREW BN/BN,TIS/BN: HCPCS | Performed by: STUDENT IN AN ORGANIZED HEALTH CARE EDUCATION/TRAINING PROGRAM

## 2025-03-07 PROCEDURE — 25010000002 ONDANSETRON PER 1 MG

## 2025-03-07 PROCEDURE — 88307 TISSUE EXAM BY PATHOLOGIST: CPT | Performed by: STUDENT IN AN ORGANIZED HEALTH CARE EDUCATION/TRAINING PROGRAM

## 2025-03-07 PROCEDURE — A9520 TC99 TILMANOCEPT DIAG 0.5MCI: HCPCS | Performed by: STUDENT IN AN ORGANIZED HEALTH CARE EDUCATION/TRAINING PROGRAM

## 2025-03-07 PROCEDURE — 25010000002 LABETALOL 5 MG/ML SOLUTION

## 2025-03-07 PROCEDURE — G0378 HOSPITAL OBSERVATION PER HR: HCPCS

## 2025-03-07 PROCEDURE — 25010000002 DEXAMETHASONE SODIUM PHOSPHATE 20 MG/5ML SOLUTION

## 2025-03-07 PROCEDURE — C1789 PROSTHESIS, BREAST, IMP: HCPCS | Performed by: STUDENT IN AN ORGANIZED HEALTH CARE EDUCATION/TRAINING PROGRAM

## 2025-03-07 PROCEDURE — 88341 IMHCHEM/IMCYTCHM EA ADD ANTB: CPT | Performed by: STUDENT IN AN ORGANIZED HEALTH CARE EDUCATION/TRAINING PROGRAM

## 2025-03-07 PROCEDURE — 38900 IO MAP OF SENT LYMPH NODE: CPT | Performed by: STUDENT IN AN ORGANIZED HEALTH CARE EDUCATION/TRAINING PROGRAM

## 2025-03-07 PROCEDURE — 82948 REAGENT STRIP/BLOOD GLUCOSE: CPT

## 2025-03-07 PROCEDURE — 38792 RA TRACER ID OF SENTINL NODE: CPT

## 2025-03-07 PROCEDURE — 25010000002 HYDROMORPHONE PER 4 MG

## 2025-03-07 PROCEDURE — 25010000002 FENTANYL CITRATE (PF) 100 MCG/2ML SOLUTION

## 2025-03-07 PROCEDURE — 25010000002 VANCOMYCIN 1 G RECONSTITUTED SOLUTION: Performed by: STUDENT IN AN ORGANIZED HEALTH CARE EDUCATION/TRAINING PROGRAM

## 2025-03-07 PROCEDURE — 88332 PATH CONSLTJ SURG EA ADD BLK: CPT | Performed by: STUDENT IN AN ORGANIZED HEALTH CARE EDUCATION/TRAINING PROGRAM

## 2025-03-07 PROCEDURE — 88331 PATH CONSLTJ SURG 1 BLK 1SPC: CPT | Performed by: STUDENT IN AN ORGANIZED HEALTH CARE EDUCATION/TRAINING PROGRAM

## 2025-03-07 DEVICE — CLIP LIGAT VASC HORIZON TI MD BLU 6CT: Type: IMPLANTABLE DEVICE | Site: BREAST | Status: FUNCTIONAL

## 2025-03-07 DEVICE — CMT BONE SIMPLEX/P TMYCIN FDOS 10PK: Type: IMPLANTABLE DEVICE | Site: BREAST | Status: FUNCTIONAL

## 2025-03-07 DEVICE — FLOSEAL WITH RECOTHROM - 5ML
Type: IMPLANTABLE DEVICE | Site: BREAST | Status: FUNCTIONAL
Brand: FLOSEAL HEMOSTATIC MATRIX

## 2025-03-07 DEVICE — ALLOGRFT DERM CORTIVA TAILORED 1MM 10.2X21.1CM LG: Type: IMPLANTABLE DEVICE | Site: BREAST | Status: FUNCTIONAL

## 2025-03-07 DEVICE — BREAST TISSUE EXPANDER, SUTURE TABS, INTEGRAL INJECTION DOME, 750CC
Type: IMPLANTABLE DEVICE | Site: BREAST | Status: FUNCTIONAL
Brand: MENTOR ARTOURA PLUS, SMOOTH, HIGH PROFILE

## 2025-03-07 RX ORDER — HYDROCODONE BITARTRATE AND ACETAMINOPHEN 5; 325 MG/1; MG/1
1 TABLET ORAL EVERY 6 HOURS PRN
Qty: 20 TABLET | Refills: 0 | Status: SHIPPED | OUTPATIENT
Start: 2025-03-07

## 2025-03-07 RX ORDER — BUPIVACAINE HYDROCHLORIDE AND EPINEPHRINE 2.5; 5 MG/ML; UG/ML
INJECTION, SOLUTION EPIDURAL; INFILTRATION; INTRACAUDAL; PERINEURAL AS NEEDED
Status: DISCONTINUED | OUTPATIENT
Start: 2025-03-07 | End: 2025-03-07 | Stop reason: HOSPADM

## 2025-03-07 RX ORDER — ISOSULFAN BLUE 50 MG/5ML
INJECTION, SOLUTION SUBCUTANEOUS AS NEEDED
Status: DISCONTINUED | OUTPATIENT
Start: 2025-03-07 | End: 2025-03-07 | Stop reason: HOSPADM

## 2025-03-07 RX ORDER — TOBRAMYCIN 1.2 G/30ML
INJECTION, POWDER, LYOPHILIZED, FOR SOLUTION INTRAVENOUS AS NEEDED
Status: DISCONTINUED | OUTPATIENT
Start: 2025-03-07 | End: 2025-03-07 | Stop reason: HOSPADM

## 2025-03-07 RX ORDER — LABETALOL HYDROCHLORIDE 5 MG/ML
5 INJECTION, SOLUTION INTRAVENOUS
Status: DISCONTINUED | OUTPATIENT
Start: 2025-03-07 | End: 2025-03-07 | Stop reason: HOSPADM

## 2025-03-07 RX ORDER — PROPOFOL 10 MG/ML
VIAL (ML) INTRAVENOUS AS NEEDED
Status: DISCONTINUED | OUTPATIENT
Start: 2025-03-07 | End: 2025-03-07 | Stop reason: SURG

## 2025-03-07 RX ORDER — MIDAZOLAM HYDROCHLORIDE 1 MG/ML
1 INJECTION, SOLUTION INTRAMUSCULAR; INTRAVENOUS
Status: DISCONTINUED | OUTPATIENT
Start: 2025-03-07 | End: 2025-03-07 | Stop reason: HOSPADM

## 2025-03-07 RX ORDER — NALOXONE HCL 0.4 MG/ML
0.2 VIAL (ML) INJECTION AS NEEDED
Status: DISCONTINUED | OUTPATIENT
Start: 2025-03-07 | End: 2025-03-07 | Stop reason: HOSPADM

## 2025-03-07 RX ORDER — VANCOMYCIN HYDROCHLORIDE 1 G/20ML
INJECTION, POWDER, LYOPHILIZED, FOR SOLUTION INTRAVENOUS AS NEEDED
Status: DISCONTINUED | OUTPATIENT
Start: 2025-03-07 | End: 2025-03-07 | Stop reason: HOSPADM

## 2025-03-07 RX ORDER — ONDANSETRON 2 MG/ML
4 INJECTION INTRAMUSCULAR; INTRAVENOUS ONCE AS NEEDED
Status: COMPLETED | OUTPATIENT
Start: 2025-03-07 | End: 2025-03-07

## 2025-03-07 RX ORDER — EPHEDRINE SULFATE 50 MG/ML
5 INJECTION, SOLUTION INTRAVENOUS ONCE AS NEEDED
Status: DISCONTINUED | OUTPATIENT
Start: 2025-03-07 | End: 2025-03-07 | Stop reason: HOSPADM

## 2025-03-07 RX ORDER — SODIUM CHLORIDE 0.9 % (FLUSH) 0.9 %
3 SYRINGE (ML) INJECTION EVERY 12 HOURS SCHEDULED
Status: DISCONTINUED | OUTPATIENT
Start: 2025-03-07 | End: 2025-03-07 | Stop reason: HOSPADM

## 2025-03-07 RX ORDER — METHOCARBAMOL 750 MG/1
750 TABLET, FILM COATED ORAL 4 TIMES DAILY PRN
Qty: 20 TABLET | Refills: 0 | Status: SHIPPED | OUTPATIENT
Start: 2025-03-07

## 2025-03-07 RX ORDER — DIPHENHYDRAMINE HYDROCHLORIDE 50 MG/ML
12.5 INJECTION INTRAMUSCULAR; INTRAVENOUS
Status: DISCONTINUED | OUTPATIENT
Start: 2025-03-07 | End: 2025-03-07 | Stop reason: HOSPADM

## 2025-03-07 RX ORDER — IPRATROPIUM BROMIDE AND ALBUTEROL SULFATE 2.5; .5 MG/3ML; MG/3ML
3 SOLUTION RESPIRATORY (INHALATION) ONCE AS NEEDED
Status: DISCONTINUED | OUTPATIENT
Start: 2025-03-07 | End: 2025-03-07 | Stop reason: HOSPADM

## 2025-03-07 RX ORDER — FENTANYL CITRATE 50 UG/ML
50 INJECTION, SOLUTION INTRAMUSCULAR; INTRAVENOUS ONCE AS NEEDED
Status: DISCONTINUED | OUTPATIENT
Start: 2025-03-07 | End: 2025-03-07 | Stop reason: HOSPADM

## 2025-03-07 RX ORDER — FAMOTIDINE 10 MG/ML
20 INJECTION, SOLUTION INTRAVENOUS ONCE
Status: COMPLETED | OUTPATIENT
Start: 2025-03-07 | End: 2025-03-07

## 2025-03-07 RX ORDER — HYDROCODONE BITARTRATE AND ACETAMINOPHEN 5; 325 MG/1; MG/1
1 TABLET ORAL ONCE AS NEEDED
Status: COMPLETED | OUTPATIENT
Start: 2025-03-07 | End: 2025-03-07

## 2025-03-07 RX ORDER — ONDANSETRON 2 MG/ML
INJECTION INTRAMUSCULAR; INTRAVENOUS AS NEEDED
Status: DISCONTINUED | OUTPATIENT
Start: 2025-03-07 | End: 2025-03-07 | Stop reason: SURG

## 2025-03-07 RX ORDER — LIDOCAINE 40 MG/G
1 CREAM TOPICAL AS NEEDED
Status: DISCONTINUED | OUTPATIENT
Start: 2025-03-07 | End: 2025-03-07 | Stop reason: HOSPADM

## 2025-03-07 RX ORDER — CIPROFLOXACIN 500 MG/1
500 TABLET, FILM COATED ORAL 2 TIMES DAILY
Qty: 14 TABLET | Refills: 0 | Status: SHIPPED | OUTPATIENT
Start: 2025-03-07 | End: 2025-03-14

## 2025-03-07 RX ORDER — DEXAMETHASONE SODIUM PHOSPHATE 4 MG/ML
INJECTION, SOLUTION INTRA-ARTICULAR; INTRALESIONAL; INTRAMUSCULAR; INTRAVENOUS; SOFT TISSUE AS NEEDED
Status: DISCONTINUED | OUTPATIENT
Start: 2025-03-07 | End: 2025-03-07 | Stop reason: SURG

## 2025-03-07 RX ORDER — PROCHLORPERAZINE EDISYLATE 5 MG/ML
10 INJECTION INTRAMUSCULAR; INTRAVENOUS EVERY 6 HOURS PRN
Status: DISCONTINUED | OUTPATIENT
Start: 2025-03-07 | End: 2025-03-07 | Stop reason: HOSPADM

## 2025-03-07 RX ORDER — PROMETHAZINE HYDROCHLORIDE 25 MG/1
25 SUPPOSITORY RECTAL ONCE AS NEEDED
Status: DISCONTINUED | OUTPATIENT
Start: 2025-03-07 | End: 2025-03-07 | Stop reason: HOSPADM

## 2025-03-07 RX ORDER — FENTANYL CITRATE 50 UG/ML
INJECTION, SOLUTION INTRAMUSCULAR; INTRAVENOUS AS NEEDED
Status: DISCONTINUED | OUTPATIENT
Start: 2025-03-07 | End: 2025-03-07 | Stop reason: SURG

## 2025-03-07 RX ORDER — SODIUM CHLORIDE, SODIUM LACTATE, POTASSIUM CHLORIDE, CALCIUM CHLORIDE 600; 310; 30; 20 MG/100ML; MG/100ML; MG/100ML; MG/100ML
9 INJECTION, SOLUTION INTRAVENOUS CONTINUOUS
Status: DISCONTINUED | OUTPATIENT
Start: 2025-03-07 | End: 2025-03-07 | Stop reason: HOSPADM

## 2025-03-07 RX ORDER — SODIUM CHLORIDE 0.9 % (FLUSH) 0.9 %
3-10 SYRINGE (ML) INJECTION AS NEEDED
Status: DISCONTINUED | OUTPATIENT
Start: 2025-03-07 | End: 2025-03-07 | Stop reason: HOSPADM

## 2025-03-07 RX ORDER — LIDOCAINE HYDROCHLORIDE 20 MG/ML
INJECTION, SOLUTION EPIDURAL; INFILTRATION; INTRACAUDAL; PERINEURAL AS NEEDED
Status: DISCONTINUED | OUTPATIENT
Start: 2025-03-07 | End: 2025-03-07 | Stop reason: SURG

## 2025-03-07 RX ORDER — LABETALOL HYDROCHLORIDE 5 MG/ML
INJECTION, SOLUTION INTRAVENOUS AS NEEDED
Status: DISCONTINUED | OUTPATIENT
Start: 2025-03-07 | End: 2025-03-07 | Stop reason: SURG

## 2025-03-07 RX ORDER — DIAZEPAM 5 MG/1
10 TABLET ORAL ONCE
Status: COMPLETED | OUTPATIENT
Start: 2025-03-07 | End: 2025-03-07

## 2025-03-07 RX ORDER — ROCURONIUM BROMIDE 10 MG/ML
INJECTION, SOLUTION INTRAVENOUS AS NEEDED
Status: DISCONTINUED | OUTPATIENT
Start: 2025-03-07 | End: 2025-03-07 | Stop reason: SURG

## 2025-03-07 RX ORDER — KETAMINE HCL IN NACL, ISO-OSM 100MG/10ML
SYRINGE (ML) INJECTION AS NEEDED
Status: DISCONTINUED | OUTPATIENT
Start: 2025-03-07 | End: 2025-03-07 | Stop reason: SURG

## 2025-03-07 RX ORDER — CEFAZOLIN SODIUM 500 MG/2.2ML
INJECTION, POWDER, FOR SOLUTION INTRAMUSCULAR; INTRAVENOUS AS NEEDED
Status: DISCONTINUED | OUTPATIENT
Start: 2025-03-07 | End: 2025-03-07 | Stop reason: SURG

## 2025-03-07 RX ORDER — METOPROLOL TARTRATE 1 MG/ML
INJECTION, SOLUTION INTRAVENOUS AS NEEDED
Status: DISCONTINUED | OUTPATIENT
Start: 2025-03-07 | End: 2025-03-07 | Stop reason: SURG

## 2025-03-07 RX ORDER — LIDOCAINE HYDROCHLORIDE 10 MG/ML
0.5 INJECTION, SOLUTION INFILTRATION; PERINEURAL ONCE AS NEEDED
Status: DISCONTINUED | OUTPATIENT
Start: 2025-03-07 | End: 2025-03-07 | Stop reason: HOSPADM

## 2025-03-07 RX ORDER — HYDRALAZINE HYDROCHLORIDE 20 MG/ML
5 INJECTION INTRAMUSCULAR; INTRAVENOUS
Status: DISCONTINUED | OUTPATIENT
Start: 2025-03-07 | End: 2025-03-07 | Stop reason: HOSPADM

## 2025-03-07 RX ORDER — FLUMAZENIL 0.1 MG/ML
0.2 INJECTION INTRAVENOUS AS NEEDED
Status: DISCONTINUED | OUTPATIENT
Start: 2025-03-07 | End: 2025-03-07 | Stop reason: HOSPADM

## 2025-03-07 RX ORDER — MAGNESIUM SULFATE HEPTAHYDRATE 500 MG/ML
INJECTION, SOLUTION INTRAMUSCULAR; INTRAVENOUS AS NEEDED
Status: DISCONTINUED | OUTPATIENT
Start: 2025-03-07 | End: 2025-03-07 | Stop reason: SURG

## 2025-03-07 RX ORDER — OXYCODONE AND ACETAMINOPHEN 7.5; 325 MG/1; MG/1
1 TABLET ORAL EVERY 4 HOURS PRN
Status: DISCONTINUED | OUTPATIENT
Start: 2025-03-07 | End: 2025-03-07 | Stop reason: HOSPADM

## 2025-03-07 RX ORDER — BACITRACIN ZINC 500 [USP'U]/G
OINTMENT TOPICAL AS NEEDED
Status: DISCONTINUED | OUTPATIENT
Start: 2025-03-07 | End: 2025-03-07 | Stop reason: HOSPADM

## 2025-03-07 RX ORDER — ONDANSETRON 4 MG/1
4 TABLET, FILM COATED ORAL DAILY PRN
Qty: 30 TABLET | Refills: 0 | Status: SHIPPED | OUTPATIENT
Start: 2025-03-07 | End: 2026-03-07

## 2025-03-07 RX ORDER — FENTANYL CITRATE 50 UG/ML
50 INJECTION, SOLUTION INTRAMUSCULAR; INTRAVENOUS
Status: DISCONTINUED | OUTPATIENT
Start: 2025-03-07 | End: 2025-03-07 | Stop reason: HOSPADM

## 2025-03-07 RX ORDER — ATROPINE SULFATE 0.4 MG/ML
0.4 INJECTION, SOLUTION INTRAMUSCULAR; INTRAVENOUS; SUBCUTANEOUS ONCE AS NEEDED
Status: DISCONTINUED | OUTPATIENT
Start: 2025-03-07 | End: 2025-03-07 | Stop reason: HOSPADM

## 2025-03-07 RX ORDER — HYDROMORPHONE HYDROCHLORIDE 1 MG/ML
0.5 INJECTION, SOLUTION INTRAMUSCULAR; INTRAVENOUS; SUBCUTANEOUS
Status: DISCONTINUED | OUTPATIENT
Start: 2025-03-07 | End: 2025-03-07 | Stop reason: HOSPADM

## 2025-03-07 RX ORDER — GABAPENTIN 100 MG/1
100 CAPSULE ORAL 3 TIMES DAILY
Qty: 30 CAPSULE | Refills: 0 | Status: SHIPPED | OUTPATIENT
Start: 2025-03-07 | End: 2025-03-17

## 2025-03-07 RX ORDER — HYDRALAZINE HYDROCHLORIDE 20 MG/ML
INJECTION INTRAMUSCULAR; INTRAVENOUS AS NEEDED
Status: DISCONTINUED | OUTPATIENT
Start: 2025-03-07 | End: 2025-03-07 | Stop reason: SURG

## 2025-03-07 RX ORDER — PROMETHAZINE HYDROCHLORIDE 25 MG/1
25 TABLET ORAL ONCE AS NEEDED
Status: DISCONTINUED | OUTPATIENT
Start: 2025-03-07 | End: 2025-03-07 | Stop reason: HOSPADM

## 2025-03-07 RX ADMIN — ROCURONIUM BROMIDE 50 MG: 10 INJECTION, SOLUTION INTRAVENOUS at 10:30

## 2025-03-07 RX ADMIN — TILMANOCEPT 1 DOSE: KIT at 09:55

## 2025-03-07 RX ADMIN — ONDANSETRON 4 MG: 2 INJECTION, SOLUTION INTRAMUSCULAR; INTRAVENOUS at 14:22

## 2025-03-07 RX ADMIN — CEFAZOLIN 2000 MG: 2 INJECTION, POWDER, FOR SOLUTION INTRAMUSCULAR; INTRAVENOUS at 10:09

## 2025-03-07 RX ADMIN — Medication 25 MG: at 11:35

## 2025-03-07 RX ADMIN — DIAZEPAM 10 MG: 5 TABLET ORAL at 08:16

## 2025-03-07 RX ADMIN — METOPROLOL TARTRATE 2 MG: 5 INJECTION INTRAVENOUS at 11:39

## 2025-03-07 RX ADMIN — PROPOFOL 50 MCG/KG/MIN: 10 INJECTION, EMULSION INTRAVENOUS at 13:21

## 2025-03-07 RX ADMIN — HYDROMORPHONE HYDROCHLORIDE 0.5 MG: 1 INJECTION, SOLUTION INTRAMUSCULAR; INTRAVENOUS; SUBCUTANEOUS at 11:11

## 2025-03-07 RX ADMIN — LABETALOL HYDROCHLORIDE 5 MG: 5 INJECTION, SOLUTION INTRAVENOUS at 13:23

## 2025-03-07 RX ADMIN — HYDROMORPHONE HYDROCHLORIDE 0.5 MG: 1 INJECTION, SOLUTION INTRAMUSCULAR; INTRAVENOUS; SUBCUTANEOUS at 15:17

## 2025-03-07 RX ADMIN — LIDOCAINE 4% 1 APPLICATION: 4 CREAM TOPICAL at 08:16

## 2025-03-07 RX ADMIN — HYDRALAZINE HYDROCHLORIDE 5 MG: 20 INJECTION INTRAMUSCULAR; INTRAVENOUS at 12:20

## 2025-03-07 RX ADMIN — ONDANSETRON 4 MG: 2 INJECTION, SOLUTION INTRAMUSCULAR; INTRAVENOUS at 15:16

## 2025-03-07 RX ADMIN — FENTANYL CITRATE 50 MCG: 50 INJECTION INTRAMUSCULAR; INTRAVENOUS at 10:58

## 2025-03-07 RX ADMIN — METOPROLOL TARTRATE 2 MG: 5 INJECTION INTRAVENOUS at 11:58

## 2025-03-07 RX ADMIN — FENTANYL CITRATE 50 MCG: 50 INJECTION INTRAMUSCULAR; INTRAVENOUS at 13:07

## 2025-03-07 RX ADMIN — PROPOFOL 180 MG: 10 INJECTION, EMULSION INTRAVENOUS at 10:30

## 2025-03-07 RX ADMIN — FAMOTIDINE 20 MG: 10 INJECTION INTRAVENOUS at 08:28

## 2025-03-07 RX ADMIN — SODIUM CHLORIDE, SODIUM LACTATE, POTASSIUM CHLORIDE, AND CALCIUM CHLORIDE 9 ML/HR: 600; 310; 30; 20 INJECTION, SOLUTION INTRAVENOUS at 08:26

## 2025-03-07 RX ADMIN — SODIUM CHLORIDE, SODIUM LACTATE, POTASSIUM CHLORIDE, AND CALCIUM CHLORIDE: 600; 310; 30; 20 INJECTION, SOLUTION INTRAVENOUS at 13:14

## 2025-03-07 RX ADMIN — Medication 25 MG: at 11:20

## 2025-03-07 RX ADMIN — CEFAZOLIN 2 G: 225 INJECTION, POWDER, FOR SOLUTION INTRAMUSCULAR; INTRAVENOUS at 14:09

## 2025-03-07 RX ADMIN — HYDROMORPHONE HYDROCHLORIDE 0.5 MG: 1 INJECTION, SOLUTION INTRAMUSCULAR; INTRAVENOUS; SUBCUTANEOUS at 15:56

## 2025-03-07 RX ADMIN — DEXAMETHASONE SODIUM PHOSPHATE 8 MG: 4 INJECTION, SOLUTION INTRAMUSCULAR; INTRAVENOUS at 10:38

## 2025-03-07 RX ADMIN — PROCHLORPERAZINE EDISYLATE 10 MG: 5 INJECTION INTRAMUSCULAR; INTRAVENOUS at 15:56

## 2025-03-07 RX ADMIN — HYDRALAZINE HYDROCHLORIDE 5 MG: 20 INJECTION INTRAMUSCULAR; INTRAVENOUS at 13:06

## 2025-03-07 RX ADMIN — FENTANYL CITRATE 50 MCG: 50 INJECTION INTRAMUSCULAR; INTRAVENOUS at 10:38

## 2025-03-07 RX ADMIN — PROPOFOL 25 MCG/KG/MIN: 10 INJECTION, EMULSION INTRAVENOUS at 10:36

## 2025-03-07 RX ADMIN — Medication 3 ML: at 08:25

## 2025-03-07 RX ADMIN — LIDOCAINE HYDROCHLORIDE 100 MG: 20 INJECTION, SOLUTION EPIDURAL; INFILTRATION; INTRACAUDAL; PERINEURAL at 10:30

## 2025-03-07 RX ADMIN — ONDANSETRON 4 MG: 2 INJECTION, SOLUTION INTRAMUSCULAR; INTRAVENOUS at 10:38

## 2025-03-07 RX ADMIN — LABETALOL HYDROCHLORIDE 10 MG: 5 INJECTION, SOLUTION INTRAVENOUS at 14:33

## 2025-03-07 RX ADMIN — MAGNESIUM SULFATE HEPTAHYDRATE 2 G: 500 INJECTION, SOLUTION INTRAMUSCULAR; INTRAVENOUS at 11:00

## 2025-03-07 RX ADMIN — HYDROCODONE BITARTRATE AND ACETAMINOPHEN 1 TABLET: 5; 325 TABLET ORAL at 16:45

## 2025-03-07 NOTE — ANESTHESIA PROCEDURE NOTES
Airway  Urgency: elective    Date/Time: 3/7/2025 10:34 AM  Airway not difficult    General Information and Staff    Patient location during procedure: OR  CRNA/CAA: Bruce Bahena CRNA    Indications and Patient Condition  Indications for airway management: airway protection    Preoxygenated: yes  Mask difficulty assessment: 1 - vent by mask    Final Airway Details  Final airway type: endotracheal airway      Successful airway: ETT  Cuffed: yes   Successful intubation technique: direct laryngoscopy  Facilitating devices/methods: intubating stylet and anterior pressure/BURP  Endotracheal tube insertion site: oral  Blade: Lary  Blade size: 3  ETT size (mm): 7.0  Cormack-Lehane Classification: grade IIa - partial view of glottis  Placement verified by: chest auscultation and capnometry   Cuff volume (mL): 5  Measured from: teeth  ETT/EBT  to teeth (cm): 21  Number of attempts at approach: 1  Assessment: lips, teeth, and gum same as pre-op and atraumatic intubation

## 2025-03-07 NOTE — OUTREACH NOTE
Prep Survey      Flowsheet Row Responses   [facility] patient discharged from? [place]   Is LACE score < 7 ? Yes   Eligibility [facility]   Date of Admission 03/07/25   Date of Discharge 03/07/25   Discharge Disposition Home or Self Care   Discharge diagnosis Malignant neoplasm of female breast, bilateral skin sparing mastectomy, left sentinel lymph node biopsy, BILATERAL BREAST TISSUE EXPANDER INSERTION W/ CORTIVA AND ANTIBIOTIC DISK AND ADJACENT TISSUE TRANSFER   Does the patient have one of the following disease processes/diagnoses(primary or secondary)? General Surgery   Does the patient have Home health ordered? No   Is there a DME ordered? No   Prep survey completed? Yes            Ruby YODER - Registered Nurse

## 2025-03-07 NOTE — ANESTHESIA PREPROCEDURE EVALUATION
Anesthesia Evaluation     NPO Solid Status: > 8 hours  NPO Liquid Status: > 4 hours           Airway   Mallampati: II  No difficulty expected  Dental      Pulmonary    (+) asthma,  Cardiovascular   Exercise tolerance: good (4-7 METS)    (+) hypertension      Neuro/Psych  (+) headaches, dizziness/light headedness, psychiatric history  GI/Hepatic/Renal/Endo    (+) hiatal hernia, GERD, renal disease-, diabetes mellitus    Musculoskeletal     Abdominal    Substance History      OB/GYN          Other   arthritis,   history of cancer                Anesthesia Plan    ASA 3     general     intravenous induction     Anesthetic plan, risks, benefits, and alternatives have been provided, discussed and informed consent has been obtained with: patient.    CODE STATUS:

## 2025-03-07 NOTE — H&P
Copied text in this note has been reviewed by me and remains accurate as of 3/7/2025.    General Surgery Breast Cancer History and Physical Exam      Summary:    Mona Castillo is a 44 y.o. lady who presents with a new diagnosis of left breast invasive ductal carcinoma with DCIS: Grade II,  ER+/TX-, Her2-; yN1F2M1, Stage II.    Right breast LCIS     A multidisciplinary plan has been formulated for the patient:    (1) Breast Surgical Oncology:  -Follow up [employer] 9 panel genetic testing. I will call her with results.   -Nurse navigator consult.   -R breast MRI guided biopsy 2/12. Axillary US and possible biopsy 2/12.   -Surgical plan: Plan for breast skin sparing mastectomy with sentinel lymph node biopsy, possible axillary dissection  -Plastic surgery referral to Dr. Hill.     (2) Medical Oncology:  -Will refer postoperatively for evaluation for endocrine therapy and possible need for chemotherapy.     (3) Radiation Oncology:  -Will refer postoperatively for evaluation for radiation therapy as clinically indicated.     Referring Provider: David Fernandez DO     Chief Complaint: breast mass     History of Present Illness: Ms. Mona Castillo is a 44 y.o. year old lady, seen at the request of David Fernandez DO for a new diagnosis of left breast cancer.       Recently when her son jumped on her, she noticed a knot underneath the NAC. She has had annual mammograms each year; she has been in 6 month surveillance. Her work-up is detailed in the oncologic history below.      She denies any other breast lumps, pain, skin changes, or nipple discharge.      She denies any family history of breast or ovarian cancer.      Workup of Current Diagnosis:    7/8/2024 Left Breast Diagnostic Mammo:   IMPRESSION:  Stable likely benign microcalcifications in the left breast. Recommend continued follow-up in 6 months with bilateral CC and MLO views and spot magnification CC and ML views of the left breast to confirm  stability  and correlate with the due date of the screening mammogram of the right breast.  BI-RADS ASSESSMENT: BI-RADS 3. Probably Benign.      1/20/2025 Bilateral Breast Diagnostic Mammogram:   Standard images and R2 computerized assisted detection were obtained followed by digital tomosynthesis and limited directed left breast ultrasound. Magnification views of the left breast were also performed. The breasts are heterogeneously dense, which may obscure small masses. No abnormality is seen in the right breast. There are loosely grouped microcalcifications in the mid to posterior third of the upper outer left breast and these appear benign and unchanged from 07/08/2024 and 12/21/2023. A marker placed in the area of clinical concern near the left nipple corresponds to a prominent area of stellate architectural distortion in the anterior third of the lower left breast near 6 o'clock that appears to be new since 07/08/2024. This corresponds to a suspicious irregular  solid lesion by ultrasound that is located at 6 o'clock 4 cm from the nipple. It measures 1.5 x 1.1 x 1.6 cm with angular irregular margins and some posterior shadowing and considered highly suspicious.  CONCLUSION: Benign-appearing microcalcifications in the left breast unchanged from earlier studies. Very suspicious left-sided mammogram and directed left breast ultrasound showing an area of stellate  architectural distortion in the lower left breast corresponds to an irregular solid lesion by ultrasound that is located at 6:00 4 cm from the nipple. Further evaluation with ultrasound-guided core biopsy and clip placement is recommended. These findings have been discussed with the patient.  BI-RADS Category 5. Highly suggestive for malignancy.     1/27/2025 Left Breast US Guided Biopsy:   PROCEDURE: The mass at 6:00, 4 cm from the nipple in the left breast was targeted under ultrasound. The skin was cleansed and prepped. 3 mL of 1% lidocaine and 10 mL of  1% lidocaine with epinephrine were used for local  anesthesia. A small skin incision was then made to permit passage of a 10 g needle with a vacuum-assisted biopsy device. 5 core specimens were obtained. A bowtie clip was then deployed at the biopsy site. The  patient tolerated the procedure well with no immediate complications. Post-procedural digital mammographic views of the left breast demonstrate the bowtie clip along the anterior superior margin of the residual mass.   Calcifications measuring approximately 3.5 cm are noted posterior to and extending to the level of the mass on the postbiopsy mammogram. These are relatively similar in morphology to additional previously followed  calcifications in the upper posterior left breast but are relatively suspicious given the close proximity to biopsy-proven malignancy.     IMPRESSION:   1. Ultrasound-guided core needle biopsy of a 1.6 cm mass with architectural distortion at 6:00, 4 cm from the nipple in the left breast, marked with a bowtie clip. Pathology is malignant and concordant with the imaging assessment. Recommend surgical consultation. Recommend contrast-enhanced breast MRI to evaluate extent of disease.    2. Calcifications are noted posterior to the mass on the post biopsy mammogram, which are similar to additional previously noted calcifications in the upper posterior left breast but are suspicious. Stereotactic core needle biopsy of these calcifications is recommended, as marked on the postbiopsy mammogram. Additional stereotactic core needle biopsy targeting the previously followed calcifications should also be considered given the similar morphology. These should be performed after the above recommended breast MRI.     1/27/2025 Pathology:   Final Diagnosis   1.  Left breast, 6:00, 4 cm from nipple, ultrasound-guided core biopsy, not for calcifications (bow clip):         A.  Invasive ductal carcinoma, moderately-differentiated; Strong histologic  grade II/III               (tubule score = 3, nuclear score = 2, mitoses score = 1), measuring at least 5 mm and involving multiple   core fragments.         B.  Associated intermediate grade ductal carcinoma in situ, solid type with focal single-cell necrosis.         C.  Negative for lymphovascular space invasion.         D.  See biomarker template.         1/30/2025 Bilateral Breast MRI:   IMPRESSION AND RECOMMENDATION:    1.  A 3.7 cm irregular enhancing mass with architectural distortion at 6:00 in the anterior left breast represents biopsy-proven malignancy and is located within the anteromedial aspect of regional to segmental non-mass enhancement measuring up to 14.4 cm at anterior, middle and posterior depths, which is suspicious for additional malignancy. Some calcifications described on postbiopsy mammogram from 1/27/2025 are located within the area of non-mass enhancement, although the degree of non-mass enhancement is much larger than the extent of calcifications on mammogram. Recommend surgical management. If breast conservation is pursued, preoperative stereotactic and/or MRI guided core needle biopsies of the left breast could be performed.  2.  Asymmetric left axillary lymph nodes with cortical thickening, including a dominant lymph node with eccentric cortical thickening. Recommend targeted ultrasound, followed by possible ultrasound guided core needle biopsy.  3.  Asymmetric anterior left breast skin thickening, edema, and enhancement, including a relatively more focal area of skin enhancement, as above, some of which may be reactive to recent biopsy but also raises concern for possible inflammatory malignancy and/or direct skin involvement in the appropriate clinical setting. Recommend clinical evaluation by breast surgery, possible skin punch biopsy.  4.  Areas of non-mass enhancement measuring 3 cm at 1:00 in the posterior right breast and 8.6 cm at 11:00 in the middle and posterior right breast  are suspicious. Recommend further evaluation with 2 site  MRI guided core needle biopsy.     Gynecologic History:   G:3. P:1 AB:2  Age at first childbirth: 40  Lactation/How long: none  Age at menarche: 13  Age at menopause: N/A  Total years of oral contraceptive use: off/on previously  Total years of hormone replacement therapy: none     Past Medical History:   Asthma      Past Surgical History:    Surgical History         Past Surgical History:   Procedure Laterality Date    APPENDECTOMY         SECTION   2020    CHOLECYSTECTOMY        COLONOSCOPY N/A 2019     Procedure: COLONOSCOPY with biopsies;  Surgeon: Philip Winter MD;  Location: [facility] ENDOSCOPY;  Service: Gastroenterology    DILATATION AND CURETTAGE   2020    ENDOSCOPY N/A 2019     Procedure: ESOPHAGOGASTRODUODENOSCOPY with biopsies and aspirate;  Surgeon: Philip Winter MD;  Location: [facility] ENDOSCOPY;  Service: Gastroenterology    UTERINE FIBROID SURGERY             Family History:    As above     Social History:  Denies tobacco use, quit 10/2013  Occasional alcohol use     Allergies:   Allergies         Allergies   Allergen Reactions    Turkey Itching       SOMETIMES HAS SOA    Gabapentin Mental Status Change         Medications:     Current Medications      Current Outpatient Medications:     albuterol (PROVENTIL) (2.5 MG/3ML) 0.083% nebulizer solution, Take 2.5 mg by nebulization Every 4 (Four) Hours As Needed for Wheezing (use with neb)., Disp: 120 each, Rfl: 12    budesonide-formoterol (SYMBICORT) 160-4.5 MCG/ACT inhaler, Inhale 2 puffs 2 (Two) Times a Day., Disp: , Rfl:     doxycycline (VIBRAMYCIN) 100 MG capsule, Take 1 capsule by mouth 2 (Two) Times a Day for 7 days., Disp: 14 capsule, Rfl: 0    HYDROcodone-acetaminophen (NORCO) 5-325 MG per tablet, Take 1 tablet by mouth Every 6 (Six) Hours As Needed for Severe Pain., Disp: 12 tablet, Rfl: 0    montelukast (SINGULAIR) 10 MG tablet, Take 1 tablet by mouth  Daily., Disp: , Rfl:     mupirocin (BACTROBAN) 2 % ointment, Apply 1 Application topically to the appropriate area as directed 2 (Two) Times a Day., Disp: 30 g, Rfl: 0    ondansetron ODT (ZOFRAN-ODT) 4 MG disintegrating tablet, Place 1 tablet on the tongue Every 8 (Eight) Hours As Needed for Nausea or Vomiting., Disp: 24 tablet, Rfl: 0    Ventolin  (90 Base) MCG/ACT inhaler, Inhale 2 puffs Every 4 (Four) Hours As Needed for Wheezing., Disp: 18 g, Rfl: 3        Laboratory Values:    Labs from 2025 reviewed by me      Review of Systems:   Influenza-like illness: no fever, no  cough, no  sore throat, no  body aches, no loss of sense of taste or smell, no known exposure to person with Covid-19.  Constitutional: Negative for fevers or chills  HENT: Negative for hearing loss or runny nose  Eyes: Negative for vision changes or scleral icterus  Respiratory: Negative for cough or shortness of breath  Cardiovascular: Negative for chest pain or heart palpitations  Gastrointestinal: Negative for abdominal pain, nausea, vomiting, constipation, melena, or hematochezia  Genitourinary: Negative for hematuria or dysuria  Musculoskeletal: Negative for joint swelling or gait instability  Neurologic: Negative for tremors or seizures  Psychiatric: Negative for suicidal ideations or depression  All other systems reviewed and negative     Physical Exam:   ECO - Asymptomatic  Constitutional: Well-developed well-nourished, no acute distress  Eyes: Conjunctiva normal, sclera nonicteric  ENMT: Hearing grossly normal, oral mucosa moist  Neck: Supple, no palpable mass, trachea midline  Respiratory: Clear to auscultation, normal inspiratory effort  Cardiovascular: Regular rate, no peripheral edema, no jugular venous distention  Breast: symmetric  Right: No visible abnormalities on inspection while seated, with arms raised or hands on hips. No masses, skin changes, or nipple abnormalities.  Left: No visible abnormalities on  inspection while seated, with arms raised or hands on hips. No masses, skin changes, or nipple abnormalities.  Biopsy site appreciated in the left breast, otherwise no skin changes.   No clinical chest wall involvement.  Gastrointestinal: Soft, nontender  Lymphatics (palpable nodes): No cervical, supraclavicular or axillary lymphadenopathy  Skin:  Warm, dry, no rash on visualized skin surfaces  Musculoskeletal: Symmetric strength, normal gait  Psychiatric: Alert and oriented ×3, normal affect                Discussion:  I had an extensive discussion with the patient and her family about the nature of her breast cancer diagnosis. We reviewed the components of breast tissue including ducts and lobules. We reviewed her pathology report in detail. We reviewed breast cancer histology, including stage, grade, ER/CT receptors, HER2 receptors and how this applies to her diagnosis. We reviewed the basics of systemic and local/regional management of breast cancer.      The patient's clinical stage is documented as above. This was discussed with the patient prior to initiation of treatment. All available pathology reports were discussed with the patient today. All treatment decisions were made via shared decision making with the patient. This patient was evaluated for appropriate ancillary referrals including, pre-treatment functional assessment, exercise program, nutrition program, genetics, and lymphedema clinic. This patient received preoperative and postoperative education. The patient was offered a breast reconstruction referral; risks and benefits were discussed today. The patient was educated on perioperative multimodal pain management strategies. Barriers to effective and efficient care are/will be evaluated by the nurse navigator.     We discussed that most breast cancer is not hereditary, however given her history, this may play a role in her case. I believe genetic testing is warranted and could affect surgical  decision making.   We reviewed potential surgical treatments to include partial mastectomy, mastectomy, sentinel lymph node biopsy and axillary node dissection and discussed the rationale associated with each approach. Regarding radiation therapy, we discussed that radiation is indicated in all cases of breast conservation and in only limited circumstances following mastectomy. We discussed that the primary goal of adjuvant radiation is to decrease the likelihood of local recurrence.      We discussed axillary staging. I described the procedure for sentinel lymph node biopsy in detail, including the preoperative injection of technetium sulfur colloid and intraoperative injection of lymphazurin blue dye. I explained that this is a mapping test and not a cancer test, that all of the lymph nodes containing these dyes will be removed for complete testing by pathology, and that the results could impact the decision for adjuvant treatment or additional surgery.     I described additional risks and potential complications associated with surgery, including, but not limited to, bleeding, infection, complications related to blue dye, lymphedema, deformity/poor cosmetic result, chronic pain, neurovascular injury, numbness, seroma, hematoma, deep venous thrombosis, skin flap necrosis, disease recurrence and the possibility of requiring additional surgery. We also discussed other treatment options including the option of not undergoing any surgical treatment and the risks associated with this including disease progression. She expressed an understanding of these factors and wished to proceed.     We discussed that in her case, systemic treatment would involve endocrine therapy and possibly chemotherapy. She will be referred to medical oncology postoperatively to discuss this further.      SHIRA ALVES M.D.  General and Endoscopic Surgery  [facility]     [address], [address]  [place], [place], [address]  P:  [phone]  F: [phone]

## 2025-03-07 NOTE — ANESTHESIA POSTPROCEDURE EVALUATION
Patient: Mona Castillo    Procedure Summary       Date: 03/07/25 Room / Location:  [facility]  [facility]    Anesthesia Start: 1024 Anesthesia Stop: 1456    Procedures:       bilateral skin sparing mastectomy, left sentinel lymph node biopsy, possible left axillary dissection (Left: Breast)      BILATERAL BREAST TISSUE EXPANDER INSERTION W/ CORTIVA AND ANTIBIOTIC DISK AND ADJACENT TISSUE TRANSFER (Bilateral: Breast) Diagnosis:       Malignant neoplasm of female breast, unspecified estrogen receptor status, unspecified laterality, unspecified site of breast      (Malignant neoplasm of female breast, unspecified estrogen receptor status, unspecified laterality, unspecified site of breast [C50.919])    Surgeons: Ruba Claros MD; Anderson Hill MD Provider: Gus Valladares MD    Anesthesia Type: general ASA Status: 3            Anesthesia Type: general    Vitals  Vitals Value Taken Time   /65 03/07/25 1530   Temp 36.9 °C (98.5 °F) 03/07/25 1455   Pulse 89 03/07/25 1543   Resp 13 03/07/25 1515   SpO2 96 % 03/07/25 1543   Vitals shown include unfiled device data.        Post Anesthesia Care and Evaluation    Patient location during evaluation: PACU  Patient participation: complete - patient participated  Level of consciousness: awake  Pain management: adequate    Airway patency: patent  Anesthetic complications: No anesthetic complications  PONV Status: none  Cardiovascular status: acceptable  Respiratory status: acceptable  Hydration status: acceptable

## 2025-03-07 NOTE — H&P
"    PLASTIC SURGERY H&P    Patient: Mona Castillo   Age: 44 y.o.  Sex: female  : 1980  MRN: [id number]    CC: Breast reconstruction     HPI:  Mona Castillo is a 44 y.o. female who presents today to discuss breast reconstruction. She was diagnosed with left IDC breast cancer after feeling a lump. She is planning to undergo bilateral skin sparing mastectomy with Dr. Claros at [facility]; 3/7/25. Of note, she also had breast biopsies on the right side, these were benign. History of normal mammograms in the past. She is currently 43 G/H. Denies breast surgeries in the past.     She did have issues after biopsy with cellulitis and drug eruption. Both are now greatly improved.    ROS:  Denies fever    Past Medical:  Asthma  Allergic rhinitis  Acute renal failure  Diarrhea  Bronchitis hx  Iron deficiency anemia  Hx of E coli intestinal infection  Gestation diabetes    Past Surgical:   Lap appendectomy  Lap cholecystectomy     Medications:  Symbicort  Albuterol    Social Hx:  Nicotine use: former, quit     Allergies:  Surgical glue - rash  Doxycycline - rash  Drug sensitivity to Gabapentin - \"loopy\"    Family Hx:  No Family hx of breast cancer     Physical Exam:  GEN: NAD  PULM: Nonlabored respirations  ABD: Soft, no distention  PSYCH: Normal mood and affect  NEURO: No gross abnormalities  SKIN: Warm and dry, no obvious rashes  BREASTS:   Breast Measurement Table  Right Left   SSN:N (cm) 34 33   N:IMF (cm) 17 19   N:ML (cm) 13 12   Base W (cm) 16 16   Chest W (in) - na   Bust W (in) - na   Ptosis Grade 3 3     Imaging/Labs/Pathology:  PATHOLOGY:   Final Diagnosis   1. Breast, right, site A, 1:00, MRI, biopsy (Infinity clip marker):   Benign breast tissue with   -A. Atypical ductal hyperplasia with microcalcifications   -B. Nonintact intraductal papilloma with ductal hyperplasia the usual   type measuring 2 mm   -C. Clustered microcysts   -D. Columnar cell change with microcalcifications   -E. Duct ectasia "   2. Breast, right, site B, 11:00, biopsy (stoplight clip marker: Breast   tissue with   -A. Lobular carcinoma in situ with atypical lobular hyperplasia,   Electronically signed by Dvaid Delgado MD on 2/13/2025 at 1535     Assessment/Plan:   Mona Castillo is a 44 y.o. female with history of left breast cancer who presents today to discuss breast reconstruction.     - Will avoid skin glue  - Will avoid gabapentin on dc    Recommend 2 stage breast reconstruction. Timing of the reconstruction will depend on the health of the mastectomy flaps. If they are vascularly compromised, we may need to delay the reconstruction.    We discussed patient's options for breast reconstruction.    We discussed aesthetic flat closure of the breast mastectomy flaps. This involves creating a flat chest that will make it easy to wear a breast prosthesis if the patient chooses or if they chose not to they will have a flat chest with minimal extra mastectomy skin. Discussed that this may may a future reconstruction more difficult if they patient changes their main. Discussed that they will still have drains and a risk for pain, infection, bleeding and healing issues as with any surgery.     We then discussed oncoplastic reconstruction which involves re-shaping the breast after a lumpectomy. Since patient is having a mastectomy she is not a Canidae for this.     We then discussed autologous breast reconstruction options that involove taking tissue from one part of the body such as from the abdomen or back and using it to reconstruct the breast. Discussed recovery and referral for above if patient is interested.     We then focused our time on the details of expander and implant based reconstruction. This included the placement of a tissue expander and acellular dermal matrix initially followed by several months of expansions in the office. This initial surgery generally takes 3-4 weeks to recover from and does not add any additional  time to the patient's hospital stay after a mastectomy. The rate of expansion would dependent upon her comfort and the time to the second surgery would depend upon expansion rate and her desired size. On average it takes about four months. We discussed that while this may be an option we may need to utilize a tissue expander if during the operation there is any concern for the viability of the mastectomy skin flaps. However, other factors like chemo may significantly slow down the rate of expansion. If she were to need radiation this would likely make expansion difficult with a 30 percent or higher rate of complication and we would likely have to add un radiated tissue like from a a latissimus flap. We also discussed options for implants that include both silicone and saline and the risks for rupture for both types, saline 1% per year, and silicone 0.5% per year. Statistically the implants should last for many years but they are not forever devices and will need to be replaced at some time int he future. The second surgery for swapout is about an hour in duration each side and typically is done as an outpatient.     FLORY drains will be used. These are meant to reduce risk for seroma and allow the tissues to heal better. Drains are removed in the office sometimes several weeks later.     The first stage will be followed by expansion in the office done typically on a weekly basis until a final size is agreed upon.  The second stage involves exchange of the tissue expander to the mammary prostheses, revision of the pocket if needed, and sometimes fat grafting to adjust contours of overall volumes.     Both stages are typically done as an outpatient. Long-term risks include, but are not limited to, bleeding, infection, delayed healing, poor scarring, mastectomy flap necrosis requiring revision, asymmetry, and loss of sensation over the breast and nipple. Long-term issues related to the implants can include chronic pain,  capsular contracture which is tight abnormal scarring, and rupture. Per FDA recommendations, ruptured implants are recommended to be replaced and monitored. This was discussed. Implants are not forever devices and typical rupture rates are about 1% per year for saline and 0.5% per year for silicone implants. Breast reconstruction sheet given to patient which discusses very low risk of ALCL and breast implant illness.        Will schedule for bilateral breast reconstruction with tissue expander acellular dermal matrix antibiotic disc and adjacent tissue transfer after non-nipple sparing mastectomy.    Consent  marked  Preop abx  Scd      Anderson Hill MD

## 2025-03-07 NOTE — BRIEF OP NOTE
BREAST RECONSTRUCTION IMMEDIATE WITH TISSUE EXPANDER INSERTION  Progress Note    Mona Castillo  3/7/2025    Pre-op Diagnosis:   Malignant neoplasm of female breast, unspecified estrogen receptor status, unspecified laterality, unspecified site of breast [C50.919]       Post-Op Diagnosis Codes:     * Malignant neoplasm of female breast, unspecified estrogen receptor status, unspecified laterality, unspecified site of breast [C50.919]    Procedure(s):      Procedure(s):  Placement bilateral breast tissue expanders  Acellular dermal matrix for reinforcement of breast bilaterally  Adjacent tissue transfer right breast 6 x 23 cm  Adjacent tissue transfer left breast 7 x 23 cm  Antibiotic disc placement for reduction of postoperative infection risk right breast  Antibiotic disc placement for reduction of postoperative infection risk left breast        Surgeon(s):    Anderson Hill MD    Anesthesia: General    Staff:   Circulator: Blanca Emanuel RN; Trey Schofield RN  Scrub Person: Andrew El  Assistant: Amaya Wright CSA  Assistant: Amaya Wright CSA    Estimated Blood Loss:  <50 ml    Urine Voided: 100 mL    Specimens:                Specimens       ID Source Type Tests Collected By Collected At Frozen?    A Breast, Left Tissue TISSUE PATHOLOGY EXAM   Ruba Claros MD 3/7/25 1108 No    Description: LEFT BREAST STITCH AT 12 O'CLOCK    Comment: FRESH FOR PERM    B Breast, Right Tissue TISSUE PATHOLOGY EXAM   Ruba Claros MD 3/7/25 1227 No    Description: RIGHT BREAST STITCH AT 12 O' CLOCK    Comment: FRESH FOR PERM    C [place] Lymph Node Tissue TISSUE PATHOLOGY EXAM   Ruba Claros MD 3/7/25 1110 Yes    Description: LEFT BREAST SENTINEL NODE NUMBER 1    Comment: HOT BLUE 180    D [place] Lymph Node Tissue TISSUE PATHOLOGY EXAM   Ruba Claros MD 3/7/25 1111 Yes    Description: LEFT BREAST SENTINEL NODE NUMBER 2    Comment: HOT BLUE 95    E [place] Lymph Node Tissue TISSUE  PATHOLOGY EXAM   Ruba Claros MD 3/7/25 1111 Yes    Description: LEFT BREAST SENTINEL NODE NUMBER 3    Comment: HOT BLUE 1200              Drains:   Closed/Suction Drain 2 Inferior;Right Breast Bulb 15 Fr. (Active)       Closed/Suction Drain 2 Inferior;Left Breast Bulb 15 Fr. (Active)       [REMOVED] Urethral Catheter Silicone (Removed)       Findings:  expected      Complications: none        Anderson Hill MD     Date: 3/7/2025  Time: 14:57 EST

## 2025-03-08 NOTE — OP NOTE
Operative Note    Date of Surgery 3/7/25    Pre-Operative Diagnosis: right breast [place]    Post-Operative Diagnosis:  right breast cancer, acquired absence right breast and nipple    Procedure Performed:   Placement bilateral breast tissue expanders  Acellular dermal matrix for reinforcement of breast bilaterally  Adjacent tissue transfer right breast 6 x 23 cm  Adjacent tissue transfer left breast 7 x 23 cm  Antibiotic disc placement for reduction of postoperative infection risk right breast  Antibiotic disc placement for reduction of postoperative infection risk left breast    Surgeon: Anderson Hill MD     Assistant: None    Anesthesia: General    Estimated Blood Loss:  50 ml    Specimens: None    Complications: None    Indications: pt is a 45 yo female with right breast cancer. She has elected bilateral mastectomies.     We discussed risks, benefits and alternatives including but not limited to: bleeding, infection, asymmetry, poor or slow wound healing, need for further surgery, possible recurrence.  The patient elected to proceed.    Description of Procedure: The patient was met in the preoperative holding area.  All questions were answered and informed consent was assured.        After induction of appropriate anesthesia, a timeout was performed correctly identifying the patient, operative site, and procedure to be performed.  All present were in agreement.    After completion   of the bilateral mastectomies I began my portion of the surgery. The mastectomy pockets were inspected and found to be hemostatic. The mastectomy Flaps were well perfused.     I constructed on the back table two antibiotic Discs with 2.2 g of tobramycin and 3 g of Vancomycin into methyl methylacrylate discs. These were allowed to come to room temperature.      I decided to utilize a 15 cm mentor tissue expander for each side after measuring the chest wall.     Cortiva ADM was utilized for soft tissue reinforcement to prevent  displacement of the tissue expander. These were prepared on the back table according to the instructions. They were sutured together with vicryl anteriorly and PDS posteriorly. The tissue expander was placed into each ADM pocket and the ABX disc was attached to the posterior leafelet with a Prolene.     Each mastectomy pocket was irrigated with saline and dilute betadine.     2 15 Czech round fluted drains were placed.     The expanders were rought to the right and left pocket and sutured into place with the tabs and 2-0 prolene.     The right breast inferior dermal pedicle was de-epithelized 6x23 cm. Medial and lateral incisions were made to rotate the tissue into place for improved lower pole coverage. It was suspended to the ADM with 2-0 vicryl.     The Left breast inferior dermal pedicle was de-epithelized 7x23 cm. Medial and lateral incisions were made to rotate the tissue into place for improved lower pole coverage. It was suspended to the ADM with 2-0 vicryl.     Both incisions were closed with 2-0 Vicryl,  3-0 Monocryl and 4-0 Monocryl. The incision were dressed with bacitracin ointment and ABD. The drains were dressed with biopatch and tegaderm.     The patient was then aroused from anesthesia with ease and transferred to the postoperative care area in good condition. All sponge, needle, and instrument counts were correct.

## 2025-03-10 ENCOUNTER — TRANSITIONAL CARE MANAGEMENT TELEPHONE ENCOUNTER (OUTPATIENT)
Dept: CALL CENTER | Facility: HOSPITAL | Age: 45
End: 2025-03-10
Payer: COMMERCIAL

## 2025-03-10 NOTE — OUTREACH NOTE
Call Center TCM Note      Flowsheet Row Responses   [facility] patient discharged from? [place]   Does the patient have one of the following disease processes/diagnoses(primary or secondary)? General Surgery   TCM attempt successful? Yes   Call start time 1305   Call end time 1311   Discharge diagnosis Malignant neoplasm of female breast, bilateral skin sparing mastectomy, left sentinel lymph node biopsy, BILATERAL BREAST TISSUE EXPANDER INSERTION W/ CORTIVA AND ANTIBIOTIC DISK AND ADJACENT TISSUE TRANSFER   Meds reviewed with patient/caregiver? Yes   Is the patient having any side effects they believe may be caused by any medication additions or changes? Yes   Side effects comments  Gabapentin she has a weird reaction to and they told her not to take.   Does the patient have all medications related to this admission filled (includes all antibiotics, pain medications, etc.) Yes   Is the patient taking all medications as directed (includes completed medication regime)? Yes   Comments Pt has appt with plastinc surgeon tomorrow and has f/u with oncology on 3/19 @ 1:00   Does the patient have an appointment with their PCP within 7-14 days of discharge? No   Nursing Interventions Patient desires to follow up with specialty only   Psychosocial issues? No   Did the patient receive a copy of their discharge instructions? Yes   Nursing interventions Reviewed instructions with patient   What is the patient's perception of their health status since discharge? Improving   Nursing interventions Nurse provided patient education   Is the patient /caregiver able to teach back basic post-op care? Practice 'cough and deep breath', Continue use of incentive spirometry at least 1 week post discharge, Drive as instructed by MD in discharge instructions, Take showers only when approved by MD-sponge bathe until then, Keep incision areas clean,dry and protected, Lifting as instructed by MD in discharge instructions   Is the  patient/caregiver able to teach back signs and symptoms of incisional infection? Fever, Pus or odor from incision, Incisional warmth, Increased drainage or bleeding, Increased redness, swelling or pain at the incisonal site   Is the patient/caregiver able to teach back steps to recovery at home? Set small, achievable goals for return to baseline health, Rest and rebuild strength, gradually increase activity   If the patient is a current smoker, are they able to teach back resources for cessation? Not a smoker   Is the patient/caregiver able to teach back the hierarchy of who to call/visit for symptoms/problems? PCP, Specialist, Home health nurse, Urgent Care, ED, 911 Yes   Additional teach back comments States her mother has been there helping her.  States she has been up and moving around. She has rash to side and wash it with soap and water and it has improved. They are stripping drain every 8 hrs and recording output.  Will f/u with plastic surgeon tomorrow.   TCM call completed? Yes   Wrap up additional comments No questions or needs at this time.   Call end time 1311            Archana Thompson LPN    3/10/2025, 13:14 EDT

## 2025-03-11 LAB
CYTO UR: NORMAL
LAB AP CASE REPORT: NORMAL
LAB AP CLINICAL INFORMATION: NORMAL
LAB AP DIAGNOSIS COMMENT: NORMAL
LAB AP SPECIAL STAINS: NORMAL
LAB AP SYNOPTIC CHECKLIST: NORMAL
Lab: NORMAL
PATH REPORT.FINAL DX SPEC: NORMAL
PATH REPORT.GROSS SPEC: NORMAL

## 2025-03-11 NOTE — DISCHARGE SUMMARY
DATE OF ADMISSION:  3/7/2025  DATE OF DISCHARGE:  3/7/2025    ATTENDING:        Ruba Claros M.D.       GENERAL SURGERY    CONSULTS:    None    PRINCIPAL DIAGNOSIS:     breast cancer    DISCHARGE DIAGNOSES:     breast cancer    HOSPITAL PROCEDURES:     Bilateral breast skin sparing mastectomy with left sentinel lymph node biopsy     HOSPITAL COURSE:   The patient was observed after the above listed elective procedure. She had an uneventful postoperative course. She was discharged on POD0 with no issues. She was tolerating a diet, her pain was controlled with PO pain medication, and she was ambulating without assistance.    ACTIVITY:  Okay to shower.  Ambulate and climb stairs as tolerated.  No lifting over 10 lbs for 2 weeks.  Okay to drive if not taking pain medications.    DIET:  Regular diet    FOLLOW UP:  With Dr Claros in 3-4 weeks. Instructed to call [phone] for an appointment.

## 2025-03-11 NOTE — OP NOTE
OPERATIVE REPORT     DATE: 3/7/2025     SURGEON: Ruba Claros MD      ASSISTANT: Olga Grijalva, who was present for necessary suctioning, retracting, suturing throughout the procedure      OPERATION PERFORMED: Bilateral breast skin sparing mastectomy with left sentinel lymph node biopsy      PREOPERATIVE DIAGNOSIS: invasive ductal carcinoma of the left breast      POSTOPERATIVE DIAGNOSIS: Same     ANESTHESIA: General     SPECIMEN:   Right breast (stitch at 12:00)  Left breast (stitch at 12:00)    Left axillary sentinel lymph node #1   Left axillary sentinel lymph node #2   Left axillary sentinel lymph node #3     DRAINS: None     BLOOD LOSS: Minimal     INDICATION FOR OPERATION: Mona Castillo is a 44 y.o. lady who was recently diagnosed with left breast invasive ductal carcinoma.   She elected to proceed with bilateral breast skin sparing mastectomies with left sentinel lymph node biopsy. All risks (including bleeding, infection, damage to surrounding structures, need for further surgery, positive margins), benefits and alternatives were explained to the patient who agreed and wished to proceed. Informed consent was signed.      OPERATIVE COURSE: The patient was taken to the operating room, transferred onto the operating room table, and underwent anesthesia without incident. 5 cc of blue dye was injected into the dermal layer of the left nipple areolar complex. The patient was prepped and draped in sterile fashion. A time out was performed and preoperative antibiotics were given.     We began on the right side.  An elliptical incision was drawn around the nipple areolar complex. Half percent Marcaine with epinephrine was injected into the skin and subcutaneous tissues. A scalpel was used to transect the skin and dermal layer. Subcutaneous flaps were created approximately 1cm in thickness circumferentially. These were extended to the clavicle superiorly, the sternum medially, the inframammary fold  inferiorly, and the serratus laterally. Once this was circumferentially dissected free with Bovie electrocautery, the breast was removed from the chest wall at the level of the pectoralis fascia. It was marked as listed above and passed off for fresh permanent specimen. The area was irrigated and appeared hemostatic. Bovie electrocautery was used for any small muscle bleeding.     We then performed the right mastectomy.  An elliptical incision was drawn around the nipple areolar complex. Half percent Marcaine with epinephrine was injected into the skin and subcutaneous tissues. A scalpel was used to transect the skin and dermal layer. Subcutaneous flaps were created approximately 1cm in thickness circumferentially. These were extended to the clavicle superiorly, the sternum medially, the inframammary fold inferiorly, and the serratus laterally. Once this was circumferentially dissected free with Bovie electrocautery, the breast was removed from the chest wall at the level of the pectoralis fascia. It was marked as listed above and passed off for fresh permanent specimen. The area was irrigated and appeared hemostatic. Bovie electrocautery was used for any small muscle bleeding.     We then performed a sentinel lymph node biopsy. Bovie electrocautery was used to dissect down through the subcutaneous tissues and through the clavipectoral fascia into the axilla.  3 [place] lymph nodes were identified. These were removed with Bovie electrocautery and clips across all major lymphovascular structures. Once this was done, there were no hot, blue, or palpable lymph nodes remaining. The area was irrigated and appeared hemostatic.  These were sent for frozen specimen.  One of the 3 returned positive for metastatic disease.  Due to AMAROS criteria, we elected to not perform an axillary dissection and plan for radiation postoperatively.    Dr. Hill then performed the reconstruction. The patient tolerated the procedure well.  All needle and lap counts were correct at the end of the case. The patient was then awoken from anesthesia and taken to recovery for further monitoring.     [place] Node Biopsy for Breast Cancer - Left  Operation performed with curative intent. Yes   Tracer(s) used to identify sentinel nodes in the upfront surgery (non-neoadjuvant) setting (select all that apply). Dye and Radioactive tracer   Tracer(s) used to identify sentinel nodes in the neoadjuvant setting (select all that apply). N/A   All nodes (colored or non-colored) present at the end of a dye-filled lymphatic channel were removed. Yes   All significantly radioactive nodes were removed. Yes   All palpably suspicious nodes were removed. Yes   Biopsy-proven positive nodes marked with clips prior to chemotherapy were identified and removed. N/A      Ruba Claros MD   General and Endoscopic Surgery  [facility]     [address] [place] [address], [address]  [place], [place], [address]  P: [phone]  F: [phone]

## 2025-03-12 ENCOUNTER — TELEPHONE (OUTPATIENT)
Dept: SURGERY | Facility: CLINIC | Age: 45
End: 2025-03-12
Payer: COMMERCIAL

## 2025-03-12 DIAGNOSIS — C50.919 MALIGNANT NEOPLASM OF FEMALE BREAST, UNSPECIFIED ESTROGEN RECEPTOR STATUS, UNSPECIFIED LATERALITY, UNSPECIFIED SITE OF BREAST: Primary | ICD-10-CM

## 2025-03-12 NOTE — TELEPHONE ENCOUNTER
Called Mona Castillo regarding recent surgical pathology.    Left breast invasive mammary carcinoma (37 mm), grade 2, margins negative, ER+/AL+, her2-. 1/3 lymph nodes.   [id number]    Referral placed for medical oncology.  Follow up in two weeks for wound check and further cancer surveillance planning.   Staging scans ordered.     Ruba Claros MD

## 2025-03-14 ENCOUNTER — TELEPHONE (OUTPATIENT)
Dept: FAMILY MEDICINE CLINIC | Facility: CLINIC | Age: 45
End: 2025-03-14
Payer: COMMERCIAL

## 2025-03-17 ENCOUNTER — HOSPITAL ENCOUNTER (OUTPATIENT)
Dept: NUCLEAR MEDICINE | Facility: HOSPITAL | Age: 45
Discharge: HOME | End: 2025-03-17
Payer: COMMERCIAL

## 2025-03-17 ENCOUNTER — HOSPITAL ENCOUNTER (OUTPATIENT)
Dept: CT IMAGING | Facility: HOSPITAL | Age: 45
Discharge: HOME | End: 2025-03-17
Admitting: STUDENT IN AN ORGANIZED HEALTH CARE EDUCATION/TRAINING PROGRAM
Payer: COMMERCIAL

## 2025-03-17 DIAGNOSIS — C50.919 MALIGNANT NEOPLASM OF FEMALE BREAST, UNSPECIFIED ESTROGEN RECEPTOR STATUS, UNSPECIFIED LATERALITY, UNSPECIFIED SITE OF BREAST: ICD-10-CM

## 2025-03-17 PROCEDURE — A9503 TC99M MEDRONATE: HCPCS | Performed by: STUDENT IN AN ORGANIZED HEALTH CARE EDUCATION/TRAINING PROGRAM

## 2025-03-17 PROCEDURE — 78306 BONE IMAGING WHOLE BODY: CPT

## 2025-03-17 PROCEDURE — 25510000001 IOPAMIDOL PER 1 ML: Performed by: STUDENT IN AN ORGANIZED HEALTH CARE EDUCATION/TRAINING PROGRAM

## 2025-03-17 PROCEDURE — 74177 CT ABD & PELVIS W/CONTRAST: CPT

## 2025-03-17 PROCEDURE — 71260 CT THORAX DX C+: CPT

## 2025-03-17 PROCEDURE — 34310000005 TECHNETIUM MEDRONATE KIT: Performed by: STUDENT IN AN ORGANIZED HEALTH CARE EDUCATION/TRAINING PROGRAM

## 2025-03-17 RX ORDER — TC 99M MEDRONATE 20 MG/10ML
22.9 INJECTION, POWDER, LYOPHILIZED, FOR SOLUTION INTRAVENOUS
Status: COMPLETED | OUTPATIENT
Start: 2025-03-17 | End: 2025-03-17

## 2025-03-17 RX ORDER — IOPAMIDOL 755 MG/ML
100 INJECTION, SOLUTION INTRAVASCULAR
Status: COMPLETED | OUTPATIENT
Start: 2025-03-17 | End: 2025-03-17

## 2025-03-17 RX ADMIN — Medication 22.9 MILLICURIE: at 08:15

## 2025-03-17 RX ADMIN — IOPAMIDOL 85 ML: 755 INJECTION, SOLUTION INTRAVENOUS at 08:28

## 2025-03-19 ENCOUNTER — LAB (OUTPATIENT)
Dept: LAB | Facility: HOSPITAL | Age: 45
End: 2025-03-19
Payer: COMMERCIAL

## 2025-03-19 ENCOUNTER — CONSULT (OUTPATIENT)
Dept: ONCOLOGY | Facility: CLINIC | Age: 45
End: 2025-03-19
Payer: COMMERCIAL

## 2025-03-19 VITALS
BODY MASS INDEX: 41.12 KG/M2 | HEART RATE: 95 BPM | HEIGHT: 63 IN | WEIGHT: 232.1 LBS | DIASTOLIC BLOOD PRESSURE: 72 MMHG | TEMPERATURE: 98.2 F | SYSTOLIC BLOOD PRESSURE: 106 MMHG | OXYGEN SATURATION: 97 %

## 2025-03-19 DIAGNOSIS — Z17.0 MALIGNANT NEOPLASM OF LOWER-INNER QUADRANT OF LEFT BREAST IN FEMALE, ESTROGEN RECEPTOR POSITIVE: Primary | ICD-10-CM

## 2025-03-19 DIAGNOSIS — C50.919 MALIGNANT NEOPLASM OF FEMALE BREAST, UNSPECIFIED ESTROGEN RECEPTOR STATUS, UNSPECIFIED LATERALITY, UNSPECIFIED SITE OF BREAST: Primary | ICD-10-CM

## 2025-03-19 DIAGNOSIS — C50.312 MALIGNANT NEOPLASM OF LOWER-INNER QUADRANT OF LEFT BREAST IN FEMALE, ESTROGEN RECEPTOR POSITIVE: Primary | ICD-10-CM

## 2025-03-19 LAB
BASOPHILS # BLD AUTO: 0.04 10*3/MM3 (ref 0–0.2)
BASOPHILS NFR BLD AUTO: 0.3 % (ref 0–1.5)
DEPRECATED RDW RBC AUTO: 50 FL (ref 37–54)
EOSINOPHIL # BLD AUTO: 0.75 10*3/MM3 (ref 0–0.4)
EOSINOPHIL NFR BLD AUTO: 5.4 % (ref 0.3–6.2)
ERYTHROCYTE [DISTWIDTH] IN BLOOD BY AUTOMATED COUNT: 14.8 % (ref 12.3–15.4)
HCT VFR BLD AUTO: 36.6 % (ref 34–46.6)
HGB BLD-MCNC: 12.4 G/DL (ref 12–15.9)
IMM GRANULOCYTES # BLD AUTO: 0.53 10*3/MM3 (ref 0–0.05)
IMM GRANULOCYTES NFR BLD AUTO: 3.8 % (ref 0–0.5)
LYMPHOCYTES # BLD AUTO: 1.98 10*3/MM3 (ref 0.7–3.1)
LYMPHOCYTES NFR BLD AUTO: 14.3 % (ref 19.6–45.3)
MCH RBC QN AUTO: 31.5 PG (ref 26.6–33)
MCHC RBC AUTO-ENTMCNC: 33.9 G/DL (ref 31.5–35.7)
MCV RBC AUTO: 92.9 FL (ref 79–97)
MONOCYTES # BLD AUTO: 0.55 10*3/MM3 (ref 0.1–0.9)
MONOCYTES NFR BLD AUTO: 4 % (ref 5–12)
NEUTROPHILS NFR BLD AUTO: 72.2 % (ref 42.7–76)
NEUTROPHILS NFR BLD AUTO: 9.95 10*3/MM3 (ref 1.7–7)
NRBC BLD AUTO-RTO: 0.1 /100 WBC (ref 0–0.2)
PLATELET # BLD AUTO: 393 10*3/MM3 (ref 140–450)
PMV BLD AUTO: 9.8 FL (ref 6–12)
RBC # BLD AUTO: 3.94 10*6/MM3 (ref 3.77–5.28)
WBC NRBC COR # BLD AUTO: 13.8 10*3/MM3 (ref 3.4–10.8)

## 2025-03-19 PROCEDURE — 85025 COMPLETE CBC W/AUTO DIFF WBC: CPT

## 2025-03-19 PROCEDURE — 36415 COLL VENOUS BLD VENIPUNCTURE: CPT

## 2025-03-19 RX ORDER — SULFAMETHOXAZOLE AND TRIMETHOPRIM 800; 160 MG/1; MG/1
1 TABLET ORAL 2 TIMES DAILY
COMMUNITY
Start: 2025-03-18 | End: 2025-03-28

## 2025-03-19 NOTE — PROGRESS NOTES
Subjective   Mona Castillo is a 44 y.o. female.  Referred by Dr. Claros for left breast invasive ductal carcinoma    History of Present Illness     Ms. Castillo is a 44-year-old premenopausal  lady who has had abnormal screening mammogram requiring 6 monthly follow-ups.  In January of this year patient noticed a mass in the lower inner quadrant of the left breast while she was playing with her 4-year-old.  She subsequently had a bilateral diagnostic mammogram which was previously scheduled because of the short-term follow-ups.  Workup confirmed invasive ductal carcinoma and she is now status post bilateral mastectomy with sentinel lymph node biopsy on the left.    1/20/2025-bilateral diagnostic mammogram  Loosely grouped microcalcifications in the mid to posterior third of the upper outer quadrant of the left breast unchanged.  Satellite architectural distortion at 6 o'clock position which is new compared to 7/8/2024.  This is 4 cm from the nipple and measures 1.5 x 1.1 x 1.6 cm.  This is also seen on the ultrasound and ultrasound-guided biopsy recommended.    1/27/2025-ultrasound-guided biopsy of the left breast mass  Pathology consistent with invasive ductal carcinoma, grade 2  Tumor measuring 5 mm on the core biopsy  Associated Ye grade ductal carcinoma in situ  Negative for lymphovascular space invasion  ER +91 to 100% strong  MD negative  HER2 1+ on immunohistochemistry, negative  Ki-67 30%    1/30/2025-bilateral breast MRI  Right breast-1:00, 10 cm from the nipple 3 cm non-mass enhancement which is suspicious.  11:00, 12 cm posterior to the nipple there is non-mass enhancement measuring 8.6 x 2.5 x 2.6 cm.  These are suspicious.  MRI guided biopsy recommended.    Left breast  6:00-anterior left breast-2.5 x 3.7 x 2.9 cm irregular enhancing mass with architectural distortion.  Segmental non-mass enhancement measuring 11.4 x 14.4 x 7.3 cm.  Anterior aspect of the non-mass enhancement extends  into the skin anterolateral to the left nipple.  There is relatively more focal asymmetric skin enhancement measuring up to 5.6 cm in maximum craniocaudal dimension which could also be due to recent biopsy or direct skin involvement.  No definite suspicious enhancement of the left nipple.  Asymmetric left axillary lymph nodes with cortical thickening.  Dominant lymph node measuring 2.2 x 1.2 x 1.9 cm.  This demonstrates a cortical thickening of 1.3 cm.  Suspicious for metastatic disease.    No internal mammary chain lymphadenopathy.    2/11/2025-Inv[facility]ae 9 gene stat panel - negative    2/12/2025-  MRI guided biopsies  1.right breast 1 o'clock position-atypical ductal hyperplasia with microcalcifications  Nonintact intraductal papilloma with ductal hyperplasia    2.right breast 11 o'clock position-lobular carcinoma in situ with atypical lobular hyperplasia    3.left axillary lymph node biopsy-benign reactive lymph node      3/7/2025-bilateral mastectomy with left sentinel lymph node biopsy  Left total mastectomy  Invasive ductal carcinoma, grade 2  Tumor measures 37 mm in maximum extension  Single focus of invasive carcinoma  Ductal carcinoma in situ present and measures 40 mm, grade 2  DCIS involves the nipples-Paget's disease  Lymphovascular invasion present  Margins negative for invasive as well as in situ carcinoma  3 sentinel lymph nodes evaluated  1 out of 3 sentinel lymph nodes with metastatic disease with the tumor measuring 6 mm and greater than 2 mm of extranodal extension.    pT2 N1a M0, stage II    ER +91 to 100% strong  WI negative  HER2 1+ immunohistochemistry  Ki-67 30%    Right breast simple skin sparing mastectomy  Scattered foci of ADH, ALH, SMOLD     3/17/2025-CT of the chest abdomen and pelvis  No evidence of metastatic disease in the chest abdomen and pelvis.  3 mm noncalcified nodule in the right upper lobe and 4 mm nodular opacity in the left upper lobe likely benign, follow-up CT in 12 months  recommended.  Previous cholecystectomy and appendectomy.    3/17/2025-bone scan without any evidence of metastatic disease.    Patient had to do IVF treatments for 2 years but subsequently conceived naturally after 2 miscarriages after IVF.    She is currently not on any form of contraception.  She is premenopausal and has regular menstrual cycles.    Denies any family history of breast cancer.  Her mother had thyroid cancer in her 30s or 40s.    No other significant comorbidities other than asthma which is well-controlled.    She currently works for the [employer] full-time.    Denies any recent changes in weight, cough, abdominal pain nausea vomiting.    The following portions of the patient's history were reviewed and updated as appropriate: allergies, current medications, past family history, past medical history, past social history, past surgical history, and problem list.    Past Medical History:   Diagnosis Date    Allergic rhinitis     Anxiety     Asthma     Breast cancer 26    Breast mass 26    Cancer     Breast    Chronic kidney disease     DDD (degenerative disc disease), lumbar     GERD (gastroesophageal reflux disease)     H/O HSV-2 infection     Hiatal hernia     History of back pain     History of gestational diabetes     History of gestational diabetes     History of miscarriage     Migraine     Tattoos         Past Surgical History:   Procedure Laterality Date    APPENDECTOMY      BREAST BIOPSY  26    Left    BREAST RECONSTRUCTION Bilateral 3/7/2025    Procedure: BILATERAL BREAST TISSUE EXPANDER INSERTION W/ CORTIVA AND ANTIBIOTIC DISK AND ADJACENT TISSUE TRANSFER;  Surgeon: Anderson Hill MD;  [place] [facility] [place] [facility] [place]  Service: [place];  Laterality: Bilateral;     SECTION  2020    CHOLECYSTECTOMY      COLONOSCOPY N/A 2019    Procedure: COLONOSCOPY with biopsies;  Surgeon: Philip Winter MD;  Location: [facility] ENDOSCOPY;  Service: Gastroenterology     DILATATION AND CURETTAGE  2020    ENDOSCOPY N/A 2019    Procedure: ESOPHAGOGASTRODUODENOSCOPY with biopsies and aspirate;  Surgeon: Philip Winter MD;  Location: [facility] ENDOSCOPY;  Service: Gastroenterology    MASTECTOMY W/ SENTINEL NODE BIOPSY Left 3/7/2025    Procedure: bilateral skin sparing mastectomy, left sentinel lymph node biopsy, possible left axillary dissection;  Surgeon: Ruba Claros MD;  Location:  [facility] [place];  Service: General;  Laterality: Left;    US GUIDED LYMPH NODE BIOPSY  2025    UTERINE FIBROID SURGERY          Family History   Adopted: Yes   Problem Relation Age of Onset    Thyroid disease Mother     Asthma Mother     Cancer Mother         Thyroid cancer. Removed thyroid.    No Known Problems Father     No Known Problems Son     Stroke Maternal Grandmother     Cirrhosis Maternal Grandmother     Anemia Maternal Grandmother     Depression Maternal Grandmother     Hypertension Maternal Grandmother     Mental illness Maternal Grandmother     Alcohol abuse Maternal Grandmother          of cirrhosis of the liver due to alcoholism    Arthritis Maternal Grandmother     No Known Problems Maternal Grandfather     Malig Hyperthermia Neg Hx         Social History     Socioeconomic History    Marital status:      Spouse name: Polo    Number of children: 1    Years of education: College   Tobacco Use    Smoking status: Former     Current packs/day: 0.00     Average packs/day: 0.3 packs/day for 18.8 years (4.7 ttl pk-yrs)     Types: Cigarettes     Start date: 1995     Quit date: 10/24/2013     Years since quittin.4     Passive exposure: Past    Smokeless tobacco: Never    Tobacco comments:     quit 10/2013   Vaping Use    Vaping status: Never Used   Substance and Sexual Activity    Alcohol use: Not Currently     Comment: I have maybe 2 beers a month or a glass a wine a month.    Drug use: Yes     Frequency: 7.0 times per week     Types: Marijuana     Comment:  "DAILY    Sexual activity: Yes     Partners: Male     Birth control/protection: None        OB History          1    Para   1    Term   1            AB        Living             SAB        IAB        Ectopic        Molar        Multiple        Live Births   1                 Allergies   Allergen Reactions    Doxycycline Hives     Could have been rocephin or tordol as well    Glue [Wound Dressing Adhesive] Hives     Had reaction after biopsy    Turkey Itching     SOMETIMES HAS SOA    Gabapentin Mental Status Change            Review of Systems   Constitutional: Negative.    HENT: Negative.     Eyes: Negative.    Respiratory: Negative.     Cardiovascular: Negative.    Gastrointestinal: Negative.    Endocrine: Negative.    Genitourinary: Negative.    Musculoskeletal: Negative.    Allergic/Immunologic: Negative.    Neurological: Negative.    Hematological: Negative.    Psychiatric/Behavioral:  The patient is nervous/anxious.          Objective   Blood pressure 106/72, pulse 95, temperature 98.2 °F (36.8 °C), temperature source Oral, height 160 cm (62.99\"), weight 105 kg (232 lb 1.6 oz), SpO2 97%, not currently breastfeeding.   Physical Exam  Vitals reviewed.   Constitutional:       Appearance: Normal appearance. She is obese.   Eyes:      Conjunctiva/sclera: Conjunctivae normal.   Cardiovascular:      Rate and Rhythm: Normal rate.   Pulmonary:      Effort: Pulmonary effort is normal.   Skin:     General: Skin is warm.   Neurological:      General: No focal deficit present.      Mental Status: She is alert and oriented to person, place, and time.   Psychiatric:         Mood and Affect: Mood normal.         Behavior: Behavior normal.         Thought Content: Thought content normal.         Judgment: Judgment normal.       Breast Exam: Status post bilateral mastectomy with expanders in place.  2 drains present on each side.  There is erythema of both the sides.    Lab on 2025   Component Date Value Ref " Range Status    WBC 03/19/2025 13.80 (H)  3.40 - 10.80 10*3/mm3 Final    RBC 03/19/2025 3.94  3.77 - 5.28 10*6/mm3 Final    Hemoglobin 03/19/2025 12.4  12.0 - 15.9 g/dL Final    Hematocrit 03/19/2025 36.6  34.0 - 46.6 % Final    MCV 03/19/2025 92.9  79.0 - 97.0 fL Final    MCH 03/19/2025 31.5  26.6 - 33.0 pg Final    MCHC 03/19/2025 33.9  31.5 - 35.7 g/dL Final    RDW 03/19/2025 14.8  12.3 - 15.4 % Final    RDW-SD 03/19/2025 50.0  37.0 - 54.0 fl Final    MPV 03/19/2025 9.8  6.0 - 12.0 fL Final    Platelets 03/19/2025 393  140 - 450 10*3/mm3 Final    Neutrophil % 03/19/2025 72.2  42.7 - 76.0 % Final    Lymphocyte % 03/19/2025 14.3 (L)  19.6 - 45.3 % Final    Monocyte % 03/19/2025 4.0 (L)  5.0 - 12.0 % Final    Eosinophil % 03/19/2025 5.4  0.3 - 6.2 % Final    Basophil % 03/19/2025 0.3  0.0 - 1.5 % Final    Immature Grans % 03/19/2025 3.8 (H)  0.0 - 0.5 % Final    Neutrophils, Absolute 03/19/2025 9.95 (H)  1.70 - 7.00 10*3/mm3 Final    Lymphocytes, Absolute 03/19/2025 1.98  0.70 - 3.10 10*3/mm3 Final    Monocytes, Absolute 03/19/2025 0.55  0.10 - 0.90 10*3/mm3 Final    Eosinophils, Absolute 03/19/2025 0.75 (H)  0.00 - 0.40 10*3/mm3 Final    Basophils, Absolute 03/19/2025 0.04  0.00 - 0.20 10*3/mm3 Final    Immature Grans, Absolute 03/19/2025 0.53 (H)  0.00 - 0.05 10*3/mm3 Final    nRBC 03/19/2025 0.1  0.0 - 0.2 /100 WBC Final   Admission on 03/07/2025, Discharged on 03/07/2025   Component Date Value Ref Range Status    HCG, Urine, QL 03/07/2025 Negative (NEGATIVE)  Negative Final    Lot Number 03/07/2025 [id number]   Final    Internal Positive Control 03/07/2025 Positive  Positive, Passed Final    Internal Negative Control 03/07/2025 Negative  Negative, Passed Final    Expiration Date 03/07/2025 08/05/2026   Final    Case Report 03/07/2025    Final                    Value:Surgical Pathology Report                         Case: [id number]                                  Authorizing Provider:  Ruba Claros MD       Collected:           03/07/2025 11:10 AM          Ordering Location:     [facility]  Received:            03/07/2025 11:31 AM                                 [facility] OR                                                                       Pathologist:           Cathy Robles MD                                                    Specimens:   1) - [place] Lymph Node, LEFT BREAST SENTINEL NODE NUMBER 1                                        2) - [place] Lymph Node, LEFT BREAST SENTINEL NODE NUMBER 2                                        3) - [place] Lymph Node, LEFT BREAST SENTINEL NODE NUMBER 3                                        4) - Breast, Left, LEFT BREAST STITCH AT 12 O'CLOCK                                                 5) - Breast, Right, RIGHT BREAST STITCH AT 12 O' CLOCK                                     Clinical Information 03/07/2025    Final                    Value:HOT BLUE 180    Final Diagnosis 03/07/2025    Final                    Value:1.  [place] lymph node #1, left axilla, excision:   -1 lymph node, negative for metastatic carcinoma (0/1).    2.  [place] lymph node #2, left axilla, excision:   -1 lymph node, negative for metastatic carcinoma (0/1).    3.  [place] lymph node #3, left axilla, excision:  -1 lymph node, positive for macrometastatic carcinoma measuring up to 6 mm predominantly within the lymph node capsule (1/1) (see comment).   -Extranodal extension is present measuring at least 8 mm.    4.  Left breast, oriented simple skin sparing mastectomy (2100 g): INVASIVE MAMMARY CARCINOMA, NO SPECIAL TYPE (INVASIVE DUCTAL CARCINOMA).   -Tumor size: 37 x 30 x 30 mm.   -Histologic grade: Britt histologic score II (tubules = 3, nuclei = 2, mitosis = 1).  -Associated intermediate grade ductal carcinoma in situ (DCIS), solid type with associated calcifications present, spanning 4 contiguous slices, 40 mm maximally (negative EIC).  -All margins are free of DCIS  and invasive carcinoma (closest 30 mm inferior,                           see synoptic template for remaining margins).   -DCIS involves the nipple tip (Paget's).   -Lymphovascular space invasion is present.   -Hormone receptors: ER % positive, RI negative, HER2 1+ negative, Ki67 30% ([id number]).   -Pathologic stage: pT2, N(sn)1a.    5.  Right breast, oriented simple skin sparing mastectomy (2025 g):  -Scattered foci of atypical ductal hyperplasia (ADH) with associated calcifications and atypical lobular hyperplasia (ALH).   -Benign skin and nipple with squamous metaplasia of lactiferous ducts (SMOLD).   -Biopsy site changes present and both clips retrieved.   -No definitive evidence of in situ nor invasive carcinoma identified.    [facility]      Synoptic Checklist 03/07/2025    Final                    Value:INVASIVE CARCINOMA OF THE BREAST: Resection                            INVASIVE CARCINOMA OF THE BREAST: RESECTION - 1, 2, 3, 4                            8th Edition - Protocol posted: 6/19/2024                                                        SPECIMEN                               Procedure:    Total mastectomy                                Specimen Laterality:    Left                                                         TUMOR                               Tumor Site:    Clock position                                :    6 o'clock                              Histologic Type:    Invasive carcinoma of no special type (ductal)                              Histologic Grade (Clare Histologic Score):                                   Glandular (Acinar) / Tubular Differentiation:    Score 3                                Nuclear Pleomorphism:    Score 2                                Mitotic Rate:    Score 1                                Overall Grade:    Grade 2 (scores of 6 or 7)                              Tumor Size:    Greatest dimension of largest invasive focus (Millimeters): 37 mm                                Additional Dimension (Millimeters):    30 mm                               Additional Dimension (Millimeters):    30 mm                             Tumor Focality:    Single focus of invasive carcinoma                              Ductal Carcinoma In Situ (DCIS):    Present                                :    Negative for extensive intraductal component (EIC)                                Size (Extent) of DCIS:    Estimated size (extent) of DCIS is at least (Millimeters): 40 mm                               Architectural Patterns:    Solid                                Nuclear Grade:    Grade II (intermediate)                                Necrosis:    Present, focal (small foci or single cell necrosis)                              Lobular Carcinoma In Situ (LCIS):    Not identified                                Tumor Extent:    DCIS involves nipple epidermis (Paget disease of the nipple)                              Lymphatic and / or Vascular Invasion:    Present                              Dermal Lymphatic and / or Vascular Invasion:    Not identified                              Microcalcifications:    Present in DCIS                              Treatment Effect in the Breast:    No known presurgical therapy                                                         MARGINS                             Margin Status for Invasive Carcinoma:    All margins negative for invasive carcinoma                                Distance from Invasive Carcinoma to Closest Margin:    30 mm                               Closest Margin(s) to Invasive Carcinoma:    Inferior                                Distance from Invasive Carcinoma to Anterior Margin:    30 mm                               Distance from Invasive Carcinoma to Posterior Margin:    70 mm                               Distance from Invasive Carcinoma to Superior Margin:    130 mm                               Distance from Invasive  Carcinoma to Medial Margin:    50 mm                               Distance from Invasive Carcinoma to Lateral Margin:    190 mm                             Margin Status for DCIS:    All margins negative for DCIS                                Distance from DCIS to Closest Margin:    30 mm                               Closest Margin(s) to DCIS:    Inferior                                Distance from DCIS to Anterior Margin:    30 mm                               Distance from DCIS to Posterior Margin:    70 mm                               Distance from DCIS to Superior Margin:    130 mm                               Distance from DCIS to Medial Margin:    60 mm                               Distance from DCIS to Lateral Margin:    180 mm                                                        REGIONAL LYMPH NODES                             Regional Lymph Node Status:                                   :    Tumor present in regional lymph node(s)                                  Number of Lymph Nodes with Macrometastases:    1                                  Number of Lymph Nodes with Micrometastases:    0                                  Size of Largest Favian Metastatic Deposit:    6 mm                                 Extranodal Extension:    Present, greater than 2 mm                                Total Number of Lymph Nodes Examined (sentinel and non-sentinel):    3                                Number of [place] Nodes Examined:    3                                                         pTNM CLASSIFICATION (AJCC 8th Edition)                               Reporting of pT, pN, and (when applicable) pM categories is based on information available to the pathologist at the time the report is issued. As per the AJCC (Chapter 1, 8th Ed.) it is the managing physician's responsibility to establish the final pathologic stage based upon all pertinent information, including but potentially not limited to this  pathology report.                             pT Category:    pT2                              pN Category:    pN1a                              N Suffix:    (sn)                                                         SPECIAL STUDIES                               Estrogen Receptor (ER) Status:    Positive (greater than 10% of cells demonstrate nuclear positivity)                                  Percentage of Cells with Nuclear Positivity:    %                                Progesterone Receptor (PgR) Status:    Negative                                HER2 (by immunohistochemistry):    Negative (Score 1+)                                Ki-67 Percentage of Positive Nuclei:    30 %                               Testing Performed on Case Number:    [id number]       Comment 03/07/2025    Final                    Value:The Paget's disease of the nipple is confined to the nipple tip without involvement of underlying skin.  AE1/AE3 is performed on sections of the lymph node from specimen 3 and highlight tumor cells within the lymph node and predominantly within the lymph node capsule and extensively extending out into the lymph node adipose tissue.  These tumor cells are noted on multiple sections in blocks 3A and 3B with the measurements noted as the greatest contiguous extent in a section. Multiple representative slides from parts 3, 4 and 5 of the this case were reviewed internally Dr. Dacosta, who concurred.       Intraoperative Consultation 03/07/2025    Final                    Value:I part 1 (left breast sentinel node #1)-1 frozen block and 1 touch prep submitted.  Frozen section diagnosis: 1 lymph node with no definitive tumor cells identified.  Results are called to Dr. Ange Claros by Dr. Berumen at 12:07 PM.    Part 2 (left breast sentinel node #2)-2 frozen blocks and 1 touch prep submitted.  Frozen section diagnosis: 1 lymph node with no definitive tumor cells identified.  Results are called to Dr. Cassidy  "Harlan by Dr. Berumen at 12:07 PM.    Part 3 (left breast sentinel node #3)-2 frozen blocks and 1 touch prep submitted.  Frozen section diagnosis: 1 lymph node with macrometastasis measuring at least 10 mm in greatest dimension.  Results are called to Dr. Ange Claros by Dr. Berumen at 12:07 PM      Gross Description 03/07/2025    Final                    Value:1. [place] Lymph Node.  Received fresh for frozen section, labeled with the patient's name, and designated \"left breast sentinel node #1\" is a 3.0 x 2.3 x 1.5 cm aggregate of fatty yellow tissue.  Palpating the tissue reveals a 1.8 x 1.1 x 1.0 cm palpable nodule.  The nodule is serially sectioned through the short axis in 2 mm sections revealing a yellow-tan cut surface.  A touch prep is made and the entire nodule is frozen as 1 AFS.  The frozen section remnant is submitted as 1 AFS.    Mb/uso/swm    2. [place] Lymph Node.  Received fresh for frozen section, labeled with the patient's name, and designated \"left breast sentinel node #2\" is an aggregate of yellow to red adipose tissue measuring 6.5 x 3.5 x 1.2 cm.  Palpating the tissue reveals a 3.6 x 2.0 x 1.2 cm palpable nodule.  The nodule is serially sectioned through the short axis in 2 mm slices revealing a yellow-tan to blue-green tinted cut surface.  A touch prep is made and the entire nodule is frozen as 2A-BFS.  The frozen                           section remnant is submitted as 2A-BFS.    Mb/uso/swm  3. [place] Lymph Node.  Received fresh for frozen section, labeled with the patient's name, and designated \"left breast sentinel node #3\" is an aggregate of yellow to red adipose tissue measuring 5.5 x 2.6 x 1.7 cm.  Palpating the tissue reveals an irregular lobulated nodule measuring 4.5 x 1.5 x 0.7 cm.  The nodule is serially sectioned through the short axis in 2 mm slices revealing a pale-tan to slightly green tinted cut surface.  A touch prep is made and the entire nodule is frozen as 3A-BFS.  " "The frozen section remnant is submitted as 3A-BFS.    Mb/uso/swm  4. Breast, Left.  Received fresh and subsequently placed in formalin, labeled \"left breast, stitch 12:00\" is an intact 2100 g, 25 (medial to lateral) x 28 (superior-inferior) x 10 (anterior-posterior) cm simple skin sparing mastectomy which is partially surfaced by a 29.5 x 10.5 cm tan skin ellipse.  The skin ellipse has an eccentric 5.0 x 5.0 cm tan and blue wrinkled                           areola.  The areola has a central 1.4 x 1.0 cm erect nipple.  The anterior margin is inked blue and the posterior margin is inked black.  There a minimal amount of skeletal muscle on the lateral/posterior aspect of the upper outer and lower outer quadrants.  The specimen is x-rayed by the Faxitron machine to reveal a bowtie clip at 3:00, 3 cm from the nipple.  The specimen is serially sectioned from medial to lateral into 28 one cm slices to reveal the bowtie clip is within slice #7 and is within an ill-defined tan-white gritty spiculated mass which measures 3 cm from superior to inferior, 3 cm from anterior to posterior, and 4 cm from medial to lateral.  The mass is within slices 7, 8, 9, and 10.  The mass comes to within 7 cm of the medial margin, 18 cm of the lateral margin, 3 cm of the inferior margin, 3 cm of the overlying skin, 7 cm of the posterior margin, and 13 cm of the superior margin.  The remaining breast parenchyma is comprised of 30% fibrous tissue.  Representative                           sections are submitted as follows:    4A- nipple tip perpendicularly bisected, slice #9  4B- nipple base cross-section  4C- section 1 cm medial to the mass, slice #6  4D- section 2 cm medial to the mass, slice #5  4E-random lower inner quadrant fibrous tissue, slice #4 (no margins included)  4F- random upper inner quadrant fibrous tissue, slice #4  4G- random lower inner quadrant fibrous tissue, slice #3 to include the inferior margin  4H- most medial aspect of " "the mass, slice #7  4I- mass, slice #7, bowtie clip retrieved, lower inner quadrant 3:00 breast, 3 cm from nipple  4J- section anterior and contiguous with the section in cassette 4I, slice #7, anterior border inked orange  4K- section posterior and contiguous with the section in cassette 4I, slice #7, posterior border inked orange  4L- posterior margin subjacent to the mass, slice #7  4M- section superior and contiguous with the mass, slice #7, superior border inked orange  4N- section inferior and contiguous to the mass, slice                           #7, inferior border inked orange  4O- mass, slice #8  4P- mass, slice #9  4Q- mass, slice #10, most lateral aspect  4R- section 1 cm lateral to the mass, slice #11  4S- section 2 cm lateral to the mass, slice #12  4T- random upper outer quadrant fibrous tissue, slice #12, 4 cm from the posterior margin  4U- section 3 cm lateral to the mass, slice #13, lower outer quadrant  4V- random upper outer quadrant fibrous tissue, slice #14 to include the anterior margin  4W- section 4 cm lateral to the mass, slice #14, lower outer quadrant  4X- section 5 cm lateral to the mass, slice #15, lower outer quadrant  4Y- random upper outer quadrant fibrous tissue, slice #15    Time collected-1108  Cold ischemia time-50 minutes  Total formalin fixation time-54 hours and 35 minutes    osmany/uso/lawanda    5. Breast, Right.  Received fresh and subsequently placed in formalin, labeled \"right breast, stitch at 12:00\" is an intact 2025 g, 25 (medial to lateral) x 27 (superior-inferior) x 10 (anterior-posterior)                           cm simple skin sparing mastectomy which is partially surfaced by a 28.5 x 10.5 cm tan skin ellipse.  The skin ellipse has an eccentric 6.0 x 5.5 cm tan wrinkled areola.  The areola has a central 1.5 x 1.2 cm erect nipple.  The anterior margin is inked blue and the posterior margin is inked black.  There is no skeletal muscle on the posterior surface.  The " specimen is x-rayed by the Faxitron machine to reveal 2 biopsy clips.  Clip #1 is stoplight shaped, 11:00, 17 cm from the nipple.  Clip #2 is Infinity shaped, 1:00, 15 cm from the nipple.  The specimen is serially sectioned from lateral to medial into 25 one cm slices and subsequently x-rayed by the Faxitron machine to reveal clip #1, stoplight, 11:00, 17 cm from the nipple is within slice #13 and is within an ill-defined tan-pink lobulated mass which grossly measures 1.5 cm from superior to inferior, 1 cm from anterior to posterior and 1 cm from medial to lateral.  The mass comes to within 0.5 cm of the anterior margin, 3.5 cm of the                           superior margin, 8.0 cm of the posterior margin, 13 cm of the lateral margin, and 12 cm of the medial margin.    The specimen is x-rayed by the Faxitron machine again to reveal that biopsy clip #2, Infinity shaped, and 1:00, 15 cm from the nipple is within slice #22 and is within an ill-defined fibrous biopsy site measuring 1.8 cm from anterior to posterior, 2 cm from superior to inferior, and 1.0 cm from medial to lateral.  Biopsy site #2 comes to within 2 cm of the anterior margin, 8.5 cm of the superior margin, 12 cm of the inferior margin, 4.5 cm the posterior margin, 22 cm of the lateral margin, and 3 cm of the medial margin.  Biopsy site #2 lies 9 cm medial to biopsy site #1.    The breast parenchyma is comprised of 30% fibrous tissue.  Representative sections are submitted as follows:    5A- nipple tip perpendicularly bisected, slice #17  5B- nipple base cross-section  5C- biopsy site #1, slice #13, most lateral aspect to include the anterior margin, 11:00 breast  5D- remainder                           of biopsy site #1, slice #13, stoplight clip retrieved, 11:00, 17 cm from the nipple, to include the anterior margin (most medial aspect)  5E- section superior and contiguous with biopsy site #1, slice #13, superior border inked orange (includes the  anterior margin)  5F- superior margin superior contiguous with the section in cassette 5E, slice #13 (inked blue)  5G- section inferior and contiguous with biopsy site #1, slice #13, inferior border inked orange (includes the anterior margin)  5H- section posterior and contiguous biopsy site #1, slice #13, posterior border inked orange  5I- posterior margin subjacent to biopsy site #1, slice #13  5J- section 1 cm lateral to biopsy site #1, slice #12 to include the anterior margin  5K- section 2 cm lateral to biopsy site #1, slice #11, upper outer quadrant to include the anterior margin  5L- section 3 cm lateral to biopsy site #1, slice number tan, upper outer quadrant to include the anterior margin  5M- random upper outer quadrant fibrous                           tissue, slice #9  5N- random lower outer quadrant fibrous tissue, slice #9  5O- random upper outer quadrant fibrous tissue, slice #8  5P- random upper outer quadrant fibrous tissue, slice #7  5Q- section 1 cm medial to biopsy site #1, slice #14 to include the anterior margin  5R- section 2 cm medial to biopsy site #1, slice #15 to include the anterior margin  5S-most lateral aspect of biopsy site #2, slice #22, 1:00 breast  5T- remainder biopsy site #2, slice #22, Infinity clip retrieved, 1:00, 15 cm from the nipple, most medial aspect  5U- anterior margin anterior and contiguous with biopsy site #2, slice #22  5V- section posterior contiguous with biopsy site #2, slice #22, posterior border inked orange  5W- posterior margin subjacent to biopsy site #2, slice #22  5X- section 1 cm medial to biopsy site #2, slice #23  5Y- section 1 cm lateral to biopsy site #2, slice #21  5Z- upper inner quadrant, slice #20, fibrous tissue  5AA- upper inner quadrant fibrous tissue, slice #19  5 BB-                           upper inner quadrant fibrous tissue, slice #18  5 CC- 12:00 fibrous tissue, slice #17  5 DD- upper outer quadrant fibrous tissue, slice #16, 1:00  "breast  5 EE- random lower inner quadrant fibrous tissue, slice #19  5 FF- random upper inner quadrant fibrous tissue, slice #20    Time collected-1227  Cold ischemia time-10 minutes  Total formalin fixation time-54 hours    jap/uso/ravindram      Special Stains 03/07/2025    Final                    Value:Utilizing appropriate controls, multiple immunohistochemical stains are performed including AE1/AE3 on specimen 3, CK5/6, CK7 and S100 on specimen for, p63 on specimen for and myosin, p63, p120 and E-cadherin on specimen 5.  AE1/AE3 is performed on sections of the lymph node from specimen 3 and highlight tumor cells within the lymph node and predominantly within the lymph node capsule and extensively extending out into the lymph node adipose tissue.  CK5/6, CK7 and S100 are performed on specimen for on a section of nipple and highlight CK7 positive tumor cells within the nipple epidermis which are CK5/6 negative and S100 negative, supporting Paget's disease of the nipple.  P63 immunostain is performed on 2 tissue blocks from specimen for and highlights an intact myoepithelial layer around foci of DCIS.  P63 and myosin are performed on block 5D and highlight an intact myoepithelial layer around benign ducts and atypical hyperplasia.  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      Microscopic Description 03/07/2025    Final                    Value:Unless \"gross only\" is specified, the final diagnosis for each specimen is based on microscopic examination of tissue.      Glucose 03/07/2025 180 (H)  70 - 130 mg/dL Final   Pre-Admission Testing on 02/24/2025   Component Date Value Ref Range Status    Glucose 02/24/2025 126 (H)  65 - 99 mg/dL Final    BUN 02/24/2025 9  6 - 20 mg/dL Final    Creatinine 02/24/2025 1.05 (H)  0.57 - 1.00 mg/dL Final    Sodium 02/24/2025 141  136 - 145 mmol/L Final    Potassium 02/24/2025 " 3.4 (L)  3.5 - 5.2 mmol/L Final    Chloride 02/24/2025 103  98 - 107 mmol/L Final    CO2 02/24/2025 25.8  22.0 - 29.0 mmol/L Final    Calcium 02/24/2025 9.2  8.6 - 10.5 mg/dL Final    BUN/Creatinine Ratio 02/24/2025 8.6  7.0 - 25.0 Final    Anion Gap 02/24/2025 12.2  5.0 - 15.0 mmol/L Final    eGFR 02/24/2025 67.8  >60.0 mL/min/1.73 Final    WBC 02/24/2025 8.62  3.40 - 10.80 10*3/mm3 Final    RBC 02/24/2025 4.10  3.77 - 5.28 10*6/mm3 Final    Hemoglobin 02/24/2025 12.8  12.0 - 15.9 g/dL Final    Hematocrit 02/24/2025 37.6  34.0 - 46.6 % Final    MCV 02/24/2025 91.7  79.0 - 97.0 fL Final    MCH 02/24/2025 31.2  26.6 - 33.0 pg Final    MCHC 02/24/2025 34.0  31.5 - 35.7 g/dL Final    RDW 02/24/2025 13.8  12.3 - 15.4 % Final    RDW-SD 02/24/2025 46.6  37.0 - 54.0 fl Final    MPV 02/24/2025 9.3  6.0 - 12.0 fL Final    Platelets 02/24/2025 395  140 - 450 10*3/mm3 Final        CT Chest With Contrast Diagnostic  Result Date: 3/18/2025  1. No convincing evidence of metastatic disease to the chest, abdomen, or pelvis in patient with recent bilateral mastectomies with implant reconstruction. Bilateral chest drains. Seroma lateral to the left implant. 2. 3 mm noncalcified nodule in right upper lobe and 4 mm nodular opacity in posterior medial left upper lobe, likely benign and these can be followed with chest CT in 12 months. 3. Previous cholecystectomy and appendectomy.     Radiation dose reduction techniques were utilized, including automated exposure control and exposure modulation based on body size.   This report was finalized on 3/18/2025 3:48 PM by Tyson Cohen M.D on Workstation: [id number]      CT Abdomen Pelvis With Contrast  Result Date: 3/18/2025  1. No convincing evidence of metastatic disease to the chest, abdomen, or pelvis in patient with recent bilateral mastectomies with implant reconstruction. Bilateral chest drains. Seroma lateral to the left implant. 2. 3 mm noncalcified nodule in right upper lobe  and 4 mm nodular opacity in posterior medial left upper lobe, likely benign and these can be followed with chest CT in 12 months. 3. Previous cholecystectomy and appendectomy.     Radiation dose reduction techniques were utilized, including automated exposure control and exposure modulation based on body size.   This report was finalized on 3/18/2025 3:48 PM by Tyson Cohen M.D on Workstation: [id number]      NM Bone Scan Whole Body  Result Date: 3/18/2025  No scintigraphic evidence of osseous metastatic disease.   This report was finalized on 3/18/2025 10:55 AM by Dr. Dorian Angel M.D on Workstation: [id number]           Assessment & Plan       *Left breast invasive ductal carcinoma, ER +91 to 100%, WI negative, HER2 1+ on immunohistochemistry, Ki-67 30%, grade 2  Patient to status post bilateral mastectomy on 3/7/2025.  Pathology consistent with 37 mm invasive ductal carcinoma, grade 2, ER +91 to 100% strong, WI negative, HER2 1+ immunohistochemistry, Ki-67 30%  pT2 N1a M0, prognostic stage IIb, anatomic stage IIb  1 out of 3 sentinel lymph nodes positive for metastatic disease with 6 mm of tumor and greater than 2 mm of extranodal extension  Lymphovascular invasion present  Discussed at length the details of imaging and pathology report.Discussed the origin of breast cancer from the ducts and the lobules and the histological type of breast cancer based on site of origin. Discussed the tumor size, lymph node status and stage of the cancer. Explained the presence of DCIS. Discussed the receptor status including ER, WI and her-2 chris and their significance in determining the biology and treatment. Also discussed the importance of grade and ki-67.   3/17/2025-CT of the chest abdomen and pelvis and bone scan without any evidence of metastatic disease  We reviewed the pathology at length today and explained to her that in premenopausal patients with lymph node positive disease the standard of care would be  to proceed with adjuvant chemotherapy followed by radiation if recommended and adjuvant endocrine therapy.  However we could consider an [facility]  clinical trial depending on the Oncotype DX recurrence score.  This trial randomizes patients to chemotherapy versus no chemotherapy in the intermediate risk group with lymph node positive disease in premenopausal patients.  She is willing to proceed with Oncotype DX recurrence score and wants to decide whether or not she wants to proceed with chemotherapy after the score is resulted.  She is also unsure about the clinical trial at this point and wants to decide based on the Dx recurrence code.  Oncotype DX recurrence score requested today.    *Asthma  Well-controlled at this time    *Bilateral breast erythema  She is currently on Bactrim from Dr. Hill.  Continue the same  Close follow-up with plastic surgery  Still has 2 drains on both sides    *Obesity  BMI 41.1.  Obesity increase the risk of breast cancer and other comorbidities.  Once she is able to recommend lifestyle changes    *Contraception  Patient is currently not on any form of contraception.  Recommend that she consider nonhormonal IUD  Counseled her regarding possible future treatments and advised her against pregnancy at least for the first 2 years of diagnosis.    *Follow-up-2 to 3 weeks    93 minutes total spent on the encounter on the same day

## 2025-03-20 ENCOUNTER — PATIENT ROUNDING (BHMG ONLY) (OUTPATIENT)
Dept: ONCOLOGY | Facility: CLINIC | Age: 45
End: 2025-03-20
Payer: COMMERCIAL

## 2025-03-20 LAB
CYTO UR: NORMAL
LAB AP CASE REPORT: NORMAL
LAB AP CLINICAL INFORMATION: NORMAL
LAB AP DIAGNOSIS COMMENT: NORMAL
LAB AP SPECIAL STAINS: NORMAL
LAB AP SYNOPTIC CHECKLIST: NORMAL
Lab: NORMAL
PATH REPORT.ADDENDUM SPEC: NORMAL
PATH REPORT.FINAL DX SPEC: NORMAL
PATH REPORT.GROSS SPEC: NORMAL

## 2025-03-20 NOTE — PROGRESS NOTES
A My-Chart message has been sent to the patient for PATIENT ROUNDING with [facility] [place].

## 2025-03-21 ENCOUNTER — TELEPHONE (OUTPATIENT)
Dept: ONCOLOGY | Facility: CLINIC | Age: 45
End: 2025-03-21
Payer: COMMERCIAL

## 2025-03-26 ENCOUNTER — OFFICE VISIT (OUTPATIENT)
Dept: SURGERY | Facility: CLINIC | Age: 45
End: 2025-03-26
Payer: COMMERCIAL

## 2025-03-26 VITALS
SYSTOLIC BLOOD PRESSURE: 106 MMHG | WEIGHT: 230 LBS | BODY MASS INDEX: 40.75 KG/M2 | DIASTOLIC BLOOD PRESSURE: 72 MMHG | HEART RATE: 88 BPM | HEIGHT: 63 IN | OXYGEN SATURATION: 96 %

## 2025-03-26 DIAGNOSIS — C50.919 MALIGNANT NEOPLASM OF FEMALE BREAST, UNSPECIFIED ESTROGEN RECEPTOR STATUS, UNSPECIFIED LATERALITY, UNSPECIFIED SITE OF BREAST: Primary | ICD-10-CM

## 2025-03-26 PROCEDURE — 99024 POSTOP FOLLOW-UP VISIT: CPT | Performed by: STUDENT IN AN ORGANIZED HEALTH CARE EDUCATION/TRAINING PROGRAM

## 2025-03-26 NOTE — H&P (VIEW-ONLY)
General Surgery Breast Cancer History and Physical Exam      Summary:    Mona Castillo is a 44 y.o. lady who presents with a history of left breast invasive ductal carcinoma with DCIS: Grade II,  ER+/IL-, Her2-; qI1H7pO1, Stage II.    Right breast LCIS      A multidisciplinary plan has been formulated for the patient:    (1) Breast Surgical Oncology:  -Follow up [employer] 9 panel genetic testing: negative.   -Nurse navigator following.   -s/p breast skin sparing mastectomy with sentinel lymph node biopsy 3/2025.   -Lymphedema clinic referral.   -6 month follow up with me.     (2) Medical Oncology:  -Following with Dr. Caldera.      (3) Radiation Oncology:  -Referral after med onc.      Referring Provider: David Fernandez DO     Chief Complaint: breast mass     History of Present Illness: Ms. Mona Castillo is a 44 y.o. year old lady, seen at the request of David Fernandez DO for a new diagnosis of left breast cancer.       Recently when her son jumped on her, she noticed a knot underneath the NAC. She has had annual mammograms each year; she has been in 6 month surveillance. Her work-up is detailed in the oncologic history below.      She denies any other breast lumps, pain, skin changes, or nipple discharge.      She denies any family history of breast or ovarian cancer.     3/26/2025 She presents today for follow up. She has done well since surgery. Her pain is controlled. She is slowly getting back to her daily activities.      Workup of Current Diagnosis:    7/8/2024 Left Breast Diagnostic Mammo:   IMPRESSION:  Stable likely benign microcalcifications in the left breast. Recommend continued follow-up in 6 months with bilateral CC and MLO views and spot magnification CC and ML views of the left breast to confirm stability  and correlate with the due date of the screening mammogram of the right breast.  BI-RADS ASSESSMENT: BI-RADS 3. Probably Benign.      1/20/2025 Bilateral Breast Diagnostic  Mammogram:   Standard images and R2 computerized assisted detection were obtained followed by digital tomosynthesis and limited directed left breast ultrasound. Magnification views of the left breast were also performed. The breasts are heterogeneously dense, which may obscure small masses. No abnormality is seen in the right breast. There are loosely grouped microcalcifications in the mid to posterior third of the upper outer left breast and these appear benign and unchanged from 07/08/2024 and 12/21/2023. A marker placed in the area of clinical concern near the left nipple corresponds to a prominent area of stellate architectural distortion in the anterior third of the lower left breast near 6 o'clock that appears to be new since 07/08/2024. This corresponds to a suspicious irregular  solid lesion by ultrasound that is located at 6 o'clock 4 cm from the nipple. It measures 1.5 x 1.1 x 1.6 cm with angular irregular margins and some posterior shadowing and considered highly suspicious.  CONCLUSION: Benign-appearing microcalcifications in the left breast unchanged from earlier studies. Very suspicious left-sided mammogram and directed left breast ultrasound showing an area of stellate  architectural distortion in the lower left breast corresponds to an irregular solid lesion by ultrasound that is located at 6:00 4 cm from the nipple. Further evaluation with ultrasound-guided core biopsy and clip placement is recommended. These findings have been discussed with the patient.  BI-RADS Category 5. Highly suggestive for malignancy.     1/27/2025 Left Breast US Guided Biopsy:   PROCEDURE: The mass at 6:00, 4 cm from the nipple in the left breast was targeted under ultrasound. The skin was cleansed and prepped. 3 mL of 1% lidocaine and 10 mL of 1% lidocaine with epinephrine were used for local  anesthesia. A small skin incision was then made to permit passage of a 10 g needle with a vacuum-assisted biopsy device. 5 core  specimens were obtained. A bowtie clip was then deployed at the biopsy site. The  patient tolerated the procedure well with no immediate complications. Post-procedural digital mammographic views of the left breast demonstrate the bowtie clip along the anterior superior margin of the residual mass.   Calcifications measuring approximately 3.5 cm are noted posterior to and extending to the level of the mass on the postbiopsy mammogram. These are relatively similar in morphology to additional previously followed  calcifications in the upper posterior left breast but are relatively suspicious given the close proximity to biopsy-proven malignancy.     IMPRESSION:   1. Ultrasound-guided core needle biopsy of a 1.6 cm mass with architectural distortion at 6:00, 4 cm from the nipple in the left breast, marked with a bowtie clip. Pathology is malignant and concordant with the imaging assessment. Recommend surgical consultation. Recommend contrast-enhanced breast MRI to evaluate extent of disease.    2. Calcifications are noted posterior to the mass on the post biopsy mammogram, which are similar to additional previously noted calcifications in the upper posterior left breast but are suspicious. Stereotactic core needle biopsy of these calcifications is recommended, as marked on the postbiopsy mammogram. Additional stereotactic core needle biopsy targeting the previously followed calcifications should also be considered given the similar morphology. These should be performed after the above recommended breast MRI.     1/27/2025 Pathology:   Final Diagnosis   1.  Left breast, 6:00, 4 cm from nipple, ultrasound-guided core biopsy, not for calcifications (bow clip):         A.  Invasive ductal carcinoma, moderately-differentiated; [place] histologic grade II/III               (tubule score = 3, nuclear score = 2, mitoses score = 1), measuring at least 5 mm and involving multiple   core fragments.         B.  Associated  intermediate grade ductal carcinoma in situ, solid type with focal single-cell necrosis.         C.  Negative for lymphovascular space invasion.         D.  See biomarker template.         1/30/2025 Bilateral Breast MRI:   IMPRESSION AND RECOMMENDATION:    1.  A 3.7 cm irregular enhancing mass with architectural distortion at 6:00 in the anterior left breast represents biopsy-proven malignancy and is located within the anteromedial aspect of regional to segmental non-mass enhancement measuring up to 14.4 cm at anterior, middle and posterior depths, which is suspicious for additional malignancy. Some calcifications described on postbiopsy mammogram from 1/27/2025 are located within the area of non-mass enhancement, although the degree of non-mass enhancement is much larger than the extent of calcifications on mammogram. Recommend surgical management. If breast conservation is pursued, preoperative stereotactic and/or MRI guided core needle biopsies of the left breast could be performed.  2.  Asymmetric left axillary lymph nodes with cortical thickening, including a dominant lymph node with eccentric cortical thickening. Recommend targeted ultrasound, followed by possible ultrasound guided core needle biopsy.  3.  Asymmetric anterior left breast skin thickening, edema, and enhancement, including a relatively more focal area of skin enhancement, as above, some of which may be reactive to recent biopsy but also raises concern for possible inflammatory malignancy and/or direct skin involvement in the appropriate clinical setting. Recommend clinical evaluation by breast surgery, possible skin punch biopsy.  4.  Areas of non-mass enhancement measuring 3 cm at 1:00 in the posterior right breast and 8.6 cm at 11:00 in the middle and posterior right breast are suspicious. Recommend further evaluation with 2 site  MRI guided core needle biopsy.    3/7/2025 Bilateral breast skin sparing mastectomy with left sentinel lymph node  biopsy      Final Diagnosis   1.  [place] lymph node #1, left axilla, excision:               -1 lymph node, negative for metastatic carcinoma (0/1).     2.  [place] lymph node #2, left axilla, excision:               -1 lymph node, negative for metastatic carcinoma (0/1).     3.  [place] lymph node #3, left axilla, excision:  -1 lymph node, positive for macrometastatic carcinoma measuring up to 6 mm predominantly within the lymph node capsule (1/1) (see comment).               -Extranodal extension is present measuring at least 8 mm.     4.  Left breast, oriented simple skin sparing mastectomy (2100 g): INVASIVE MAMMARY CARCINOMA, NO SPECIAL TYPE (INVASIVE DUCTAL CARCINOMA).               -Tumor size: 37 x 30 x 30 mm.               -Histologic grade: [place] histologic score II (tubules = 3, nuclei = 2, mitosis = 1).  -Associated intermediate grade ductal carcinoma in situ (DCIS), solid type with associated calcifications present, spanning 4 contiguous slices, 40 mm maximally (negative EIC).  -All margins are free of DCIS and invasive carcinoma (closest 30 mm inferior, see synoptic template for remaining margins).               -DCIS involves the nipple tip (Paget's).               -Lymphovascular space invasion is present.               -Hormone receptors: ER % positive, MA negative, HER2 1+ negative, Ki67 30% ([id number]).               -Pathologic stage: pT2, N(sn)1a.     5.  Right breast, oriented simple skin sparing mastectomy (2025 g):  -Scattered foci of atypical ductal hyperplasia (ADH) with associated calcifications and atypical lobular hyperplasia (ALH).               -Benign skin and nipple with squamous metaplasia of lactiferous ducts (SMOLD).               -Biopsy site changes present and both clips retrieved.               -No definitive evidence of in situ nor invasive carcinoma identified.     Gynecologic History:   G:3. P:1 AB:2  Age at first childbirth: 40  Lactation/How long: none  Age  at menarche: 13  Age at menopause: N/A  Total years of oral contraceptive use: off/on previously  Total years of hormone replacement therapy: none     Past Medical History:   Asthma      Past Surgical History:    Surgical History             Past Surgical History:   Procedure Laterality Date    APPENDECTOMY         SECTION   2020    CHOLECYSTECTOMY        COLONOSCOPY N/A 2019     Procedure: COLONOSCOPY with biopsies;  Surgeon: Philip Winter MD;  Location: [facility] ENDOSCOPY;  Service: Gastroenterology    DILATATION AND CURETTAGE   2020    ENDOSCOPY N/A 2019     Procedure: ESOPHAGOGASTRODUODENOSCOPY with biopsies and aspirate;  Surgeon: Philip Winter MD;  Location: [facility] ENDOSCOPY;  Service: Gastroenterology    UTERINE FIBROID SURGERY             Family History:    As above     Social History:  Denies tobacco use, quit 10/2013  Occasional alcohol use     Allergies:   Allergies             Allergies   Allergen Reactions    [place] Itching       SOMETIMES HAS SOA    Gabapentin Mental Status Change         Medications:      Current Medications      Current Outpatient Medications:     albuterol (PROVENTIL) (2.5 MG/3ML) 0.083% nebulizer solution, Take 2.5 mg by nebulization Every 4 (Four) Hours As Needed for Wheezing (use with neb)., Disp: 120 each, Rfl: 12    budesonide-formoterol (SYMBICORT) 160-4.5 MCG/ACT inhaler, Inhale 2 puffs 2 (Two) Times a Day., Disp: , Rfl:     doxycycline (VIBRAMYCIN) 100 MG capsule, Take 1 capsule by mouth 2 (Two) Times a Day for 7 days., Disp: 14 capsule, Rfl: 0    HYDROcodone-acetaminophen (NORCO) 5-325 MG per tablet, Take 1 tablet by mouth Every 6 (Six) Hours As Needed for Severe Pain., Disp: 12 tablet, Rfl: 0    montelukast (SINGULAIR) 10 MG tablet, Take 1 tablet by mouth Daily., Disp: , Rfl:     mupirocin (BACTROBAN) 2 % ointment, Apply 1 Application topically to the appropriate area as directed 2 (Two) Times a Day., Disp: 30 g, Rfl: 0    ondansetron ODT  (ZOFRAN-ODT) 4 MG disintegrating tablet, Place 1 tablet on the tongue Every 8 (Eight) Hours As Needed for Nausea or Vomiting., Disp: 24 tablet, Rfl: 0    Ventolin  (90 Base) MCG/ACT inhaler, Inhale 2 puffs Every 4 (Four) Hours As Needed for Wheezing., Disp: 18 g, Rfl: 3       Laboratory Values:    Labs from 3/19/2025 reviewed by me      Review of Systems:   Influenza-like illness: no fever, no  cough, no  sore throat, no  body aches, no loss of sense of taste or smell, no known exposure to person with Covid-19.  Constitutional: Negative for fevers or chills  HENT: Negative for hearing loss or runny nose  Eyes: Negative for vision changes or scleral icterus  Respiratory: Negative for cough or shortness of breath  Cardiovascular: Negative for chest pain or heart palpitations  Gastrointestinal: Negative for abdominal pain, nausea, vomiting, constipation, melena, or hematochezia  Genitourinary: Negative for hematuria or dysuria  Musculoskeletal: Negative for joint swelling or gait instability  Neurologic: Negative for tremors or seizures  Psychiatric: Negative for suicidal ideations or depression  All other systems reviewed and negative     Physical Exam:   ECO - Asymptomatic  Constitutional: Well-developed well-nourished, no acute distress  Eyes: Conjunctiva normal, sclera nonicteric  ENMT: Hearing grossly normal, oral mucosa moist  Neck: Supple, no palpable mass, trachea midline  Respiratory: Clear to auscultation, normal inspiratory effort  Cardiovascular: Regular rate, no peripheral edema, no jugular venous distention  Breast: symmetric  Incision clean and dry, no erythema or drainage, no hematoma  No clinical chest wall involvement.  Gastrointestinal: Soft, nontender  Lymphatics (palpable nodes): No cervical, supraclavicular or axillary lymphadenopathy  Skin:  Warm, dry, no rash on visualized skin surfaces  Musculoskeletal: Symmetric strength, normal gait  Psychiatric: Alert and oriented ×3, normal  affect       SHIRA ALVES M.D.  General and Endoscopic Surgery  [facility]     [address], [address]  [place], [place], [address]  P: [phone]  F: [phone]

## 2025-03-26 NOTE — PROGRESS NOTES
General Surgery Breast Cancer History and Physical Exam      Summary:    Mona Castillo is a 44 y.o. 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    Right breast LCIS      A multidisciplinary plan has been formulated for the patient:    (1) Breast Surgical Oncology:  -Follow up [employer] 9 panel genetic testing: negative.   -Nurse navigator following.   -s/p breast skin sparing mastectomy with sentinel lymph node biopsy 3/2025.   -Lymphedema clinic referral.   -6 month follow up with me.     (2) Medical Oncology:  -Following with Dr. Caldera.      (3) Radiation Oncology:  -Referral after med onc.      Referring Provider: David Fernandez DO     Chief Complaint: breast mass     History of Present Illness: Ms. Mona Castillo is a 44 y.o. year old lady, seen at the request of David Fernandez DO for a new diagnosis of left breast cancer.       Recently when her son jumped on her, she noticed a knot underneath the NAC. She has had annual mammograms each year; she has been in 6 month surveillance. Her work-up is detailed in the oncologic history below.      She denies any other breast lumps, pain, skin changes, or nipple discharge.      She denies any family history of breast or ovarian cancer.     3/26/2025 She presents today for follow up. She has done well since surgery. Her pain is controlled. She is slowly getting back to her daily activities.      Workup of Current Diagnosis:    7/8/2024 Left Breast Diagnostic Mammo:   IMPRESSION:  Stable likely benign microcalcifications in the left breast. Recommend continued follow-up in 6 months with bilateral CC and MLO views and spot magnification CC and ML views of the left breast to confirm stability  and correlate with the due date of the screening mammogram of the right breast.  BI-RADS ASSESSMENT: BI-RADS 3. Probably Benign.      1/20/2025 Bilateral Breast Diagnostic  Mammogram:   Standard images and R2 computerized assisted detection were obtained followed by digital tomosynthesis and limited directed left breast ultrasound. Magnification views of the left breast were also performed. The breasts are heterogeneously dense, which may obscure small masses. No abnormality is seen in the right breast. There are loosely grouped microcalcifications in the mid to posterior third of the upper outer left breast and these appear benign and unchanged from 07/08/2024 and 12/21/2023. A marker placed in the area of clinical concern near the left nipple corresponds to a prominent area of stellate architectural distortion in the anterior third of the lower left breast near 6 o'clock that appears to be new since 07/08/2024. This corresponds to a suspicious irregular  solid lesion by ultrasound that is located at 6 o'clock 4 cm from the nipple. It measures 1.5 x 1.1 x 1.6 cm with angular irregular margins and some posterior shadowing and considered highly suspicious.  CONCLUSION: Benign-appearing microcalcifications in the left breast unchanged from earlier studies. Very suspicious left-sided mammogram and directed left breast ultrasound showing an area of stellate  architectural distortion in the lower left breast corresponds to an irregular solid lesion by ultrasound that is located at 6:00 4 cm from the nipple. Further evaluation with ultrasound-guided core biopsy and clip placement is recommended. These findings have been discussed with the patient.  BI-RADS Category 5. Highly suggestive for malignancy.     1/27/2025 Left Breast US Guided Biopsy:   PROCEDURE: The mass at 6:00, 4 cm from the nipple in the left breast was targeted under ultrasound. The skin was cleansed and prepped. 3 mL of 1% lidocaine and 10 mL of 1% lidocaine with epinephrine were used for local  anesthesia. A small skin incision was then made to permit passage of a 10 g needle with a vacuum-assisted biopsy device. 5 core  specimens were obtained. A bowtie clip was then deployed at the biopsy site. The  patient tolerated the procedure well with no immediate complications. Post-procedural digital mammographic views of the left breast demonstrate the bowtie clip along the anterior superior margin of the residual mass.   Calcifications measuring approximately 3.5 cm are noted posterior to and extending to the level of the mass on the postbiopsy mammogram. These are relatively similar in morphology to additional previously followed  calcifications in the upper posterior left breast but are relatively suspicious given the close proximity to biopsy-proven malignancy.     IMPRESSION:   1. Ultrasound-guided core needle biopsy of a 1.6 cm mass with architectural distortion at 6:00, 4 cm from the nipple in the left breast, marked with a bowtie clip. Pathology is malignant and concordant with the imaging assessment. Recommend surgical consultation. Recommend contrast-enhanced breast MRI to evaluate extent of disease.    2. Calcifications are noted posterior to the mass on the post biopsy mammogram, which are similar to additional previously noted calcifications in the upper posterior left breast but are suspicious. Stereotactic core needle biopsy of these calcifications is recommended, as marked on the postbiopsy mammogram. Additional stereotactic core needle biopsy targeting the previously followed calcifications should also be considered given the similar morphology. These should be performed after the above recommended breast MRI.     1/27/2025 Pathology:   Final Diagnosis   1.  Left breast, 6:00, 4 cm from nipple, ultrasound-guided core biopsy, not for calcifications (bow clip):         A.  Invasive ductal carcinoma, moderately-differentiated; [place] histologic grade II/III               (tubule score = 3, nuclear score = 2, mitoses score = 1), measuring at least 5 mm and involving multiple   core fragments.         B.  Associated  intermediate grade ductal carcinoma in situ, solid type with focal single-cell necrosis.         C.  Negative for lymphovascular space invasion.         D.  See biomarker template.         1/30/2025 Bilateral Breast MRI:   IMPRESSION AND RECOMMENDATION:    1.  A 3.7 cm irregular enhancing mass with architectural distortion at 6:00 in the anterior left breast represents biopsy-proven malignancy and is located within the anteromedial aspect of regional to segmental non-mass enhancement measuring up to 14.4 cm at anterior, middle and posterior depths, which is suspicious for additional malignancy. Some calcifications described on postbiopsy mammogram from 1/27/2025 are located within the area of non-mass enhancement, although the degree of non-mass enhancement is much larger than the extent of calcifications on mammogram. Recommend surgical management. If breast conservation is pursued, preoperative stereotactic and/or MRI guided core needle biopsies of the left breast could be performed.  2.  Asymmetric left axillary lymph nodes with cortical thickening, including a dominant lymph node with eccentric cortical thickening. Recommend targeted ultrasound, followed by possible ultrasound guided core needle biopsy.  3.  Asymmetric anterior left breast skin thickening, edema, and enhancement, including a relatively more focal area of skin enhancement, as above, some of which may be reactive to recent biopsy but also raises concern for possible inflammatory malignancy and/or direct skin involvement in the appropriate clinical setting. Recommend clinical evaluation by breast surgery, possible skin punch biopsy.  4.  Areas of non-mass enhancement measuring 3 cm at 1:00 in the posterior right breast and 8.6 cm at 11:00 in the middle and posterior right breast are suspicious. Recommend further evaluation with 2 site  MRI guided core needle biopsy.    3/7/2025 Bilateral breast skin sparing mastectomy with left sentinel lymph node  biopsy      Final Diagnosis   1.  [place] lymph node #1, left axilla, excision:               -1 lymph node, negative for metastatic carcinoma (0/1).     2.  [place] lymph node #2, left axilla, excision:               -1 lymph node, negative for metastatic carcinoma (0/1).     3.  [place] lymph node #3, left axilla, excision:  -1 lymph node, positive for macrometastatic carcinoma measuring up to 6 mm predominantly within the lymph node capsule (1/1) (see comment).               -Extranodal extension is present measuring at least 8 mm.     4.  Left breast, oriented simple skin sparing mastectomy (2100 g): INVASIVE MAMMARY CARCINOMA, NO SPECIAL TYPE (INVASIVE DUCTAL CARCINOMA).               -Tumor size: 37 x 30 x 30 mm.               -Histologic grade: [place] histologic score II (tubules = 3, nuclei = 2, mitosis = 1).  -Associated intermediate grade ductal carcinoma in situ (DCIS), solid type with associated calcifications present, spanning 4 contiguous slices, 40 mm maximally (negative EIC).  -All margins are free of DCIS and invasive carcinoma (closest 30 mm inferior, see synoptic template for remaining margins).               -DCIS involves the nipple tip (Paget's).               -Lymphovascular space invasion is present.               -Hormone receptors: ER % positive, IL negative, HER2 1+ negative, Ki67 30% ([id number]).               -Pathologic stage: pT2, N(sn)1a.     5.  Right breast, oriented simple skin sparing mastectomy (2025 g):  -Scattered foci of atypical ductal hyperplasia (ADH) with associated calcifications and atypical lobular hyperplasia (ALH).               -Benign skin and nipple with squamous metaplasia of lactiferous ducts (SMOLD).               -Biopsy site changes present and both clips retrieved.               -No definitive evidence of in situ nor invasive carcinoma identified.     Gynecologic History:   G:3. P:1 AB:2  Age at first childbirth: 40  Lactation/How long: none  Age  at menarche: 13  Age at menopause: N/A  Total years of oral contraceptive use: off/on previously  Total years of hormone replacement therapy: none     Past Medical History:   Asthma      Past Surgical History:    Surgical History             Past Surgical History:   Procedure Laterality Date    APPENDECTOMY         SECTION   2020    CHOLECYSTECTOMY        COLONOSCOPY N/A 2019     Procedure: COLONOSCOPY with biopsies;  Surgeon: Philip Winter MD;  Location: [facility] ENDOSCOPY;  Service: Gastroenterology    DILATATION AND CURETTAGE   2020    ENDOSCOPY N/A 2019     Procedure: ESOPHAGOGASTRODUODENOSCOPY with biopsies and aspirate;  Surgeon: Philip Winter MD;  Location: [facility] ENDOSCOPY;  Service: Gastroenterology    UTERINE FIBROID SURGERY             Family History:    As above     Social History:  Denies tobacco use, quit 10/2013  Occasional alcohol use     Allergies:   Allergies             Allergies   Allergen Reactions    [place] Itching       SOMETIMES HAS SOA    Gabapentin Mental Status Change         Medications:      Current Medications      Current Outpatient Medications:     albuterol (PROVENTIL) (2.5 MG/3ML) 0.083% nebulizer solution, Take 2.5 mg by nebulization Every 4 (Four) Hours As Needed for Wheezing (use with neb)., Disp: 120 each, Rfl: 12    budesonide-formoterol (SYMBICORT) 160-4.5 MCG/ACT inhaler, Inhale 2 puffs 2 (Two) Times a Day., Disp: , Rfl:     doxycycline (VIBRAMYCIN) 100 MG capsule, Take 1 capsule by mouth 2 (Two) Times a Day for 7 days., Disp: 14 capsule, Rfl: 0    HYDROcodone-acetaminophen (NORCO) 5-325 MG per tablet, Take 1 tablet by mouth Every 6 (Six) Hours As Needed for Severe Pain., Disp: 12 tablet, Rfl: 0    montelukast (SINGULAIR) 10 MG tablet, Take 1 tablet by mouth Daily., Disp: , Rfl:     mupirocin (BACTROBAN) 2 % ointment, Apply 1 Application topically to the appropriate area as directed 2 (Two) Times a Day., Disp: 30 g, Rfl: 0    ondansetron ODT  (ZOFRAN-ODT) 4 MG disintegrating tablet, Place 1 tablet on the tongue Every 8 (Eight) Hours As Needed for Nausea or Vomiting., Disp: 24 tablet, Rfl: 0    Ventolin  (90 Base) MCG/ACT inhaler, Inhale 2 puffs Every 4 (Four) Hours As Needed for Wheezing., Disp: 18 g, Rfl: 3       Laboratory Values:    Labs from 3/19/2025 reviewed by me      Review of Systems:   Influenza-like illness: no fever, no  cough, no  sore throat, no  body aches, no loss of sense of taste or smell, no known exposure to person with Covid-19.  Constitutional: Negative for fevers or chills  HENT: Negative for hearing loss or runny nose  Eyes: Negative for vision changes or scleral icterus  Respiratory: Negative for cough or shortness of breath  Cardiovascular: Negative for chest pain or heart palpitations  Gastrointestinal: Negative for abdominal pain, nausea, vomiting, constipation, melena, or hematochezia  Genitourinary: Negative for hematuria or dysuria  Musculoskeletal: Negative for joint swelling or gait instability  Neurologic: Negative for tremors or seizures  Psychiatric: Negative for suicidal ideations or depression  All other systems reviewed and negative     Physical Exam:   ECO - Asymptomatic  Constitutional: Well-developed well-nourished, no acute distress  Eyes: Conjunctiva normal, sclera nonicteric  ENMT: Hearing grossly normal, oral mucosa moist  Neck: Supple, no palpable mass, trachea midline  Respiratory: Clear to auscultation, normal inspiratory effort  Cardiovascular: Regular rate, no peripheral edema, no jugular venous distention  Breast: symmetric  Incision clean and dry, no erythema or drainage, no hematoma  No clinical chest wall involvement.  Gastrointestinal: Soft, nontender  Lymphatics (palpable nodes): No cervical, supraclavicular or axillary lymphadenopathy  Skin:  Warm, dry, no rash on visualized skin surfaces  Musculoskeletal: Symmetric strength, normal gait  Psychiatric: Alert and oriented ×3, normal  affect       SHIRA ALVES M.D.  General and Endoscopic Surgery  [facility]     [address], [address]  [place], [place], [address]  P: [phone]  F: [phone]

## 2025-03-28 LAB
CYTO UR: NORMAL
LAB AP CASE REPORT: NORMAL
LAB AP CLINICAL INFORMATION: NORMAL
LAB AP DIAGNOSIS COMMENT: NORMAL
LAB AP SPECIAL STAINS: NORMAL
LAB AP SYNOPTIC CHECKLIST: NORMAL
Lab: NORMAL
Lab: NORMAL
PATH REPORT.ADDENDUM SPEC: NORMAL
PATH REPORT.FINAL DX SPEC: NORMAL
PATH REPORT.GROSS SPEC: NORMAL

## 2025-04-01 ENCOUNTER — OFFICE VISIT (OUTPATIENT)
Dept: ONCOLOGY | Facility: CLINIC | Age: 45
End: 2025-04-01
Payer: COMMERCIAL

## 2025-04-01 VITALS
HEART RATE: 79 BPM | BODY MASS INDEX: 40.68 KG/M2 | OXYGEN SATURATION: 98 % | TEMPERATURE: 98 F | HEIGHT: 63 IN | RESPIRATION RATE: 16 BRPM | WEIGHT: 229.6 LBS | SYSTOLIC BLOOD PRESSURE: 133 MMHG | DIASTOLIC BLOOD PRESSURE: 74 MMHG

## 2025-04-01 DIAGNOSIS — C50.312 MALIGNANT NEOPLASM OF LOWER-INNER QUADRANT OF LEFT BREAST IN FEMALE, ESTROGEN RECEPTOR POSITIVE: Primary | ICD-10-CM

## 2025-04-01 DIAGNOSIS — Z17.0 MALIGNANT NEOPLASM OF LOWER-INNER QUADRANT OF LEFT BREAST IN FEMALE, ESTROGEN RECEPTOR POSITIVE: Primary | ICD-10-CM

## 2025-04-01 PROBLEM — Z45.2 ENCOUNTER FOR FITTING AND ADJUSTMENT OF VASCULAR CATHETER: Status: ACTIVE | Noted: 2025-04-01

## 2025-04-01 RX ORDER — SODIUM CHLORIDE 0.9 % (FLUSH) 0.9 %
10 SYRINGE (ML) INJECTION AS NEEDED
OUTPATIENT
Start: 2025-04-01

## 2025-04-01 RX ORDER — HEPARIN SODIUM (PORCINE) LOCK FLUSH IV SOLN 100 UNIT/ML 100 UNIT/ML
500 SOLUTION INTRAVENOUS AS NEEDED
OUTPATIENT
Start: 2025-04-01

## 2025-04-01 NOTE — PROGRESS NOTES
Subjective   Mona Castillo is a 44 y.o. female.  Referred by Dr. Claros for left breast invasive ductal carcinoma    History of Present Illness     Ms. Castillo is a 44-year-old premenopausal  lady who has had abnormal screening mammogram requiring 6 monthly follow-ups.  In January of this year patient noticed a mass in the lower inner quadrant of the left breast while she was playing with her 4-year-old.  She subsequently had a bilateral diagnostic mammogram which was previously scheduled because of the short-term follow-ups.  Workup confirmed invasive ductal carcinoma and she is now status post bilateral mastectomy with sentinel lymph node biopsy on the left.    1/20/2025-bilateral diagnostic mammogram  Loosely grouped microcalcifications in the mid to posterior third of the upper outer quadrant of the left breast unchanged.  Satellite architectural distortion at 6 o'clock position which is new compared to 7/8/2024.  This is 4 cm from the nipple and measures 1.5 x 1.1 x 1.6 cm.  This is also seen on the ultrasound and ultrasound-guided biopsy recommended.    1/27/2025-ultrasound-guided biopsy of the left breast mass  Pathology consistent with invasive ductal carcinoma, grade 2  Tumor measuring 5 mm on the core biopsy  Associated Ye grade ductal carcinoma in situ  Negative for lymphovascular space invasion  ER +91 to 100% strong  IA negative  HER2 1+ on immunohistochemistry, negative  Ki-67 30%    1/30/2025-bilateral breast MRI  Right breast-1:00, 10 cm from the nipple 3 cm non-mass enhancement which is suspicious.  11:00, 12 cm posterior to the nipple there is non-mass enhancement measuring 8.6 x 2.5 x 2.6 cm.  These are suspicious.  MRI guided biopsy recommended.    Left breast  6:00-anterior left breast-2.5 x 3.7 x 2.9 cm irregular enhancing mass with architectural distortion.  Segmental non-mass enhancement measuring 11.4 x 14.4 x 7.3 cm.  Anterior aspect of the non-mass enhancement extends  into the skin anterolateral to the left nipple.  There is relatively more focal asymmetric skin enhancement measuring up to 5.6 cm in maximum craniocaudal dimension which could also be due to recent biopsy or direct skin involvement.  No definite suspicious enhancement of the left nipple.  Asymmetric left axillary lymph nodes with cortical thickening.  Dominant lymph node measuring 2.2 x 1.2 x 1.9 cm.  This demonstrates a cortical thickening of 1.3 cm.  Suspicious for metastatic disease.    No internal mammary chain lymphadenopathy.    2/11/2025-Inv[facility]ae 9 gene stat panel - negative    2/12/2025-  MRI guided biopsies  1.right breast 1 o'clock position-atypical ductal hyperplasia with microcalcifications  Nonintact intraductal papilloma with ductal hyperplasia    2.right breast 11 o'clock position-lobular carcinoma in situ with atypical lobular hyperplasia    3.left axillary lymph node biopsy-benign reactive lymph node      3/7/2025-bilateral mastectomy with left sentinel lymph node biopsy  Left total mastectomy  Invasive ductal carcinoma, grade 2  Tumor measures 37 mm in maximum extension  Single focus of invasive carcinoma  Ductal carcinoma in situ present and measures 40 mm, grade 2  DCIS involves the nipples-Paget's disease  Lymphovascular invasion present  Margins negative for invasive as well as in situ carcinoma  3 sentinel lymph nodes evaluated  1 out of 3 sentinel lymph nodes with metastatic disease with the tumor measuring 6 mm and greater than 2 mm of extranodal extension.    pT2 N1a M0, stage II    ER +91 to 100% strong  WA negative  HER2 1+ immunohistochemistry  Ki-67 30%    Right breast simple skin sparing mastectomy  Scattered foci of ADH, ALH, SMOLD     3/17/2025-CT of the chest abdomen and pelvis  No evidence of metastatic disease in the chest abdomen and pelvis.  3 mm noncalcified nodule in the right upper lobe and 4 mm nodular opacity in the left upper lobe likely benign, follow-up CT in 12 months  recommended.  Previous cholecystectomy and appendectomy.    3/17/2025-bone scan without any evidence of metastatic disease.    Patient had to do IVF treatments for 2 years but subsequently conceived naturally after 2 miscarriages after IVF.    She is currently not on any form of contraception.  She is premenopausal and has regular menstrual cycles.    Denies any family history of breast cancer.  Her mother had thyroid cancer in her 30s or 40s.    No other significant comorbidities other than asthma which is well-controlled.    She currently works for the [employer] full-time.    Denies any recent changes in weight, cough, abdominal pain nausea vomiting.    Oncotype DX recurrence score has returned at 30.  There is a 12% risk of distant metastasis with AI or tamoxifen alone.  There is definite benefit from chemotherapy.    The drains have been removed.    The following portions of the patient's history were reviewed and updated as appropriate: allergies, current medications, past family history, past medical history, past social history, past surgical history, and problem list.    Past Medical History:   Diagnosis Date    Allergic rhinitis     Anxiety     Arthritis     Asthma     Breast cancer 1/27/25    Breast mass 1/20/25    Cancer     Breast    Chronic kidney disease     DDD (degenerative disc disease), lumbar     GERD (gastroesophageal reflux disease)     H/O HSV-2 infection     Hiatal hernia     History of back pain     History of gestational diabetes     History of gestational diabetes     History of miscarriage     Migraine     Tattoos         Past Surgical History:   Procedure Laterality Date    APPENDECTOMY      BREAST BIOPSY  1/27/25    Left    BREAST RECONSTRUCTION Bilateral 3/7/2025    Procedure: BILATERAL BREAST TISSUE EXPANDER INSERTION W/ CORTIVA AND ANTIBIOTIC DISK AND ADJACENT TISSUE TRANSFER;  Surgeon: Anderson Hill MD;  Location: [facility] OR [facility] [place];  Service: Plastics;  Laterality: Bilateral;      SECTION  2020    CHOLECYSTECTOMY      COLONOSCOPY N/A 2019    Procedure: COLONOSCOPY with biopsies;  Surgeon: Philip Winter MD;  Location:  [facility];  Service: Gastroenterology    DILATATION AND CURETTAGE  2020    ENDOSCOPY N/A 2019    Procedure: ESOPHAGOGASTRODUODENOSCOPY with biopsies and aspirate;  Surgeon: Philip Winter MD;  Location:  [facility];  Service: Gastroenterology    MASTECTOMY W/ SENTINEL NODE BIOPSY Left 3/7/2025    Procedure: bilateral skin sparing mastectomy, left sentinel lymph node biopsy, possible left axillary dissection;  Surgeon: Ruba Claros MD;  [place]  [facility] [place] [facility] [place]  Service: General;  Laterality: Left;    US GUIDED LYMPH NODE BIOPSY  2025    UTERINE FIBROID SURGERY          Family History   Adopted: Yes   Problem Relation Age of Onset    Thyroid disease Mother     Asthma Mother     Cancer Mother         Thyroid cancer. Removed thyroid.    No Known Problems Father     No Known Problems Son     Stroke Maternal Grandmother     Cirrhosis Maternal Grandmother     Anemia Maternal Grandmother     Depression Maternal Grandmother     Hypertension Maternal Grandmother     Mental illness Maternal Grandmother     Alcohol abuse Maternal Grandmother          of cirrhosis of the liver due to alcoholism    Arthritis Maternal Grandmother     No Known Problems Maternal Grandfather     Malig Hyperthermia Neg Hx         Social History     Socioeconomic History    Marital status:      Spouse name: Polo    Number of children: 1    Years of education: College   Tobacco Use    Smoking status: Former     Current packs/day: 0.00     Average packs/day: 0.3 packs/day for 21.9 years (5.6 ttl pk-yrs)     Types: Cigarettes     Start date: 1995     Quit date: 10/24/2013     Years since quittin.4     Passive exposure: Past    Smokeless tobacco: Never    Tobacco comments:     quit 10/2013   Vaping Use    Vaping status: Never Used  "  Substance and Sexual Activity    Alcohol use: Not Currently     Comment: I have maybe 2 beers a month or a glass a wine a month.    Drug use: Yes     Frequency: 7.0 times per week     Types: Marijuana     Comment: DAILY    Sexual activity: Yes     Partners: Male     Birth control/protection: None        OB History          1    Para   1    Term   1            AB        Living             SAB        IAB        Ectopic        Molar        Multiple        Live Births   1                 Allergies   Allergen Reactions    Doxycycline Hives     Could have been rocephin or tordol as well    Glue [Wound Dressing Adhesive] Hives     Had reaction after biopsy    [place] Itching     SOMETIMES HAS SOA    Gabapentin Mental Status Change            Review of Systems   Constitutional: Negative.    HENT: Negative.     Eyes: Negative.    Respiratory: Negative.     Cardiovascular: Negative.    Gastrointestinal: Negative.    Endocrine: Negative.    Genitourinary: Negative.    Musculoskeletal: Negative.    Allergic/Immunologic: Negative.    Neurological: Negative.    Hematological: Negative.    Psychiatric/Behavioral:  The patient is nervous/anxious.          Objective   Blood pressure 133/74, pulse 79, temperature 98 °F (36.7 °C), temperature source Oral, resp. rate 16, height 160 cm (62.99\"), weight 104 kg (229 lb 9.6 oz), SpO2 98%, not currently breastfeeding.   Physical Exam  Vitals reviewed.   Constitutional:       Appearance: Normal appearance. She is obese.   Eyes:      Conjunctiva/sclera: Conjunctivae normal.   Cardiovascular:      Rate and Rhythm: Normal rate.   Pulmonary:      Effort: Pulmonary effort is normal.   Skin:     General: Skin is warm.   Neurological:      General: No focal deficit present.      Mental Status: She is alert and oriented to person, place, and time.   Psychiatric:         Mood and Affect: Mood normal.         Behavior: Behavior normal.         Thought Content: Thought content normal.    "      Judgment: Judgment normal.     Breast Exam: Status post bilateral mastectomy with expanders in place.  Drains removed.    Lab on 03/19/2025   Component Date Value Ref Range Status    WBC 03/19/2025 13.80 (H)  3.40 - 10.80 10*3/mm3 Final    RBC 03/19/2025 3.94  3.77 - 5.28 10*6/mm3 Final    Hemoglobin 03/19/2025 12.4  12.0 - 15.9 g/dL Final    Hematocrit 03/19/2025 36.6  34.0 - 46.6 % Final    MCV 03/19/2025 92.9  79.0 - 97.0 fL Final    MCH 03/19/2025 31.5  26.6 - 33.0 pg Final    MCHC 03/19/2025 33.9  31.5 - 35.7 g/dL Final    RDW 03/19/2025 14.8  12.3 - 15.4 % Final    RDW-SD 03/19/2025 50.0  37.0 - 54.0 fl Final    MPV 03/19/2025 9.8  6.0 - 12.0 fL Final    Platelets 03/19/2025 393  140 - 450 10*3/mm3 Final    Neutrophil % 03/19/2025 72.2  42.7 - 76.0 % Final    Lymphocyte % 03/19/2025 14.3 (L)  19.6 - 45.3 % Final    Monocyte % 03/19/2025 4.0 (L)  5.0 - 12.0 % Final    Eosinophil % 03/19/2025 5.4  0.3 - 6.2 % Final    Basophil % 03/19/2025 0.3  0.0 - 1.5 % Final    Immature Grans % 03/19/2025 3.8 (H)  0.0 - 0.5 % Final    Neutrophils, Absolute 03/19/2025 9.95 (H)  1.70 - 7.00 10*3/mm3 Final    Lymphocytes, Absolute 03/19/2025 1.98  0.70 - 3.10 10*3/mm3 Final    Monocytes, Absolute 03/19/2025 0.55  0.10 - 0.90 10*3/mm3 Final    Eosinophils, Absolute 03/19/2025 0.75 (H)  0.00 - 0.40 10*3/mm3 Final    Basophils, Absolute 03/19/2025 0.04  0.00 - 0.20 10*3/mm3 Final    Immature Grans, Absolute 03/19/2025 0.53 (H)  0.00 - 0.05 10*3/mm3 Final    nRBC 03/19/2025 0.1  0.0 - 0.2 /100 WBC Final   Admission on 03/07/2025, Discharged on 03/07/2025   Component Date Value Ref Range Status    HCG, Urine, QL 03/07/2025 Negative (NEGATIVE)  Negative Final    Lot Number 03/07/2025 [id number]   Final    Internal Positive Control 03/07/2025 Positive  Positive, Passed Final    Internal Negative Control 03/07/2025 Negative  Negative, Passed Final    Expiration Date 03/07/2025 08/05/2026   Final    Addendum 2 03/07/2025    Final                     Value:Please see the completely scanned OncotypeDx report from [facility] below.       Addendum 03/07/2025    Final                    Value:A request for Oncotype Dx was received on 3-19-25 from Dr. Caldera.  The test is to be performed on tissue from case [id number].  The case report, slides, and blocks for the cited accession were retrieved from archives. The pathologist whose signature appears below reviewed the original pathology report, examined candidate H&E slides, and selected block 4J as being appropriate to the specifications of the ordered molecular analysis.  Block 4J was prepared and forwarded to [facility] where the subject molecular test will be performed.  An addendum report will be issued when the results of this molecular test are available.         CPT Code: 52169        Case Report 03/07/2025    Final                    Value:Surgical Pathology Report                         Case: [id number]                                  Authorizing Provider:  Ruba Claros MD      Collected:           03/07/2025 11:10 AM          Ordering Location:     [facility]  Received:            03/07/2025 11:31 AM                                 [facility] OR                                                                       Pathologist:           Cathy Robles MD                                                    Specimens:   1) - [place] Lymph Node, LEFT BREAST SENTINEL NODE NUMBER 1                                        2) - [place] Lymph Node, LEFT BREAST SENTINEL NODE NUMBER 2                                        3) - [place] Lymph Node, LEFT BREAST SENTINEL NODE NUMBER 3                                        4) - Breast, Left, LEFT BREAST STITCH AT 12 O'CLOCK                                                 5) - Breast, Right, RIGHT BREAST STITCH AT 12 O' CLOCK                                     Clinical Information 03/07/2025    Final                     Value:HOT BLUE 180    Final Diagnosis 03/07/2025    Final                    Value:1.  [place] lymph node #1, left axilla, excision:   -1 lymph node, negative for metastatic carcinoma (0/1).    2.  [place] lymph node #2, left axilla, excision:   -1 lymph node, negative for metastatic carcinoma (0/1).    3.  [place] lymph node #3, left axilla, excision:  -1 lymph node, positive for macrometastatic carcinoma measuring up to 6 mm predominantly within the lymph node capsule (1/1) (see comment).   -Extranodal extension is present measuring at least 8 mm.    4.  Left breast, oriented simple skin sparing mastectomy (2100 g): INVASIVE MAMMARY CARCINOMA, NO SPECIAL TYPE (INVASIVE DUCTAL CARCINOMA).   -Tumor size: 37 x 30 x 30 mm.   -Histologic grade: Shahid histologic score II (tubules = 3, nuclei = 2, mitosis = 1).  -Associated intermediate grade ductal carcinoma in situ (DCIS), solid type with associated calcifications present, spanning 4 contiguous slices, 40 mm maximally (negative EIC).  -All margins are free of DCIS and invasive carcinoma (closest 30 mm inferior,                           see synoptic template for remaining margins).   -DCIS involves the nipple tip (Paget's).   -Lymphovascular space invasion is present.   -Hormone receptors: ER % positive, WA negative, HER2 1+ negative, Ki67 30% ([id number]).   -Pathologic stage: pT2, N(sn)1a.    5.  Right breast, oriented simple skin sparing mastectomy (2025 g):  -Scattered foci of atypical ductal hyperplasia (ADH) with associated calcifications and atypical lobular hyperplasia (ALH).   -Benign skin and nipple with squamous metaplasia of lactiferous ducts (SMOLD).   -Biopsy site changes present and both clips retrieved.   -No definitive evidence of in situ nor invasive carcinoma identified.    [facility]      Synoptic Checklist 03/07/2025    Final                    Value:INVASIVE CARCINOMA OF THE BREAST: Resection                            INVASIVE CARCINOMA OF  THE BREAST: RESECTION - 1, 2, 3, 4                            8th Edition - Protocol posted: 6/19/2024                                                        SPECIMEN                               Procedure:    Total mastectomy                                Specimen Laterality:    Left                                                         TUMOR                               Tumor Site:    Clock position                                :    6 o'clock                              Histologic Type:    Invasive carcinoma of no special type (ductal)                              Histologic Grade ([place] Histologic Score):                                   Glandular (Acinar) / Tubular Differentiation:    Score 3                                Nuclear Pleomorphism:    Score 2                                Mitotic Rate:    Score 1                                Overall Grade:    Grade 2 (scores of 6 or 7)                              Tumor Size:    Greatest dimension of largest invasive focus (Millimeters): 37 mm                               Additional Dimension (Millimeters):    30 mm                               Additional Dimension (Millimeters):    30 mm                             Tumor Focality:    Single focus of invasive carcinoma                              Ductal Carcinoma In Situ (DCIS):    Present                                :    Negative for extensive intraductal component (EIC)                                Size (Extent) of DCIS:    Estimated size (extent) of DCIS is at least (Millimeters): 40 mm                               Architectural Patterns:    Solid                                Nuclear Grade:    Grade II (intermediate)                                Necrosis:    Present, focal (small foci or single cell necrosis)                              Lobular Carcinoma In Situ (LCIS):    Not identified                                Tumor Extent:    DCIS involves nipple epidermis (Paget disease of  the nipple)                              Lymphatic and / or Vascular Invasion:    Present                              Dermal Lymphatic and / or Vascular Invasion:    Not identified                              Microcalcifications:    Present in DCIS                              Treatment Effect in the Breast:    No known presurgical therapy                                                         MARGINS                             Margin Status for Invasive Carcinoma:    All margins negative for invasive carcinoma                                Distance from Invasive Carcinoma to Closest Margin:    30 mm                               Closest Margin(s) to Invasive Carcinoma:    Inferior                                Distance from Invasive Carcinoma to Anterior Margin:    30 mm                               Distance from Invasive Carcinoma to Posterior Margin:    70 mm                               Distance from Invasive Carcinoma to Superior Margin:    130 mm                               Distance from Invasive Carcinoma to Medial Margin:    50 mm                               Distance from Invasive Carcinoma to Lateral Margin:    190 mm                             Margin Status for DCIS:    All margins negative for DCIS                                Distance from DCIS to Closest Margin:    30 mm                               Closest Margin(s) to DCIS:    Inferior                                Distance from DCIS to Anterior Margin:    30 mm                               Distance from DCIS to Posterior Margin:    70 mm                               Distance from DCIS to Superior Margin:    130 mm                               Distance from DCIS to Medial Margin:    60 mm                               Distance from DCIS to Lateral Margin:    180 mm                                                        REGIONAL LYMPH NODES                             Regional Lymph Node Status:                                   :     Tumor present in regional lymph node(s)                                  Number of Lymph Nodes with Macrometastases:    1                                  Number of Lymph Nodes with Micrometastases:    0                                  Size of Largest [place] Metastatic Deposit:    6 mm                                 Extranodal Extension:    Present, greater than 2 mm                                Total Number of Lymph Nodes Examined (sentinel and non-sentinel):    3                                Number of [place] Nodes Examined:    3                                                         pTNM CLASSIFICATION (AJCC 8th Edition)                               Reporting of pT, pN, and (when applicable) pM categories is based on information available to the pathologist at the time the report is issued. As per the AJCC (Chapter 1, 8th Ed.) it is the managing physician's responsibility to establish the final pathologic stage based upon all pertinent information, including but potentially not limited to this pathology report.                             pT Category:    pT2                              pN Category:    pN1a                              N Suffix:    (sn)                                                         SPECIAL STUDIES                               Estrogen Receptor (ER) Status:    Positive (greater than 10% of cells demonstrate nuclear positivity)                                  Percentage of Cells with Nuclear Positivity:    %                                Progesterone Receptor (PgR) Status:    Negative                                HER2 (by immunohistochemistry):    Negative (Score 1+)                                Ki-67 Percentage of Positive Nuclei:    30 %                               Testing Performed on Case Number:    [id number]       Comment 03/07/2025    Final                    Value:The Paget's disease of the nipple is confined to the nipple tip without involvement of  "underlying skin.  AE1/AE3 is performed on sections of the lymph node from specimen 3 and highlight tumor cells within the lymph node and predominantly within the lymph node capsule and extensively extending out into the lymph node adipose tissue.  These tumor cells are noted on multiple sections in blocks 3A and 3B with the measurements noted as the greatest contiguous extent in a section. Multiple representative slides from parts 3, 4 and 5 of the this case were reviewed internally Dr. Dacosta, who concurred.       Intraoperative Consultation 03/07/2025    Final                    Value:I part 1 (left breast sentinel node #1)-1 frozen block and 1 touch prep submitted.  Frozen section diagnosis: 1 lymph node with no definitive tumor cells identified.  Results are called to Dr. Ange Claros by Dr. Berumen at 12:07 PM.    Part 2 (left breast sentinel node #2)-2 frozen blocks and 1 touch prep submitted.  Frozen section diagnosis: 1 lymph node with no definitive tumor cells identified.  Results are called to Dr. Ange Claros by Dr. Berumen at 12:07 PM.    Part 3 (left breast sentinel node #3)-2 frozen blocks and 1 touch prep submitted.  Frozen section diagnosis: 1 lymph node with macrometastasis measuring at least 10 mm in greatest dimension.  Results are called to Dr. nAge Claros by Dr. Berumen at 12:07 PM      Gross Description 03/07/2025    Final                    Value:1. [place] Lymph Node.  Received fresh for frozen section, labeled with the patient's name, and designated \"left breast sentinel node #1\" is a 3.0 x 2.3 x 1.5 cm aggregate of fatty yellow tissue.  Palpating the tissue reveals a 1.8 x 1.1 x 1.0 cm palpable nodule.  The nodule is serially sectioned through the short axis in 2 mm sections revealing a yellow-tan cut surface.  A touch prep is made and the entire nodule is frozen as 1 AFS.  The frozen section remnant is submitted as 1 AFS.    Mb/o/lawanda    2. [place] Lymph Node.  Received fresh for " "frozen section, labeled with the patient's name, and designated \"left breast sentinel node #2\" is an aggregate of yellow to red adipose tissue measuring 6.5 x 3.5 x 1.2 cm.  Palpating the tissue reveals a 3.6 x 2.0 x 1.2 cm palpable nodule.  The nodule is serially sectioned through the short axis in 2 mm slices revealing a yellow-tan to blue-green tinted cut surface.  A touch prep is made and the entire nodule is frozen as 2A-BFS.  The frozen                           section remnant is submitted as 2A-BFS.    Mb/uso/swm  3. [place] Lymph Node.  Received fresh for frozen section, labeled with the patient's name, and designated \"left breast sentinel node #3\" is an aggregate of yellow to red adipose tissue measuring 5.5 x 2.6 x 1.7 cm.  Palpating the tissue reveals an irregular lobulated nodule measuring 4.5 x 1.5 x 0.7 cm.  The nodule is serially sectioned through the short axis in 2 mm slices revealing a pale-tan to slightly green tinted cut surface.  A touch prep is made and the entire nodule is frozen as 3A-BFS.  The frozen section remnant is submitted as 3A-BFS.    Mb/uso/swm  4. Breast, Left.  Received fresh and subsequently placed in formalin, labeled \"left breast, stitch 12:00\" is an intact 2100 g, 25 (medial to lateral) x 28 (superior-inferior) x 10 (anterior-posterior) cm simple skin sparing mastectomy which is partially surfaced by a 29.5 x 10.5 cm tan skin ellipse.  The skin ellipse has an eccentric 5.0 x 5.0 cm tan and blue wrinkled                           areola.  The areola has a central 1.4 x 1.0 cm erect nipple.  The anterior margin is inked blue and the posterior margin is inked black.  There a minimal amount of skeletal muscle on the lateral/posterior aspect of the upper outer and lower outer quadrants.  The specimen is x-rayed by the [employer] machine to reveal a bowtie clip at 3:00, 3 cm from the nipple.  The specimen is serially sectioned from medial to lateral into 28 one cm slices to reveal " the bowtie clip is within slice #7 and is within an ill-defined tan-white gritty spiculated mass which measures 3 cm from superior to inferior, 3 cm from anterior to posterior, and 4 cm from medial to lateral.  The mass is within slices 7, 8, 9, and 10.  The mass comes to within 7 cm of the medial margin, 18 cm of the lateral margin, 3 cm of the inferior margin, 3 cm of the overlying skin, 7 cm of the posterior margin, and 13 cm of the superior margin.  The remaining breast parenchyma is comprised of 30% fibrous tissue.  Representative                           sections are submitted as follows:    4A- nipple tip perpendicularly bisected, slice #9  4B- nipple base cross-section  4C- section 1 cm medial to the mass, slice #6  4D- section 2 cm medial to the mass, slice #5  4E-random lower inner quadrant fibrous tissue, slice #4 (no margins included)  4F- random upper inner quadrant fibrous tissue, slice #4  4G- random lower inner quadrant fibrous tissue, slice #3 to include the inferior margin  4H- most medial aspect of the mass, slice #7  4I- mass, slice #7, bowtie clip retrieved, lower inner quadrant 3:00 breast, 3 cm from nipple  4J- section anterior and contiguous with the section in cassette 4I, slice #7, anterior border inked orange  4K- section posterior and contiguous with the section in cassette 4I, slice #7, posterior border inked orange  4L- posterior margin subjacent to the mass, slice #7  4M- section superior and contiguous with the mass, slice #7, superior border inked orange  4N- section inferior and contiguous to the mass, slice                           #7, inferior border inked orange  4O- mass, slice #8  4P- mass, slice #9  4Q- mass, slice #10, most lateral aspect  4R- section 1 cm lateral to the mass, slice #11  4S- section 2 cm lateral to the mass, slice #12  4T- random upper outer quadrant fibrous tissue, slice #12, 4 cm from the posterior margin  4U- section 3 cm lateral to the mass, slice #13,  "lower outer quadrant  4V- random upper outer quadrant fibrous tissue, slice #14 to include the anterior margin  4W- section 4 cm lateral to the mass, slice #14, lower outer quadrant  4X- section 5 cm lateral to the mass, slice #15, lower outer quadrant  4Y- random upper outer quadrant fibrous tissue, slice #15    Time collected-1108  Cold ischemia time-50 minutes  Total formalin fixation time-54 hours and 35 minutes    jap/uso/ravindram    5. Breast, Right.  Received fresh and subsequently placed in formalin, labeled \"right breast, stitch at 12:00\" is an intact 2025 g, 25 (medial to lateral) x 27 (superior-inferior) x 10 (anterior-posterior)                           cm simple skin sparing mastectomy which is partially surfaced by a 28.5 x 10.5 cm tan skin ellipse.  The skin ellipse has an eccentric 6.0 x 5.5 cm tan wrinkled areola.  The areola has a central 1.5 x 1.2 cm erect nipple.  The anterior margin is inked blue and the posterior margin is inked black.  There is no skeletal muscle on the posterior surface.  The specimen is x-rayed by the Faxitron machine to reveal 2 biopsy clips.  Clip #1 is stoplight shaped, 11:00, 17 cm from the nipple.  Clip #2 is Infinity shaped, 1:00, 15 cm from the nipple.  The specimen is serially sectioned from lateral to medial into 25 one cm slices and subsequently x-rayed by the Faxitron machine to reveal clip #1, stoplight, 11:00, 17 cm from the nipple is within slice #13 and is within an ill-defined tan-pink lobulated mass which grossly measures 1.5 cm from superior to inferior, 1 cm from anterior to posterior and 1 cm from medial to lateral.  The mass comes to within 0.5 cm of the anterior margin, 3.5 cm of the                           superior margin, 8.0 cm of the posterior margin, 13 cm of the lateral margin, and 12 cm of the medial margin.    The specimen is x-rayed by the Faxitron machine again to reveal that biopsy clip #2, Infinity shaped, and 1:00, 15 cm from the nipple is " within slice #22 and is within an ill-defined fibrous biopsy site measuring 1.8 cm from anterior to posterior, 2 cm from superior to inferior, and 1.0 cm from medial to lateral.  Biopsy site #2 comes to within 2 cm of the anterior margin, 8.5 cm of the superior margin, 12 cm of the inferior margin, 4.5 cm the posterior margin, 22 cm of the lateral margin, and 3 cm of the medial margin.  Biopsy site #2 lies 9 cm medial to biopsy site #1.    The breast parenchyma is comprised of 30% fibrous tissue.  Representative sections are submitted as follows:    5A- nipple tip perpendicularly bisected, slice #17  5B- nipple base cross-section  5C- biopsy site #1, slice #13, most lateral aspect to include the anterior margin, 11:00 breast  5D- remainder                           of biopsy site #1, slice #13, stoplight clip retrieved, 11:00, 17 cm from the nipple, to include the anterior margin (most medial aspect)  5E- section superior and contiguous with biopsy site #1, slice #13, superior border inked orange (includes the anterior margin)  5F- superior margin superior contiguous with the section in cassette 5E, slice #13 (inked blue)  5G- section inferior and contiguous with biopsy site #1, slice #13, inferior border inked orange (includes the anterior margin)  5H- section posterior and contiguous biopsy site #1, slice #13, posterior border inked orange  5I- posterior margin subjacent to biopsy site #1, slice #13  5J- section 1 cm lateral to biopsy site #1, slice #12 to include the anterior margin  5K- section 2 cm lateral to biopsy site #1, slice #11, upper outer quadrant to include the anterior margin  5L- section 3 cm lateral to biopsy site #1, slice number tan, upper outer quadrant to include the anterior margin  5M- random upper outer quadrant fibrous                           tissue, slice #9  5N- random lower outer quadrant fibrous tissue, slice #9  5O- random upper outer quadrant fibrous tissue, slice #8  5P- random  upper outer quadrant fibrous tissue, slice #7  5Q- section 1 cm medial to biopsy site #1, slice #14 to include the anterior margin  5R- section 2 cm medial to biopsy site #1, slice #15 to include the anterior margin  5S-most lateral aspect of biopsy site #2, slice #22, 1:00 breast  5T- remainder biopsy site #2, slice #22, Infinity clip retrieved, 1:00, 15 cm from the nipple, most medial aspect  5U- anterior margin anterior and contiguous with biopsy site #2, slice #22  5V- section posterior contiguous with biopsy site #2, slice #22, posterior border inked orange  5W- posterior margin subjacent to biopsy site #2, slice #22  5X- section 1 cm medial to biopsy site #2, slice #23  5Y- section 1 cm lateral to biopsy site #2, slice #21  5Z- upper inner quadrant, slice #20, fibrous tissue  5AA- upper inner quadrant fibrous tissue, slice #19  5 BB-                           upper inner quadrant fibrous tissue, slice #18  5 CC- 12:00 fibrous tissue, slice #17  5 DD- upper outer quadrant fibrous tissue, slice #16, 1:00 breast  5 EE- random lower inner quadrant fibrous tissue, slice #19  5 FF- random upper inner quadrant fibrous tissue, slice #20    Time collected-1227  Cold ischemia time-10 minutes  Total formalin fixation time-54 hours    jap/uso/ravindram      Special Stains 03/07/2025    Final                    Value:Utilizing appropriate controls, multiple immunohistochemical stains are performed including AE1/AE3 on specimen 3, CK5/6, CK7 and S100 on specimen for, p63 on specimen for and myosin, p63, p120 and E-cadherin on specimen 5.  AE1/AE3 is performed on sections of the lymph node from specimen 3 and highlight tumor cells within the lymph node and predominantly within the lymph node capsule and extensively extending out into the lymph node adipose tissue.  CK5/6, CK7 and S100 are performed on specimen for on a section of nipple and highlight CK7 positive tumor cells within the nipple epidermis which are CK5/6 negative and  "S100 negative, supporting Paget's disease of the nipple.  P63 immunostain is performed on 2 tissue blocks from specimen for and highlights an intact myoepithelial layer around foci of DCIS.  P63 and myosin are performed on block 5D and highlight an intact myoepithelial layer around benign ducts and atypical hyperplasia.  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      Microscopic Description 03/07/2025    Final                    Value:Unless \"gross only\" is specified, the final diagnosis for each specimen is based on microscopic examination of tissue.      Glucose 03/07/2025 180 (H)  70 - 130 mg/dL Final        CT Chest With Contrast Diagnostic  Result Date: 3/18/2025  1. No convincing evidence of metastatic disease to the chest, abdomen, or pelvis in patient with recent bilateral mastectomies with implant reconstruction. Bilateral chest drains. Seroma lateral to the left implant. 2. 3 mm noncalcified nodule in right upper lobe and 4 mm nodular opacity in posterior medial left upper lobe, likely benign and these can be followed with chest CT in 12 months. 3. Previous cholecystectomy and appendectomy.     Radiation dose reduction techniques were utilized, including automated exposure control and exposure modulation based on body size.   This report was finalized on 3/18/2025 3:48 PM by Tyson Cohen M.D on Workstation: [id number]      CT Abdomen Pelvis With Contrast  Result Date: 3/18/2025  1. No convincing evidence of metastatic disease to the chest, abdomen, or pelvis in patient with recent bilateral mastectomies with implant reconstruction. Bilateral chest drains. Seroma lateral to the left implant. 2. 3 mm noncalcified nodule in right upper lobe and 4 mm nodular opacity in posterior medial left upper lobe, likely benign and these can be followed with chest CT in 12 months. 3. Previous " cholecystectomy and appendectomy.     Radiation dose reduction techniques were utilized, including automated exposure control and exposure modulation based on body size.   This report was finalized on 3/18/2025 3:48 PM by Tyson Cohen M.D on Workstation: [id number]      NM Bone Scan Whole Body  Result Date: 3/18/2025  No scintigraphic evidence of osseous metastatic disease.   This report was finalized on 3/18/2025 10:55 AM by Dr. Dorian Angel M.D on Workstation: [id number]           Assessment & Plan       *Left breast invasive ductal carcinoma, ER +91 to 100%, SC negative, HER2 1+ on immunohistochemistry, Ki-67 30%, grade 2  Patient to status post bilateral mastectomy on 3/7/2025.  Pathology consistent with 37 mm invasive ductal carcinoma, grade 2, ER +91 to 100% strong, SC negative, HER2 1+ immunohistochemistry, Ki-67 30%  pT2 N1a M0, prognostic stage IIb, anatomic stage IIb  1 out of 3 sentinel lymph nodes positive for metastatic disease with 6 mm of tumor and greater than 2 mm of extranodal extension  Lymphovascular invasion present   3/17/2025-CT of the chest abdomen and pelvis and bone scan without any evidence of metastatic disease  Oncotype DX recurrence score has returned at 30 with a definite benefit from chemotherapy.  We reviewed the 2 adjuvant chemotherapy regimens used to treat breast cancer including Taxotere and Cytoxan versus Adriamycin and Cytoxan followed by Taxol.  Reviewed the side effects of both regimens including but not limited to myelosuppression, increased risk of infections, nausea, vomiting, neuropathy, alopecia, fatigue, altered taste, ovarian dysfunction, cardiotoxicity, neurotoxicity.  Patient would like to proceed with Taxotere and Cytoxan.  Schedule chemotherapy education  She would like to proceed with the port placement.  She will be referred to radiation oncology.  Following completion of chemotherapy she will have to be on adjuvant endocrine therapy along with CDK  4 6 inhibitors.    *Asthma  Well-controlled at this time    *Bilateral breast erythema  She is currently on Bactrim from Dr. Hill.  Continue the same  Close follow-up with plastic surgery  Drains have been removed.    *Obesity  BMI 41.1.  Obesity increase the risk of breast cancer and other comorbidities.  Once she is able to recommend lifestyle changes    *Contraception  Patient is currently not on any form of contraception.  Recommend that she consider nonhormonal IUD  Counseled her regarding possible future treatments and advised her against pregnancy at least for the first 2 years of diagnosis.    *Follow-up-chemotherapy education  Cycle 1 day 8 of Taxotere and Cytoxan    52 minutes total spent on the encounter on the same day

## 2025-04-02 ENCOUNTER — PREP FOR SURGERY (OUTPATIENT)
Dept: OTHER | Facility: HOSPITAL | Age: 45
End: 2025-04-02
Payer: COMMERCIAL

## 2025-04-02 DIAGNOSIS — C50.919 MALIGNANT NEOPLASM OF FEMALE BREAST, UNSPECIFIED ESTROGEN RECEPTOR STATUS, UNSPECIFIED LATERALITY, UNSPECIFIED SITE OF BREAST: Primary | ICD-10-CM

## 2025-04-03 ENCOUNTER — OFFICE VISIT (OUTPATIENT)
Dept: OBSTETRICS AND GYNECOLOGY | Age: 45
End: 2025-04-03
Payer: COMMERCIAL

## 2025-04-03 VITALS
BODY MASS INDEX: 39.87 KG/M2 | SYSTOLIC BLOOD PRESSURE: 128 MMHG | WEIGHT: 225 LBS | DIASTOLIC BLOOD PRESSURE: 80 MMHG | HEIGHT: 63 IN

## 2025-04-03 DIAGNOSIS — Z12.31 SCREENING MAMMOGRAM FOR BREAST CANCER: ICD-10-CM

## 2025-04-03 DIAGNOSIS — Z12.4 SCREENING FOR MALIGNANT NEOPLASM OF CERVIX: ICD-10-CM

## 2025-04-03 DIAGNOSIS — Z11.51 SCREENING FOR HUMAN PAPILLOMAVIRUS (HPV): ICD-10-CM

## 2025-04-03 DIAGNOSIS — Z01.419 WELL FEMALE EXAM WITH ROUTINE GYNECOLOGICAL EXAM: Primary | ICD-10-CM

## 2025-04-03 PROBLEM — O13.3 PREGNANCY-INDUCED HYPERTENSION IN THIRD TRIMESTER: Status: RESOLVED | Noted: 2020-11-23 | Resolved: 2025-04-03

## 2025-04-03 PROBLEM — O26.899 RH NEGATIVE, ANTEPARTUM: Status: RESOLVED | Noted: 2020-01-24 | Resolved: 2025-04-03

## 2025-04-03 PROBLEM — Z67.91 RH NEGATIVE, ANTEPARTUM: Status: RESOLVED | Noted: 2020-01-24 | Resolved: 2025-04-03

## 2025-04-03 RX ORDER — SULFAMETHOXAZOLE AND TRIMETHOPRIM 200; 40 MG/5ML; MG/5ML
SUSPENSION ORAL 2 TIMES DAILY
COMMUNITY
End: 2025-04-07

## 2025-04-03 NOTE — PROGRESS NOTES
Subjective     Chief Complaint   Patient presents with    Gynecologic Exam     New Gyn discuss IUD        History of Present Illness    Mona Castillo is a 44 y.o.  who presents for annual exam.  Patient is a new patient to me.  Patient had left-sided breast cancer.  It was found to be invasive with positive lymph node.  She has had a bilateral mastectomy.  She is her tissue expanders in and her chest is bandaged.  She is starting her chemotherapy soon.  She was referred by oncology to discuss contraception.  She reports that her  had struggles with infertility.  He has erectile dysfunction.  She reports that they rarely have sex maybe once a year.  They do not use any contraception.  Her cycles were regular but this last 1 has been delayed she thinks from the stress of her cancer.  Patient has had a prior .  She had hysteroscopically removed fibroid when she was undergoing fertility treatments.  Her cycles do not bother her.  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  She is trying to change her diet and stop eating a lot of fried foods.  She does not do any exercise outside of work.  She is here to discuss forms of contraception.  Oncology had recommended a ParaGard IUD.  Patient is going to be starting Lupron soon.   Her menses are irregular, lasting  4 days light to moderate  , dysmenorrhea mild, occurring first 1-2 days of flow   Obstetric History:  OB History          3    Para   1    Term   1            AB   2    Living   1         SAB   2    IAB        Ectopic        Molar        Multiple        Live Births   1               Menstrual History:     Patient's last menstrual period was 2025.         Current contraception: none  History of abnormal Pap smear: no  Received Gardasil immunization: no  Perform regular self breast exam : yes  Family history of uterine or ovarian cancer: no  Family History of colon cancer: no  Family  "history of breast cancer: no    Mammogram: up to date.  Colonoscopy: up to date.  DEXA: not indicated.    Exercise: no but active job       The following portions of the patient's history were reviewed and updated as appropriate: allergies, current medications, past family history, past medical history, past social history, past surgical history, and problem list.    Review of Systems        Objective   Physical Exam    /80   Ht 160 cm (63\")   Wt 102 kg (225 lb)   LMP 02/24/2025   BMI 39.86 kg/m²     General:  Patient is very pleasant.  She is in no distress.   Neck:    Heart: regular rate and rhythm   Lungs: clear to auscultation bilaterally   Abdomen: soft, non-tender, without masses or organomegaly   Breast: inspection negative, no nipple discharge or bleeding, no masses or nodularity palpable   Vulva: normal, Bartholin's, Urethra, [place]'s normal   Vagina: normal mucosa, normal discharge   Cervix: no cervical motion tenderness and no lesions   Uterus: non-tender, difficult to evaluate due to body habitus   Adnexa: no mass, fullness, tenderness   Rectal: not indicated     Assessment & Plan   Diagnoses and all orders for this visit:    1. Well female exam with routine gynecological exam (Primary)  -     IGP, Apt HPV,rfx 16 / 18,45    2. Screening for human papillomavirus (HPV)  -     IGP, Apt HPV,rfx 16 / 18,45    3. Screening for malignant neoplasm of cervix  -     IGP, Apt HPV,rfx 16 / 18,45    4. Screening mammogram for breast cancer  -     Mammo Screening Digital Tomosynthesis Bilateral With CAD; Future  -     Mammo Screening Digital Tomosynthesis Bilateral With CAD; Future    Patient had breast cancer.  She will be starting Lupron.  She was referred for possible ParaGard IUD.  She does report that her and her  are rarely sexually active.  We discussed nonhormonal options including vasectomy, bilateral salpingectomy, barrier methods like condoms and the ParaGard IUD.  ParaGard IUD was discussed " in detail with diagrams and written information was given.  Patient would like to talk to her  and let me know.    We did also discuss the option of bilateral salpingo-oophorectomy which would be contraception and lower the risk for recurrent breast cancer.  Patient has thought about that also but feels like she does not want to go that route due to it causing immediate surgical menopause which would be not reversible.    Pap was done today    All questions answered.  Breast self exam technique reviewed and patient encouraged to perform self-exam monthly.  Discussed healthy lifestyle modifications.  Recommended 30 minutes of aerobic exercise five times per week.  Discussed calcium needs to prevent osteoporosis.

## 2025-04-04 ENCOUNTER — OFFICE VISIT (OUTPATIENT)
Dept: ONCOLOGY | Facility: CLINIC | Age: 45
End: 2025-04-04
Payer: COMMERCIAL

## 2025-04-04 VITALS
TEMPERATURE: 98.1 F | RESPIRATION RATE: 16 BRPM | HEART RATE: 89 BPM | DIASTOLIC BLOOD PRESSURE: 77 MMHG | HEIGHT: 63 IN | WEIGHT: 230.6 LBS | SYSTOLIC BLOOD PRESSURE: 125 MMHG | OXYGEN SATURATION: 98 % | BODY MASS INDEX: 40.86 KG/M2

## 2025-04-04 DIAGNOSIS — Z17.0 MALIGNANT NEOPLASM OF LOWER-INNER QUADRANT OF LEFT BREAST IN FEMALE, ESTROGEN RECEPTOR POSITIVE: Primary | ICD-10-CM

## 2025-04-04 DIAGNOSIS — C50.312 MALIGNANT NEOPLASM OF LOWER-INNER QUADRANT OF LEFT BREAST IN FEMALE, ESTROGEN RECEPTOR POSITIVE: Primary | ICD-10-CM

## 2025-04-04 RX ORDER — LIDOCAINE AND PRILOCAINE 25; 25 MG/G; MG/G
1 CREAM TOPICAL TAKE AS DIRECTED
Qty: 30 G | Refills: 5 | Status: SHIPPED | OUTPATIENT
Start: 2025-04-04

## 2025-04-04 RX ORDER — DEXAMETHASONE 4 MG/1
TABLET ORAL
Qty: 12 TABLET | Refills: 3 | Status: SHIPPED | OUTPATIENT
Start: 2025-04-04

## 2025-04-04 RX ORDER — ONDANSETRON 8 MG/1
8 TABLET, FILM COATED ORAL 3 TIMES DAILY PRN
Qty: 30 TABLET | Refills: 5 | Status: SHIPPED | OUTPATIENT
Start: 2025-04-04

## 2025-04-04 NOTE — PROGRESS NOTES
[facility] Hematology/Oncology Treatment Plan Summary    Name: Mona Castillo  [facility]# [id number]  MD: Dr. Caldera    Diagnosis:     ICD-10-CM ICD-9-CM   1. Malignant neoplasm of lower-inner quadrant of left breast in female, estrogen receptor positive  C50.312 174.3    Z17.0 V86.0     Goal of treatment: adjuvant    Treatment Medication(s):   Docetaxel (Taxotere)  Cyclophosphamide     Frequency: Every 21 days     Number of cycles: 4    Starting on: 4/14/2025    Items for home use: Senokot-S (for constipation), Milk of Magnesia (for constipation), Imodium AD (for diarrhea), Tylenol (for fever and/or pain), and Thermometer    Rx written for: [] Nausea    [] Pre-Treatment   dexamethasone 4 mg by mouth twice daily on the day before, day of, and day after treatment, EMLA cream to port site 30 minutes prior to access, and ondansetron 8 mg by mouth every 8 hours as needed for nausea    Notes:     Completing Provider: LEYLA Saldana           Date/time: 04/04/2025      Please note: You will be seen by a provider frequently with your treatment plan. This plan may change depending on many factors, if so, this will be discussed with you by your physician.  Last update 03/2022.

## 2025-04-04 NOTE — PROGRESS NOTES
TREATMENT  PREPARATION    Mona Castillo  [id number]  1980    Chief Complaint: Treatment preparation and needs assessment    History of present illness:  Mona Castillo is a 44 y.o. year old female who is here today for treatment preparation and needs assessment.  The patient has been diagnosed with   Encounter Diagnosis   Name Primary?    Malignant neoplasm of lower-inner quadrant of left breast in female, estrogen receptor positive Yes    and is scheduled to begin treatment with:     Oncology History:    Oncology/Hematology History   Breast cancer   2/7/2025 Initial Diagnosis    Breast cancer     4/11/2025 -  Chemotherapy    OP BREAST TC DOCEtaxel / Cyclophosphamide         The current medication list and allergy list were reviewed and reconciled.     Past Medical History, Past Surgical History, Social History, Family History have been reviewed and are without significant changes except as mentioned.    Physical Exam:    Vitals:    04/04/25 0758   BP: 125/77   Pulse: 89   Resp: 16   Temp: 98.1 °F (36.7 °C)   SpO2: 98%     Vitals:    04/04/25 0758   PainSc: 0-No pain  Comment: Sharp jolt of pain when she moves, at her incision site on right breast   PainLoc: Breast        ECOG score: 0         Physical Exam      NEEDS ASSESSMENTS    Psychosocial and Barriers to care  The patient has completed a PHQ-9 Depression Screening and the Distress Thermometer (DT) today.  PHQ-9 results show PHQ-2 Total Score: 2 PHQ-9 Total Score:        The patient scored their distress today as Distress Level: 4 on a scale of 0-10 with 0 being no distress and 10 being extreme distress. Problems marked by the patient as being an issue for them within the last week include Practical Problems  : No  Housing/Utilities: No  Transportation: No  Treatment decisions: No  Family Concerns  Ability to have children: No  Physical concerns  Fatigue: No  Pain: No  Sleep: Yes  Substance abuse: No .      Results were reviewed along  "with psychosocial resources offered by our cancer center.  Our Supportive Oncology team will be flagged for a score of 4 or above, and a same day call will be made for a score of 9 or 10.  A mental health referral is offered at that time. Patients who score less than 4 have been educated on our support services and can be referred to our  upon request.  The patient will not be referred to our .     Nutrition  The patient has completed the malnutrition screening today. They scored Malnutrition Screening Tool  Have you recently lost weight without trying?  If yes, how much weight have you lost?: 0--> No  Have you been eating poorly because of a decreased appetite?: 0--> No  MST score: 0   with a score of 0-1 meaning not at risk in a score of 2 or greater meaning at risk.  Patients with a score of 3 or higher will be referred to our oncology dietitian for support. Patients beginning at risk treatment regimens or who have dietary concerns will also be referred to our oncology dietitian. The patient will not be referred.    Intravenous Access Assessment  The patient and I discussed planned intravenous chemo/biotherapy as well as other IV treatments that are often needed throughout the course of treatment. These may include, but are not limited to blood transfusions, antibiotics, and IV hydration. Discussed that depending on selected treatment and vein assessment, patient may require venous access device (VAD) which could include but not limited to a Mediport or PICC line. Risks and benefits of VADs reviewed. The patient will be treated via Port.    Advanced Care Planning  Advance Care Planning   The patient and I discussed advanced care planning, \"Conversations that Matter\".   This service is offered for development of advance directives with a certified ACP facilitator.  The patient does not have an up-to-date advanced directive. This document is not on file with our office. The patient is not " interested in an appointment with one of our facilitators to create or update their advanced directives.    Have you reviewed your Advance Directive and is it valid for this stay?: Not applicable          Smoking cessation  Tobacco Use: Medium Risk (4/3/2025)    Patient History     Smoking Tobacco Use: Former     Smokeless Tobacco Use: Never     Passive Exposure: Past       Survivorship   When appropriate, we discussed that we will refer the patient to survivorship clinic to discuss next steps following completion of planned treatment.  Reviewed this visit will include assessment of your physical, psychological, functional, and spiritual needs as a survivor and the need at attend this visit when scheduled.    TREATMENT EDUCATION    Today I met with the patient to discuss the chemo/biotherapy regimen recommended for treatment of Malignant neoplasm of lower-inner quadrant of left breast in female, estrogen receptor positive  .  The patient was given explanation of treatment premed side effects including office policy that prohibits patients to drive if sedating medications are administered, MD explanation given regarding benefits, side effects, toxicities and goals of treatment.  The patient received a Chemotherapy/Biotherapy Plan Summary including diagnosis and explanation of specific treatment plan.    SIDE EFFECTS:  Common side effects were discussed with the patient and/or significant other.  Discussion included where applicable hair loss/discoloration, anemia/fatigue, infection/chills/fever, appetite, bleeding risk/precautions, constipation, diarrhea, mouth sores, taste alteration, loss of appetite, nausea/vomiting, peripheral neuropathy, skin/nail changes, rash, muscle aches/weakness, photosensitivity, weight gain/loss, hearing loss, dizziness, menopausal symptoms, menstrual irregularity, sterility, high blood pressure, heart damage, liver damage, lung damage, kidney damage, DVT/PE risk, fluid retention,  pleural/pericardial effusion, somnolence, electrolyte/LFT imbalance, vein exercises and/or the possible need for vascular access/port placement.  The patient was advised that although uncommon, leakage of an infused medication from the vein or venous access device may lead to skin breakdown and/or other tissue damage.  The patient was advised that he/she may have pain, bleeding, and/or bruising from the insertion of a needle in their vein or venous access device (port).  The patient was further advised that, in spite of proper technique, infection with redness and irritation may rarely occur at the site where the needle was inserted.  The patient was advised that if complications occur, additional medical treatment is available.  Finally, where applicable we have reviewed rare but potential immune mediated side effects including shortness of breath, cough, chest pain (pneumonitis), abdominal pain, diarrhea (colitis), thyroiditis (hypothyroid or hyperthyroid), hepatitis and liver dysfunction, nephritis and renal dysfunction.    Discussion also included side effects specific to drugs in the treatment plan, specifically:    Treatment Plans       Name Type Plan Dates Plan Provider         Active    OP BREAST TC DOCEtaxel / Cyclophosphamide ONCOLOGY TREATMENT 4/9/2025 - Present Kate Caldera MD                      Questions answered and additional information discussed on topics including:  Anemia, Thrombocytopenia, Neutropenia, Nutrition and appetite changes, Constipation, Diarrhea, Nausea & vomiting, Nervous system changes, Pain, and Skin & nail changes       Assessment and Plan:    Diagnoses and all orders for this visit:    1. Malignant neoplasm of lower-inner quadrant of left breast in female, estrogen receptor positive (Primary)      No orders of the defined types were placed in this encounter.      The patient and I have reviewed their diagnosis and scheduled treatment plan. Needs assessment was completed  where applicable including genetics, psychosocial needs, barriers to care, VAD evaluation, advanced care planning, survivorship, and palliative care services where indicated. Referrals have been ordered as appropriate based upon evaluation today and patient desires.   Chemo/biotherapy teaching was completed today and consent obtained. See separate documentation for further details.  Adequate time was given to answer questions.  Patient made aware of their care team members and contact information if they have questions or problems throughout the treatment course.  Discussion held and written information provided describing frequency of office visits and ongoing monitoring throughout the treatment plan.     Reviewed with patient any prescribed medication sent to pharmacy.  Education provided regarding proper storage, safe handling, and proper disposal of unused medication.  Proper handling of body fluids and waste discussed and written information provided.  If appropriate, patient had pretreatment labs drawn today.    Learning assessment completed at initial patient encounter. See separate flowsheet. Chemo/biotherapy education comprehension assessed at today's visit.    I spent 60 minutes caring for Mona on this date of service. This time includes time spent by me in the following activities: preparing for the visit, reviewing tests, obtaining and/or reviewing a separately obtained history, performing a medically appropriate examination and/or evaluation, counseling and educating the patient/family/caregiver, ordering medications, tests, or procedures, referring and communicating with other health care professionals, documenting information in the medical record, independently interpreting results and communicating that information with the patient/family/caregiver, and care coordination.     Ewa Correia, APRN   04/04/25

## 2025-04-07 ENCOUNTER — TELEPHONE (OUTPATIENT)
Dept: ONCOLOGY | Facility: CLINIC | Age: 45
End: 2025-04-07
Payer: COMMERCIAL

## 2025-04-07 ENCOUNTER — PRE-ADMISSION TESTING (OUTPATIENT)
Dept: PREADMISSION TESTING | Facility: HOSPITAL | Age: 45
End: 2025-04-07
Payer: COMMERCIAL

## 2025-04-07 VITALS
RESPIRATION RATE: 16 BRPM | OXYGEN SATURATION: 100 % | BODY MASS INDEX: 41.3 KG/M2 | SYSTOLIC BLOOD PRESSURE: 115 MMHG | HEIGHT: 63 IN | DIASTOLIC BLOOD PRESSURE: 79 MMHG | WEIGHT: 233.1 LBS | HEART RATE: 94 BPM | TEMPERATURE: 97.7 F

## 2025-04-07 LAB
ANION GAP SERPL CALCULATED.3IONS-SCNC: 10.3 MMOL/L (ref 5–15)
BUN SERPL-MCNC: 8 MG/DL (ref 6–20)
BUN/CREAT SERPL: 8.6 (ref 7–25)
CALCIUM SPEC-SCNC: 9.4 MG/DL (ref 8.6–10.5)
CHLORIDE SERPL-SCNC: 106 MMOL/L (ref 98–107)
CO2 SERPL-SCNC: 26.7 MMOL/L (ref 22–29)
CREAT SERPL-MCNC: 0.93 MG/DL (ref 0.57–1)
DEPRECATED RDW RBC AUTO: 51.6 FL (ref 37–54)
EGFRCR SERPLBLD CKD-EPI 2021: 78.4 ML/MIN/1.73
ERYTHROCYTE [DISTWIDTH] IN BLOOD BY AUTOMATED COUNT: 14.4 % (ref 12.3–15.4)
GLUCOSE SERPL-MCNC: 99 MG/DL (ref 65–99)
HCT VFR BLD AUTO: 38.8 % (ref 34–46.6)
HGB BLD-MCNC: 12.6 G/DL (ref 12–15.9)
MCH RBC QN AUTO: 31.4 PG (ref 26.6–33)
MCHC RBC AUTO-ENTMCNC: 32.5 G/DL (ref 31.5–35.7)
MCV RBC AUTO: 96.8 FL (ref 79–97)
PLATELET # BLD AUTO: 355 10*3/MM3 (ref 140–450)
PMV BLD AUTO: 9.8 FL (ref 6–12)
POTASSIUM SERPL-SCNC: 3.9 MMOL/L (ref 3.5–5.2)
RBC # BLD AUTO: 4.01 10*6/MM3 (ref 3.77–5.28)
SODIUM SERPL-SCNC: 143 MMOL/L (ref 136–145)
WBC NRBC COR # BLD AUTO: 6.87 10*3/MM3 (ref 3.4–10.8)

## 2025-04-07 PROCEDURE — 85027 COMPLETE CBC AUTOMATED: CPT

## 2025-04-07 PROCEDURE — 80048 BASIC METABOLIC PNL TOTAL CA: CPT

## 2025-04-07 PROCEDURE — 36415 COLL VENOUS BLD VENIPUNCTURE: CPT

## 2025-04-07 NOTE — DISCHARGE INSTRUCTIONS
Take the following medications the morning of surgery: INHALERS, BRING INHALERS, FAMOTIDINE/PEPCID      If you are on prescription narcotic pain medication to control your pain you may also take that medication the morning of surgery.      General Instructions:     Do not eat solid food after midnight the night before surgery.  Clear liquids day of surgery are allowed but must be stopped at least two hours before your hospital arrival time.       Allowed clear liquids      Water, sodas, and tea or coffee with no cream or milk added.       12 to 20 ounces of a clear liquid that contains carbohydrates is recommended.  If non-diabetic, have Gatorade or Powerade.  If diabetic, have G2 or Powerade Zero.     Do not have liquids red in color.  Do not consume chicken, beef, pork or vegetable broth or bouillon cubes of any variety as they are not considered clear liquids and are not allowed.    Patients who avoid smoking, chewing tobacco and alcohol for 4 weeks prior to surgery have a reduced risk of post-operative complications.  Quit smoking as many days before surgery as you can.  Do not smoke, use chewing tobacco or drink alcohol the day of surgery.   If applicable bring your C-PAP/ BI-PAP machine in with you to preop day of surgery.  Bring any papers given to you in the doctor’s office.  Wear clean comfortable clothes.  Do not wear contact lenses, false eyelashes or make-up.  Bring a case for your glasses.   Bring crutches or walker if applicable.  Remove all piercings.  Leave jewelry and any other valuables at home.  Hair extensions with metal clips must be removed prior to surgery.  The Pre-Admission Testing nurse will instruct you to bring medications if unable to obtain an accurate list in Pre-Admission Testing.    Day of surgery you will need to let the preoperative nurse know the last time you took each of your medications.  To ensure a safe environment for patients and staff, we kindly ask that children under the  age of 16 not accompany patients.  If you must bring a dependent child or dependent adult please ensure a responsible adult, other than yourself, is present to supervise them.        Preventing a Surgical Site Infection:  For 2 to 3 days before surgery, avoid shaving with a razor because the razor can irritate skin and make it easier to develop an infection.    Any areas of open skin can increase the risk of a post-operative wound infection by allowing bacteria to enter and travel throughout the body.  Notify your surgeon if you have any skin wounds / rashes even if it is not near the expected surgical site.  The area will need assessed to determine if surgery should be delayed until it is healed.  The night prior to surgery shower using a fresh bar of anti-bacterial soap (such as Dial) and clean washcloth.  Sleep in a clean bed with clean clothing.  Do not allow pets to sleep with you.  Shower on the morning of surgery using a fresh bar of anti-bacterial soap (such as Dial) and clean washcloth.  Dry with a clean towel and dress in clean clothing.  Ask your surgeon if you will be receiving antibiotics prior to surgery.  Make sure you, your family, and all healthcare providers clean their hands with soap and water or an alcohol based hand  before caring for you or your wound.  CHLORHEXIDINE CLOTH INSTRUCTIONS  The morning of surgery follow these instructions using the Chlorhexidine cloths you've been given.  These steps reduce bacteria on the body.  Do not use the cloths near your eyes, ears mouth, genitalia or on open wounds.  Throw the cloths away after use but do not try to flush them down a toilet.      Open and remove one cloth at a time from the package.    Leave the cloth unfolded and begin the bathing.  Massage the skin with the cloths using gentle pressure to remove bacteria.  Do not scrub harshly.   Follow the steps below with one 2% CHG cloth per area (6 total cloths).  One cloth for neck,  shoulders and chest.  One cloth for both arms, hands, fingers and underarms (do underarms last).  One cloth for the abdomen followed by groin.  One cloth for right leg and foot including between the toes.  One cloth for left leg and foot including between the toes.  The last cloth is to be used for the back of the neck, back and buttocks.    Allow the CHG to air dry 3 minutes on the skin which will give it time to work and decrease the chance of irritation.  The skin may feel sticky until it is dry.  Do not rinse with water or any other liquid or you will lose the beneficial effects of the CHG.  If mild skin irritation occurs, do rinse the skin to remove the CHG.  Report this to the nurse at time of admission.  Do not apply lotions, creams, ointments, deodorants or perfumes after using the clothes. Dress in clean clothes before coming to the hospital.    Day of surgery:  Your arrival time is approximately two hours before your scheduled surgery time.  Please note if you have an early arrival time the surgery doors do not open before 5:00 AM.  Upon arrival, a Pre-op nurse and Anesthesiologist will review your health history, obtain vital signs, and answer questions you may have.  The only belongings needed at this time will be a list of your home medications and if applicable your C-PAP/BI-PAP machine.  A Pre-op nurse will start an IV and you may receive medication in preparation for surgery, including something to help you relax.     Please be aware that surgery does come with discomfort.  We want to make every effort to control your discomfort so please discuss any uncontrolled symptoms with your nurse.   Your doctor will most likely have prescribed pain medications.      If you are going home after surgery you will receive individualized written care instructions before being discharged.  A responsible adult must drive you to and from the hospital on the day of your surgery and ideally stay with you through the  night.   .  Discharge prescriptions can be filled by the hospital pharmacy during regular pharmacy hours.  If you are having surgery late in the day/evening your prescription may be e-prescribed to your pharmacy.  Please verify your pharmacy hours or chose a 24 hour pharmacy to avoid not having access to your prescription because your pharmacy has closed for the day.    If you are staying overnight following surgery, you will be transported to your hospital room following the recovery period.  [facility] has all private rooms.    If you have any questions please call Pre-Admission Testing at [phone].  Deductibles and co-payments are collected on the day of service. Please be prepared to pay the required co-pay, deductible or deposit on the day of service as defined by your plan.    Call your surgeon immediately if you experience any of the following symptoms:  Sore Throat  Shortness of Breath or difficulty breathing  Cough  Chills  Body soreness or muscle pain  Headache  Fever  New loss of taste or smell  Do not arrive for your surgery ill.  Your procedure will need to be rescheduled to another time.  You will need to call your physician before the day of surgery to avoid any unnecessary exposure to hospital staff as well as other patients.

## 2025-04-07 NOTE — TELEPHONE ENCOUNTER
Caller: Jaime Castillo    Relationship: Self    Best call back number: [phone]    What is the best time to reach you: ANY    Who are you requesting to speak with (clinical staff, provider,  specific staff member): CLINICAL       What was the call regarding: JAIME IS CALLING STATES SHE IS WANTING TO DO THE COLD CAPPING, SHE HAS SOMEONE THAT IS GONG TO COVER HER COST    PLEASE CALL TO DISCUSS

## 2025-04-07 NOTE — TELEPHONE ENCOUNTER
Returned call to Mona- informed per infusion staff that they will fit her and fill out application at the time of her first treatment.  Notified Mona of the same.

## 2025-04-08 ENCOUNTER — OFFICE VISIT (OUTPATIENT)
Dept: FAMILY MEDICINE CLINIC | Facility: CLINIC | Age: 45
End: 2025-04-08
Payer: COMMERCIAL

## 2025-04-08 VITALS
OXYGEN SATURATION: 94 % | WEIGHT: 233 LBS | DIASTOLIC BLOOD PRESSURE: 70 MMHG | SYSTOLIC BLOOD PRESSURE: 100 MMHG | BODY MASS INDEX: 41.29 KG/M2 | HEART RATE: 78 BPM | HEIGHT: 63 IN

## 2025-04-08 DIAGNOSIS — C50.912 MALIGNANT NEOPLASM OF LEFT BREAST IN FEMALE, ESTROGEN RECEPTOR POSITIVE, UNSPECIFIED SITE OF BREAST: Primary | ICD-10-CM

## 2025-04-08 DIAGNOSIS — Z17.0 MALIGNANT NEOPLASM OF LEFT BREAST IN FEMALE, ESTROGEN RECEPTOR POSITIVE, UNSPECIFIED SITE OF BREAST: Primary | ICD-10-CM

## 2025-04-08 LAB
CYTOLOGIST CVX/VAG CYTO: NORMAL
CYTOLOGY CVX/VAG DOC CYTO: NORMAL
CYTOLOGY CVX/VAG DOC THIN PREP: NORMAL
DX ICD CODE: NORMAL
HPV I/H RISK 4 DNA CVX QL PROBE+SIG AMP: NEGATIVE
OTHER STN SPEC: NORMAL
SERVICE CMNT-IMP: NORMAL
STAT OF ADQ CVX/VAG CYTO-IMP: NORMAL

## 2025-04-08 NOTE — PROGRESS NOTES
Subjective   Mona Castillo is a 44 y.o. female. Presents today for   Chief Complaint   Patient presents with    Breast Cancer         History of Present Illness  Patient 45 y/o female with new diagnosis of breast cancer pT2 N1a M0, stage II (positive node) s/p bilateral mastectomy has in tissue expanders (seeing plastic in f/u soon).  To start chemo soon.   Doing ok, though post-op pain chest wall;   Tired and some stress.   Needs paperwork for off work  History of Present Illness    Review of Systems   Constitutional:  Negative for chills and fever.   Respiratory:  Negative for shortness of breath.    Cardiovascular:  Negative for chest pain.   Skin:  Positive for wound. Negative for rash.       Patient Active Problem List   Diagnosis    Asthma    Seasonal allergic rhinitis    Dizziness    Missed     Acute renal failure    Abdominal pain    Diarrhea    Bronchitis with asthma, acute    Iron deficiency anemia    Intestinal infection due to enteropathogenic E. coli    Gestational diabetes    Thrombocytosis    Malignant neoplasm of female breast    Breast cancer    Encounter for fitting and adjustment of vascular catheter       Social History     Socioeconomic History    Marital status:      Spouse name: Polo    Number of children: 1    Years of education: College   Tobacco Use    Smoking status: Former     Current packs/day: 0.00     Average packs/day: 0.3 packs/day for 21.9 years (5.6 ttl pk-yrs)     Types: Cigarettes     Start date: 1995     Quit date: 10/24/2013     Years since quittin.4     Passive exposure: Past    Smokeless tobacco: Never    Tobacco comments:     quit 10/2013   Vaping Use    Vaping status: Never Used   Substance and Sexual Activity    Alcohol use: Not Currently     Comment: I have maybe 2 beers a month or a glass a wine a month.    Drug use: Yes     Frequency: 7.0 times per week     Types: Marijuana     Comment: DAILY    Sexual activity: Yes     Partners: Male      "Birth control/protection: None       Allergies   Allergen Reactions    Doxycycline Hives     Could have been rocephin or tordol as well    Glue [Wound Dressing Adhesive] Hives     Had reaction after biopsy    [place] Itching     SOMETIMES HAS SOA    Gabapentin Mental Status Change         Objective   Vitals:    04/08/25 0919   BP: 100/70   Pulse: 78   SpO2: 94%   Weight: 106 kg (233 lb)   Height: 160 cm (63\")     Body mass index is 41.27 kg/m².    Physical Exam  Vitals and nursing note reviewed.   Constitutional:       Appearance: Normal appearance. She is not toxic-appearing or diaphoretic.   HENT:      Head: Normocephalic and atraumatic.   Musculoskeletal:      Cervical back: Neck supple.   Skin:     General: Skin is warm and dry.      Capillary Refill: Capillary refill takes less than 2 seconds.   Neurological:      Mental Status: She is alert.   Psychiatric:         Mood and Affect: Mood normal.         Behavior: Behavior normal.       Component      Latest Ref Rng 4/7/2025   Glucose      65 - 99 mg/dL 99    BUN      6 - 20 mg/dL 8    Creatinine      0.57 - 1.00 mg/dL 0.93    Sodium      136 - 145 mmol/L 143    Potassium      3.5 - 5.2 mmol/L 3.9    Chloride      98 - 107 mmol/L 106    CO2      22.0 - 29.0 mmol/L 26.7    Calcium      8.6 - 10.5 mg/dL 9.4    BUN/Creatinine Ratio      7.0 - 25.0  8.6    Anion Gap      5.0 - 15.0 mmol/L 10.3    eGFR      >60.0 mL/min/1.73 78.4    WBC      3.40 - 10.80 10*3/mm3 6.87    RBC      3.77 - 5.28 10*6/mm3 4.01    Hemoglobin      12.0 - 15.9 g/dL 12.6    Hematocrit      34.0 - 46.6 % 38.8    MCV      79.0 - 97.0 fL 96.8    MCH      26.6 - 33.0 pg 31.4    MCHC      31.5 - 35.7 g/dL 32.5    RDW      12.3 - 15.4 % 14.4    RDW-SD      37.0 - 54.0 fl 51.6    MPV      6.0 - 12.0 fL 9.8    Platelets      140 - 450 10*3/mm3 355        ddendum electronically signed by Cathy Robles MD on 3/28/2025 at 0914 EDT   Addendum   A request for Oncotype Dx was received on 3-19-25 from " Dr. aCldera.  The test is to be performed on tissue from case [id number].  The case report, slides, and blocks for the cited accession were retrieved from archives. The pathologist whose signature appears below reviewed the original pathology report, examined candidate H&E slides, and selected block 4J as being appropriate to the specifications of the ordered molecular analysis.  Block 4J was prepared and forwarded to [facility] where the subject molecular test will be performed.  An addendum report will be issued when the results of this molecular test are available.          CPT Code: 84843      Addendum electronically signed by David Delgado MD on 3/20/2025 at 1148 EDT   Case Report   Surgical Pathology Report                         Case: [id number]                                   Authorizing Provider:  Ruba Claros MD      Collected:           03/07/2025 11:10 AM           Ordering Location:     [facility]  Received:            03/07/2025 11:31 AM                                  [facility]                                                                       Pathologist:           Cathy Robles MD                                                     Specimens:   1) - [place] Lymph Node, LEFT BREAST SENTINEL NODE NUMBER 1                                        2) - [place] Lymph Node, LEFT BREAST SENTINEL NODE NUMBER 2                                        3) - [place] Lymph Node, LEFT BREAST SENTINEL NODE NUMBER 3                                        4) - Breast, Left, LEFT BREAST STITCH AT 12 O'CLOCK                                                  5) - Breast, Right, RIGHT BREAST STITCH AT 12 O' CLOCK                                    Clinical Information    HOT BLUE 180   Final Diagnosis   1.  [place] lymph node #1, left axilla, excision:               -1 lymph node, negative for metastatic carcinoma (0/1).     2.  [place] lymph node #2, left axilla, excision:                -1 lymph node, negative for metastatic carcinoma (0/1).     3.  [place] lymph node #3, left axilla, excision:  -1 lymph node, positive for macrometastatic carcinoma measuring up to 6 mm predominantly within the lymph node capsule (1/1) (see comment).               -Extranodal extension is present measuring at least 8 mm.     4.  Left breast, oriented simple skin sparing mastectomy (2100 g): INVASIVE MAMMARY CARCINOMA, NO SPECIAL TYPE (INVASIVE DUCTAL CARCINOMA).               -Tumor size: 37 x 30 x 30 mm.               -Histologic grade: Shahid histologic score II (tubules = 3, nuclei = 2, mitosis = 1).  -Associated intermediate grade ductal carcinoma in situ (DCIS), solid type with associated calcifications present, spanning 4 contiguous slices, 40 mm maximally (negative EIC).  -All margins are free of DCIS and invasive carcinoma (closest 30 mm inferior, see synoptic template for remaining margins).               -DCIS involves the nipple tip (Paget's).               -Lymphovascular space invasion is present.               -Hormone receptors: ER % positive, MD negative, HER2 1+ negative, Ki67 30% ([id number]).               -Pathologic stage: pT2, N(sn)1a.     5.  Right breast, oriented simple skin sparing mastectomy (2025 g):  -Scattered foci of atypical ductal hyperplasia (ADH) with associated calcifications and atypical lobular hyperplasia (ALH).               -Benign skin and nipple with squamous metaplasia of lactiferous ducts (SMOLD).               -Biopsy site changes present and both clips retrieved.               -No definitive evidence of in situ nor invasive carcinoma identified.     SWM   Electronically signed by Cathy Robles MD on 3/11/2025 at 0847 EDT   Synoptic Checklist     Results       Assessment & Plan   Diagnoses and all orders for this visit:    1. Malignant neoplasm of left breast in female, estrogen receptor positive, unspecified site of breast (Primary)    [place]  s/p b/l mastectomy, healing post-op;   seeing plastic and general surgery;   Will proceed with chemo soon;  has upcomign appts with medical and radiation oncology;  Recommend off work to further recover and cancer treatments.         Assessment & Plan            -Follow up: 10 to 12 weeks and prn     ________________________________________  David Fernandez DO, MS    Current Outpatient Medications on File Prior to Visit   Medication Sig Dispense Refill    albuterol (PROVENTIL) (2.5 MG/3ML) 0.083% nebulizer solution Take 2.5 mg by nebulization Every 4 (Four) Hours As Needed for Wheezing (use with neb). 120 each 12    budesonide-formoterol (SYMBICORT) 160-4.5 MCG/ACT inhaler Inhale 2 puffs 2 (Two) Times a Day.      dexAMETHasone (DECADRON) 4 MG tablet Take 2 tablets oral twice a day for 3 consecutive days beginning the day before chemotherapy and continue for 6 doses. 12 tablet 3    famotidine (Pepcid) 20 MG tablet Take 1 tablet by mouth 2 (Two) Times a Day. (Patient taking differently: Take 1 tablet by mouth As Needed for Heartburn.) 30 tablet 0    lidocaine-prilocaine (EMLA) 2.5-2.5 % cream Apply 1 Application topically to the appropriate area as directed Take As Directed. Apply nickel size amount to port site 30 min before appt time do not rub in cover with plastic wrap 30 g 5    methocarbamol (ROBAXIN) 750 MG tablet Take 1 tablet by mouth 4 (Four) Times a Day As Needed for Muscle Spasms. 20 tablet 0    montelukast (SINGULAIR) 10 MG tablet Take 1 tablet by mouth Every Night.      ondansetron (Zofran) 4 MG tablet Take 1 tablet by mouth Daily As Needed for Nausea or Vomiting. 30 tablet 0    ondansetron (ZOFRAN) 8 MG tablet Take 1 tablet by mouth 3 (Three) Times a Day As Needed for Nausea or Vomiting. 30 tablet 5    Ventolin  (90 Base) MCG/ACT inhaler Inhale 2 puffs Every 4 (Four) Hours As Needed for Wheezing. 18 g 3     No current facility-administered medications on file prior to visit.

## 2025-04-10 ENCOUNTER — ANESTHESIA EVENT (OUTPATIENT)
Dept: PERIOP | Facility: HOSPITAL | Age: 45
End: 2025-04-10
Payer: COMMERCIAL

## 2025-04-11 ENCOUNTER — APPOINTMENT (OUTPATIENT)
Dept: GENERAL RADIOLOGY | Facility: HOSPITAL | Age: 45
End: 2025-04-11
Payer: COMMERCIAL

## 2025-04-11 ENCOUNTER — ANESTHESIA (OUTPATIENT)
Dept: PERIOP | Facility: HOSPITAL | Age: 45
End: 2025-04-11
Payer: COMMERCIAL

## 2025-04-11 ENCOUNTER — HOSPITAL ENCOUNTER (OUTPATIENT)
Facility: HOSPITAL | Age: 45
Setting detail: HOSPITAL OUTPATIENT SURGERY
Discharge: HOME | End: 2025-04-11
Attending: STUDENT IN AN ORGANIZED HEALTH CARE EDUCATION/TRAINING PROGRAM | Admitting: STUDENT IN AN ORGANIZED HEALTH CARE EDUCATION/TRAINING PROGRAM
Payer: COMMERCIAL

## 2025-04-11 VITALS
TEMPERATURE: 97.5 F | RESPIRATION RATE: 16 BRPM | SYSTOLIC BLOOD PRESSURE: 132 MMHG | HEART RATE: 74 BPM | DIASTOLIC BLOOD PRESSURE: 80 MMHG | OXYGEN SATURATION: 98 %

## 2025-04-11 DIAGNOSIS — C50.919 MALIGNANT NEOPLASM OF FEMALE BREAST, UNSPECIFIED ESTROGEN RECEPTOR STATUS, UNSPECIFIED LATERALITY, UNSPECIFIED SITE OF BREAST: ICD-10-CM

## 2025-04-11 LAB
B-HCG UR QL: NEGATIVE
EXPIRATION DATE: NORMAL
INTERNAL NEGATIVE CONTROL: NEGATIVE
INTERNAL POSITIVE CONTROL: POSITIVE
Lab: NORMAL

## 2025-04-11 PROCEDURE — 25810000003 LACTATED RINGERS PER 1000 ML: Performed by: ANESTHESIOLOGY

## 2025-04-11 PROCEDURE — 36561 INSERT TUNNELED CV CATH: CPT | Performed by: STUDENT IN AN ORGANIZED HEALTH CARE EDUCATION/TRAINING PROGRAM

## 2025-04-11 PROCEDURE — 25010000002 FAMOTIDINE 10 MG/ML SOLUTION: Performed by: ANESTHESIOLOGY

## 2025-04-11 PROCEDURE — 76000 FLUOROSCOPY <1 HR PHYS/QHP: CPT

## 2025-04-11 PROCEDURE — 25010000002 PROPOFOL 10 MG/ML EMULSION

## 2025-04-11 PROCEDURE — 25010000002 GLYCOPYRROLATE 1 MG/5ML SOLUTION

## 2025-04-11 PROCEDURE — 25010000002 MIDAZOLAM PER 1 MG: Performed by: ANESTHESIOLOGY

## 2025-04-11 PROCEDURE — 77001 FLUOROGUIDE FOR VEIN DEVICE: CPT | Performed by: STUDENT IN AN ORGANIZED HEALTH CARE EDUCATION/TRAINING PROGRAM

## 2025-04-11 PROCEDURE — 25010000002 FENTANYL CITRATE (PF) 50 MCG/ML SOLUTION: Performed by: ANESTHESIOLOGY

## 2025-04-11 PROCEDURE — 81025 URINE PREGNANCY TEST: CPT | Performed by: ANESTHESIOLOGY

## 2025-04-11 PROCEDURE — 25010000002 HEPARIN (PORCINE) PER 1000 UNITS: Performed by: STUDENT IN AN ORGANIZED HEALTH CARE EDUCATION/TRAINING PROGRAM

## 2025-04-11 PROCEDURE — 25010000002 CEFAZOLIN PER 500 MG: Performed by: STUDENT IN AN ORGANIZED HEALTH CARE EDUCATION/TRAINING PROGRAM

## 2025-04-11 PROCEDURE — C1788 PORT, INDWELLING, IMP: HCPCS | Performed by: STUDENT IN AN ORGANIZED HEALTH CARE EDUCATION/TRAINING PROGRAM

## 2025-04-11 PROCEDURE — 25010000002 ONDANSETRON PER 1 MG

## 2025-04-11 PROCEDURE — 25010000002 PROPOFOL 200 MG/20ML EMULSION

## 2025-04-11 PROCEDURE — 25010000002 LIDOCAINE PF 2% 2 % SOLUTION

## 2025-04-11 DEVICE — POWERPORT CLEARVUE ISP IMPLANTABLE PORT WITH ATTACHABLE 8F POLYURETHANE OPEN-ENDED SINGLE-LUMEN VENOUS CATHETER PROCEDURAL KIT
Type: IMPLANTABLE DEVICE | Site: CLAVICLE | Status: FUNCTIONAL
Brand: POWERPORT CLEARVUE

## 2025-04-11 RX ORDER — GLYCOPYRROLATE 0.2 MG/ML
INJECTION INTRAMUSCULAR; INTRAVENOUS AS NEEDED
Status: DISCONTINUED | OUTPATIENT
Start: 2025-04-11 | End: 2025-04-11 | Stop reason: SURG

## 2025-04-11 RX ORDER — LIDOCAINE HYDROCHLORIDE 10 MG/ML
0.5 INJECTION, SOLUTION INFILTRATION; PERINEURAL ONCE AS NEEDED
Status: DISCONTINUED | OUTPATIENT
Start: 2025-04-11 | End: 2025-04-11 | Stop reason: HOSPADM

## 2025-04-11 RX ORDER — FENTANYL CITRATE 50 UG/ML
50 INJECTION, SOLUTION INTRAMUSCULAR; INTRAVENOUS ONCE AS NEEDED
Status: COMPLETED | OUTPATIENT
Start: 2025-04-11 | End: 2025-04-11

## 2025-04-11 RX ORDER — DIPHENHYDRAMINE HYDROCHLORIDE 50 MG/ML
12.5 INJECTION, SOLUTION INTRAMUSCULAR; INTRAVENOUS
Status: DISCONTINUED | OUTPATIENT
Start: 2025-04-11 | End: 2025-04-11 | Stop reason: HOSPADM

## 2025-04-11 RX ORDER — MAGNESIUM HYDROXIDE 1200 MG/15ML
LIQUID ORAL AS NEEDED
Status: DISCONTINUED | OUTPATIENT
Start: 2025-04-11 | End: 2025-04-11 | Stop reason: HOSPADM

## 2025-04-11 RX ORDER — MIDAZOLAM HYDROCHLORIDE 1 MG/ML
1 INJECTION, SOLUTION INTRAMUSCULAR; INTRAVENOUS
Status: DISCONTINUED | OUTPATIENT
Start: 2025-04-11 | End: 2025-04-11 | Stop reason: HOSPADM

## 2025-04-11 RX ORDER — BUPIVACAINE HYDROCHLORIDE AND EPINEPHRINE 5; 5 MG/ML; UG/ML
INJECTION, SOLUTION EPIDURAL; INTRACAUDAL; PERINEURAL AS NEEDED
Status: DISCONTINUED | OUTPATIENT
Start: 2025-04-11 | End: 2025-04-11 | Stop reason: HOSPADM

## 2025-04-11 RX ORDER — SODIUM CHLORIDE 0.9 % (FLUSH) 0.9 %
3-10 SYRINGE (ML) INJECTION AS NEEDED
Status: DISCONTINUED | OUTPATIENT
Start: 2025-04-11 | End: 2025-04-11 | Stop reason: HOSPADM

## 2025-04-11 RX ORDER — FLUMAZENIL 0.1 MG/ML
0.2 INJECTION INTRAVENOUS AS NEEDED
Status: DISCONTINUED | OUTPATIENT
Start: 2025-04-11 | End: 2025-04-11 | Stop reason: HOSPADM

## 2025-04-11 RX ORDER — FAMOTIDINE 10 MG/ML
20 INJECTION, SOLUTION INTRAVENOUS ONCE
Status: COMPLETED | OUTPATIENT
Start: 2025-04-11 | End: 2025-04-11

## 2025-04-11 RX ORDER — DROPERIDOL 2.5 MG/ML
0.62 INJECTION, SOLUTION INTRAMUSCULAR; INTRAVENOUS
Status: DISCONTINUED | OUTPATIENT
Start: 2025-04-11 | End: 2025-04-11 | Stop reason: HOSPADM

## 2025-04-11 RX ORDER — PROMETHAZINE HYDROCHLORIDE 25 MG/1
25 SUPPOSITORY RECTAL ONCE AS NEEDED
Status: DISCONTINUED | OUTPATIENT
Start: 2025-04-11 | End: 2025-04-11 | Stop reason: HOSPADM

## 2025-04-11 RX ORDER — LIDOCAINE HYDROCHLORIDE 20 MG/ML
INJECTION, SOLUTION INFILTRATION; PERINEURAL AS NEEDED
Status: DISCONTINUED | OUTPATIENT
Start: 2025-04-11 | End: 2025-04-11 | Stop reason: SURG

## 2025-04-11 RX ORDER — SCOPOLAMINE 1 MG/3D
1 PATCH, EXTENDED RELEASE TRANSDERMAL
Status: DISCONTINUED | OUTPATIENT
Start: 2025-04-11 | End: 2025-04-11 | Stop reason: HOSPADM

## 2025-04-11 RX ORDER — PROPOFOL 10 MG/ML
INJECTION, EMULSION INTRAVENOUS AS NEEDED
Status: DISCONTINUED | OUTPATIENT
Start: 2025-04-11 | End: 2025-04-11 | Stop reason: SURG

## 2025-04-11 RX ORDER — SODIUM CHLORIDE, SODIUM LACTATE, POTASSIUM CHLORIDE, CALCIUM CHLORIDE 600; 310; 30; 20 MG/100ML; MG/100ML; MG/100ML; MG/100ML
9 INJECTION, SOLUTION INTRAVENOUS CONTINUOUS
Status: DISCONTINUED | OUTPATIENT
Start: 2025-04-11 | End: 2025-04-11 | Stop reason: HOSPADM

## 2025-04-11 RX ORDER — NALOXONE HCL 0.4 MG/ML
0.2 VIAL (ML) INJECTION AS NEEDED
Status: DISCONTINUED | OUTPATIENT
Start: 2025-04-11 | End: 2025-04-11 | Stop reason: HOSPADM

## 2025-04-11 RX ORDER — ONDANSETRON 2 MG/ML
INJECTION INTRAMUSCULAR; INTRAVENOUS AS NEEDED
Status: DISCONTINUED | OUTPATIENT
Start: 2025-04-11 | End: 2025-04-11 | Stop reason: SURG

## 2025-04-11 RX ORDER — SODIUM CHLORIDE 0.9 % (FLUSH) 0.9 %
3 SYRINGE (ML) INJECTION EVERY 12 HOURS SCHEDULED
Status: DISCONTINUED | OUTPATIENT
Start: 2025-04-11 | End: 2025-04-11 | Stop reason: HOSPADM

## 2025-04-11 RX ORDER — PROMETHAZINE HYDROCHLORIDE 25 MG/1
25 TABLET ORAL ONCE AS NEEDED
Status: DISCONTINUED | OUTPATIENT
Start: 2025-04-11 | End: 2025-04-11 | Stop reason: HOSPADM

## 2025-04-11 RX ORDER — ONDANSETRON 2 MG/ML
4 INJECTION INTRAMUSCULAR; INTRAVENOUS ONCE AS NEEDED
Status: DISCONTINUED | OUTPATIENT
Start: 2025-04-11 | End: 2025-04-11 | Stop reason: HOSPADM

## 2025-04-11 RX ADMIN — PROPOFOL 100 MG: 10 INJECTION, EMULSION INTRAVENOUS at 08:20

## 2025-04-11 RX ADMIN — SCOPOLAMINE 1 PATCH: 1.5 PATCH, EXTENDED RELEASE TRANSDERMAL at 06:50

## 2025-04-11 RX ADMIN — MIDAZOLAM HYDROCHLORIDE 1 MG: 1 INJECTION, SOLUTION INTRAMUSCULAR; INTRAVENOUS at 06:43

## 2025-04-11 RX ADMIN — PROPOFOL 150 MCG/KG/MIN: 10 INJECTION, EMULSION INTRAVENOUS at 08:20

## 2025-04-11 RX ADMIN — LIDOCAINE HYDROCHLORIDE 80 MG: 20 INJECTION, SOLUTION EPIDURAL; INFILTRATION; INTRACAUDAL; PERINEURAL at 08:20

## 2025-04-11 RX ADMIN — FAMOTIDINE 20 MG: 10 INJECTION, SOLUTION INTRAVENOUS at 06:24

## 2025-04-11 RX ADMIN — ONDANSETRON 4 MG: 2 INJECTION, SOLUTION INTRAMUSCULAR; INTRAVENOUS at 08:20

## 2025-04-11 RX ADMIN — GLYCOPYRROLATE 0.1 MG: 0.2 INJECTION INTRAMUSCULAR; INTRAVENOUS at 08:20

## 2025-04-11 RX ADMIN — Medication 3 ML: at 08:04

## 2025-04-11 RX ADMIN — FENTANYL CITRATE 50 MCG: 50 INJECTION, SOLUTION INTRAMUSCULAR; INTRAVENOUS at 09:21

## 2025-04-11 RX ADMIN — SODIUM CHLORIDE, POTASSIUM CHLORIDE, SODIUM LACTATE AND CALCIUM CHLORIDE 9 ML/HR: 600; 310; 30; 20 INJECTION, SOLUTION INTRAVENOUS at 06:29

## 2025-04-11 RX ADMIN — CEFAZOLIN 2000 MG: 2 INJECTION, POWDER, FOR SOLUTION INTRAMUSCULAR; INTRAVENOUS at 08:04

## 2025-04-11 RX ADMIN — SODIUM CHLORIDE, POTASSIUM CHLORIDE, SODIUM LACTATE AND CALCIUM CHLORIDE: 600; 310; 30; 20 INJECTION, SOLUTION INTRAVENOUS at 08:38

## 2025-04-11 NOTE — ANESTHESIA PREPROCEDURE EVALUATION
Anesthesia Evaluation     history of anesthetic complications:  PONV  NPO Solid Status: > 8 hours  NPO Liquid Status: > 4 hours           Airway   Mallampati: I  No difficulty expected  Dental      Pulmonary    (+) asthma,  Cardiovascular   Exercise tolerance: good (4-7 METS)        Neuro/Psych  (+) headaches, dizziness/light headedness, psychiatric history  GI/Hepatic/Renal/Endo    (+) hiatal hernia, GERD, renal disease-, diabetes mellitus    Musculoskeletal     Abdominal    Substance History      OB/GYN          Other   arthritis,   history of cancer                Anesthesia Plan    ASA 3     MAC     intravenous induction     Anesthetic plan, risks, benefits, and alternatives have been provided, discussed and informed consent has been obtained with: patient.    CODE STATUS:

## 2025-04-11 NOTE — INTERVAL H&P NOTE
Plan for adjuvant chemotherapy. Port placement today on the right. Risks and benefits explained.       H&P reviewed. The patient was examined and there are no changes to the H&P.

## 2025-04-11 NOTE — OP NOTE
PREOPERATIVE DIAGNOSIS:  Breast cancer    POSTOPERATIVE DIAGNOSIS AND FINDINGS:  same    PROCEDURE:  Right internal jugular Powerport placement with fluoroscopic guidance    DATE OF PROCEDURE:   4/11/2025    SURGEON:  Ruba Alves MD    ASSISTANT:  none    ANESTHESIA:  MAC    EBL:  Minimal    SPECIMEN:  none    DESCRIPTION:  All risks, benefits, and alternatives were explained and informed consent was obtained. The patient was taken to the operating room, transferred onto the operating room table in the supine position, underwent monitored anesthesia, and was prepped and draped in the usual sterile manner.  Half percent marcaine with epinephrine was administered.  The right internal jugular vein was accessed with an 18-gauge needle under ultrasound guidance, and a guidewire was inserted under fluoroscopic guidance into the superior vena cava.  A subcutaneous pocket was then created with electrocautery on the right chest wall. The port was placed in the pocket and secured in two points with 2-0 prolene. The tubing was then tunneled from the subcutaneous pocket to the location of the wire in the neck. The vein dilator and sheath were introduced, and the dilator and guidewire were removed.  The port tubing was inserted to the cavoatrial junction, and position of tip was confirmed with fluoroscopic guidance. The port was connected to the tubing and the cap was secured. Venous blood was easily aspirated from the port, and the port was flushed with heparinized saline. There was good hemostasis noted. The skin was then closed with 3-0 Vicryl deep dermal and 4-0 Vicryl subcuticular sutures. Dermabond was placed over the wound. The patient tolerated the procedure well, and was transferred to the recovery area in stable condition. Recovery room CXR was ordered to confirm placement.    RUBA ALVES M.D.  General and Endoscopic Surgery  [facility]    [address], [address]  [place], [place], [address]  P:  [phone]  F: [phone]

## 2025-04-11 NOTE — ANESTHESIA POSTPROCEDURE EVALUATION
Patient: Mona Castillo    Procedure Summary       Date: 04/11/25 Room / Location:  [facility] [address] [place] [address] [facility]    Anesthesia Start: 0809 Anesthesia Stop: 0859    Procedure: INSERTION VENOUS ACCESS DEVICE (Right) Diagnosis:       Malignant neoplasm of female breast, unspecified estrogen receptor status, unspecified laterality, unspecified site of breast      (Malignant neoplasm of female breast, unspecified estrogen receptor status, unspecified laterality, unspecified site of breast [C50.919])    Surgeons: Ruba Claros MD Provider: Gus Valladares MD    Anesthesia Type: MAC ASA Status: 3            Anesthesia Type: MAC    Vitals  Vitals Value Taken Time   /80 04/11/25 09:23   Temp 36.4 °C (97.5 °F) 04/11/25 09:03   Pulse 69 04/11/25 09:46   Resp 16 04/11/25 09:20   SpO2 96 % 04/11/25 09:46   Vitals shown include unfiled device data.        Post Anesthesia Care and Evaluation    Patient location during evaluation: PACU  Patient participation: complete - patient participated  Level of consciousness: awake  Pain management: adequate    Airway patency: patent  Anesthetic complications: No anesthetic complications  PONV Status: none  Cardiovascular status: acceptable  Respiratory status: acceptable  Hydration status: acceptable

## 2025-04-14 ENCOUNTER — INFUSION (OUTPATIENT)
Dept: ONCOLOGY | Facility: HOSPITAL | Age: 45
End: 2025-04-14
Payer: COMMERCIAL

## 2025-04-14 VITALS
OXYGEN SATURATION: 95 % | TEMPERATURE: 98 F | RESPIRATION RATE: 16 BRPM | DIASTOLIC BLOOD PRESSURE: 80 MMHG | WEIGHT: 236.2 LBS | BODY MASS INDEX: 41.84 KG/M2 | HEART RATE: 102 BPM | SYSTOLIC BLOOD PRESSURE: 114 MMHG

## 2025-04-14 DIAGNOSIS — C50.312 MALIGNANT NEOPLASM OF LOWER-INNER QUADRANT OF LEFT BREAST IN FEMALE, ESTROGEN RECEPTOR POSITIVE: Primary | ICD-10-CM

## 2025-04-14 DIAGNOSIS — Z17.0 MALIGNANT NEOPLASM OF LOWER-INNER QUADRANT OF LEFT BREAST IN FEMALE, ESTROGEN RECEPTOR POSITIVE: Primary | ICD-10-CM

## 2025-04-14 LAB
ALBUMIN SERPL-MCNC: 3.7 G/DL (ref 3.5–5.2)
ALBUMIN/GLOB SERPL: 1.3 G/DL
ALP SERPL-CCNC: 85 U/L (ref 39–117)
ALT SERPL W P-5'-P-CCNC: 15 U/L (ref 1–33)
ANION GAP SERPL CALCULATED.3IONS-SCNC: 14.5 MMOL/L (ref 5–15)
AST SERPL-CCNC: 12 U/L (ref 1–32)
BASOPHILS # BLD AUTO: 0.01 10*3/MM3 (ref 0–0.2)
BASOPHILS NFR BLD AUTO: 0.1 % (ref 0–1.5)
BILIRUB SERPL-MCNC: 0.3 MG/DL (ref 0–1.2)
BUN SERPL-MCNC: 11 MG/DL (ref 6–20)
BUN/CREAT SERPL: 13.3 (ref 7–25)
CALCIUM SPEC-SCNC: 9.6 MG/DL (ref 8.6–10.5)
CHLORIDE SERPL-SCNC: 104 MMOL/L (ref 98–107)
CO2 SERPL-SCNC: 21.5 MMOL/L (ref 22–29)
CREAT SERPL-MCNC: 0.83 MG/DL (ref 0.57–1)
DEPRECATED RDW RBC AUTO: 51.2 FL (ref 37–54)
EGFRCR SERPLBLD CKD-EPI 2021: 89.8 ML/MIN/1.73
EOSINOPHIL # BLD AUTO: 0 10*3/MM3 (ref 0–0.4)
EOSINOPHIL NFR BLD AUTO: 0 % (ref 0.3–6.2)
ERYTHROCYTE [DISTWIDTH] IN BLOOD BY AUTOMATED COUNT: 14.7 % (ref 12.3–15.4)
GLOBULIN UR ELPH-MCNC: 2.8 GM/DL
GLUCOSE SERPL-MCNC: 129 MG/DL (ref 65–99)
HCT VFR BLD AUTO: 39.4 % (ref 34–46.6)
HGB BLD-MCNC: 13 G/DL (ref 12–15.9)
IMM GRANULOCYTES # BLD AUTO: 0.03 10*3/MM3 (ref 0–0.05)
IMM GRANULOCYTES NFR BLD AUTO: 0.3 % (ref 0–0.5)
LYMPHOCYTES # BLD AUTO: 0.79 10*3/MM3 (ref 0.7–3.1)
LYMPHOCYTES NFR BLD AUTO: 8.2 % (ref 19.6–45.3)
MCH RBC QN AUTO: 31.5 PG (ref 26.6–33)
MCHC RBC AUTO-ENTMCNC: 33 G/DL (ref 31.5–35.7)
MCV RBC AUTO: 95.4 FL (ref 79–97)
MONOCYTES # BLD AUTO: 0.14 10*3/MM3 (ref 0.1–0.9)
MONOCYTES NFR BLD AUTO: 1.5 % (ref 5–12)
NEUTROPHILS NFR BLD AUTO: 8.62 10*3/MM3 (ref 1.7–7)
NEUTROPHILS NFR BLD AUTO: 89.9 % (ref 42.7–76)
NRBC BLD AUTO-RTO: 0 /100 WBC (ref 0–0.2)
PLATELET # BLD AUTO: 378 10*3/MM3 (ref 140–450)
PMV BLD AUTO: 9.6 FL (ref 6–12)
POTASSIUM SERPL-SCNC: 3.9 MMOL/L (ref 3.5–5.2)
PROT SERPL-MCNC: 6.5 G/DL (ref 6–8.5)
RBC # BLD AUTO: 4.13 10*6/MM3 (ref 3.77–5.28)
SODIUM SERPL-SCNC: 140 MMOL/L (ref 136–145)
WBC NRBC COR # BLD AUTO: 9.59 10*3/MM3 (ref 3.4–10.8)

## 2025-04-14 PROCEDURE — 80053 COMPREHEN METABOLIC PANEL: CPT

## 2025-04-14 PROCEDURE — 25010000002 DOCETAXEL 20 MG/ML SOLUTION 8 ML VIAL: Performed by: INTERNAL MEDICINE

## 2025-04-14 PROCEDURE — 25010000002 CYCLOPHOSPHAMIDE 2 GM/10ML SOLUTION 10 ML VIAL: Performed by: INTERNAL MEDICINE

## 2025-04-14 PROCEDURE — 96375 TX/PRO/DX INJ NEW DRUG ADDON: CPT

## 2025-04-14 PROCEDURE — 85025 COMPLETE CBC W/AUTO DIFF WBC: CPT

## 2025-04-14 PROCEDURE — 25010000002 DIPHENHYDRAMINE PER 50 MG: Performed by: INTERNAL MEDICINE

## 2025-04-14 PROCEDURE — 96413 CHEMO IV INFUSION 1 HR: CPT

## 2025-04-14 PROCEDURE — 25010000002 PALONOSETRON PER 25 MCG: Performed by: INTERNAL MEDICINE

## 2025-04-14 PROCEDURE — 25010000002 PEGFILGRASTIM-CBQV 6 MG/0.6ML SOLUTION PREFILLED SYRINGE: Performed by: INTERNAL MEDICINE

## 2025-04-14 PROCEDURE — 96377 APPLICATON ON-BODY INJECTOR: CPT

## 2025-04-14 PROCEDURE — 25810000003 SODIUM CHLORIDE 0.9 % SOLUTION: Performed by: INTERNAL MEDICINE

## 2025-04-14 PROCEDURE — 25010000002 FAMOTIDINE 10 MG/ML SOLUTION: Performed by: INTERNAL MEDICINE

## 2025-04-14 PROCEDURE — 96417 CHEMO IV INFUS EACH ADDL SEQ: CPT

## 2025-04-14 PROCEDURE — 25810000003 SODIUM CHLORIDE 0.9 % SOLUTION 250 ML FLEX CONT: Performed by: INTERNAL MEDICINE

## 2025-04-14 RX ORDER — FAMOTIDINE 10 MG/ML
20 INJECTION, SOLUTION INTRAVENOUS ONCE
Status: CANCELLED | OUTPATIENT
Start: 2025-04-14

## 2025-04-14 RX ORDER — SODIUM CHLORIDE 9 MG/ML
20 INJECTION, SOLUTION INTRAVENOUS ONCE
Status: CANCELLED | OUTPATIENT
Start: 2025-04-14

## 2025-04-14 RX ORDER — ACETAMINOPHEN 325 MG/1
650 TABLET ORAL EVERY 6 HOURS PRN
Status: DISCONTINUED | OUTPATIENT
Start: 2025-04-14 | End: 2025-04-14 | Stop reason: HOSPADM

## 2025-04-14 RX ORDER — HYDROCORTISONE SODIUM SUCCINATE 100 MG/2ML
100 INJECTION INTRAMUSCULAR; INTRAVENOUS AS NEEDED
Status: CANCELLED | OUTPATIENT
Start: 2025-04-14

## 2025-04-14 RX ORDER — SODIUM CHLORIDE 9 MG/ML
20 INJECTION, SOLUTION INTRAVENOUS ONCE
Status: COMPLETED | OUTPATIENT
Start: 2025-04-14 | End: 2025-04-14

## 2025-04-14 RX ORDER — FAMOTIDINE 10 MG/ML
20 INJECTION, SOLUTION INTRAVENOUS ONCE
Status: COMPLETED | OUTPATIENT
Start: 2025-04-14 | End: 2025-04-14

## 2025-04-14 RX ORDER — PALONOSETRON 0.05 MG/ML
0.25 INJECTION, SOLUTION INTRAVENOUS ONCE
Status: CANCELLED | OUTPATIENT
Start: 2025-04-14

## 2025-04-14 RX ORDER — PALONOSETRON 0.05 MG/ML
0.25 INJECTION, SOLUTION INTRAVENOUS ONCE
Status: COMPLETED | OUTPATIENT
Start: 2025-04-14 | End: 2025-04-14

## 2025-04-14 RX ORDER — FAMOTIDINE 10 MG/ML
20 INJECTION, SOLUTION INTRAVENOUS AS NEEDED
Status: CANCELLED | OUTPATIENT
Start: 2025-04-14

## 2025-04-14 RX ORDER — DIPHENHYDRAMINE HYDROCHLORIDE 50 MG/ML
50 INJECTION, SOLUTION INTRAMUSCULAR; INTRAVENOUS AS NEEDED
Status: CANCELLED | OUTPATIENT
Start: 2025-04-14

## 2025-04-14 RX ADMIN — CYCLOPHOSPHAMIDE 1230 MG: 2 INJECTION INTRAVENOUS at 11:55

## 2025-04-14 RX ADMIN — PEGFILGRASTIM-CBQV 6 MG: 6 INJECTION, SOLUTION SUBCUTANEOUS at 12:43

## 2025-04-14 RX ADMIN — ACETAMINOPHEN 650 MG: 325 TABLET ORAL at 13:02

## 2025-04-14 RX ADMIN — SODIUM CHLORIDE 20 ML/HR: 9 INJECTION, SOLUTION INTRAVENOUS at 09:50

## 2025-04-14 RX ADMIN — PALONOSETRON 0.25 MG: 0.05 INJECTION, SOLUTION INTRAVENOUS at 09:51

## 2025-04-14 RX ADMIN — DOCETAXEL 155 MG: 20 INJECTION, SOLUTION, CONCENTRATE INTRAVENOUS at 10:53

## 2025-04-14 RX ADMIN — FAMOTIDINE 20 MG: 10 INJECTION INTRAVENOUS at 09:51

## 2025-04-14 RX ADMIN — DIPHENHYDRAMINE HYDROCHLORIDE 50 MG: 50 INJECTION, SOLUTION INTRAMUSCULAR; INTRAVENOUS at 09:56

## 2025-04-16 DIAGNOSIS — Z17.0 MALIGNANT NEOPLASM OF LOWER-INNER QUADRANT OF LEFT BREAST IN FEMALE, ESTROGEN RECEPTOR POSITIVE: Primary | ICD-10-CM

## 2025-04-16 DIAGNOSIS — C50.312 MALIGNANT NEOPLASM OF LOWER-INNER QUADRANT OF LEFT BREAST IN FEMALE, ESTROGEN RECEPTOR POSITIVE: Primary | ICD-10-CM

## 2025-04-16 RX ORDER — PROCHLORPERAZINE MALEATE 10 MG
10 TABLET ORAL EVERY 6 HOURS PRN
Qty: 90 TABLET | Refills: 3 | Status: SHIPPED | OUTPATIENT
Start: 2025-04-16

## 2025-04-16 RX ORDER — TRAMADOL HYDROCHLORIDE 50 MG/1
50 TABLET ORAL EVERY 8 HOURS PRN
Qty: 30 TABLET | Refills: 0 | Status: SHIPPED | OUTPATIENT
Start: 2025-04-16

## 2025-04-16 NOTE — TELEPHONE ENCOUNTER
Provider: Werner  Caller: patient  Relationship to Patient: self  Call Back Phone Number: [phone]  Reason for Call: patient has a had a terrible headache since her treatment

## 2025-04-16 NOTE — TELEPHONE ENCOUNTER
Returned call to Mona. She reports a headache since her treatment.  It is unrelieved by tylenol or ibuprofen. She states she is taking some zofran for nausea.  Explained this headache is likely caused from  Aloxi she received with her treatment, and zofran is in the same class of drug and can cause this as well.  In have instructed her to discontinue the zofran. Reviewed with Dr Caldera, will send in compazine for nausea and tramadol for the headache.  I asked her to call back if no improvement.  Will switch out aloxi for kytril in her treatment plan as well.

## 2025-04-18 ENCOUNTER — TELEPHONE (OUTPATIENT)
Dept: RADIATION ONCOLOGY | Facility: HOSPITAL | Age: 45
End: 2025-04-18
Payer: COMMERCIAL

## 2025-04-21 ENCOUNTER — INFUSION (OUTPATIENT)
Dept: ONCOLOGY | Facility: HOSPITAL | Age: 45
End: 2025-04-21
Payer: COMMERCIAL

## 2025-04-21 ENCOUNTER — CONSULT (OUTPATIENT)
Dept: RADIATION ONCOLOGY | Facility: HOSPITAL | Age: 45
End: 2025-04-21
Payer: COMMERCIAL

## 2025-04-21 ENCOUNTER — CLINICAL SUPPORT (OUTPATIENT)
Dept: OTHER | Facility: HOSPITAL | Age: 45
End: 2025-04-21
Payer: COMMERCIAL

## 2025-04-21 ENCOUNTER — OFFICE VISIT (OUTPATIENT)
Dept: ONCOLOGY | Facility: CLINIC | Age: 45
End: 2025-04-21
Payer: COMMERCIAL

## 2025-04-21 VITALS
HEART RATE: 86 BPM | BODY MASS INDEX: 41.28 KG/M2 | SYSTOLIC BLOOD PRESSURE: 126 MMHG | RESPIRATION RATE: 18 BRPM | DIASTOLIC BLOOD PRESSURE: 80 MMHG | WEIGHT: 233 LBS | OXYGEN SATURATION: 97 %

## 2025-04-21 VITALS
HEART RATE: 79 BPM | SYSTOLIC BLOOD PRESSURE: 130 MMHG | OXYGEN SATURATION: 97 % | TEMPERATURE: 97.2 F | WEIGHT: 228.8 LBS | HEIGHT: 63 IN | DIASTOLIC BLOOD PRESSURE: 85 MMHG | RESPIRATION RATE: 17 BRPM | BODY MASS INDEX: 40.54 KG/M2

## 2025-04-21 DIAGNOSIS — C50.312 MALIGNANT NEOPLASM OF LOWER-INNER QUADRANT OF LEFT BREAST IN FEMALE, ESTROGEN RECEPTOR POSITIVE: Primary | ICD-10-CM

## 2025-04-21 DIAGNOSIS — Z17.0 MALIGNANT NEOPLASM OF LOWER-INNER QUADRANT OF LEFT BREAST IN FEMALE, ESTROGEN RECEPTOR POSITIVE: Primary | ICD-10-CM

## 2025-04-21 DIAGNOSIS — Z17.0 MALIGNANT NEOPLASM OF LOWER-INNER QUADRANT OF LEFT BREAST IN FEMALE, ESTROGEN RECEPTOR POSITIVE: ICD-10-CM

## 2025-04-21 DIAGNOSIS — C50.312 MALIGNANT NEOPLASM OF LOWER-INNER QUADRANT OF LEFT BREAST IN FEMALE, ESTROGEN RECEPTOR POSITIVE: ICD-10-CM

## 2025-04-21 LAB
ALBUMIN SERPL-MCNC: 3.4 G/DL (ref 3.5–5.2)
ALBUMIN/GLOB SERPL: 1.3 G/DL
ALP SERPL-CCNC: 145 U/L (ref 39–117)
ALT SERPL W P-5'-P-CCNC: 19 U/L (ref 1–33)
ANION GAP SERPL CALCULATED.3IONS-SCNC: 10.6 MMOL/L (ref 5–15)
AST SERPL-CCNC: 14 U/L (ref 1–32)
BASOPHILS # BLD AUTO: 0.03 10*3/MM3 (ref 0–0.2)
BASOPHILS NFR BLD AUTO: 0.3 % (ref 0–1.5)
BILIRUB SERPL-MCNC: 0.3 MG/DL (ref 0–1.2)
BUN SERPL-MCNC: 8 MG/DL (ref 6–20)
BUN/CREAT SERPL: 8.6 (ref 7–25)
CALCIUM SPEC-SCNC: 9.1 MG/DL (ref 8.6–10.5)
CHLORIDE SERPL-SCNC: 105 MMOL/L (ref 98–107)
CO2 SERPL-SCNC: 24.4 MMOL/L (ref 22–29)
CREAT SERPL-MCNC: 0.93 MG/DL (ref 0.57–1)
DEPRECATED RDW RBC AUTO: 50 FL (ref 37–54)
EGFRCR SERPLBLD CKD-EPI 2021: 78.4 ML/MIN/1.73
EOSINOPHIL # BLD AUTO: 0.24 10*3/MM3 (ref 0–0.4)
EOSINOPHIL NFR BLD AUTO: 2.1 % (ref 0.3–6.2)
ERYTHROCYTE [DISTWIDTH] IN BLOOD BY AUTOMATED COUNT: 14.2 % (ref 12.3–15.4)
GLOBULIN UR ELPH-MCNC: 2.7 GM/DL
GLUCOSE SERPL-MCNC: 102 MG/DL (ref 65–99)
HCT VFR BLD AUTO: 36.1 % (ref 34–46.6)
HGB BLD-MCNC: 11.9 G/DL (ref 12–15.9)
IMM GRANULOCYTES # BLD AUTO: 2.01 10*3/MM3 (ref 0–0.05)
IMM GRANULOCYTES NFR BLD AUTO: 17.5 % (ref 0–0.5)
LYMPHOCYTES # BLD AUTO: 2.5 10*3/MM3 (ref 0.7–3.1)
LYMPHOCYTES NFR BLD AUTO: 21.7 % (ref 19.6–45.3)
MAGNESIUM SERPL-MCNC: 1.8 MG/DL (ref 1.6–2.6)
MCH RBC QN AUTO: 31.5 PG (ref 26.6–33)
MCHC RBC AUTO-ENTMCNC: 33 G/DL (ref 31.5–35.7)
MCV RBC AUTO: 95.5 FL (ref 79–97)
MONOCYTES # BLD AUTO: 1.58 10*3/MM3 (ref 0.1–0.9)
MONOCYTES NFR BLD AUTO: 13.7 % (ref 5–12)
NEUTROPHILS NFR BLD AUTO: 44.7 % (ref 42.7–76)
NEUTROPHILS NFR BLD AUTO: 5.15 10*3/MM3 (ref 1.7–7)
NRBC BLD AUTO-RTO: 0.4 /100 WBC (ref 0–0.2)
PLATELET # BLD AUTO: 294 10*3/MM3 (ref 140–450)
PMV BLD AUTO: 9.6 FL (ref 6–12)
POTASSIUM SERPL-SCNC: 3.6 MMOL/L (ref 3.5–5.2)
PROT SERPL-MCNC: 6.1 G/DL (ref 6–8.5)
RBC # BLD AUTO: 3.78 10*6/MM3 (ref 3.77–5.28)
SODIUM SERPL-SCNC: 140 MMOL/L (ref 136–145)
WBC NRBC COR # BLD AUTO: 11.51 10*3/MM3 (ref 3.4–10.8)

## 2025-04-21 PROCEDURE — 36415 COLL VENOUS BLD VENIPUNCTURE: CPT

## 2025-04-21 PROCEDURE — 96360 HYDRATION IV INFUSION INIT: CPT

## 2025-04-21 PROCEDURE — 80053 COMPREHEN METABOLIC PANEL: CPT

## 2025-04-21 PROCEDURE — 25810000003 SODIUM CHLORIDE 0.9 % SOLUTION: Performed by: INTERNAL MEDICINE

## 2025-04-21 PROCEDURE — G0463 HOSPITAL OUTPT CLINIC VISIT: HCPCS | Performed by: RADIOLOGY

## 2025-04-21 PROCEDURE — 96361 HYDRATE IV INFUSION ADD-ON: CPT

## 2025-04-21 PROCEDURE — 83735 ASSAY OF MAGNESIUM: CPT

## 2025-04-21 PROCEDURE — 99215 OFFICE O/P EST HI 40 MIN: CPT | Performed by: INTERNAL MEDICINE

## 2025-04-21 PROCEDURE — 85025 COMPLETE CBC W/AUTO DIFF WBC: CPT

## 2025-04-21 RX ORDER — DIPHENOXYLATE HYDROCHLORIDE AND ATROPINE SULFATE 2.5; .025 MG/1; MG/1
1 TABLET ORAL 4 TIMES DAILY PRN
Qty: 90 TABLET | Refills: 0 | Status: SHIPPED | OUTPATIENT
Start: 2025-04-21

## 2025-04-21 RX ORDER — OLANZAPINE 5 MG/1
5 TABLET, FILM COATED ORAL NIGHTLY
Qty: 30 TABLET | Refills: 3 | Status: SHIPPED | OUTPATIENT
Start: 2025-04-21

## 2025-04-21 RX ORDER — DEXAMETHASONE 4 MG/1
4 TABLET ORAL 2 TIMES DAILY WITH MEALS
Qty: 30 TABLET | Refills: 0 | Status: SHIPPED | OUTPATIENT
Start: 2025-04-21 | End: 2025-05-06

## 2025-04-21 RX ORDER — SODIUM CHLORIDE 9 MG/ML
1000 INJECTION, SOLUTION INTRAVENOUS ONCE
Status: COMPLETED | OUTPATIENT
Start: 2025-04-21 | End: 2025-04-21

## 2025-04-21 RX ORDER — SUMATRIPTAN SUCCINATE 25 MG/1
TABLET ORAL
Qty: 30 TABLET | Refills: 5 | Status: SHIPPED | OUTPATIENT
Start: 2025-04-21

## 2025-04-21 RX ADMIN — SODIUM CHLORIDE 1000 ML: 0.9 INJECTION, SOLUTION INTRAVENOUS at 09:39

## 2025-04-21 NOTE — PROGRESS NOTES
OUTPATIENT ONCOLOGY NUTRITION ASSESSMENT    Patient Name: Mona Castillo  YOB: 1980  MRN: [id number]  Assessment Date: 4/21/2025    COMMENTS:  Met patient in the infusion area. Here for IVF after receiving cycle one Taxotere and Cyclophosphamide on 4/14.  She has felt bad since her treatment, experiencing headache, nausea and diarrhea.   Labs, meds, and medical history reviewed. She has discontinued zofran. Using compazine instead. Lomotil was called in to pharmacy. Appetite has been decreased.  She is drinking liquid IV.   The patient is open to trying enterade and provided her with a box to take home with instructions. Will follow up for tolerance.   Also provided her with the ACS nutrition booklet and RD contact info.    Will follow throughout course of treatment.         Reason for Assessment New assessment, Education      Diagnosis/Problem   Left breast invasive ductal carcinoma, ER + SD -   Treatment Plan Chemotherapy  TC   Frequency Every 21 days   Goal of cancer treatment Adjuvant   Comments:          Encounter Information        Nutrition/Diet History:     Oral Nutrition Supplements:    Factors/Symptoms Affecting Intake: Diarrhea, Fatigue, Nausea   Comments:      Medical/Surgical History Past Medical History:   Diagnosis Date    Allergic rhinitis     Anxiety     Arthritis     Asthma     Breast cancer 1/27/25    Breast mass 1/20/25    Cancer     Breast LEFT    Chronic kidney disease     STAGE 2    DDD (degenerative disc disease), lumbar     GERD (gastroesophageal reflux disease)     H/O HSV-2 infection     Hiatal hernia     History of back pain     History of gestational diabetes     History of miscarriage     Migraine     Pneumonia     PONV (postoperative nausea and vomiting)     Tattoos     Trouble in sleeping        Past Surgical History:   Procedure Laterality Date    APPENDECTOMY      BREAST BIOPSY  1/27/25    Left    BREAST RECONSTRUCTION Bilateral 03/07/2025    Procedure: BILATERAL  "BREAST TISSUE EXPANDER INSERTION W/ CORTIVA AND ANTIBIOTIC DISK AND ADJACENT TISSUE TRANSFER;  Surgeon: Anderson Hill MD;  Location:  [facility];  Service: Plastics;  Laterality: Bilateral;     SECTION  2020    CHOLECYSTECTOMY      COLONOSCOPY N/A 2019    Procedure: COLONOSCOPY with biopsies;  Surgeon: Philip Winter MD;  Location: [facility];  Service: Gastroenterology    DILATATION AND CURETTAGE  2020    ENDOSCOPY N/A 2019    Procedure: ESOPHAGOGASTRODUODENOSCOPY with biopsies and aspirate;  Surgeon: Philpi Winter MD;  Location: [facility] ENDOSCOPY;  Service: Gastroenterology    MASTECTOMY Bilateral     MASTECTOMY W/ SENTINEL NODE BIOPSY Left 2025    Procedure: bilateral skin sparing mastectomy, left sentinel lymph node biopsy, possible left axillary dissection;  Surgeon: Ruba Claros MD;  Location: [facility] OR [facility];  Service: General;  Laterality: Left;    US GUIDED LYMPH NODE BIOPSY  2025    UTERINE FIBROID SURGERY  2015    myosure removed vaginally    VENOUS ACCESS DEVICE (PORT) INSERTION Right 2025    Procedure: INSERTION VENOUS ACCESS DEVICE;  Surgeon: Ruba Claros MD;  Location: [facility] OR [facility];  Service: General;  Laterality: Right;               Anthropometrics        Current Height Ht Readings from Last 1 Encounters:   25 160 cm (62.99\")      Current Weight Wt Readings from Last 1 Encounters:   25 106 kg (233 lb)      BMI  41.3   Ideal Body Weight (IBW) 115 lb   Usual Body Weight (UBW) 230 lb   Weight Change/Trend Stable   Weight History Wt Readings from Last 30 Encounters:   25 1258 106 kg (233 lb)   25 0905 104 kg (228 lb 12.8 oz)   25 0827 107 kg (236 lb 3.2 oz)   25 0919 106 kg (233 lb)   25 1012 106 kg (233 lb 1.6 oz)   25 0758 105 kg (230 lb 9.6 oz)   25 1001 102 kg (225 lb)   25 1441 104 kg (229 lb 9.6 oz)   25 1339 104 kg (230 lb)   25 1257 105 kg (232 lb " 1.6 oz)   03/07/25 0727 107 kg (235 lb 14.3 oz)   02/24/25 1610 107 kg (236 lb)   02/24/25 1432 106 kg (234 lb)   02/10/25 1253 108 kg (238 lb)   02/06/25 0815 108 kg (238 lb)   02/05/25 1104 108 kg (238 lb 9.6 oz)   02/04/25 1222 107 kg (235 lb)   02/03/25 1012 107 kg (235 lb)   01/27/25 1359 108 kg (239 lb)   10/21/24 0900 108 kg (239 lb)   04/19/24 1536 106 kg (234 lb)   07/19/23 0809 107 kg (236 lb 6.4 oz)   01/20/21 1621 96.6 kg (213 lb)   12/18/20 1105 96.6 kg (213 lb)   12/14/20 1134 96.6 kg (213 lb)   03/02/20 1554 102 kg (225 lb)   09/30/19 1333 98.9 kg (218 lb)   08/30/19 0810 96.8 kg (213 lb 5 oz)   08/05/19 1352 96.6 kg (213 lb)   07/25/19 2006 99 kg (218 lb 4.8 oz)   07/25/19 1500 99.8 kg (220 lb)          Medications           Current medications: OLANZapine, SUMAtriptan, albuterol, albuterol sulfate HFA, budesonide-formoterol, dexAMETHasone, diphenoxylate-atropine, famotidine, lidocaine-prilocaine, methocarbamol, montelukast, ondansetron, prochlorperazine, and traMADol                 Tests/Procedures        Tests/Procedures Chemotherapy     Labs       Pertinent Labs    Results from last 7 days   Lab Units 04/21/25  0821   SODIUM mmol/L 140   POTASSIUM mmol/L 3.6   CHLORIDE mmol/L 105   CO2 mmol/L 24.4   BUN mg/dL 8   CREATININE mg/dL 0.93   CALCIUM mg/dL 9.1   BILIRUBIN mg/dL 0.3   ALK PHOS U/L 145*   ALT (SGPT) U/L 19   AST (SGOT) U/L 14   GLUCOSE mg/dL 102*     Results from last 7 days   Lab Units 04/21/25  0949 04/21/25  0821   MAGNESIUM mg/dL 1.8  --    HEMOGLOBIN g/dL  --  11.9*   HEMATOCRIT %  --  36.1   WBC 10*3/mm3  --  11.51*   ALBUMIN g/dL  --  3.4*     Results from last 7 days   Lab Units 04/21/25  0821   PLATELETS 10*3/mm3 294     COVID19   Date Value Ref Range Status   01/02/2025 Not Detected Not Detected - Ref. Range Final     Lab Results   Component Value Date    HGBA1C 5.20 07/19/2023          Physical Findings        Physical Appearance alert     Edema  not assessed    Gastrointestinal diarrhea, nausea   Tubes/Lines/Drains Implantable Port   Oral/Mouth Cavity WNL   Skin Intact       Estimated/Assessed Needs        Energy Requirements    Height for Calculation      Weight for Calculation 52 kg IBW   Method for Estimation  25 kcal/kg   EST Needs (kcal/day) 1300 kcals/day       Protein Requirements    Weight for Calculation 106 kg   EST Protein Needs (g/kg) 0.8 gm/kg   EST Daily Needs (g/day) 85 g/day       Fluid Requirements     Method for Estimation 1 mL/kcal    Estimated Needs (mL/day) 4533-4732 mL/day         NUTRITION INTERVENTION / PLAN OF CARE      Intervention Goal(s) Reduce/improve symptoms, Provide information, Meet estimated needs, Disease management/therapy, Tolerate PO , and No significant weight loss         RD Intervention/Action Encouraged intake, Follow Tx progress, Recommended/ordered         Recommendations:       PO Diet       Supplements       Snacks       Other Given enterade to take home one to two per day          Monitor/Evaluation PO intake, Supplement intake, Pertinent labs, Weight, Symptoms   Education Education provided   Next appointment 5/5/25       Electronically signed by:  Mitali Sotomayor RD, LD  04/21/25 14:14 EDT

## 2025-04-21 NOTE — PROGRESS NOTES
Cancer Staging     CC: left breast cancer pT2N1a ER pos MT neg Her 2 negative, evaluate for radiation                              Dear Kate Caldera MD    I had the pleasure of seeing Mona Castillo  today in [facility].   The patient is a 44 y.o. female with recently diagnosed left breast cancer.  She noticed a mass in the lower inner quadrant of the left breast while she was playing with her 4-year-old. She had a bilateral diagnostic mammogram on 1/20/25 which showed Loosely grouped microcalcifications in the mid to posterior third of the upper outer quadrant of the left breast unchanged.Satellite architectural distortion at 6 o'clock position which is new compared to 7/8/2024.  This is 4 cm from the nipple and measures 1.5 x 1.1 x 1.6 cm.    She had an us guided biopsy of the left breast on 1/27/25 which revealed  Invasive ductal carcinoma, moderately-differentiated; Shahid histologic grade II/III measuring at least 5 mm and involving multiple core fragments. Associated intermediate grade ductal carcinoma in situ, solid type with focal single-cell necrosis. Negative for lymphovascular space invasion. ER % MT negative and Her 2 negative [id number]%.    She had a breast mri on 1/30/25 which showed a 3.7 cm irregular enhancing mass with architectural distortion at 6:00 in the anterior left breast represents biopsy-proven malignancy and is located within the anteromedial aspect of regional to segmental non-mass enhancement measuring up to 14.4 cm at anterior, middle and posterior depths, which is suspicious for additional malignancy. If breast conservation is  pursued, preoperative stereotactic and/or MRI guided core needle biopsies of the left breast could be performed. Asymmetric left axillary lymph nodes with cortical thickening, including a dominant lymph node with eccentric cortical thickening. Recommend targeted ultrasound, followed by possible ultrasound guided core needle  biopsy. Asymmetric anterior left breast skin thickening, edema, and enhancement, including a relatively more focal area of skin enhancement, as above, some of which may be reactive to recent biopsy but also raises concern for possible inflammatory malignancy and/or direct skin involvement in the appropriate clinical setting. Recommend clinical evaluation by breast surgery, possible skin punch biopsy. Areas of non-mass enhancement measuring 3 cm at 1:00 in the posterior right breast and 8.6 cm at 11:00 in the middle and posterior right breast are suspicious. Recommend further evaluation with 2 siteMRI guided core needle biopsy.    She had a mri guided biopsy of the right breast 2/12/25 with pathology revealing right, site A, 1:00, MRI, biopsy (Infinity clip marker): Benign breast tissue with atypical ductal hyperplasia with microcalcifications. Nonintact intraductal papilloma with ductal hyperplasia the usual type measuring 2 mm.  Biopsy of the right breast, site B, 11:00, biopsy showed lobular carcinoma in situ with atypical lobular hyperplasia.  Biopsy of the left axillary lymph node, biopsy (spiral shaped HydroMARK clip) was benign.    She underwent a left skin sparing mastectomy and sentinel lymph node biopsy with Dr. Claros on 3/7/25 with pathology revealing 37 x 30 x 30 mm. Grade II invasive ductal carcinoma with associated intermediate grade dcis, solid type with associated calcifications present,  spanning 4 contiguous slices, 40 mm maximally (negative EIC). All margins are free of DCIS and invasive carcinoma (closest 30 mm inferior, see synoptic template for remaining margins).DCIS involves the nipple tip (Paget's).Lymphovascular space invasion is present.  -Hormone receptors: ER % positive, CO negative, HER2 1+ negative, Ki67 30%. One of 3 sentinel lymph nodes was positive for metastatic disease measuring 6 mm predominantly within the lymph capsule with eulalia.  pT2N1a.  Her oncotype score was  30.    Pathology from the right breast mastectomy was benign.     She is G:3. P:1 AB:2 with menarche age 13 and first childbirth age 40.  She did not breast feed.  She is premenopausal.  She has used oral contraceptive use: off/on previously.  She has never sued hormone replacement therapy.Patient had to do IVF treatments for 2 years but subsequently conceived naturally after 2 miscarriages after IVF. She had invitae genetic testing which was negative. She denies any family history of breast cancer. Her mother had thyroid cancer in her 30s or 40s.     She is recovering well from her surgery and is referred today for evaluation for adjuvant post mastectomy radiation. She started adjuvant chemotherapy with her first dose of cycle 1 day 1 of docetaxel and Cytoxan on 4/14/25.         Review of Systems   Constitutional:  Positive for fatigue.   HENT: Negative.     Respiratory: Negative.     Cardiovascular: Negative.    Gastrointestinal:  Positive for constipation and diarrhea.   Endocrine: Negative.    Genitourinary: Negative.    Musculoskeletal: Negative.    Neurological: Negative.    Hematological: Negative.    Psychiatric/Behavioral: Negative.           Past Medical History:   Diagnosis Date    Allergic rhinitis     Anxiety     Arthritis     Asthma     Breast cancer 1/27/25    Breast mass 1/20/25    Cancer     Breast LEFT    Chronic kidney disease     STAGE 2    DDD (degenerative disc disease), lumbar     GERD (gastroesophageal reflux disease)     H/O HSV-2 infection     Hiatal hernia     History of back pain     History of gestational diabetes     History of miscarriage     Migraine     Pneumonia     PONV (postoperative nausea and vomiting)     Tattoos     Trouble in sleeping          Past Surgical History:   Procedure Laterality Date    APPENDECTOMY      BREAST BIOPSY  1/27/25    Left    BREAST RECONSTRUCTION Bilateral 03/07/2025    Procedure: BILATERAL BREAST TISSUE EXPANDER INSERTION W/ CORTIVA AND ANTIBIOTIC DISK  AND ADJACENT TISSUE TRANSFER;  Surgeon: Anderson Hill MD;  Location:  [facility] [place];  Service: Plastics;  Laterality: Bilateral;     SECTION  2020    CHOLECYSTECTOMY      COLONOSCOPY N/A 2019    Procedure: COLONOSCOPY with biopsies;  Surgeon: Philip Winter MD;  Location: [facility] ENDOSCOPY;  Service: Gastroenterology    DILATATION AND CURETTAGE  2020    ENDOSCOPY N/A 2019    Procedure: ESOPHAGOGASTRODUODENOSCOPY with biopsies and aspirate;  Surgeon: Philip Winter MD;  Location: [facility] ENDOSCOPY;  Service: Gastroenterology    MASTECTOMY Bilateral     MASTECTOMY W/ SENTINEL NODE BIOPSY Left 2025    Procedure: bilateral skin sparing mastectomy, left sentinel lymph node biopsy, possible left axillary dissection;  Surgeon: Ruba Claros MD;  Location: [facility] OR [facility] [place];  Service: General;  Laterality: Left;    US GUIDED LYMPH NODE BIOPSY  2025    UTERINE FIBROID SURGERY  2015    myosure removed vaginally    VENOUS ACCESS DEVICE (PORT) INSERTION Right 2025    Procedure: INSERTION VENOUS ACCESS DEVICE;  Surgeon: Ruba Claros MD;  Location: [facility] OR [facility] [place];  Service: General;  Laterality: Right;         Social History     Socioeconomic History    Marital status:      Spouse name: Polo    Number of children: 1    Years of education: College   Tobacco Use    Smoking status: Former     Current packs/day: 0.00     Average packs/day: 0.3 packs/day for 21.9 years (5.6 ttl pk-yrs)     Types: Cigarettes     Start date: 1995     Quit date: 10/24/2013     Years since quittin.4     Passive exposure: Past    Smokeless tobacco: Never    Tobacco comments:     quit 10/2013   Vaping Use    Vaping status: Never Used   Substance and Sexual Activity    Alcohol use: Not Currently     Comment: I have maybe 2 beers a month or a glass a wine a month.    Drug use: Yes     Frequency: 7.0 times per week     Types: Marijuana     Comment: DAILY    Sexual activity: Yes      Partners: Male     Birth control/protection: None         Family History   Adopted: Yes   Problem Relation Age of Onset    Thyroid disease Mother     Asthma Mother     Cancer Mother         Thyroid cancer. Removed thyroid.    No Known Problems Father     No Known Problems Son     Stroke Maternal Grandmother     Cirrhosis Maternal Grandmother     Anemia Maternal Grandmother     Depression Maternal Grandmother     Hypertension Maternal Grandmother     Mental illness Maternal Grandmother     Alcohol abuse Maternal Grandmother          of cirrhosis of the liver due to alcoholism    Arthritis Maternal Grandmother     No Known Problems Maternal Grandfather     Malig Hyperthermia Neg Hx           Objective    Physical Exam  Constitutional:       Appearance: Normal appearance.   HENT:      Head: Atraumatic.   Chest:          Comments: Bilateral breasts surgically absent, expanders in place, no suspicious lesions or adenopathy  Musculoskeletal:         General: Normal range of motion.   Neurological:      General: No focal deficit present.      Mental Status: She is alert.   Psychiatric:         Mood and Affect: Mood normal.         Behavior: Behavior normal.         Current Outpatient Medications on File Prior to Visit   Medication Sig Dispense Refill    albuterol (PROVENTIL) (2.5 MG/3ML) 0.083% nebulizer solution Take 2.5 mg by nebulization Every 4 (Four) Hours As Needed for Wheezing (use with neb). 120 each 12    budesonide-formoterol (SYMBICORT) 160-4.5 MCG/ACT inhaler Inhale 2 puffs 2 (Two) Times a Day.      dexAMETHasone (DECADRON) 4 MG tablet Take 2 tablets oral twice a day for 3 consecutive days beginning the day before chemotherapy and continue for 6 doses. 12 tablet 3    dexAMETHasone (DECADRON) 4 MG tablet Take 1 tablet by mouth 2 (Two) Times a Day With Meals for 15 days. 30 tablet 0    diphenoxylate-atropine (LOMOTIL) 2.5-0.025 MG per tablet Take 1 tablet by mouth 4 (Four) Times a Day As Needed for Diarrhea.  90 tablet 0    famotidine (Pepcid) 20 MG tablet Take 1 tablet by mouth 2 (Two) Times a Day. 30 tablet 0    lidocaine-prilocaine (EMLA) 2.5-2.5 % cream Apply 1 Application topically to the appropriate area as directed Take As Directed. Apply nickel size amount to port site 30 min before appt time do not rub in cover with plastic wrap 30 g 5    methocarbamol (ROBAXIN) 750 MG tablet Take 1 tablet by mouth 4 (Four) Times a Day As Needed for Muscle Spasms. 20 tablet 0    montelukast (SINGULAIR) 10 MG tablet Take 1 tablet by mouth Every Night.      OLANZapine (ZyPREXA) 5 MG tablet Take 1 tablet by mouth Every Night. 30 tablet 3    ondansetron (Zofran) 4 MG tablet Take 1 tablet by mouth Daily As Needed for Nausea or Vomiting. 30 tablet 0    ondansetron (ZOFRAN) 8 MG tablet Take 1 tablet by mouth 3 (Three) Times a Day As Needed for Nausea or Vomiting. 30 tablet 5    prochlorperazine (COMPAZINE) 10 MG tablet Take 1 tablet by mouth Every 6 (Six) Hours As Needed for Nausea or Vomiting. 90 tablet 3    SUMAtriptan (Imitrex) 25 MG tablet Take one tablet at onset of headache. May repeat dose one time in 2 hours if headache not relieved. 30 tablet 5    traMADol (ULTRAM) 50 MG tablet Take 1 tablet by mouth Every 8 (Eight) Hours As Needed for Moderate Pain (headache). 30 tablet 0    Ventolin  (90 Base) MCG/ACT inhaler Inhale 2 puffs Every 4 (Four) Hours As Needed for Wheezing. 18 g 3     Current Facility-Administered Medications on File Prior to Visit   Medication Dose Route Frequency Provider Last Rate Last Admin    [COMPLETED] sodium chloride 0.9 % infusion 1,000 mL  1,000 mL Intravenous Once Kate Caldera MD   Stopped at 04/21/25 1100       ALLERGIES:    Allergies   Allergen Reactions    Doxycycline Hives     Could have been rocephin or tordol as well    Glue [Wound Dressing Adhesive] Hives     Had reaction after biopsy    Turkey Itching     SOMETIMES HAS SOA    Gabapentin Mental Status Change       LMP  (LMP Unknown)  Comment: neg hcg         4/14/2025     8:26 AM   Current Status   ECOG score 0         Assessment & Plan     44 year old female with pT2N1a left breast cancer ER pos DC negative Her 2 negative s/p mastectomy and sln biopsy with one of three nodes positive with eulalia.  I have personally reviewed her imaging and pathology results.  I discussed with her my recommendation for post-operative radiation therapy to the left chest wall and regional nodes to decrease the risk of local regional recurrence.      I discussed with her in detail the risks, benefits and rationale of radiation therapy to include but not limited to the following:    Acute: skin erythema, breakdown/moist desquamation, swelling or discomfort of the tissue/expander, fatigue, pneumonitis resulting in shortness of breath, cough or pain, lymphedema of the arm, esophagitis, infection requiring surgery     Late: Permanent skin changes including hyperpigmentation, telangiectasias, fibrosis of the tissue resulting in smaller size or poor cosmetic outcome, late edema or cellulitis, late rib fracture, late pulmonary fibrosis, late lymphedema, hypothyroidism, and the remote risk of late cardiac damage resulting in increased risk of heart attack or second malignancies.      She voiced understanding and was given an opportunity to ask questions which I believe were answered to her satisfaction.  I have scheduled her to return for re-evaluation in 6 weeks once she completes chemotherapy.  I plan to treat the left chest wall and regional nodes to to a dose of approximately 4256cGy in 16 fractions.    I personally spent greater than 60 minutes today assessing, managing, discussing and documenting my visit with the patient. That time includes review of records, imaging and pathology reports, obtaining my own history, performing a medically appropriate evaluation, counseling and educating the patient, discussing goals, logistics, alternatives and risks of my  recommendations, surveillance options and potential outcomes. It also includes the time documenting the clinical information in the EMR and communicating my recommendations to the other involved physicians.                Thank you very much for allowing me to participate in the care of this very pleasant patient.    Sincerely,      Keiko Alonzo MD

## 2025-04-21 NOTE — PROGRESS NOTES
Subjective   Mona Castillo is a 44 y.o. female.  Referred by Dr. Claros for left breast invasive ductal carcinoma    History of Present Illness     Ms. Castillo is a 44-year-old premenopausal  lady who has had abnormal screening mammogram requiring 6 monthly follow-ups.  In January of this year patient noticed a mass in the lower inner quadrant of the left breast while she was playing with her 4-year-old.  She subsequently had a bilateral diagnostic mammogram which was previously scheduled because of the short-term follow-ups.  Workup confirmed invasive ductal carcinoma and she is now status post bilateral mastectomy with sentinel lymph node biopsy on the left.    1/20/2025-bilateral diagnostic mammogram  Loosely grouped microcalcifications in the mid to posterior third of the upper outer quadrant of the left breast unchanged.  Satellite architectural distortion at 6 o'clock position which is new compared to 7/8/2024.  This is 4 cm from the nipple and measures 1.5 x 1.1 x 1.6 cm.  This is also seen on the ultrasound and ultrasound-guided biopsy recommended.    1/27/2025-ultrasound-guided biopsy of the left breast mass  Pathology consistent with invasive ductal carcinoma, grade 2  Tumor measuring 5 mm on the core biopsy  Associated Ye grade ductal carcinoma in situ  Negative for lymphovascular space invasion  ER +91 to 100% strong  MS negative  HER2 1+ on immunohistochemistry, negative  Ki-67 30%    1/30/2025-bilateral breast MRI  Right breast-1:00, 10 cm from the nipple 3 cm non-mass enhancement which is suspicious.  11:00, 12 cm posterior to the nipple there is non-mass enhancement measuring 8.6 x 2.5 x 2.6 cm.  These are suspicious.  MRI guided biopsy recommended.    Left breast  6:00-anterior left breast-2.5 x 3.7 x 2.9 cm irregular enhancing mass with architectural distortion.  Segmental non-mass enhancement measuring 11.4 x 14.4 x 7.3 cm.  Anterior aspect of the non-mass enhancement extends  into the skin anterolateral to the left nipple.  There is relatively more focal asymmetric skin enhancement measuring up to 5.6 cm in maximum craniocaudal dimension which could also be due to recent biopsy or direct skin involvement.  No definite suspicious enhancement of the left nipple.  Asymmetric left axillary lymph nodes with cortical thickening.  Dominant lymph node measuring 2.2 x 1.2 x 1.9 cm.  This demonstrates a cortical thickening of 1.3 cm.  Suspicious for metastatic disease.    No internal mammary chain lymphadenopathy.    2/11/2025-Inv[facility]ae 9 gene stat panel - negative    2/12/2025-  MRI guided biopsies  1.right breast 1 o'clock position-atypical ductal hyperplasia with microcalcifications  Nonintact intraductal papilloma with ductal hyperplasia    2.right breast 11 o'clock position-lobular carcinoma in situ with atypical lobular hyperplasia    3.left axillary lymph node biopsy-benign reactive lymph node      3/7/2025-bilateral mastectomy with left sentinel lymph node biopsy  Left total mastectomy  Invasive ductal carcinoma, grade 2  Tumor measures 37 mm in maximum extension  Single focus of invasive carcinoma  Ductal carcinoma in situ present and measures 40 mm, grade 2  DCIS involves the nipples-Paget's disease  Lymphovascular invasion present  Margins negative for invasive as well as in situ carcinoma  3 sentinel lymph nodes evaluated  1 out of 3 sentinel lymph nodes with metastatic disease with the tumor measuring 6 mm and greater than 2 mm of extranodal extension.    pT2 N1a M0, stage II    ER +91 to 100% strong  TX negative  HER2 1+ immunohistochemistry  Ki-67 30%    Right breast simple skin sparing mastectomy  Scattered foci of ADH, ALH, SMOLD     3/17/2025-CT of the chest abdomen and pelvis  No evidence of metastatic disease in the chest abdomen and pelvis.  3 mm noncalcified nodule in the right upper lobe and 4 mm nodular opacity in the left upper lobe likely benign, follow-up CT in 12 months  recommended.  Previous cholecystectomy and appendectomy.    3/17/2025-bone scan without any evidence of metastatic disease.    Patient had to do IVF treatments for 2 years but subsequently conceived naturally after 2 miscarriages after IVF.    She is currently not on any form of contraception.  She is premenopausal and has regular menstrual cycles.    Denies any family history of breast cancer.  Her mother had thyroid cancer in her 30s or 40s.    No other significant comorbidities other than asthma which is well-controlled.    She currently works for the [employer] full-time.    Denies any recent changes in weight, cough, abdominal pain nausea vomiting.    Oncotype DX recurrence score has returned at 30.  There is a 12% risk of distant metastasis with AI or tamoxifen alone.  There is definite benefit from chemotherapy.    The drains have been removed.    4/14/2025-cycle 1 day 1 of docetaxel and Cytoxan administered.    Interval history  Mona returns today for a follow-up.  She is accompanied by her mother.  She has felt very poorly since the chemotherapy.  Reporting severe nausea, headaches, diarrhea.      The following portions of the patient's history were reviewed and updated as appropriate: allergies, current medications, past family history, past medical history, past social history, past surgical history, and problem list.    Past Medical History:   Diagnosis Date    Allergic rhinitis     Anxiety     Arthritis     Asthma     Breast cancer 1/27/25    Breast mass 1/20/25    Cancer     Breast LEFT    Chronic kidney disease     STAGE 2    DDD (degenerative disc disease), lumbar     GERD (gastroesophageal reflux disease)     H/O HSV-2 infection     Hiatal hernia     History of back pain     History of gestational diabetes     History of miscarriage     Migraine     Pneumonia     PONV (postoperative nausea and vomiting)     Tattoos     Trouble in sleeping         Past Surgical History:   Procedure Laterality  Date    APPENDECTOMY      BREAST BIOPSY  25    Left    BREAST RECONSTRUCTION Bilateral 2025    Procedure: BILATERAL BREAST TISSUE EXPANDER INSERTION W/ CORTIVA AND ANTIBIOTIC DISK AND ADJACENT TISSUE TRANSFER;  Surgeon: Anderson Hill MD;  Location: [facility] OR [facility] [place];  Service: Plastics;  Laterality: Bilateral;     SECTION  2020    CHOLECYSTECTOMY      COLONOSCOPY N/A 2019    Procedure: COLONOSCOPY with biopsies;  Surgeon: Philip Winter MD;  Location: [facility] ENDOSCOPY;  Service: Gastroenterology    DILATATION AND CURETTAGE  2020    ENDOSCOPY N/A 2019    Procedure: ESOPHAGOGASTRODUODENOSCOPY with biopsies and aspirate;  Surgeon: Philip Winter MD;  Location: [facility] ENDOSCOPY;  Service: Gastroenterology    MASTECTOMY Bilateral     MASTECTOMY W/ SENTINEL NODE BIOPSY Left 2025    Procedure: bilateral skin sparing mastectomy, left sentinel lymph node biopsy, possible left axillary dissection;  Surgeon: Ruba Claros MD;  Location: [facility] OR [facility] [place];  Service: General;  Laterality: Left;    US GUIDED LYMPH NODE BIOPSY  2025    UTERINE FIBROID SURGERY  2015    myosure removed vaginally    VENOUS ACCESS DEVICE (PORT) INSERTION Right 2025    Procedure: INSERTION VENOUS ACCESS DEVICE;  Surgeon: Ruba Claros MD;  Location: [facility] OR [facility] [place];  Service: General;  Laterality: Right;        Family History   Adopted: Yes   Problem Relation Age of Onset    Thyroid disease Mother     Asthma Mother     Cancer Mother         Thyroid cancer. Removed thyroid.    No Known Problems Father     No Known Problems Son     Stroke Maternal Grandmother     Cirrhosis Maternal Grandmother     Anemia Maternal Grandmother     Depression Maternal Grandmother     Hypertension Maternal Grandmother     Mental illness Maternal Grandmother     Alcohol abuse Maternal Grandmother          of cirrhosis of the liver due to alcoholism    Arthritis Maternal Grandmother     No Known Problems  "Maternal Grandfather     Malig Hyperthermia Neg Hx         Social History     Socioeconomic History    Marital status:      Spouse name: Polo    Number of children: 1    Years of education: College   Tobacco Use    Smoking status: Former     Current packs/day: 0.00     Average packs/day: 0.3 packs/day for 21.9 years (5.6 ttl pk-yrs)     Types: Cigarettes     Start date: 1995     Quit date: 10/24/2013     Years since quittin.4     Passive exposure: Past    Smokeless tobacco: Never    Tobacco comments:     quit 10/2013   Vaping Use    Vaping status: Never Used   Substance and Sexual Activity    Alcohol use: Not Currently     Comment: I have maybe 2 beers a month or a glass a wine a month.    Drug use: Yes     Frequency: 7.0 times per week     Types: Marijuana     Comment: DAILY    Sexual activity: Yes     Partners: Male     Birth control/protection: None        OB History          3    Para   1    Term   1            AB   2    Living   1         SAB   2    IAB        Ectopic        Molar        Multiple        Live Births   1                 Allergies   Allergen Reactions    Doxycycline Hives     Could have been rocephin or tordol as well    Glue [Wound Dressing Adhesive] Hives     Had reaction after biopsy    Turkey Itching     SOMETIMES HAS SOA    Gabapentin Mental Status Change            Review of Systems   Constitutional: Negative.    HENT: Negative.     Eyes: Negative.    Respiratory: Negative.     Cardiovascular: Negative.    Gastrointestinal: Negative.    Endocrine: Negative.    Genitourinary: Negative.    Musculoskeletal: Negative.    Allergic/Immunologic: Negative.    Neurological: Negative.    Hematological: Negative.    Psychiatric/Behavioral:  The patient is nervous/anxious.          Objective   Blood pressure 130/85, pulse 79, temperature 97.2 °F (36.2 °C), temperature source Oral, resp. rate 17, height 160 cm (62.99\"), weight 104 kg (228 lb 12.8 oz), SpO2 97%, not " currently breastfeeding.   Physical Exam  Vitals reviewed.   Constitutional:       Appearance: Normal appearance. She is obese.   Eyes:      Conjunctiva/sclera: Conjunctivae normal.   Cardiovascular:      Rate and Rhythm: Normal rate.   Pulmonary:      Effort: Pulmonary effort is normal.   Skin:     General: Skin is warm.   Neurological:      General: No focal deficit present.      Mental Status: She is alert and oriented to person, place, and time.   Psychiatric:         Mood and Affect: Mood normal.         Behavior: Behavior normal.         Thought Content: Thought content normal.         Judgment: Judgment normal.       Breast Exam: Status post bilateral mastectomy with expanders in place.    I have reexamined the patient and the results are consistent with the previously documented exam. Kate Caldera MD      Infusion on 04/21/2025   Component Date Value Ref Range Status    Glucose 04/21/2025 102 (H)  65 - 99 mg/dL Final    BUN 04/21/2025 8  6 - 20 mg/dL Final    Creatinine 04/21/2025 0.93  0.57 - 1.00 mg/dL Final    Sodium 04/21/2025 140  136 - 145 mmol/L Final    Potassium 04/21/2025 3.6  3.5 - 5.2 mmol/L Final    Chloride 04/21/2025 105  98 - 107 mmol/L Final    CO2 04/21/2025 24.4  22.0 - 29.0 mmol/L Final    Calcium 04/21/2025 9.1  8.6 - 10.5 mg/dL Final    Total Protein 04/21/2025 6.1  6.0 - 8.5 g/dL Final    Albumin 04/21/2025 3.4 (L)  3.5 - 5.2 g/dL Final    ALT (SGPT) 04/21/2025 19  1 - 33 U/L Final    AST (SGOT) 04/21/2025 14  1 - 32 U/L Final    Alkaline Phosphatase 04/21/2025 145 (H)  39 - 117 U/L Final    Total Bilirubin 04/21/2025 0.3  0.0 - 1.2 mg/dL Final    Globulin 04/21/2025 2.7  gm/dL Final    A/G Ratio 04/21/2025 1.3  g/dL Final    BUN/Creatinine Ratio 04/21/2025 8.6  7.0 - 25.0 Final    Anion Gap 04/21/2025 10.6  5.0 - 15.0 mmol/L Final    eGFR 04/21/2025 78.4  >60.0 mL/min/1.73 Final    WBC 04/21/2025 11.51 (H)  3.40 - 10.80 10*3/mm3 Final    RBC 04/21/2025 3.78  3.77 - 5.28 10*6/mm3  Final    Hemoglobin 04/21/2025 11.9 (L)  12.0 - 15.9 g/dL Final    Hematocrit 04/21/2025 36.1  34.0 - 46.6 % Final    MCV 04/21/2025 95.5  79.0 - 97.0 fL Final    MCH 04/21/2025 31.5  26.6 - 33.0 pg Final    MCHC 04/21/2025 33.0  31.5 - 35.7 g/dL Final    RDW 04/21/2025 14.2  12.3 - 15.4 % Final    RDW-SD 04/21/2025 50.0  37.0 - 54.0 fl Final    MPV 04/21/2025 9.6  6.0 - 12.0 fL Final    Platelets 04/21/2025 294  140 - 450 10*3/mm3 Final    Neutrophil % 04/21/2025 44.7  42.7 - 76.0 % Final    Lymphocyte % 04/21/2025 21.7  19.6 - 45.3 % Final    Monocyte % 04/21/2025 13.7 (H)  5.0 - 12.0 % Final    Eosinophil % 04/21/2025 2.1  0.3 - 6.2 % Final    Basophil % 04/21/2025 0.3  0.0 - 1.5 % Final    Immature Grans % 04/21/2025 17.5 (H)  0.0 - 0.5 % Final    Neutrophils, Absolute 04/21/2025 5.15  1.70 - 7.00 10*3/mm3 Final    Lymphocytes, Absolute 04/21/2025 2.50  0.70 - 3.10 10*3/mm3 Final    Monocytes, Absolute 04/21/2025 1.58 (H)  0.10 - 0.90 10*3/mm3 Final    Eosinophils, Absolute 04/21/2025 0.24  0.00 - 0.40 10*3/mm3 Final    Basophils, Absolute 04/21/2025 0.03  0.00 - 0.20 10*3/mm3 Final    Immature Grans, Absolute 04/21/2025 2.01 (H)  0.00 - 0.05 10*3/mm3 Final    nRBC 04/21/2025 0.4 (H)  0.0 - 0.2 /100 WBC Final   Infusion on 04/14/2025   Component Date Value Ref Range Status    Glucose 04/14/2025 129 (H)  65 - 99 mg/dL Final    BUN 04/14/2025 11  6 - 20 mg/dL Final    Creatinine 04/14/2025 0.83  0.57 - 1.00 mg/dL Final    Sodium 04/14/2025 140  136 - 145 mmol/L Final    Potassium 04/14/2025 3.9  3.5 - 5.2 mmol/L Final    Chloride 04/14/2025 104  98 - 107 mmol/L Final    CO2 04/14/2025 21.5 (L)  22.0 - 29.0 mmol/L Final    Calcium 04/14/2025 9.6  8.6 - 10.5 mg/dL Final    Total Protein 04/14/2025 6.5  6.0 - 8.5 g/dL Final    Albumin 04/14/2025 3.7  3.5 - 5.2 g/dL Final    ALT (SGPT) 04/14/2025 15  1 - 33 U/L Final    AST (SGOT) 04/14/2025 12  1 - 32 U/L Final    Alkaline Phosphatase 04/14/2025 85  39 - 117 U/L  Final    Total Bilirubin 04/14/2025 0.3  0.0 - 1.2 mg/dL Final    Globulin 04/14/2025 2.8  gm/dL Final    A/G Ratio 04/14/2025 1.3  g/dL Final    BUN/Creatinine Ratio 04/14/2025 13.3  7.0 - 25.0 Final    Anion Gap 04/14/2025 14.5  5.0 - 15.0 mmol/L Final    eGFR 04/14/2025 89.8  >60.0 mL/min/1.73 Final    WBC 04/14/2025 9.59  3.40 - 10.80 10*3/mm3 Final    RBC 04/14/2025 4.13  3.77 - 5.28 10*6/mm3 Final    Hemoglobin 04/14/2025 13.0  12.0 - 15.9 g/dL Final    Hematocrit 04/14/2025 39.4  34.0 - 46.6 % Final    MCV 04/14/2025 95.4  79.0 - 97.0 fL Final    MCH 04/14/2025 31.5  26.6 - 33.0 pg Final    MCHC 04/14/2025 33.0  31.5 - 35.7 g/dL Final    RDW 04/14/2025 14.7  12.3 - 15.4 % Final    RDW-SD 04/14/2025 51.2  37.0 - 54.0 fl Final    MPV 04/14/2025 9.6  6.0 - 12.0 fL Final    Platelets 04/14/2025 378  140 - 450 10*3/mm3 Final    Neutrophil % 04/14/2025 89.9 (H)  42.7 - 76.0 % Final    Lymphocyte % 04/14/2025 8.2 (L)  19.6 - 45.3 % Final    Monocyte % 04/14/2025 1.5 (L)  5.0 - 12.0 % Final    Eosinophil % 04/14/2025 0.0 (L)  0.3 - 6.2 % Final    Basophil % 04/14/2025 0.1  0.0 - 1.5 % Final    Immature Grans % 04/14/2025 0.3  0.0 - 0.5 % Final    Neutrophils, Absolute 04/14/2025 8.62 (H)  1.70 - 7.00 10*3/mm3 Final    Lymphocytes, Absolute 04/14/2025 0.79  0.70 - 3.10 10*3/mm3 Final    Monocytes, Absolute 04/14/2025 0.14  0.10 - 0.90 10*3/mm3 Final    Eosinophils, Absolute 04/14/2025 0.00  0.00 - 0.40 10*3/mm3 Final    Basophils, Absolute 04/14/2025 0.01  0.00 - 0.20 10*3/mm3 Final    Immature Grans, Absolute 04/14/2025 0.03  0.00 - 0.05 10*3/mm3 Final    nRBC 04/14/2025 0.0  0.0 - 0.2 /100 WBC Final   Admission on 04/11/2025, Discharged on 04/11/2025   Component Date Value Ref Range Status    HCG, Urine, QL 04/11/2025 Negative  Negative Final    Lot Number 04/11/2025 [id number]   Final    Internal Positive Control 04/11/2025 Positive  Positive, Passed Final    Internal Negative Control 04/11/2025 Negative  Negative,  Passed Final    Expiration Date 04/11/2025 08/05/2026   Final   Pre-Admission Testing on 04/07/2025   Component Date Value Ref Range Status    Glucose 04/07/2025 99  65 - 99 mg/dL Final    BUN 04/07/2025 8  6 - 20 mg/dL Final    Creatinine 04/07/2025 0.93  0.57 - 1.00 mg/dL Final    Sodium 04/07/2025 143  136 - 145 mmol/L Final    Potassium 04/07/2025 3.9  3.5 - 5.2 mmol/L Final    Chloride 04/07/2025 106  98 - 107 mmol/L Final    CO2 04/07/2025 26.7  22.0 - 29.0 mmol/L Final    Calcium 04/07/2025 9.4  8.6 - 10.5 mg/dL Final    BUN/Creatinine Ratio 04/07/2025 8.6  7.0 - 25.0 Final    Anion Gap 04/07/2025 10.3  5.0 - 15.0 mmol/L Final    eGFR 04/07/2025 78.4  >60.0 mL/min/1.73 Final    WBC 04/07/2025 6.87  3.40 - 10.80 10*3/mm3 Final    RBC 04/07/2025 4.01  3.77 - 5.28 10*6/mm3 Final    Hemoglobin 04/07/2025 12.6  12.0 - 15.9 g/dL Final    Hematocrit 04/07/2025 38.8  34.0 - 46.6 % Final    MCV 04/07/2025 96.8  79.0 - 97.0 fL Final    MCH 04/07/2025 31.4  26.6 - 33.0 pg Final    MCHC 04/07/2025 32.5  31.5 - 35.7 g/dL Final    RDW 04/07/2025 14.4  12.3 - 15.4 % Final    RDW-SD 04/07/2025 51.6  37.0 - 54.0 fl Final    MPV 04/07/2025 9.8  6.0 - 12.0 fL Final    Platelets 04/07/2025 355  140 - 450 10*3/mm3 Final   Office Visit on 04/03/2025   Component Date Value Ref Range Status    Diagnosis 04/03/2025 Comment   Final    NEGATIVE FOR INTRAEPITHELIAL LESION OR MALIGNANCY.    Specimen adequacy: 04/03/2025 Comment   Final    Comment: Satisfactory for evaluation.  Endocervical and/or squamous metaplastic  cells (endocervical component) are present.      Clinician Provided ICD-10: 04/03/2025 Comment   Final    Comment: Z01.419  Z11.51  Z12.4      Performed by: 04/03/2025 Comment   Final    Octavia Morrow, Cytotechnologist (ASCP)    . 04/03/2025 .   Final    Note: 04/03/2025 Comment   Final    Comment: The Pap smear is a screening test designed to aid in the detection of  premalignant and malignant conditions of the uterine  cervix.  It is not a  diagnostic procedure and should not be used as the sole means of detecting  cervical cancer.  Both false-positive and false-negative reports do occur.      Method: 04/03/2025 Comment   Final    Comment: This liquid based ThinPrep(R) pap test was screened with the  use of an image guided system.      HPV Aptima 04/03/2025 Negative  Negative Final    Comment: This nucleic acid amplification test detects fourteen high-risk  HPV types (16,18,31,33,35,39,45,51,52,56,58,59,66,68) without  differentiation.          XR Chest Post CVA Port  Result Date: 4/11/2025   As described.    This report was finalized on 4/11/2025 9:36 AM by Dr. Bhanu Boswell M.D on Workstation: [id number]       CBC and CMP reviewed and stable.    Assessment & Plan       *Left breast invasive ductal carcinoma, ER +91 to 100%, MD negative, HER2 1+ on immunohistochemistry, Ki-67 30%, grade 2  Patient to status post bilateral mastectomy on 3/7/2025.  Pathology consistent with 37 mm invasive ductal carcinoma, grade 2, ER +91 to 100% strong, MD negative, HER2 1+ immunohistochemistry, Ki-67 30%  pT2 N1a M0, prognostic stage IIb, anatomic stage IIb  1 out of 3 sentinel lymph nodes positive for metastatic disease with 6 mm of tumor and greater than 2 mm of extranodal extension  Lymphovascular invasion present   3/17/2025-CT of the chest abdomen and pelvis and bone scan without any evidence of metastatic disease  Oncotype DX recurrence score has returned at 30 with a definite benefit from chemotherapy.  4/14/2021-cycle 1 Taxotere and Cytoxan.  4/21/2025-patient reports severe nausea, severe headaches, severe diarrhea.    *Severe nausea  Start olanzapine 5 mg nightly  Patient experiencing headaches which could be secondary to Zofran.  Discontinue Zofran  Continue Compazine as needed  Prolonged steroids for 5 days total after chemotherapy    *Severe headaches  Could be secondary to Zofran or could be unrelated.  Patient reports that she  has had migraines a long time  ago and Imitrex that helped.  Start Imitrex as needed  Continue tramadol 50 mg every 8 hours as needed.  Discontinue Zofran and Aloxi    *Diarrhea  Patient has utilized only 1 Imodium per day.  Counseled her on using up to 8 Imodium's per day  Also send in Lomotil.    *Asthma  Well-controlled at this time    *Bilateral breast erythema  She is currently on Bactrim from Dr. Hill.  Continue the same  Close follow-up with plastic surgery  Drains have been removed.    *Obesity  BMI 41.1.  Weight loss since receiving the first cycle of chemotherapy.    *Contraception  Patient is currently not on any form of contraception.  Recommend that she consider nonhormonal IUD  Counseled her regarding possible future treatments and advised her against pregnancy at least for the first 2 years of diagnosis.    *Follow-up-cycle 2 Taxotere and Cytoxan in 2 weeks  MD in 3 weeks for toxicity check.      Patient is experiencing severe diarrhea, nausea and headaches which is secondary to chemotherapy.  She is receiving cytotoxic chemotherapy requiring close monitoring for toxicities.

## 2025-04-28 ENCOUNTER — HOSPITAL ENCOUNTER (OUTPATIENT)
Dept: PHYSICAL THERAPY | Facility: HOSPITAL | Age: 45
Setting detail: THERAPIES SERIES
Discharge: HOME | End: 2025-04-28
Payer: COMMERCIAL

## 2025-04-28 DIAGNOSIS — Z17.0 MALIGNANT NEOPLASM OF LOWER-INNER QUADRANT OF LEFT BREAST IN FEMALE, ESTROGEN RECEPTOR POSITIVE: Primary | ICD-10-CM

## 2025-04-28 DIAGNOSIS — Z90.13 S/P BILATERAL MASTECTOMY: ICD-10-CM

## 2025-04-28 DIAGNOSIS — C50.312 MALIGNANT NEOPLASM OF LOWER-INNER QUADRANT OF LEFT BREAST IN FEMALE, ESTROGEN RECEPTOR POSITIVE: Primary | ICD-10-CM

## 2025-04-28 DIAGNOSIS — I89.0 LYMPHEDEMA OF LEFT ARM: ICD-10-CM

## 2025-04-28 PROCEDURE — 97535 SELF CARE MNGMENT TRAINING: CPT

## 2025-04-28 PROCEDURE — 93702 BIS XTRACELL FLUID ANALYSIS: CPT

## 2025-04-28 PROCEDURE — 97161 PT EVAL LOW COMPLEX 20 MIN: CPT

## 2025-04-28 PROCEDURE — 97110 THERAPEUTIC EXERCISES: CPT

## 2025-04-28 NOTE — THERAPY EVALUATION
Physical Therapy Lymphedema Initial Evaluation  [facility]     Patient Name: Mona Castillo  : 1980  MRN: [id number]  Today's Date: 2025      Visit Date: 2025    Visit Dx:    ICD-10-CM ICD-9-CM   1. Malignant neoplasm of lower-inner quadrant of left breast in female, estrogen receptor positive  C50.312 174.3    Z17.0 V86.0   2. Lymphedema of left arm  I89.0 457.1   3. S/P bilateral mastectomy  Z90.13 V45.71       Patient Active Problem List   Diagnosis    Asthma    Seasonal allergic rhinitis    Dizziness    Missed     Acute renal failure    Abdominal pain    Diarrhea    Bronchitis with asthma, acute    Iron deficiency anemia    Intestinal infection due to enteropathogenic E. coli    Gestational diabetes    Thrombocytosis    Malignant neoplasm of female breast    Malignant neoplasm of lower-inner quadrant of left breast in female, estrogen receptor positive    Encounter for fitting and adjustment of vascular catheter        Past Medical History:   Diagnosis Date    Allergic rhinitis     Anxiety     Arthritis     Asthma     Breast cancer 26    Breast mass 26    Cancer     Breast LEFT    Chronic kidney disease     STAGE 2    DDD (degenerative disc disease), lumbar     GERD (gastroesophageal reflux disease)     H/O HSV-2 infection     Hiatal hernia     History of back pain     History of gestational diabetes     History of miscarriage     Migraine     Pneumonia     PONV (postoperative nausea and vomiting)     Tattoos     Trouble in sleeping         Past Surgical History:   Procedure Laterality Date    APPENDECTOMY      BREAST BIOPSY  25    Left    BREAST RECONSTRUCTION Bilateral 2025    Procedure: BILATERAL BREAST TISSUE EXPANDER INSERTION W/ CORTIVA AND ANTIBIOTIC DISK AND ADJACENT TISSUE TRANSFER;  Surgeon: Anderson Hill MD;  [place] [facility] [place] [facility] [place]  Service: Plastics;  Laterality: Bilateral;     SECTION  2020    CHOLECYSTECTOMY       COLONOSCOPY N/A 08/30/2019    Procedure: COLONOSCOPY with biopsies;  Surgeon: Philip Winter MD;  [facility];  Service: Gastroenterology    DILATATION AND CURETTAGE  2018, 2020    ENDOSCOPY N/A 08/30/2019    Procedure: ESOPHAGOGASTRODUODENOSCOPY with biopsies and aspirate;  Surgeon: Philip Winter MD;  Location:  [facility];  Service: Gastroenterology    MASTECTOMY Bilateral     MASTECTOMY W/ SENTINEL NODE BIOPSY Left 03/07/2025    Procedure: bilateral skin sparing mastectomy, left sentinel lymph node biopsy, possible left axillary dissection;  Surgeon: Ruba Claros MD;  Location:  [facility];  Service: General;  Laterality: Left;    US GUIDED LYMPH NODE BIOPSY  02/12/2025    UTERINE FIBROID SURGERY  2015    myosure removed vaginally    VENOUS ACCESS DEVICE (PORT) INSERTION Right 4/11/2025    Procedure: INSERTION VENOUS ACCESS DEVICE;  Surgeon: Ruba Claros MD;  Location:  [facility];  Service: General;  Laterality: Right;       Visit Dx:    ICD-10-CM ICD-9-CM   1. Malignant neoplasm of lower-inner quadrant of left breast in female, estrogen receptor positive  C50.312 174.3    Z17.0 V86.0   2. Lymphedema of left arm  I89.0 457.1   3. S/P bilateral mastectomy  Z90.13 V45.71        Patient History       Row Name 04/28/25 1100             History    Chief Complaint Pain;Swelling  -LB      Type of Pain Upper Extremity / Arm  -LB      Date Current Problem(s) Began 03/07/25  -LB      Brief Description of Current Complaint Pt s/p B mastectomy, L SLNB, 1/3 LNs positive, B expanders placed 3/7/25 by Yasmany Das/Liz. 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 She has not returned to work yet and is currently undergoing chemotherapy. She will have radiation to regional nodes and breast in July. She has 3 yo son at home. She reports LUE has been painful and swollen since surgery.  -LB      Hand Dominance right-handed  -LB      Occupation/sports/leisure activities desk job  -LB       Patient seeing anyone else for problem(s)? yes  -LB      History of Previous Related Injuries none  -LB         Pain     Pain Location Arm  -LB      Pain at Present 6  -LB      Pain at Best 6  -LB      Pain at Worst 6  -LB      Pain Frequency Constant/continuous  -LB      Pain Description Sore;Tightness  -LB      What Performance Factors Make the Current Problem(s) WORSE? using LUE  -LB      What Performance Factors Make the Current Problem(s) BETTER? avoiding using LUE  -LB      Tolerance Time- Standing no change  -LB      Tolerance Time- Sitting no change  -LB      Tolerance Time- Walking no change  -LB      Tolerance Time- Lying no change  -LB      Is your sleep disturbed? Yes  -LB      What position do you sleep in? Supine  -LB      Difficulties at work? Has not returned yet.  -LB      Difficulties with ADL's? Able to perform with some modification.  -LB      Difficulties with recreational activities? Pt denies.  -LB         Fall Risk Assessment    Any falls in the past year: No  -LB         Services    Prior Rehab/Home Health Experiences No  -LB      Are you currently receiving Home Health services No  -LB      Do you plan to receive Home Health services in the near future No  -LB         Daily Activities    Primary Language English  -LB      How does patient learn best? Listening;Reading;Demonstration  -LB      Barriers to learning None  -LB      Pt Participated in POC and Goals Yes  -LB         Safety    Are you being hurt, hit, or frightened by anyone at home or in your life? No  -LB      Are you being neglected by a caregiver No  -LB      Have you had any of the following issues with N/A  -LB                User Key  (r) = Recorded By, (t) = Taken By, (c) = Cosigned By      Initials Name Provider Type    Norah Gallegos PT Physical Therapist                     Lymphedema       Row Name 04/28/25 0900             Subjective Pain    Able to rate subjective pain? yes  -LB      Pre-Treatment Pain Level 6   -LB      Post-Treatment Pain Level 6  -LB         Subjective    Subjective Comments My arm has been bothering me since surgery.  -LB         Lymphedema Assessment    Lymphedema Classification LUE:;secondary;stage 1 (Spontaneously Reversible)  -LB      Lymphedema Cancer Related Sx bilateral;modified radical mastectomy;left;sentinel node biopsy  -LB      Lymphedema Surgery Comments 3/7/25  -LB      Lymph Nodes Removed # 3  -LB      Positive Lymph Nodes # 11  -LB      Chemo Received yes  -LB      Chemo Treatments #/Timeframe current  -LB      Radiation Therapy Received yes  -LB      Radiation Treatments #/Timeframe upcoming  -LB      Infections or Cellulitis? no  -LB         Posture/Observations    Posture/Observations Comments LUE with obvious edema  -LB         General ROM    GENERAL ROM COMMENTS BUE WFL; L painful  -LB         MMT (Manual Muscle Testing)    General MMT Comments dec strength secondary to surgery  -LB         Lymphedema Edema Assessment    Edema Assessment Comment LUE with lymphedema  -LB         Skin Changes/Observations    Skin Observations Comment well healing;expanders intac  -LB         Lymphedema Sensation    Lymphedema Sensation Comments dec B mastectomy site  -LB         Lymphedema Pulses/Capillary Refill    Lymph Pulses Capillary Refill Comments normal  -LB         LUE Quick Girth (cm)    Axilla 44.7 cm  -LB      Mid upper arm 41.8 cm  -LB      Elbow 29.5 cm  -LB      Mid forearm 29 cm  -LB      Wrist crease 16.5 cm  -LB      Web space 18.2 cm  -LB      Met-heads 18 cm  -LB      LUE Quick Girth Total 197.7  -LB         RUE Quick Girth (cm)    Axilla 41 cm  -LB      Mid upper arm 32.7 cm  -LB      Elbow 29.2 cm  -LB      Mid forearm 27.6 cm  -LB      Wrist crease 16.1 cm  -LB      Web space 18.8 cm  -LB      Met-heads 18 cm  -LB      RUE Quick Girth Total 183.4  -LB         Manual Lymphatic Drainage    Manual Therapy discussed gentle massage for TAMIKO  -LB         Compression/Skin Care     Compression/Skin Care Comments discussed LUE compression sleeve for swelling  -LB         L-Dex Bioimpedence Screening    L-Dex Measurement Extremity LUE  -LB      L-Dex Patient Position Standing  -LB      L-Dex UE Dominate Side Right  -LB      L-Dex UE At Risk Side Left  -LB      L-Dex UE Pre Surgical Value No  -LB      L-Dex UE Score 9.4  -LB      L-Dex UE Baseline Score 9.4  -LB      L-Dex UE Value Change 0  -LB      L-Dex UE Comment elevated  -LB      $ L-Dex Charge yes  -LB                User Key  (r) = Recorded By, (t) = Taken By, (c) = Cosigned By      Initials Name Provider Type    Norah Gallegos, PT Physical Therapist                                    Therapy Education  Education Details: reviewed s/s of lymphedema, steps to prevention, bioimpedance results and interpretation, use of compression sleeve, self massage for TAMIKO  Given: Symptoms/condition management, Edema management, HEP, Mobility training  Program: New  How Provided: Verbal, Demonstration  Provided to: Patient  Level of Understanding: Verbalized, Teach back education performed, Demonstrated  97695 - PT Self Care/Mgmt Minutes: 15       OP Exercises       Row Name 04/28/25 0900             Subjective    Subjective Comments My arm has been bothering me since surgery.  -LB         Subjective Pain    Able to rate subjective pain? yes  -LB      Pre-Treatment Pain Level 6  -LB      Post-Treatment Pain Level 6  -LB         Total Minutes    33242 - PT Therapeutic Exercise Minutes 15  -LB         Exercise 1    Exercise Name 1 shoulder rolls  -LB      Sets 1 2  -LB      Reps 1 10  -LB         Exercise 2    Exercise Name 2 scapular retraction  -LB      Sets 2 2  -LB      Reps 2 10  -LB      Time 2 5  -LB         Exercise 3    Exercise Name 3 wall wash flexion  -LB      Reps 3 10  -LB         Exercise 4    Exercise Name 4 supine self assist flexion  -LB      Reps 4 10  -LB         Exercise 5    Exercise Name 5 standing AROM ER  -LB      Sets 5 2  -LB       Reps 5 10  -LB         Exercise 6    Exercise Name 6 seated butterfly stretch  -LB      Reps 6 3  -LB      Time 6 20  -LB                User Key  (r) = Recorded By, (t) = Taken By, (c) = Cosigned By      Initials Name Provider Type    Norah Gallegos, PT Physical Therapist                                 PT OP Goals       Row Name 04/28/25 1100          Long Term Goals    LTG Date to Achieve 07/27/25  -LB     LTG 1 Pt will maintain bioimpedance L-dex WNLs.  -LB     LTG 1 Progress New  -LB     LTG 2 BUE AROM WNLs or back to baseline  -LB     LTG 2 Progress New  -LB     LTG 3 Pt will obtain properly fitting compression arm sleeve and verbalize wear schedule.  -LB     LTG 3 Progress New  -LB     LTG 4 Pt will be negative for obvious lymphedema LUE.  -LB     LTG 4 Progress New  -LB        Time Calculation    PT Goal Re-Cert Due Date 07/27/25  -LB               User Key  (r) = Recorded By, (t) = Taken By, (c) = Cosigned By      Initials Name Provider Type    Norah Gallegos, PT Physical Therapist                     PT Assessment/Plan       Row Name 04/28/25 1130          PT Assessment    Functional Limitations Limitations in functional capacity and performance;Other (comment);Limitation in home management;Performance in work activities;Performance in self-care ADL;Performance in leisure activities  LUE lymphedema  -LB     Impairments Edema;Impaired flexibility;Impaired lymphatic circulation;Pain;Muscle strength;Posture;Sensation  -LB     Assessment Comments Mona Castillo is a 44 y.o. female recently diagnosed with Left Breast Cancer, presents to therapy in evolving condition, s/p Bilateral Modified Radical Mastectomy with Allen Node Biopsy with immediate reconstruction with Prepectoral Tissue expander performed on 3/7/25 by surgeons Dr. Claros/Liz. Mona Castillo is at increased risk of post Mastectomy lymphedema syndrome Left Upper Extremity due to Radiation therapy Breast surgery Lymph Node  Removal Lymph Node involvement. She presents with post-operative LUE lymphedema and possible TAMIKO. LUE with inc circumferential measurements, obvious swelling, and pain in typical TAMIKO distribution but difficulty visualizing today. We did complete a baseline post operative bioimpedance assessment, with current L-Dex score of 9.3 , which is elevated. Pt will benefit from LUE compression sleeve 20-30 mmhg, glove/gauntlet 20-30 mmHg, and reduction kit 20-30 mmHg to address LUE lymphedema. At this time, functional limitations can be affected by but not limited to pain, LUE lymphedema, TAMIKO. Mona Castillo will benefit from skilled formal Breast Care Physical Therapy at this time to address listed dysfunctions. Please refer to Plan.  -LB     Rehab Potential Good  -LB     Patient/caregiver participated in establishment of treatment plan and goals Yes  -LB     Patient would benefit from skilled therapy intervention Yes  -LB        PT Plan    PT Frequency 1x/week  -LB     Predicted Duration of Therapy Intervention (PT) 8-10 visits  -LB     Planned CPT's? PT EVAL LOW COMPLEXITY: 76033;PT RE-EVAL: 45549;PT THER PROC EA 15 MIN: 71646;PT THER ACT EA 15 MIN: 46315;PT MANUAL THERAPY EA 15 MIN: 98688;PT NEUROMUSC RE-EDUCATION EA 15 MIN: 63457;PT SELF CARE/HOME MGMT/TRAIN EA 15: 82314;PT BIS XTRACELL FLUID ANALYSIS: 49836  -LB     PT Plan Comments return for TAMIKO management, sleeve fitting, repeat biomipedance in 3 months  -LB               User Key  (r) = Recorded By, (t) = Taken By, (c) = Cosigned By      Initials Name Provider Type    Norah aGllegos, PT Physical Therapist                       Outcome Measure Options: Quick DASH  Quick DASH  Open a tight or new jar.: Moderate Difficulty  Do heavy household chores (e.g., wash walls, wash floors): Severe Difficulty  Carry a shopping bag or briefcase: Severe Difficulty  Wash your back: No Difficulty  Use a knife to cut food: No Difficulty  Recreational activities in which you  take some force or impact through your arm, should or hand (e.g. golf, hammering, tennis, etc.): Moderate Difficulty  During the past week, to what extent has your arm, shoulder, or hand problem interfered with your normal social activites with family, friends, neighbors or groups?: Moderately  During the past week, were you limited in your work or other regular daily activities as a result of your arm, shoulder or hand problem?: Moderately Limited  Arm, Shoulder, or hand pain: Moderate  Tingling (pins and needles) in your arm, shoulder, or hand: None  During the past week, how much difficulty have you had sleeping because of the pain in your arm, shoulder or hand?: No difficulty  Number of Questions Answered: 11  Quick DASH Score: 36.36         Time Calculation:   Start Time: 0915  Stop Time: 1000  Time Calculation (min): 45 min  Total Timed Code Minutes- PT: 30 minute(s)  Timed Charges  92149 - PT Therapeutic Exercise Minutes: 15  80421 - PT Self Care/Mgmt Minutes: 15  Total Minutes  Timed Charges Total Minutes: 15   Total Minutes: 15   Therapy Charges for Today       Code Description Service Date Service Provider Modifiers Qty    90026176179 HC PT BIS XTRACELL FLUID ANALYSIS 4/28/2025 Norah Herrera, PT  1    67962771094 HC PT THER PROC EA 15 MIN 4/28/2025 Norah Herrera, PT GP 1    61477889917 HC PT SELF CARE/MGMT/TRAIN EA 15 MIN 4/28/2025 Norah Herrera, PT GP 1    66535426926 HC PT EVAL LOW COMPLEXITY 1 4/28/2025 Norah Herrera, PT GP 1            PT G-Codes  Outcome Measure Options: Quick DASH  Quick DASH Score: 36.36         Norah Herrrea PT  4/28/2025

## 2025-04-29 ENCOUNTER — TELEPHONE (OUTPATIENT)
Dept: OTHER | Facility: HOSPITAL | Age: 45
End: 2025-04-29
Payer: COMMERCIAL

## 2025-04-29 ENCOUNTER — TELEPHONE (OUTPATIENT)
Dept: ONCOLOGY | Facility: CLINIC | Age: 45
End: 2025-04-29
Payer: COMMERCIAL

## 2025-04-29 NOTE — TELEPHONE ENCOUNTER
I called to let the patient know I completed the form, Dr. Caldera signed and I faxed the form on 4/23

## 2025-04-29 NOTE — TELEPHONE ENCOUNTER
Oncology Social Work  Distress Screening Follow-up    Diagnosis: Breast cancer    Location of Distress Screening: Medical Oncology    Distress Level: 2 (4/21/2025 12:54 PM)    Physical Concerns:    Fatigue: N  Pain: N  Sleep: Y  Substance abuse: N  Tobacco use: N  Memory or concentration: N  Sexual health: N  Changes in eating: N  Loss or change of physical abilities: N    Practical Problems:    : N  Housing/Utilities: N  Transportation: N  Treatment decisions: Y  Taking care of myself: N  Taking care of others: N  Work: Y  School: N  Finances: N  Insurance: N  Having enough food: N  Access to medicine: N    Emotional Concerns:    Worry or anxiety: Y  Sadness or depression: N  Loss of interest or enjoyment: N  Grief or loss: N  Fear: N  Loneliness: N  Anger: N  Changes in appearance: N  Feelings of worthlessness or being a burden: N    Family Concerns:    Ability to have children: N  Relationship with spouse or partner: N  Relationship with children: N  Relationship with family members: N  Relationship with friends or coworkers: N  Communication with health care team: N    Spiritual Concerns:    Sense of meaning or purpose: N  Changes in ezequiel or beliefs: N  Death, dying or afterlife: N  Conflict between beliefs and cancer treatments: N  Relationship with the sacred: N  Ritual or dietary needs: N     Interventions:   Emotional support/coping strategies     Comments:   Spoke to the patient via telephone.  Explained role of OSW and supportive oncology services.  Discussed with the patient her distress screening score and concerns of sleep, worry/anxiety and work.  The patient states that her concerns resulted from uncertainty of her diagnosis and treatment needs.  The patient states that she is doing better since having talked to her MD but still has periods of unexplained anxiety that she did not have before her diagnosis.  The patient stated that she would like some time to think about  services that she may want to utilize.  The patient was informed that OSW will meet with her to provide her with OSW contact information, a list of supportive oncology services and information on [employer] and [facility].  OSW will follow up with the patient to assist as needed.  RASHEED Goodman

## 2025-04-29 NOTE — TELEPHONE ENCOUNTER
Caller: Mona Castillo    Relationship: Self    Best call back number:   Telephone Information:   Mobile [phone]     Who are you requesting to speak with (clinical staff, provider,  specific staff member): SEBASTIEN     What was the call regarding: PATIENT DROPPED OFF A CONTINUATION FORM FOR DR. LEZAMA TO COMPLETE ON 4/21/2025.    NEEDS TO BE COMPLETED AND FAXED BACK AS IT IS HOLDING UP HER GETTING HER MONEY.    CALL TO ADVISE

## 2025-05-05 ENCOUNTER — CLINICAL SUPPORT (OUTPATIENT)
Dept: OTHER | Facility: HOSPITAL | Age: 45
End: 2025-05-05
Payer: COMMERCIAL

## 2025-05-05 ENCOUNTER — PATIENT OUTREACH (OUTPATIENT)
Dept: OTHER | Facility: HOSPITAL | Age: 45
End: 2025-05-05
Payer: COMMERCIAL

## 2025-05-05 ENCOUNTER — INFUSION (OUTPATIENT)
Dept: ONCOLOGY | Facility: HOSPITAL | Age: 45
End: 2025-05-05
Payer: COMMERCIAL

## 2025-05-05 ENCOUNTER — OFFICE VISIT (OUTPATIENT)
Dept: ONCOLOGY | Facility: CLINIC | Age: 45
End: 2025-05-05
Payer: COMMERCIAL

## 2025-05-05 VITALS
BODY MASS INDEX: 41.43 KG/M2 | HEIGHT: 63 IN | DIASTOLIC BLOOD PRESSURE: 80 MMHG | OXYGEN SATURATION: 97 % | RESPIRATION RATE: 16 BRPM | SYSTOLIC BLOOD PRESSURE: 124 MMHG | HEART RATE: 82 BPM | TEMPERATURE: 98.5 F | WEIGHT: 233.8 LBS

## 2025-05-05 DIAGNOSIS — C50.312 MALIGNANT NEOPLASM OF LOWER-INNER QUADRANT OF LEFT BREAST IN FEMALE, ESTROGEN RECEPTOR POSITIVE: Primary | ICD-10-CM

## 2025-05-05 DIAGNOSIS — Z17.0 MALIGNANT NEOPLASM OF LOWER-INNER QUADRANT OF LEFT BREAST IN FEMALE, ESTROGEN RECEPTOR POSITIVE: ICD-10-CM

## 2025-05-05 DIAGNOSIS — C50.312 MALIGNANT NEOPLASM OF LOWER-INNER QUADRANT OF LEFT BREAST IN FEMALE, ESTROGEN RECEPTOR POSITIVE: ICD-10-CM

## 2025-05-05 DIAGNOSIS — Z17.0 MALIGNANT NEOPLASM OF LOWER-INNER QUADRANT OF LEFT BREAST IN FEMALE, ESTROGEN RECEPTOR POSITIVE: Primary | ICD-10-CM

## 2025-05-05 DIAGNOSIS — Z79.899 HIGH RISK MEDICATION USE: ICD-10-CM

## 2025-05-05 LAB
ALBUMIN SERPL-MCNC: 4.1 G/DL (ref 3.5–5.2)
ALBUMIN/GLOB SERPL: 1.7 G/DL
ALP SERPL-CCNC: 76 U/L (ref 39–117)
ALT SERPL W P-5'-P-CCNC: 22 U/L (ref 1–33)
ANION GAP SERPL CALCULATED.3IONS-SCNC: 11.6 MMOL/L (ref 5–15)
AST SERPL-CCNC: 11 U/L (ref 1–32)
BASOPHILS # BLD AUTO: 0.05 10*3/MM3 (ref 0–0.2)
BASOPHILS NFR BLD AUTO: 0.3 % (ref 0–1.5)
BILIRUB SERPL-MCNC: 0.3 MG/DL (ref 0–1.2)
BUN SERPL-MCNC: 21 MG/DL (ref 6–20)
BUN/CREAT SERPL: 28 (ref 7–25)
CALCIUM SPEC-SCNC: 9.1 MG/DL (ref 8.6–10.5)
CHLORIDE SERPL-SCNC: 103 MMOL/L (ref 98–107)
CO2 SERPL-SCNC: 23.4 MMOL/L (ref 22–29)
CREAT SERPL-MCNC: 0.75 MG/DL (ref 0.57–1)
DEPRECATED RDW RBC AUTO: 51.5 FL (ref 37–54)
EGFRCR SERPLBLD CKD-EPI 2021: 101.5 ML/MIN/1.73
EOSINOPHIL # BLD AUTO: 0.09 10*3/MM3 (ref 0–0.4)
EOSINOPHIL NFR BLD AUTO: 0.5 % (ref 0.3–6.2)
ERYTHROCYTE [DISTWIDTH] IN BLOOD BY AUTOMATED COUNT: 15.2 % (ref 12.3–15.4)
GLOBULIN UR ELPH-MCNC: 2.4 GM/DL
GLUCOSE SERPL-MCNC: 137 MG/DL (ref 65–99)
HCT VFR BLD AUTO: 38.7 % (ref 34–46.6)
HGB BLD-MCNC: 12.6 G/DL (ref 12–15.9)
IMM GRANULOCYTES # BLD AUTO: 0.45 10*3/MM3 (ref 0–0.05)
IMM GRANULOCYTES NFR BLD AUTO: 2.4 % (ref 0–0.5)
LYMPHOCYTES # BLD AUTO: 1.93 10*3/MM3 (ref 0.7–3.1)
LYMPHOCYTES NFR BLD AUTO: 10.1 % (ref 19.6–45.3)
MAGNESIUM SERPL-MCNC: 2.2 MG/DL (ref 1.6–2.6)
MCH RBC QN AUTO: 31.4 PG (ref 26.6–33)
MCHC RBC AUTO-ENTMCNC: 32.6 G/DL (ref 31.5–35.7)
MCV RBC AUTO: 96.5 FL (ref 79–97)
MONOCYTES # BLD AUTO: 1.14 10*3/MM3 (ref 0.1–0.9)
MONOCYTES NFR BLD AUTO: 6 % (ref 5–12)
NEUTROPHILS NFR BLD AUTO: 15.38 10*3/MM3 (ref 1.7–7)
NEUTROPHILS NFR BLD AUTO: 80.7 % (ref 42.7–76)
NRBC BLD AUTO-RTO: 0.1 /100 WBC (ref 0–0.2)
PLATELET # BLD AUTO: 432 10*3/MM3 (ref 140–450)
PMV BLD AUTO: 9.6 FL (ref 6–12)
POTASSIUM SERPL-SCNC: 4.2 MMOL/L (ref 3.5–5.2)
PROT SERPL-MCNC: 6.5 G/DL (ref 6–8.5)
RBC # BLD AUTO: 4.01 10*6/MM3 (ref 3.77–5.28)
SODIUM SERPL-SCNC: 138 MMOL/L (ref 136–145)
WBC NRBC COR # BLD AUTO: 19.04 10*3/MM3 (ref 3.4–10.8)

## 2025-05-05 PROCEDURE — 85025 COMPLETE CBC W/AUTO DIFF WBC: CPT

## 2025-05-05 PROCEDURE — 96413 CHEMO IV INFUSION 1 HR: CPT

## 2025-05-05 PROCEDURE — 96375 TX/PRO/DX INJ NEW DRUG ADDON: CPT

## 2025-05-05 PROCEDURE — 80053 COMPREHEN METABOLIC PANEL: CPT

## 2025-05-05 PROCEDURE — 96377 APPLICATON ON-BODY INJECTOR: CPT

## 2025-05-05 PROCEDURE — 25010000002 DOCETAXEL 20 MG/ML SOLUTION 8 ML VIAL: Performed by: NURSE PRACTITIONER

## 2025-05-05 PROCEDURE — 25810000003 SODIUM CHLORIDE 0.9 % SOLUTION 250 ML FLEX CONT: Performed by: NURSE PRACTITIONER

## 2025-05-05 PROCEDURE — 99215 OFFICE O/P EST HI 40 MIN: CPT | Performed by: NURSE PRACTITIONER

## 2025-05-05 PROCEDURE — 83735 ASSAY OF MAGNESIUM: CPT

## 2025-05-05 PROCEDURE — 25010000002 DIPHENHYDRAMINE PER 50 MG: Performed by: NURSE PRACTITIONER

## 2025-05-05 PROCEDURE — 25010000002 GRANISETRON PER 100 MCG: Performed by: NURSE PRACTITIONER

## 2025-05-05 PROCEDURE — 96417 CHEMO IV INFUS EACH ADDL SEQ: CPT

## 2025-05-05 PROCEDURE — 25010000002 CYCLOPHOSPHAMIDE 2 GM/10ML SOLUTION 10 ML VIAL: Performed by: NURSE PRACTITIONER

## 2025-05-05 PROCEDURE — 25010000002 FAMOTIDINE 10 MG/ML SOLUTION: Performed by: NURSE PRACTITIONER

## 2025-05-05 PROCEDURE — 25010000002 PEGFILGRASTIM-CBQV 6 MG/0.6ML SOLUTION PREFILLED SYRINGE: Performed by: NURSE PRACTITIONER

## 2025-05-05 RX ORDER — DIPHENHYDRAMINE HYDROCHLORIDE 50 MG/ML
50 INJECTION, SOLUTION INTRAMUSCULAR; INTRAVENOUS AS NEEDED
Status: CANCELLED | OUTPATIENT
Start: 2025-05-05

## 2025-05-05 RX ORDER — SODIUM CHLORIDE 9 MG/ML
20 INJECTION, SOLUTION INTRAVENOUS ONCE
Status: CANCELLED | OUTPATIENT
Start: 2025-05-05

## 2025-05-05 RX ORDER — GRANISETRON HYDROCHLORIDE 1 MG/ML
1 INJECTION INTRAVENOUS ONCE
Status: COMPLETED | OUTPATIENT
Start: 2025-05-05 | End: 2025-05-05

## 2025-05-05 RX ORDER — OMEPRAZOLE 20 MG/1
20 CAPSULE, DELAYED RELEASE ORAL DAILY
COMMUNITY

## 2025-05-05 RX ORDER — GRANISETRON HYDROCHLORIDE 1 MG/ML
1 INJECTION INTRAVENOUS ONCE
Status: CANCELLED | OUTPATIENT
Start: 2025-05-05

## 2025-05-05 RX ORDER — FAMOTIDINE 10 MG/ML
20 INJECTION, SOLUTION INTRAVENOUS ONCE
Status: COMPLETED | OUTPATIENT
Start: 2025-05-05 | End: 2025-05-05

## 2025-05-05 RX ORDER — HYDROCORTISONE SODIUM SUCCINATE 100 MG/2ML
100 INJECTION INTRAMUSCULAR; INTRAVENOUS AS NEEDED
Status: CANCELLED | OUTPATIENT
Start: 2025-05-05

## 2025-05-05 RX ORDER — FAMOTIDINE 10 MG/ML
20 INJECTION, SOLUTION INTRAVENOUS AS NEEDED
Status: CANCELLED | OUTPATIENT
Start: 2025-05-05

## 2025-05-05 RX ORDER — FAMOTIDINE 10 MG/ML
20 INJECTION, SOLUTION INTRAVENOUS ONCE
Status: CANCELLED | OUTPATIENT
Start: 2025-05-05

## 2025-05-05 RX ORDER — DEXAMETHASONE 4 MG/1
4 TABLET ORAL 2 TIMES DAILY WITH MEALS
Qty: 60 TABLET | Refills: 0 | Status: SHIPPED | OUTPATIENT
Start: 2025-05-05

## 2025-05-05 RX ADMIN — PEGFILGRASTIM-CBQV 6 MG: 6 INJECTION, SOLUTION SUBCUTANEOUS at 13:20

## 2025-05-05 RX ADMIN — SODIUM CHLORIDE 155 MG: 9 INJECTION, SOLUTION INTRAVENOUS at 10:45

## 2025-05-05 RX ADMIN — CYCLOPHOSPHAMIDE 1230 MG: 2 INJECTION INTRAVENOUS at 11:48

## 2025-05-05 RX ADMIN — FAMOTIDINE 20 MG: 10 INJECTION INTRAVENOUS at 09:55

## 2025-05-05 RX ADMIN — GRANISETRON HYDROCHLORIDE 1 MG: 1 INJECTION, SOLUTION INTRAVENOUS at 09:55

## 2025-05-05 RX ADMIN — DIPHENHYDRAMINE HYDROCHLORIDE 50 MG: 50 INJECTION, SOLUTION INTRAMUSCULAR; INTRAVENOUS at 09:55

## 2025-05-05 NOTE — PROGRESS NOTES
OUTPATIENT ONCOLOGY NUTRITION ASSESSMENT    Patient Name: Mona Castillo  YOB: 1980  MRN: [id number]  Assessment Date: 2025    COMMENTS:  Follow up in infusion today. Receiving cycle 2 TC today. She reports diarrhea is resolved. She took Enterade for a couple of days. Plans to use again this cycle.   She was using prednisone every day and this has been clarified.   Will continue to follow up.         Reason for Assessment Follow up     Diagnosis/Problem   Left breast invasive ductal carcinoma, ER + TX -   Treatment Plan Chemotherapy  Taxotere and Cytoxan   Frequency Every 21 days   Goal of cancer treatment Adjuvant   Comments:          Encounter Information        Nutrition/Diet History:  No issues currently   Oral Nutrition Supplements:    Factors/Symptoms Affecting Intake: No factors at this time   Comments:      Medical/Surgical History Past Medical History:   Diagnosis Date    Allergic rhinitis     Anxiety     Arthritis     Asthma     Breast cancer 26    Breast mass 26    Cancer     Breast LEFT    Chronic kidney disease     STAGE 2    DDD (degenerative disc disease), lumbar     GERD (gastroesophageal reflux disease)     H/O HSV-2 infection     Hiatal hernia     History of back pain     History of gestational diabetes     History of miscarriage     Migraine     Pneumonia     PONV (postoperative nausea and vomiting)     Tattoos     Trouble in sleeping        Past Surgical History:   Procedure Laterality Date    APPENDECTOMY      BREAST BIOPSY  26    Left    BREAST RECONSTRUCTION Bilateral 2025    Procedure: BILATERAL BREAST TISSUE EXPANDER INSERTION W/ CORTIVA AND ANTIBIOTIC DISK AND ADJACENT TISSUE TRANSFER;  Surgeon: Anderson Hill MD;  Location: [facility] OR [facility] [place];  Service: Plastics;  Laterality: Bilateral;     SECTION  2020    CHOLECYSTECTOMY      COLONOSCOPY N/A 2019    Procedure: COLONOSCOPY with biopsies;  Surgeon: Philip Winter MD;   "Location: [facility] ENDOSCOPY;  Service: Gastroenterology    DILATATION AND CURETTAGE  2018, 2020    ENDOSCOPY N/A 08/30/2019    Procedure: ESOPHAGOGASTRODUODENOSCOPY with biopsies and aspirate;  Surgeon: Philip Winter MD;  Location: [facility] ENDOSCOPY;  Service: Gastroenterology    MASTECTOMY Bilateral     MASTECTOMY W/ SENTINEL NODE BIOPSY Left 03/07/2025    Procedure: bilateral skin sparing mastectomy, left sentinel lymph node biopsy, possible left axillary dissection;  Surgeon: Ruba Claros MD;  Location: [facility] OR [facility] [place];  Service: General;  Laterality: Left;    US GUIDED LYMPH NODE BIOPSY  02/12/2025    UTERINE FIBROID SURGERY  2015    myosure removed vaginally    VENOUS ACCESS DEVICE (PORT) INSERTION Right 4/11/2025    Procedure: INSERTION VENOUS ACCESS DEVICE;  Surgeon: Ruba Claros MD;  Location: [facility] OR [facility] [place];  Service: General;  Laterality: Right;               Anthropometrics        Current Height Ht Readings from Last 1 Encounters:   05/05/25 160 cm (62.99\")      Current Weight Wt Readings from Last 1 Encounters:   05/05/25 106 kg (233 lb 12.8 oz)      BMI  41.3   Ideal Body Weight (IBW) 115 lb   Usual Body Weight (UBW) 230 lb   Weight Change/Trend Stable   Weight History Wt Readings from Last 30 Encounters:   05/05/25 0852 106 kg (233 lb 12.8 oz)   04/21/25 1258 106 kg (233 lb)   04/21/25 0905 104 kg (228 lb 12.8 oz)   04/14/25 0827 107 kg (236 lb 3.2 oz)   04/08/25 0919 106 kg (233 lb)   04/07/25 1012 106 kg (233 lb 1.6 oz)   04/04/25 0758 105 kg (230 lb 9.6 oz)   04/03/25 1001 102 kg (225 lb)   04/01/25 1441 104 kg (229 lb 9.6 oz)   03/26/25 1339 104 kg (230 lb)   03/19/25 1257 105 kg (232 lb 1.6 oz)   03/07/25 0727 107 kg (235 lb 14.3 oz)   02/24/25 1610 107 kg (236 lb)   02/24/25 1432 106 kg (234 lb)   02/10/25 1253 108 kg (238 lb)   02/06/25 0815 108 kg (238 lb)   02/05/25 1104 108 kg (238 lb 9.6 oz)   02/04/25 1222 107 kg (235 lb)   02/03/25 1012 107 kg (235 lb)   01/27/25 1359 108 kg (239 " lb)   10/21/24 0900 108 kg (239 lb)   04/19/24 1536 106 kg (234 lb)   07/19/23 0809 107 kg (236 lb 6.4 oz)   01/20/21 1621 96.6 kg (213 lb)   12/18/20 1105 96.6 kg (213 lb)   12/14/20 1134 96.6 kg (213 lb)   03/02/20 1554 102 kg (225 lb)   09/30/19 1333 98.9 kg (218 lb)   08/30/19 0810 96.8 kg (213 lb 5 oz)   08/05/19 1352 96.6 kg (213 lb)          Medications           Current medications: OLANZapine, SUMAtriptan, albuterol, albuterol sulfate HFA, budesonide-formoterol, dexAMETHasone, diphenoxylate-atropine, famotidine, lidocaine-prilocaine, methocarbamol, montelukast, omeprazole, ondansetron, prochlorperazine, and traMADol                 Tests/Procedures        Tests/Procedures Chemotherapy     Labs       Pertinent Labs    Results from last 7 days   Lab Units 05/05/25  0848   SODIUM mmol/L 138   POTASSIUM mmol/L 4.2   CHLORIDE mmol/L 103   CO2 mmol/L 23.4   BUN mg/dL 21*   CREATININE mg/dL 0.75   CALCIUM mg/dL 9.1   BILIRUBIN mg/dL 0.3   ALK PHOS U/L 76   ALT (SGPT) U/L 22   AST (SGOT) U/L 11   GLUCOSE mg/dL 137*     Results from last 7 days   Lab Units 05/05/25  0848   MAGNESIUM mg/dL 2.2   HEMOGLOBIN g/dL 12.6   HEMATOCRIT % 38.7   WBC 10*3/mm3 19.04*   ALBUMIN g/dL 4.1     Results from last 7 days   Lab Units 05/05/25  0848   PLATELETS 10*3/mm3 432     COVID19   Date Value Ref Range Status   01/02/2025 Not Detected Not Detected - Ref. Range Final     Lab Results   Component Value Date    HGBA1C 5.20 07/19/2023          Physical Findings        Physical Appearance alert     Edema  not assessed   Gastrointestinal None   Tubes/Lines/Drains Implantable Port   Oral/Mouth Cavity WNL   Skin Intact       Estimated/Assessed Needs        Energy Requirements    Height for Calculation      Weight for Calculation 52 kg IBW   Method for Estimation  25 kcal/kg   EST Needs (kcal/day) 1300 kcals/day       Protein Requirements    Weight for Calculation 106 kg   EST Protein Needs (g/kg) 0.8 gm/kg   EST Daily Needs (g/day) 85  g/day       Fluid Requirements     Method for Estimation 1 mL/kcal    Estimated Needs (mL/day) 3558-9645 mL/day         NUTRITION INTERVENTION / PLAN OF CARE      Intervention Goal(s) Reduce/improve symptoms, Provide information, Meet estimated needs, Disease management/therapy, Tolerate PO , and No significant weight loss         RD Intervention/Action Encouraged intake, Follow Tx progress, Recommended/ordered         Recommendations:       PO Diet Continue diet as tolerated       Supplements       Snacks       Other Has enterade at home, use one to two per day for diarrhea         Monitor/Evaluation PO intake, Supplement intake, Pertinent labs, Weight, Symptoms   Education Education provided           Electronically signed by:  Mitali Sotomayor RD, LD  05/05/25 12:11 EDT

## 2025-05-05 NOTE — PROGRESS NOTES
Subjective   Mona Castillo is a 44 y.o. female.  Referred by Dr. Claros for left breast invasive ductal carcinoma    History of Present Illness     Ms. Castillo is a 44-year-old premenopausal  lady who has had abnormal screening mammogram requiring 6 monthly follow-ups.  In January of this year patient noticed a mass in the lower inner quadrant of the left breast while she was playing with her 4-year-old.  She subsequently had a bilateral diagnostic mammogram which was previously scheduled because of the short-term follow-ups.  Workup confirmed invasive ductal carcinoma and she is now status post bilateral mastectomy with sentinel lymph node biopsy on the left.    1/20/2025-bilateral diagnostic mammogram  Loosely grouped microcalcifications in the mid to posterior third of the upper outer quadrant of the left breast unchanged.  Satellite architectural distortion at 6 o'clock position which is new compared to 7/8/2024.  This is 4 cm from the nipple and measures 1.5 x 1.1 x 1.6 cm.  This is also seen on the ultrasound and ultrasound-guided biopsy recommended.    1/27/2025-ultrasound-guided biopsy of the left breast mass  Pathology consistent with invasive ductal carcinoma, grade 2  Tumor measuring 5 mm on the core biopsy  Associated Ye grade ductal carcinoma in situ  Negative for lymphovascular space invasion  ER +91 to 100% strong  CO negative  HER2 1+ on immunohistochemistry, negative  Ki-67 30%    1/30/2025-bilateral breast MRI  Right breast-1:00, 10 cm from the nipple 3 cm non-mass enhancement which is suspicious.  11:00, 12 cm posterior to the nipple there is non-mass enhancement measuring 8.6 x 2.5 x 2.6 cm.  These are suspicious.  MRI guided biopsy recommended.    Left breast  6:00-anterior left breast-2.5 x 3.7 x 2.9 cm irregular enhancing mass with architectural distortion.  Segmental non-mass enhancement measuring 11.4 x 14.4 x 7.3 cm.  Anterior aspect of the non-mass enhancement extends  into the skin anterolateral to the left nipple.  There is relatively more focal asymmetric skin enhancement measuring up to 5.6 cm in maximum craniocaudal dimension which could also be due to recent biopsy or direct skin involvement.  No definite suspicious enhancement of the left nipple.  Asymmetric left axillary lymph nodes with cortical thickening.  Dominant lymph node measuring 2.2 x 1.2 x 1.9 cm.  This demonstrates a cortical thickening of 1.3 cm.  Suspicious for metastatic disease.    No internal mammary chain lymphadenopathy.    2/11/2025-Inv[employer]ae 9 gene stat panel - negative    2/12/2025-  MRI guided biopsies  1.right breast 1 o'clock position-atypical ductal hyperplasia with microcalcifications  Nonintact intraductal papilloma with ductal hyperplasia    2.right breast 11 o'clock position-lobular carcinoma in situ with atypical lobular hyperplasia    3.left axillary lymph node biopsy-benign reactive lymph node      3/7/2025-bilateral mastectomy with left sentinel lymph node biopsy  Left total mastectomy  Invasive ductal carcinoma, grade 2  Tumor measures 37 mm in maximum extension  Single focus of invasive carcinoma  Ductal carcinoma in situ present and measures 40 mm, grade 2  DCIS involves the nipples-Paget's disease  Lymphovascular invasion present  Margins negative for invasive as well as in situ carcinoma  3 sentinel lymph nodes evaluated  1 out of 3 sentinel lymph nodes with metastatic disease with the tumor measuring 6 mm and greater than 2 mm of extranodal extension.    pT2 N1a M0, stage II    ER +91 to 100% strong  ID negative  HER2 1+ immunohistochemistry  Ki-67 30%    Right breast simple skin sparing mastectomy  Scattered foci of ADH, ALH, SMOLD     3/17/2025-CT of the chest abdomen and pelvis  No evidence of metastatic disease in the chest abdomen and pelvis.  3 mm noncalcified nodule in the right upper lobe and 4 mm nodular opacity in the left upper lobe likely benign, follow-up CT in 12 months  recommended.  Previous cholecystectomy and appendectomy.    3/17/2025-bone scan without any evidence of metastatic disease.    Patient had to do IVF treatments for 2 years but subsequently conceived naturally after 2 miscarriages after IVF.    She is currently not on any form of contraception.  She is premenopausal and has regular menstrual cycles.    Denies any family history of breast cancer.  Her mother had thyroid cancer in her 30s or 40s.    No other significant comorbidities other than asthma which is well-controlled.    She currently works for the [employer] full-time.    Denies any recent changes in weight, cough, abdominal pain nausea vomiting.    Oncotype DX recurrence score has returned at 30.  There is a 12% risk of distant metastasis with AI or tamoxifen alone.  There is definite benefit from chemotherapy.    The drains have been removed.    4/14/2025-cycle 1 day 1 of docetaxel and Cytoxan administered.    Interval history  The patient returns to the office today, 5/5/2025 for follow-up and cycle 2 Taxotere Cytoxan.  She did struggle to tolerate her first cycle with nausea compo significant headaches.  She reports after receiving IV fluid support her tolerance improved.  She does reports she has had several episodes of becoming very jittery, diaphoretic, and feeling as though her legs are going to give out.  She has even fallen.  Upon further discussion, it is realized the patient has been taking dexamethasone 4 mg twice daily for the past 2 weeks.  This was attempted to be for total of 5 days around chemotherapy due to nausea.  She has not checking her blood glucose at home.  She has no neuropathy.      The following portions of the patient's history were reviewed and updated as appropriate: allergies, current medications, past family history, past medical history, past social history, past surgical history, and problem list.    Past Medical History:   Diagnosis Date    Allergic rhinitis     Anxiety      Arthritis     Asthma     Breast cancer 26    Breast mass 26    Cancer     Breast LEFT    Chronic kidney disease     STAGE 2    DDD (degenerative disc disease), lumbar     GERD (gastroesophageal reflux disease)     H/O HSV-2 infection     Hiatal hernia     History of back pain     History of gestational diabetes     History of miscarriage     Migraine     Pneumonia     PONV (postoperative nausea and vomiting)     Tattoos     Trouble in sleeping         Past Surgical History:   Procedure Laterality Date    APPENDECTOMY      BREAST BIOPSY  25    Left    BREAST RECONSTRUCTION Bilateral 2025    Procedure: BILATERAL BREAST TISSUE EXPANDER INSERTION W/ CORTIVA AND ANTIBIOTIC DISK AND ADJACENT TISSUE TRANSFER;  Surgeon: Anderson Hill MD;  Location: [facility] [place] [facility] [place];  Service: Plastics;  Laterality: Bilateral;     SECTION  2020    CHOLECYSTECTOMY      COLONOSCOPY N/A 2019    Procedure: COLONOSCOPY with biopsies;  Surgeon: Philip Winter MD;  Location: [facility] ENDOSCOPY;  Service: Gastroenterology    DILATATION AND CURETTAGE  2020    ENDOSCOPY N/A 2019    Procedure: ESOPHAGOGASTRODUODENOSCOPY with biopsies and aspirate;  Surgeon: Philip Winter MD;  Location: [facility] ENDOSCOPY;  Service: Gastroenterology    MASTECTOMY Bilateral     MASTECTOMY W/ SENTINEL NODE BIOPSY Left 2025    Procedure: bilateral skin sparing mastectomy, left sentinel lymph node biopsy, possible left axillary dissection;  Surgeon: Ruba Claros MD;  Location: [facility] [place];  Service: General;  Laterality: Left;    US GUIDED LYMPH NODE BIOPSY  2025    UTERINE FIBROID SURGERY  2015    myosure removed vaginally    VENOUS ACCESS DEVICE (PORT) INSERTION Right 2025    Procedure: INSERTION VENOUS ACCESS DEVICE;  Surgeon: Ruba Claros MD;  Location: [facility] [place];  Service: General;  Laterality: Right;        Family History   Adopted: Yes   Problem Relation Age of Onset     Thyroid disease Mother     Asthma Mother     Cancer Mother         Thyroid cancer. Removed thyroid.    No Known Problems Father     No Known Problems Son     Stroke Maternal Grandmother     Cirrhosis Maternal Grandmother     Anemia Maternal Grandmother     Depression Maternal Grandmother     Hypertension Maternal Grandmother     Mental illness Maternal Grandmother     Alcohol abuse Maternal Grandmother          of cirrhosis of the liver due to alcoholism    Arthritis Maternal Grandmother     No Known Problems Maternal Grandfather     Malig Hyperthermia Neg Hx         Social History     Socioeconomic History    Marital status:      Spouse name: Polo    Number of children: 1    Years of education: College   Tobacco Use    Smoking status: Former     Current packs/day: 0.00     Average packs/day: 0.3 packs/day for 21.9 years (5.6 ttl pk-yrs)     Types: Cigarettes     Start date: 1995     Quit date: 10/24/2013     Years since quittin.5     Passive exposure: Past    Smokeless tobacco: Never    Tobacco comments:     quit 10/2013   Vaping Use    Vaping status: Never Used   Substance and Sexual Activity    Alcohol use: Not Currently     Comment: I have maybe 2 beers a month or a glass a wine a month.    Drug use: Yes     Frequency: 7.0 times per week     Types: Marijuana     Comment: DAILY    Sexual activity: Yes     Partners: Male     Birth control/protection: None        OB History          3    Para   1    Term   1            AB   2    Living   1         SAB   2    IAB        Ectopic        Molar        Multiple        Live Births   1                 Allergies   Allergen Reactions    Doxycycline Hives     Could have been rocephin or tordol as well    Glue [Wound Dressing Adhesive] Hives     Had reaction after biopsy    Tur[place] Itching     SOMETIMES HAS SOA    Gabapentin Mental Status Change      Review of Systems  ROS as per HPI    Objective   Blood pressure 124/80, pulse 82, temperature  "98.5 °F (36.9 °C), temperature source Oral, resp. rate 16, height 160 cm (62.99\"), weight 106 kg (233 lb 12.8 oz), SpO2 97%, not currently breastfeeding.     Physical Exam  Vitals reviewed.   Constitutional:       Appearance: Normal appearance. She is obese.   Eyes:      Conjunctiva/sclera: Conjunctivae normal.   Cardiovascular:      Rate and Rhythm: Normal rate.   Pulmonary:      Effort: Pulmonary effort is normal.   Skin:     General: Skin is warm.   Neurological:      General: No focal deficit present.      Mental Status: She is alert and oriented to person, place, and time.   Psychiatric:         Mood and Affect: Mood normal.         Behavior: Behavior normal.         Thought Content: Thought content normal.         Judgment: Judgment normal.       Breast Exam: Status post bilateral mastectomy with expanders in place.    I have reexamined the patient and the results are consistent with the previously documented exam. Adrienne Grewal, APRN      Results from last 7 days   Lab Units 05/05/25  0848   WBC 10*3/mm3 19.04*   NEUTROS ABS 10*3/mm3 15.38*   HEMOGLOBIN g/dL 12.6   HEMATOCRIT % 38.7   PLATELETS 10*3/mm3 432     Results from last 7 days   Lab Units 05/05/25  0848   SODIUM mmol/L 138   POTASSIUM mmol/L 4.2   CHLORIDE mmol/L 103   CO2 mmol/L 23.4   BUN mg/dL 21*   CREATININE mg/dL 0.75   CALCIUM mg/dL 9.1   ALBUMIN g/dL 4.1   BILIRUBIN mg/dL 0.3   ALK PHOS U/L 76   ALT (SGPT) U/L 22   AST (SGOT) U/L 11   GLUCOSE mg/dL 137*   MAGNESIUM mg/dL 2.2             XR Chest Post CVA Port  Result Date: 4/11/2025   As described.    This report was finalized on 4/11/2025 9:36 AM by Dr. Bhanu Boswell M.D on Workstation: [id number]        Assessment & Plan       *Left breast invasive ductal carcinoma, ER +91 to 100%, CA negative, HER2 1+ on immunohistochemistry, Ki-67 30%, grade 2  Patient to status post bilateral mastectomy on 3/7/2025.  Pathology consistent with 37 mm invasive ductal carcinoma, grade 2, ER +91 to " 100% strong, W[place] negative, HER2 1+ immunohistochemistry, Ki-67 30%  pT2 N1a M0, prognostic stage IIb, anatomic stage IIb  1 out of 3 sentinel lymph nodes positive for metastatic disease with 6 mm of tumor and greater than 2 mm of extranodal extension  Lymphovascular invasion present   3/17/2025-CT of the chest abdomen and pelvis and bone scan without any evidence of metastatic disease  Oncotype DX recurrence score has returned at 30 with a definite benefit from chemotherapy.  4/14/2021-cycle 1 Taxotere and Cytoxan.  4/21/2025-patient reports severe nausea, severe headaches, severe diarrhea.  5/5/2025 proceed with cycle 2 Taxotere Cytoxan    *Severe nausea  Start olanzapine 5 mg nightly  Patient experiencing headaches which could be secondary to Zofran.  Discontinue Zofran  Continue Compazine as needed  Continue olanzapine 5 mg nightly along with dexamethasone 4 mg twice daily x 5 days beginning the day before chemotherapy    *Severe headaches  Could be secondary to Zofran or could be unrelated.  Patient reports that she has had migraines a long time  ago and Imitrex that helped.  Start Imitrex as needed  Continue tramadol 50 mg every 8 hours as needed.  Discontinue Zofran and Aloxi  Currently without headaches    *Diarrhea  Patient has utilized only 1 Imodium per day.  Counseled her on using up to 8 Imodium's per day  Also send in Lomotil.    *Asthma  Well-controlled at this time    *Bilateral breast erythema  She is currently on Bactrim from Dr. Hill.  Continue the same  Close follow-up with plastic surgery  Drains have been removed.    *Obesity  Body mass index is 41.43 kg/m².   Weight loss since receiving the first cycle of chemotherapy.    *Contraception  Patient is currently not on any form of contraception.  Recommend that she consider nonhormonal IUD  Counseled her regarding possible future treatments and advised her against pregnancy at least for the first 2 years of diagnosis.    *Jitteriness and lower  extremity weakness  Was discovered the patient has been taking dexamethasone 4 mg twice daily the past 10 days after mixup with the pharmacy instructions regarding dexamethasone surrounding chemotherapy  Likely cause of her symptoms as I suspect she is having episodes of hypoglycemia  She will complete 5 days of dexamethasone surrounding chemotherapy and continue 4 mg twice daily until Thursday, 5/8/2025.  She will then begin taper to 2 mg twice daily x 3 days, 2 mg daily x 2 days then discontinue.  Additional dexamethasone will only be surrounding chemotherapy for cycle 3 and cycle 4 chemotherapy    PLAN:  Proceed today with cycle 2 Taxotere Cytoxan  Continue PAXMAN  Continue olanzapine 5 mg nightly  Continue dexamethasone 4 mg twice daily until Thursday, 5/8/2025. Then begin taper to 2 mg twice daily x 3 days, 2 mg daily x 2 days then discontinue.  Additional dexamethasone will only be surrounding chemotherapy for cycle 3 and cycle 4 chemotherapy  Return in 1 week for CBC, CMP follow-up with Dr. Caldera and Day 8 IV fluid support  She will due for her third cycle of Taxotere Cytoxan in 3 weeks    She continues on a high risk medication requiring close monitoring for toxicity.  Today's plan of care was discussed with Dr. Caldera is in agreement    I spent 40 minutes caring for Mona on this date of service. This time includes time spent by me in the following activities: preparing for the visit, reviewing tests, obtaining and/or reviewing a separately obtained history, performing a medically appropriate examination and/or evaluation, counseling and educating the patient/family/caregiver, ordering medications, tests, or procedures, referring and communicating with other health care professionals, documenting information in the medical record, and care coordination.       Adrienne Grewal, APRN  05/05/2025

## 2025-05-05 NOTE — PROGRESS NOTES
Ms. Castillo came to cancer center to  packet from [facility]. Stated she is doing well and has no other needs at this time.

## 2025-05-06 DIAGNOSIS — J45.40 MODERATE PERSISTENT ASTHMA WITHOUT COMPLICATION: ICD-10-CM

## 2025-05-06 RX ORDER — ALBUTEROL SULFATE 0.83 MG/ML
SOLUTION RESPIRATORY (INHALATION)
Qty: 360 ML | Refills: 1 | Status: SHIPPED | OUTPATIENT
Start: 2025-05-06

## 2025-05-12 ENCOUNTER — INFUSION (OUTPATIENT)
Dept: ONCOLOGY | Facility: HOSPITAL | Age: 45
End: 2025-05-12
Payer: COMMERCIAL

## 2025-05-12 ENCOUNTER — DOCUMENTATION (OUTPATIENT)
Dept: OTHER | Facility: HOSPITAL | Age: 45
End: 2025-05-12
Payer: COMMERCIAL

## 2025-05-12 ENCOUNTER — OFFICE VISIT (OUTPATIENT)
Dept: ONCOLOGY | Facility: CLINIC | Age: 45
End: 2025-05-12
Payer: COMMERCIAL

## 2025-05-12 ENCOUNTER — TELEPHONE (OUTPATIENT)
Dept: OTHER | Facility: HOSPITAL | Age: 45
End: 2025-05-12
Payer: COMMERCIAL

## 2025-05-12 VITALS
OXYGEN SATURATION: 95 % | DIASTOLIC BLOOD PRESSURE: 82 MMHG | BODY MASS INDEX: 42.05 KG/M2 | HEIGHT: 63 IN | TEMPERATURE: 97.9 F | HEART RATE: 99 BPM | SYSTOLIC BLOOD PRESSURE: 124 MMHG | WEIGHT: 237.3 LBS

## 2025-05-12 DIAGNOSIS — C50.312 MALIGNANT NEOPLASM OF LOWER-INNER QUADRANT OF LEFT BREAST IN FEMALE, ESTROGEN RECEPTOR POSITIVE: Primary | ICD-10-CM

## 2025-05-12 DIAGNOSIS — C50.312 MALIGNANT NEOPLASM OF LOWER-INNER QUADRANT OF LEFT BREAST IN FEMALE, ESTROGEN RECEPTOR POSITIVE: ICD-10-CM

## 2025-05-12 DIAGNOSIS — Z17.0 MALIGNANT NEOPLASM OF LOWER-INNER QUADRANT OF LEFT BREAST IN FEMALE, ESTROGEN RECEPTOR POSITIVE: ICD-10-CM

## 2025-05-12 DIAGNOSIS — Z17.0 MALIGNANT NEOPLASM OF LOWER-INNER QUADRANT OF LEFT BREAST IN FEMALE, ESTROGEN RECEPTOR POSITIVE: Primary | ICD-10-CM

## 2025-05-12 LAB
ALBUMIN SERPL-MCNC: 3.8 G/DL (ref 3.5–5.2)
ALBUMIN/GLOB SERPL: 1.8 G/DL
ALP SERPL-CCNC: 138 U/L (ref 39–117)
ALT SERPL W P-5'-P-CCNC: 30 U/L (ref 1–33)
ANION GAP SERPL CALCULATED.3IONS-SCNC: 13.4 MMOL/L (ref 5–15)
AST SERPL-CCNC: 13 U/L (ref 1–32)
BASOPHILS # BLD AUTO: 0.02 10*3/MM3 (ref 0–0.2)
BASOPHILS NFR BLD AUTO: 0.1 % (ref 0–1.5)
BILIRUB SERPL-MCNC: 0.3 MG/DL (ref 0–1.2)
BUN SERPL-MCNC: 20 MG/DL (ref 6–20)
BUN/CREAT SERPL: 25.3 (ref 7–25)
CALCIUM SPEC-SCNC: 9.2 MG/DL (ref 8.6–10.5)
CHLORIDE SERPL-SCNC: 103 MMOL/L (ref 98–107)
CO2 SERPL-SCNC: 25.6 MMOL/L (ref 22–29)
CREAT SERPL-MCNC: 0.79 MG/DL (ref 0.57–1)
DEPRECATED RDW RBC AUTO: 56.8 FL (ref 37–54)
EGFRCR SERPLBLD CKD-EPI 2021: 95.3 ML/MIN/1.73
EOSINOPHIL # BLD AUTO: 0.46 10*3/MM3 (ref 0–0.4)
EOSINOPHIL NFR BLD AUTO: 3.2 % (ref 0.3–6.2)
ERYTHROCYTE [DISTWIDTH] IN BLOOD BY AUTOMATED COUNT: 15.9 % (ref 12.3–15.4)
GLOBULIN UR ELPH-MCNC: 2.1 GM/DL
GLUCOSE SERPL-MCNC: 131 MG/DL (ref 65–99)
HCT VFR BLD AUTO: 37.2 % (ref 34–46.6)
HGB BLD-MCNC: 11.9 G/DL (ref 12–15.9)
IMM GRANULOCYTES # BLD AUTO: 1.79 10*3/MM3 (ref 0–0.05)
IMM GRANULOCYTES NFR BLD AUTO: 12.5 % (ref 0–0.5)
LYMPHOCYTES # BLD AUTO: 2.45 10*3/MM3 (ref 0.7–3.1)
LYMPHOCYTES NFR BLD AUTO: 17 % (ref 19.6–45.3)
MCH RBC QN AUTO: 31.5 PG (ref 26.6–33)
MCHC RBC AUTO-ENTMCNC: 32 G/DL (ref 31.5–35.7)
MCV RBC AUTO: 98.4 FL (ref 79–97)
MONOCYTES # BLD AUTO: 1.55 10*3/MM3 (ref 0.1–0.9)
MONOCYTES NFR BLD AUTO: 10.8 % (ref 5–12)
NEUTROPHILS NFR BLD AUTO: 56.4 % (ref 42.7–76)
NEUTROPHILS NFR BLD AUTO: 8.1 10*3/MM3 (ref 1.7–7)
NRBC BLD AUTO-RTO: 1 /100 WBC (ref 0–0.2)
PLATELET # BLD AUTO: 276 10*3/MM3 (ref 140–450)
PMV BLD AUTO: 9.8 FL (ref 6–12)
POTASSIUM SERPL-SCNC: 3.7 MMOL/L (ref 3.5–5.2)
PROT SERPL-MCNC: 5.9 G/DL (ref 6–8.5)
RBC # BLD AUTO: 3.78 10*6/MM3 (ref 3.77–5.28)
SODIUM SERPL-SCNC: 142 MMOL/L (ref 136–145)
WBC NRBC COR # BLD AUTO: 14.37 10*3/MM3 (ref 3.4–10.8)

## 2025-05-12 PROCEDURE — 96360 HYDRATION IV INFUSION INIT: CPT

## 2025-05-12 PROCEDURE — 25810000003 SODIUM CHLORIDE 0.9 % SOLUTION: Performed by: INTERNAL MEDICINE

## 2025-05-12 PROCEDURE — 99214 OFFICE O/P EST MOD 30 MIN: CPT | Performed by: INTERNAL MEDICINE

## 2025-05-12 PROCEDURE — 80053 COMPREHEN METABOLIC PANEL: CPT

## 2025-05-12 PROCEDURE — 85025 COMPLETE CBC W/AUTO DIFF WBC: CPT

## 2025-05-12 RX ORDER — SODIUM CHLORIDE 9 MG/ML
1000 INJECTION, SOLUTION INTRAVENOUS ONCE
Status: COMPLETED | OUTPATIENT
Start: 2025-05-12 | End: 2025-05-12

## 2025-05-12 RX ADMIN — SODIUM CHLORIDE 1000 ML: 900 INJECTION, SOLUTION INTRAVENOUS at 09:02

## 2025-05-12 NOTE — PROGRESS NOTES
Oncology Social Work  Application submitted for financial assistance to [employer].  RASHEED Goodman   Dizziness since 3 days

## 2025-05-12 NOTE — PROGRESS NOTES
Oncology Social Work  Application submitted for financial assistance program through [employer] for a [employer] gift card for food. RASHEED Goodman

## 2025-05-12 NOTE — PROGRESS NOTES
Subjective   Mona Castillo is a 44 y.o. female.  Referred by Dr. Claros for left breast invasive ductal carcinoma    History of Present Illness     Ms. Castillo is a 44-year-old premenopausal  lady who has had abnormal screening mammogram requiring 6 monthly follow-ups.  In January of this year patient noticed a mass in the lower inner quadrant of the left breast while she was playing with her 4-year-old.  She subsequently had a bilateral diagnostic mammogram which was previously scheduled because of the short-term follow-ups.  Workup confirmed invasive ductal carcinoma and she is now status post bilateral mastectomy with sentinel lymph node biopsy on the left.    1/20/2025-bilateral diagnostic mammogram  Loosely grouped microcalcifications in the mid to posterior third of the upper outer quadrant of the left breast unchanged.  Satellite architectural distortion at 6 o'clock position which is new compared to 7/8/2024.  This is 4 cm from the nipple and measures 1.5 x 1.1 x 1.6 cm.  This is also seen on the ultrasound and ultrasound-guided biopsy recommended.    1/27/2025-ultrasound-guided biopsy of the left breast mass  Pathology consistent with invasive ductal carcinoma, grade 2  Tumor measuring 5 mm on the core biopsy  Associated Ye grade ductal carcinoma in situ  Negative for lymphovascular space invasion  ER +91 to 100% strong  ND negative  HER2 1+ on immunohistochemistry, negative  Ki-67 30%    1/30/2025-bilateral breast MRI  Right breast-1:00, 10 cm from the nipple 3 cm non-mass enhancement which is suspicious.  11:00, 12 cm posterior to the nipple there is non-mass enhancement measuring 8.6 x 2.5 x 2.6 cm.  These are suspicious.  MRI guided biopsy recommended.    Left breast  6:00-anterior left breast-2.5 x 3.7 x 2.9 cm irregular enhancing mass with architectural distortion.  Segmental non-mass enhancement measuring 11.4 x 14.4 x 7.3 cm.  Anterior aspect of the non-mass enhancement extends  into the skin anterolateral to the left nipple.  There is relatively more focal asymmetric skin enhancement measuring up to 5.6 cm in maximum craniocaudal dimension which could also be due to recent biopsy or direct skin involvement.  No definite suspicious enhancement of the left nipple.  Asymmetric left axillary lymph nodes with cortical thickening.  Dominant lymph node measuring 2.2 x 1.2 x 1.9 cm.  This demonstrates a cortical thickening of 1.3 cm.  Suspicious for metastatic disease.    No internal mammary chain lymphadenopathy.    2/11/2025-[employer] 9 gene stat panel - negative    2/12/2025-  MRI guided biopsies  1.right breast 1 o'clock position-atypical ductal hyperplasia with microcalcifications  Nonintact intraductal papilloma with ductal hyperplasia    2.right breast 11 o'clock position-lobular carcinoma in situ with atypical lobular hyperplasia    3.left axillary lymph node biopsy-benign reactive lymph node      3/7/2025-bilateral mastectomy with left sentinel lymph node biopsy  Left total mastectomy  Invasive ductal carcinoma, grade 2  Tumor measures 37 mm in maximum extension  Single focus of invasive carcinoma  Ductal carcinoma in situ present and measures 40 mm, grade 2  DCIS involves the nipples-Paget's disease  Lymphovascular invasion present  Margins negative for invasive as well as in situ carcinoma  3 sentinel lymph nodes evaluated  1 out of 3 sentinel lymph nodes with metastatic disease with the tumor measuring 6 mm and greater than 2 mm of extranodal extension.    pT2 N1a M0, stage II    ER +91 to 100% strong  AL negative  HER2 1+ immunohistochemistry  Ki-67 30%    Right breast simple skin sparing mastectomy  Scattered foci of ADH, ALH, SMOLD     3/17/2025-CT of the chest abdomen and pelvis  No evidence of metastatic disease in the chest abdomen and pelvis.  3 mm noncalcified nodule in the right upper lobe and 4 mm nodular opacity in the left upper lobe likely benign, follow-up CT in 12 months  recommended.  Previous cholecystectomy and appendectomy.    3/17/2025-bone scan without any evidence of metastatic disease.    Patient had to do IVF treatments for 2 years but subsequently conceived naturally after 2 miscarriages after IVF.    She is currently not on any form of contraception.  She is premenopausal and has regular menstrual cycles.    Denies any family history of breast cancer.  Her mother had thyroid cancer in her 30s or 40s.    No other significant comorbidities other than asthma which is well-controlled.    She currently works for the [employer] full-time.    Denies any recent changes in weight, cough, abdominal pain nausea vomiting.    Oncotype DX recurrence score has returned at 30.  There is a 12% risk of distant metastasis with AI or tamoxifen alone.  There is definite benefit from chemotherapy.    The drains have been removed.    4/14/2025-cycle 1 day 1 of docetaxel and Cytoxan administered.    Interval history  Patient returns today for follow-up.  Cycle 1 day 8 of Taxotere and Cytoxan.  She reports that the nausea is much better at this time around, continues on Zyprexa and a steroid taper.  Also the diarrhea has improved  Altered taste.  No neuropathy  Left upper extremity swelling from lymphedema    The following portions of the patient's history were reviewed and updated as appropriate: allergies, current medications, past family history, past medical history, past social history, past surgical history, and problem list.    Past Medical History:   Diagnosis Date    Allergic rhinitis     Anxiety     Arthritis     Asthma     Breast cancer 1/27/25    Breast mass 1/20/25    Cancer     Breast LEFT    Chronic kidney disease     STAGE 2    DDD (degenerative disc disease), lumbar     GERD (gastroesophageal reflux disease)     H/O HSV-2 infection     Hiatal hernia     History of back pain     History of gestational diabetes     History of miscarriage     Migraine     Pneumonia     PONV  (postoperative nausea and vomiting)     Tattoos     Trouble in sleeping         Past Surgical History:   Procedure Laterality Date    APPENDECTOMY      BREAST BIOPSY  25    Left    BREAST RECONSTRUCTION Bilateral 2025    Procedure: BILATERAL BREAST TISSUE EXPANDER INSERTION W/ CORTIVA AND ANTIBIOTIC DISK AND ADJACENT TISSUE TRANSFER;  Surgeon: Anderson Hill MD;  Location: [facility] OR [facility] [place];  Service: Plastics;  Laterality: Bilateral;     SECTION  2020    CHOLECYSTECTOMY      COLONOSCOPY N/A 2019    Procedure: COLONOSCOPY with biopsies;  Surgeon: Philip Winter MD;  Location: [facility] ENDOSCOPY;  Service: Gastroenterology    DILATATION AND CURETTAGE  2020    ENDOSCOPY N/A 2019    Procedure: ESOPHAGOGASTRODUODENOSCOPY with biopsies and aspirate;  Surgeon: Philip Winter MD;  Location: [facility] ENDOSCOPY;  Service: Gastroenterology    MASTECTOMY Bilateral     MASTECTOMY W/ SENTINEL NODE BIOPSY Left 2025    Procedure: bilateral skin sparing mastectomy, left sentinel lymph node biopsy, possible left axillary dissection;  Surgeon: Ruba Claros MD;  Location: [facility] OR [facility] [place];  Service: General;  Laterality: Left;    US GUIDED LYMPH NODE BIOPSY  2025    UTERINE FIBROID SURGERY  2015    myosure removed vaginally    VENOUS ACCESS DEVICE (PORT) INSERTION Right 2025    Procedure: INSERTION VENOUS ACCESS DEVICE;  Surgeon: Ruba Claros MD;  Location: [facility] OR [facility] [place];  Service: General;  Laterality: Right;        Family History   Adopted: Yes   Problem Relation Age of Onset    Thyroid disease Mother     Asthma Mother     Cancer Mother         Thyroid cancer. Removed thyroid.    No Known Problems Father     No Known Problems Son     Stroke Maternal Grandmother     Cirrhosis Maternal Grandmother     Anemia Maternal Grandmother     Depression Maternal Grandmother     Hypertension Maternal Grandmother     Mental illness Maternal Grandmother     Alcohol abuse Maternal  "Grandmother          of cirrhosis of the liver due to alcoholism    Arthritis Maternal Grandmother     No Known Problems Maternal Grandfather     Malig Hyperthermia Neg Hx         Social History     Socioeconomic History    Marital status:      Spouse name: Polo    Number of children: 1    Years of education: College   Tobacco Use    Smoking status: Former     Current packs/day: 0.00     Average packs/day: 0.3 packs/day for 21.9 years (5.6 ttl pk-yrs)     Types: Cigarettes     Start date: 1995     Quit date: 10/24/2013     Years since quittin.5     Passive exposure: Past    Smokeless tobacco: Never    Tobacco comments:     quit 10/2013   Vaping Use    Vaping status: Never Used   Substance and Sexual Activity    Alcohol use: Not Currently     Comment: I have maybe 2 beers a month or a glass a wine a month.    Drug use: Yes     Frequency: 7.0 times per week     Types: Marijuana     Comment: DAILY    Sexual activity: Yes     Partners: Male     Birth control/protection: None        OB History          3    Para   1    Term   1            AB   2    Living   1         SAB   2    IAB        Ectopic        Molar        Multiple        Live Births   1                 Allergies   Allergen Reactions    Doxycycline Hives     Could have been rocephin or tordol as well    Glue [Wound Dressing Adhesive] Hives     Had reaction after biopsy    [place] Itching     SOMETIMES HAS SOA    Gabapentin Mental Status Change      Review of Systems  ROS as per HPI    Objective   Blood pressure 124/82, pulse 99, temperature 97.9 °F (36.6 °C), temperature source Oral, height 160 cm (62.99\"), weight 108 kg (237 lb 4.8 oz), SpO2 95%, not currently breastfeeding.     Physical Exam  Vitals reviewed.   Constitutional:       Appearance: Normal appearance. She is obese.   Eyes:      Conjunctiva/sclera: Conjunctivae normal.   Cardiovascular:      Rate and Rhythm: Normal rate.   Pulmonary:      Effort: Pulmonary effort " is normal.   Skin:     General: Skin is warm.   Neurological:      General: No focal deficit present.      Mental Status: She is alert and oriented to person, place, and time.   Psychiatric:         Mood and Affect: Mood normal.         Behavior: Behavior normal.         Thought Content: Thought content normal.         Judgment: Judgment normal.       Breast Exam: Status post bilateral mastectomy with expanders in place.    I have reexamined the patient and the results are consistent with the previously documented exam. Kate Caldera MD      Results from last 7 days   Lab Units 05/12/25  0810   WBC 10*3/mm3 14.37*   NEUTROS ABS 10*3/mm3 8.10*   HEMOGLOBIN g/dL 11.9*   HEMATOCRIT % 37.2   PLATELETS 10*3/mm3 276                   XR Chest Post CVA Port  Result Date: 4/11/2025   As described.    This report was finalized on 4/11/2025 9:36 AM by Dr. Bhanu Boswell M.D on Workstation: [id number]        Assessment & Plan       *Left breast invasive ductal carcinoma, ER +91 to 100%, DE negative, HER2 1+ on immunohistochemistry, Ki-67 30%, grade 2  Patient to status post bilateral mastectomy on 3/7/2025.  Pathology consistent with 37 mm invasive ductal carcinoma, grade 2, ER +91 to 100% strong, DE negative, HER2 1+ immunohistochemistry, Ki-67 30%  pT2 N1a M0, prognostic stage IIb, anatomic stage IIb  1 out of 3 sentinel lymph nodes positive for metastatic disease with 6 mm of tumor and greater than 2 mm of extranodal extension  Lymphovascular invasion present   3/17/2025-CT of the chest abdomen and pelvis and bone scan without any evidence of metastatic disease  Oncotype DX recurrence score has returned at 30 with a definite benefit from chemotherapy.  4/14/2021-cycle 1 Taxotere and Cytoxan.  4/21/2025-patient reports severe nausea, severe headaches, severe diarrhea.  5/5/2025 cycle 2 TC  512 2025-1 L normal saline.  Overall tolerating chemotherapy much better    *Severe nausea  Continue olanzapine 5 mg nightly  Patient  experiencing headaches which could be secondary to Zofran.  Discontinue Zofran  Continue Compazine as needed  Complete steroid taper and subsequently with the cycle of TC she will receive dexamethasone for 5 days only    *Severe headaches  Could be secondary to Zofran or could be unrelated.  Patient reports that she has had migraines a long time  ago and Imitrex that helped.  Start Imitrex as needed  Continue tramadol 50 mg every 8 hours as needed.  Discontinue Zofran and Aloxi  Headaches have    *Diarrhea  Patient has utilized only 1 Imodium per day.  Counseled her on using up to 8 Imodium's per day  Diarrhea improved    *Asthma  Well-controlled at this time    *Bilateral breast erythema  She is currently on Bactrim from Dr. Hill.  Continue the same  Close follow-up with plastic surgery  Drains have been removed.    *Obesity  Body mass index is 42.05 kg/m².   Weight loss since receiving the first cycle of chemotherapy.    *Contraception  Patient is currently not on any form of contraception.  Recommend that she consider nonhormonal IUD  Counseled her regarding possible future treatments and advised her against pregnancy at least for the first 2 years of diagnosis.      PLAN:  1 L normal saline today  Continue PAXMAN  Continue olanzapine 5 mg nightly  Continue dexamethasone taper  TC in 2 weeks and again in 5 weeks.  LEYLA in 2 weeks and MD in 5 weeks, 1 L normal saline and 3    She continues on a high risk medication requiring close monitoring for toxicity.          Kate Caldera MD  05/12/2025

## 2025-05-12 NOTE — TELEPHONE ENCOUNTER
Oncology Social Work  Inbound call from the patient requesting financial assistance.  The patient stated that she is already connected with [employer] and is receiving assistance from them.  The patient was agreeable to referrals to [employer] for gas cards and [employer].  OSW will follow to provide the patient with additional information on other financial assistance programs that may be available for the patient.  RASHEED Goodman

## 2025-05-21 ENCOUNTER — TELEPHONE (OUTPATIENT)
Dept: ONCOLOGY | Facility: CLINIC | Age: 45
End: 2025-05-21
Payer: COMMERCIAL

## 2025-05-21 NOTE — TELEPHONE ENCOUNTER
I received a call from pt to disregard Physicians Statement Form from [facility] this is the same form submitted to [employer] on 4/24/25.

## 2025-05-21 NOTE — TELEPHONE ENCOUNTER
I left pt a VM. I received the same from that was faxed to [employer] on 4/24/25. Is this a duplicate or are they needing form redone? Is [facility] missing information?

## 2025-05-21 NOTE — PROGRESS NOTES
Subjective   Mona Castillo is a 44 y.o. female.  Referred by Dr. Claros for left breast invasive ductal carcinoma    History of Present Illness     Ms. Castillo is a 44-year-old premenopausal  lady who has had abnormal screening mammogram requiring 6 monthly follow-ups.  In January of this year patient noticed a mass in the lower inner quadrant of the left breast while she was playing with her 4-year-old.  She subsequently had a bilateral diagnostic mammogram which was previously scheduled because of the short-term follow-ups.  Workup confirmed invasive ductal carcinoma and she is now status post bilateral mastectomy with sentinel lymph node biopsy on the left.    1/20/2025-bilateral diagnostic mammogram  Loosely grouped microcalcifications in the mid to posterior third of the upper outer quadrant of the left breast unchanged.  Satellite architectural distortion at 6 o'clock position which is new compared to 7/8/2024.  This is 4 cm from the nipple and measures 1.5 x 1.1 x 1.6 cm.  This is also seen on the ultrasound and ultrasound-guided biopsy recommended.    1/27/2025-ultrasound-guided biopsy of the left breast mass  Pathology consistent with invasive ductal carcinoma, grade 2  Tumor measuring 5 mm on the core biopsy  Associated Ye grade ductal carcinoma in situ  Negative for lymphovascular space invasion  ER +91 to 100% strong  VT negative  HER2 1+ on immunohistochemistry, negative  Ki-67 30%    1/30/2025-bilateral breast MRI  Right breast-1:00, 10 cm from the nipple 3 cm non-mass enhancement which is suspicious.  11:00, 12 cm posterior to the nipple there is non-mass enhancement measuring 8.6 x 2.5 x 2.6 cm.  These are suspicious.  MRI guided biopsy recommended.    Left breast  6:00-anterior left breast-2.5 x 3.7 x 2.9 cm irregular enhancing mass with architectural distortion.  Segmental non-mass enhancement measuring 11.4 x 14.4 x 7.3 cm.  Anterior aspect of the non-mass enhancement extends  into the skin anterolateral to the left nipple.  There is relatively more focal asymmetric skin enhancement measuring up to 5.6 cm in maximum craniocaudal dimension which could also be due to recent biopsy or direct skin involvement.  No definite suspicious enhancement of the left nipple.  Asymmetric left axillary lymph nodes with cortical thickening.  Dominant lymph node measuring 2.2 x 1.2 x 1.9 cm.  This demonstrates a cortical thickening of 1.3 cm.  Suspicious for metastatic disease.    No internal mammary chain lymphadenopathy.    2/11/2025-Invitae 9 gene stat panel - negative    2/12/2025-  MRI guided biopsies  1.right breast 1 o'clock position-atypical ductal hyperplasia with microcalcifications  Nonintact intraductal papilloma with ductal hyperplasia    2.right breast 11 o'clock position-lobular carcinoma in situ with atypical lobular hyperplasia    3.left axillary lymph node biopsy-benign reactive lymph node      3/7/2025-bilateral mastectomy with left sentinel lymph node biopsy  Left total mastectomy  Invasive ductal carcinoma, grade 2  Tumor measures 37 mm in maximum extension  Single focus of invasive carcinoma  Ductal carcinoma in situ present and measures 40 mm, grade 2  DCIS involves the nipples-Paget's disease  Lymphovascular invasion present  Margins negative for invasive as well as in situ carcinoma  3 sentinel lymph nodes evaluated  1 out of 3 sentinel lymph nodes with metastatic disease with the tumor measuring 6 mm and greater than 2 mm of extranodal extension.    pT2 N1a M0, stage II    ER +91 to 100% strong  W[place] negative  HER2 1+ immunohistochemistry  Ki-67 30%    Right breast simple skin sparing mastectomy  Scattered foci of ADH, ALH, SMOLD     3/17/2025-CT of the chest abdomen and pelvis  No evidence of metastatic disease in the chest abdomen and pelvis.  3 mm noncalcified nodule in the right upper lobe and 4 mm nodular opacity in the left upper lobe likely benign, follow-up CT in 12 months  recommended.  Previous cholecystectomy and appendectomy.    3/17/2025-bone scan without any evidence of metastatic disease.    Patient had to do IVF treatments for 2 years but subsequently conceived naturally after 2 miscarriages after IVF.    She is currently not on any form of contraception.  She is premenopausal and has regular menstrual cycles.    Denies any family history of breast cancer.  Her mother had thyroid cancer in her 30s or 40s.    No other significant comorbidities other than asthma which is well-controlled.    She currently works for the [employer] full-time.    Denies any recent changes in weight, cough, abdominal pain nausea vomiting.    Oncotype DX recurrence score has returned at 30.  There is a 12% risk of distant metastasis with AI or tamoxifen alone.  There is definite benefit from chemotherapy.    The drains have been removed.    4/14/2025-cycle 1 day 1 of docetaxel and Cytoxan administered.    Interval history  She returns today for Cycle 3 day 1 cyclophosphamide and docetaxel. She reports nausea remains under control. Today she started dexamethasone 4mg BID x5 days. She uses compazine as needed and continues on zyprexa nightly. She denies vomiting, diarrhea or constipation. She denies worsening fatigue or shortness of breath. She does report ongoing headaches. Headaches typically resolve when she takes the imitrex, although sometimes she does have to take it twice. She also notes intermittent muscle cramping and lower extremity edema. No redness, warmth or pain. The swelling improves in the morning/with elevation.     The following portions of the patient's history were reviewed and updated as appropriate: allergies, current medications, past family history, past medical history, past social history, past surgical history, and problem list.    Past Medical History:   Diagnosis Date    Allergic rhinitis     Anxiety     Arthritis     Asthma     Breast cancer 1/27/25    Breast mass 1/20/25     Cancer     Breast LEFT    Chronic kidney disease     STAGE 2    DDD (degenerative disc disease), lumbar     GERD (gastroesophageal reflux disease)     H/O HSV-2 infection     Hiatal hernia     History of back pain     History of gestational diabetes     History of miscarriage     Migraine     Pneumonia     PONV (postoperative nausea and vomiting)     Tattoos     Trouble in sleeping         Past Surgical History:   Procedure Laterality Date    APPENDECTOMY      BREAST BIOPSY  25    Left    BREAST RECONSTRUCTION Bilateral 2025    Procedure: BILATERAL BREAST TISSUE EXPANDER INSERTION W/ CORTIVA AND ANTIBIOTIC DISK AND ADJACENT TISSUE TRANSFER;  Surgeon: Anderson Hill MD;  Location: [facility] OR [facility] [place];  Service: Plastics;  Laterality: Bilateral;     SECTION  2020    CHOLECYSTECTOMY      COLONOSCOPY N/A 2019    Procedure: COLONOSCOPY with biopsies;  Surgeon: Philip Winter MD;  Location: [facility] ENDOSCOPY;  Service: Gastroenterology    DILATATION AND CURETTAGE  2020    ENDOSCOPY N/A 2019    Procedure: ESOPHAGOGASTRODUODENOSCOPY with biopsies and aspirate;  Surgeon: Philip Winter MD;  Location: [facility] ENDOSCOPY;  Service: Gastroenterology    MASTECTOMY Bilateral     MASTECTOMY W/ SENTINEL NODE BIOPSY Left 2025    Procedure: bilateral skin sparing mastectomy, left sentinel lymph node biopsy, possible left axillary dissection;  Surgeon: Ruba Claros MD;  Location: [facility] OR [facility] [place];  Service: General;  Laterality: Left;    US GUIDED LYMPH NODE BIOPSY  2025    UTERINE FIBROID SURGERY  2015    myosure removed vaginally    VENOUS ACCESS DEVICE (PORT) INSERTION Right 2025    Procedure: INSERTION VENOUS ACCESS DEVICE;  Surgeon: Ruba Claros MD;  Location: [facility] OR [facility] [place];  Service: General;  Laterality: Right;        Family History   Adopted: Yes   Problem Relation Age of Onset    Thyroid disease Mother     Asthma Mother     Cancer Mother         Thyroid  cancer. Removed thyroid.    No Known Problems Father     No Known Problems Son     Stroke Maternal Grandmother     Cirrhosis Maternal Grandmother     Anemia Maternal Grandmother     Depression Maternal Grandmother     Hypertension Maternal Grandmother     Mental illness Maternal Grandmother     Alcohol abuse Maternal Grandmother          of cirrhosis of the liver due to alcoholism    Arthritis Maternal Grandmother     No Known Problems Maternal Grandfather     Malig Hyperthermia Neg Hx         Social History     Socioeconomic History    Marital status:      Spouse name: Polo    Number of children: 1    Years of education: College   Tobacco Use    Smoking status: Former     Current packs/day: 0.00     Average packs/day: 0.3 packs/day for 21.9 years (5.6 ttl pk-yrs)     Types: Cigarettes     Start date: 1995     Quit date: 10/24/2013     Years since quittin.5     Passive exposure: Past    Smokeless tobacco: Never    Tobacco comments:     quit 10/2013   Vaping Use    Vaping status: Never Used   Substance and Sexual Activity    Alcohol use: Not Currently     Comment: I have maybe 2 beers a month or a glass a wine a month.    Drug use: Yes     Frequency: 7.0 times per week     Types: Marijuana     Comment: DAILY    Sexual activity: Yes     Partners: Male     Birth control/protection: None        OB History          3    Para   1    Term   1            AB   2    Living   1         SAB   2    IAB        Ectopic        Molar        Multiple        Live Births   1                 Allergies   Allergen Reactions    Doxycycline Hives     Could have been rocephin or tordol as well    Glue [Wound Dressing Adhesive] Hives     Had reaction after biopsy    [place] Itching     SOMETIMES HAS SOA    Gabapentin Mental Status Change        Objective   not currently breastfeeding.     Physical Exam  Constitutional:       Appearance: Normal appearance.   Cardiovascular:      Rate and Rhythm: Normal rate.       Heart sounds: Normal heart sounds.   Pulmonary:      Effort: Pulmonary effort is normal.      Breath sounds: Normal breath sounds.   Abdominal:      General: Bowel sounds are normal.      Palpations: Abdomen is soft.   Musculoskeletal:      Right lower leg: Edema present.      Left lower leg: Edema present.      Comments: Trace edema   Lymphadenopathy:      Comments: Sleeve on left arm.    Skin:     General: Skin is warm and dry.   Neurological:      Mental Status: She is oriented to person, place, and time.   Psychiatric:         Mood and Affect: Mood normal.         Behavior: Behavior normal.       Breast Exam:   S/p bilateral mastectomy with explanders in place.     No radiology results for the last 30 days.      Assessment & Plan   *Left breast invasive ductal carcinoma, ER +91 to 100%, IA negative, HER2 1+ on immunohistochemistry, Ki-67 30%, grade 2  Patient to status post bilateral mastectomy on 3/7/2025.  Pathology consistent with 37 mm invasive ductal carcinoma, grade 2, ER +91 to 100% strong, IA negative, HER2 1+ immunohistochemistry, Ki-67 30%  pT2 N1a M0, prognostic stage IIb, anatomic stage IIb  1 out of 3 sentinel lymph nodes positive for metastatic disease with 6 mm of tumor and greater than 2 mm of extranodal extension  Lymphovascular invasion present   3/17/2025-CT of the chest abdomen and pelvis and bone scan without any evidence of metastatic disease  Oncotype DX recurrence score has returned at 30 with a definite benefit from chemotherapy.  4/14/2021-cycle 1 Taxotere and Cytoxan.  4/21/2025-patient reports severe nausea, severe headaches, severe diarrhea.  5/5/2025 cycle 2 TC  5/12/2025-1 L normal saline.  Overall tolerating chemotherapy much better  5/27/2025: Proceed today with Cycle 3 TC. WBC 13.97, hemoglobin 11.2, platelets 361,000.     *Severe nausea  Continue olanzapine 5 mg nightly  Patient experiencing headaches which could be secondary to Zofran.  Discontinue Zofran  Continue  Compazine as needed  Complete steroid taper and subsequently with the cycle of TC she will receive dexamethasone for 5 days only  5/27/2025: Continue dexamethasone 4mg x5 days. Continue zyprexa and compazine as needed.     *Severe headaches  Could be secondary to Zofran or could be unrelated.  Patient reports that she has had migraines a long time  ago and Imitrex that helped.  Start Imitrex as needed  Continue tramadol 50 mg every 8 hours as needed.  Discontinue Zofran and Aloxi  5/27/2025: Continue imitrex as needed. Also advised starting magnesium gycinate 400mg nightly.     *Diarrhea  Patient has utilized only 1 Imodium per day.  Counseled her on using up to 8 Imodium's per day  Diarrhea improved    *Asthma  Well-controlled at this time    *Bilateral breast erythema  She is currently on Bactrim from Dr. Hill.  Continue the same  Close follow-up with plastic surgery  Drains have been removed.    *Obesity  Body mass index is 41.92 kg/m².   Weight loss since receiving the first cycle of chemotherapy.    *Contraception  Patient is currently not on any form of contraception.  Recommend that she consider nonhormonal IUD  Counseled her regarding possible future treatments and advised her against pregnancy at least for the first 2 years of diagnosis.    *Lower extremity edema   5/27/2025: Intermittent, encouraged use of compression stockings and advised elevation.     PLAN:   Proceed today with cycle 3 docetaxel and cyclophosphamide  She continues on Paxman  Continue dexamethasone 4mg x5 nights on treatment weeks  Continue olanzapine 5 mg nightly  Start magnesium glycinate 400mg nightly   Start compression stockings for lower extremities  Return to clinic on 6/2/2025 for 1 L NS bolus   Return to clinic on 6/16/2025 for MD visit, cycle 4 AC, with labs per protocol  Instructed to reach out sooner with any concerns      Ewa Correia, LEYLA  05/27/2025

## 2025-05-22 ENCOUNTER — PATIENT MESSAGE (OUTPATIENT)
Dept: ONCOLOGY | Facility: CLINIC | Age: 45
End: 2025-05-22
Payer: COMMERCIAL

## 2025-05-22 DIAGNOSIS — L65.8 ALOPECIA DUE TO CYTOTOXIC DRUG: Primary | ICD-10-CM

## 2025-05-22 DIAGNOSIS — T45.1X5A ALOPECIA DUE TO CYTOTOXIC DRUG: Primary | ICD-10-CM

## 2025-05-23 ENCOUNTER — TELEPHONE (OUTPATIENT)
Dept: OTHER | Facility: HOSPITAL | Age: 45
End: 2025-05-23
Payer: COMMERCIAL

## 2025-05-23 NOTE — TELEPHONE ENCOUNTER
Oncology Social Work   Return call to the patient who inquired about wig assistance through her insurance.  The patient was informed that she is more than welcome to contact her insurance company to see if they will assist her in getting her wig prescription filled.  The patient was informed that free wigs are available in the multi disciplinary clinic and she is welcome to come in and look at them.  The patient was also emailed links to access free and low cost wigs.  RASHEED Goodman

## 2025-05-27 ENCOUNTER — OFFICE VISIT (OUTPATIENT)
Dept: ONCOLOGY | Facility: CLINIC | Age: 45
End: 2025-05-27
Payer: COMMERCIAL

## 2025-05-27 ENCOUNTER — APPOINTMENT (OUTPATIENT)
Dept: ONCOLOGY | Facility: HOSPITAL | Age: 45
End: 2025-05-27
Payer: COMMERCIAL

## 2025-05-27 ENCOUNTER — INFUSION (OUTPATIENT)
Dept: ONCOLOGY | Facility: HOSPITAL | Age: 45
End: 2025-05-27
Payer: COMMERCIAL

## 2025-05-27 VITALS
SYSTOLIC BLOOD PRESSURE: 126 MMHG | TEMPERATURE: 98.1 F | BODY MASS INDEX: 41.92 KG/M2 | WEIGHT: 236.6 LBS | HEIGHT: 63 IN | HEART RATE: 98 BPM | RESPIRATION RATE: 17 BRPM | OXYGEN SATURATION: 97 % | DIASTOLIC BLOOD PRESSURE: 86 MMHG

## 2025-05-27 DIAGNOSIS — C50.312 MALIGNANT NEOPLASM OF LOWER-INNER QUADRANT OF LEFT BREAST IN FEMALE, ESTROGEN RECEPTOR POSITIVE: Primary | ICD-10-CM

## 2025-05-27 DIAGNOSIS — Z17.0 MALIGNANT NEOPLASM OF LOWER-INNER QUADRANT OF LEFT BREAST IN FEMALE, ESTROGEN RECEPTOR POSITIVE: ICD-10-CM

## 2025-05-27 DIAGNOSIS — Z17.0 MALIGNANT NEOPLASM OF LOWER-INNER QUADRANT OF LEFT BREAST IN FEMALE, ESTROGEN RECEPTOR POSITIVE: Primary | ICD-10-CM

## 2025-05-27 DIAGNOSIS — C50.312 MALIGNANT NEOPLASM OF LOWER-INNER QUADRANT OF LEFT BREAST IN FEMALE, ESTROGEN RECEPTOR POSITIVE: ICD-10-CM

## 2025-05-27 LAB
ALBUMIN SERPL-MCNC: 4.1 G/DL (ref 3.5–5.2)
ALBUMIN/GLOB SERPL: 1.8 G/DL
ALP SERPL-CCNC: 86 U/L (ref 39–117)
ALT SERPL W P-5'-P-CCNC: 39 U/L (ref 1–33)
ANION GAP SERPL CALCULATED.3IONS-SCNC: 12.1 MMOL/L (ref 5–15)
AST SERPL-CCNC: 17 U/L (ref 1–32)
BASOPHILS # BLD AUTO: 0.04 10*3/MM3 (ref 0–0.2)
BASOPHILS NFR BLD AUTO: 0.3 % (ref 0–1.5)
BILIRUB SERPL-MCNC: 0.3 MG/DL (ref 0–1.2)
BUN SERPL-MCNC: 12 MG/DL (ref 6–20)
BUN/CREAT SERPL: 15 (ref 7–25)
CALCIUM SPEC-SCNC: 9.2 MG/DL (ref 8.6–10.5)
CHLORIDE SERPL-SCNC: 107 MMOL/L (ref 98–107)
CO2 SERPL-SCNC: 22.9 MMOL/L (ref 22–29)
CREAT SERPL-MCNC: 0.8 MG/DL (ref 0.57–1)
DEPRECATED RDW RBC AUTO: 56.4 FL (ref 37–54)
EGFRCR SERPLBLD CKD-EPI 2021: 93.9 ML/MIN/1.73
EOSINOPHIL # BLD AUTO: 0 10*3/MM3 (ref 0–0.4)
EOSINOPHIL NFR BLD AUTO: 0 % (ref 0.3–6.2)
ERYTHROCYTE [DISTWIDTH] IN BLOOD BY AUTOMATED COUNT: 16 % (ref 12.3–15.4)
GLOBULIN UR ELPH-MCNC: 2.3 GM/DL
GLUCOSE SERPL-MCNC: 204 MG/DL (ref 65–99)
HCT VFR BLD AUTO: 34.6 % (ref 34–46.6)
HGB BLD-MCNC: 11.2 G/DL (ref 12–15.9)
IMM GRANULOCYTES # BLD AUTO: 0.14 10*3/MM3 (ref 0–0.05)
IMM GRANULOCYTES NFR BLD AUTO: 1 % (ref 0–0.5)
LYMPHOCYTES # BLD AUTO: 1.51 10*3/MM3 (ref 0.7–3.1)
LYMPHOCYTES NFR BLD AUTO: 10.8 % (ref 19.6–45.3)
MCH RBC QN AUTO: 31.5 PG (ref 26.6–33)
MCHC RBC AUTO-ENTMCNC: 32.4 G/DL (ref 31.5–35.7)
MCV RBC AUTO: 97.5 FL (ref 79–97)
MONOCYTES # BLD AUTO: 0.77 10*3/MM3 (ref 0.1–0.9)
MONOCYTES NFR BLD AUTO: 5.5 % (ref 5–12)
NEUTROPHILS NFR BLD AUTO: 11.51 10*3/MM3 (ref 1.7–7)
NEUTROPHILS NFR BLD AUTO: 82.4 % (ref 42.7–76)
NRBC BLD AUTO-RTO: 0 /100 WBC (ref 0–0.2)
PLATELET # BLD AUTO: 361 10*3/MM3 (ref 140–450)
PMV BLD AUTO: 8.8 FL (ref 6–12)
POTASSIUM SERPL-SCNC: 3.8 MMOL/L (ref 3.5–5.2)
PROT SERPL-MCNC: 6.4 G/DL (ref 6–8.5)
RBC # BLD AUTO: 3.55 10*6/MM3 (ref 3.77–5.28)
SODIUM SERPL-SCNC: 142 MMOL/L (ref 136–145)
WBC NRBC COR # BLD AUTO: 13.97 10*3/MM3 (ref 3.4–10.8)

## 2025-05-27 PROCEDURE — 25810000003 SODIUM CHLORIDE 0.9 % SOLUTION 250 ML FLEX CONT

## 2025-05-27 PROCEDURE — 80053 COMPREHEN METABOLIC PANEL: CPT

## 2025-05-27 PROCEDURE — 96413 CHEMO IV INFUSION 1 HR: CPT

## 2025-05-27 PROCEDURE — 25010000002 DOCETAXEL 20 MG/ML SOLUTION 8 ML VIAL

## 2025-05-27 PROCEDURE — 25010000002 FAMOTIDINE 10 MG/ML SOLUTION

## 2025-05-27 PROCEDURE — 85025 COMPLETE CBC W/AUTO DIFF WBC: CPT

## 2025-05-27 PROCEDURE — 96375 TX/PRO/DX INJ NEW DRUG ADDON: CPT

## 2025-05-27 PROCEDURE — 25010000002 DIPHENHYDRAMINE PER 50 MG

## 2025-05-27 PROCEDURE — 25010000002 CYCLOPHOSPHAMIDE 2 GM/10ML SOLUTION 10 ML VIAL

## 2025-05-27 PROCEDURE — 96417 CHEMO IV INFUS EACH ADDL SEQ: CPT

## 2025-05-27 PROCEDURE — 25010000002 PEGFILGRASTIM-CBQV 6 MG/0.6ML SOLUTION PREFILLED SYRINGE

## 2025-05-27 PROCEDURE — 25010000002 GRANISETRON PER 100 MCG

## 2025-05-27 PROCEDURE — 25810000003 SODIUM CHLORIDE 0.9 % SOLUTION

## 2025-05-27 PROCEDURE — 96377 APPLICATON ON-BODY INJECTOR: CPT

## 2025-05-27 RX ORDER — GRANISETRON HYDROCHLORIDE 1 MG/ML
1 INJECTION INTRAVENOUS ONCE
Status: CANCELLED | OUTPATIENT
Start: 2025-05-27

## 2025-05-27 RX ORDER — HYDROCORTISONE SODIUM SUCCINATE 100 MG/2ML
100 INJECTION INTRAMUSCULAR; INTRAVENOUS AS NEEDED
Status: CANCELLED | OUTPATIENT
Start: 2025-05-27

## 2025-05-27 RX ORDER — FAMOTIDINE 10 MG/ML
20 INJECTION, SOLUTION INTRAVENOUS ONCE
Status: CANCELLED | OUTPATIENT
Start: 2025-05-27

## 2025-05-27 RX ORDER — DIPHENHYDRAMINE HYDROCHLORIDE 50 MG/ML
50 INJECTION, SOLUTION INTRAMUSCULAR; INTRAVENOUS AS NEEDED
Status: CANCELLED | OUTPATIENT
Start: 2025-05-27

## 2025-05-27 RX ORDER — SODIUM CHLORIDE 9 MG/ML
20 INJECTION, SOLUTION INTRAVENOUS ONCE
Status: COMPLETED | OUTPATIENT
Start: 2025-05-27 | End: 2025-05-27

## 2025-05-27 RX ORDER — GRANISETRON HYDROCHLORIDE 1 MG/ML
1 INJECTION INTRAVENOUS ONCE
Status: COMPLETED | OUTPATIENT
Start: 2025-05-27 | End: 2025-05-27

## 2025-05-27 RX ORDER — FAMOTIDINE 10 MG/ML
20 INJECTION, SOLUTION INTRAVENOUS ONCE
Status: COMPLETED | OUTPATIENT
Start: 2025-05-27 | End: 2025-05-27

## 2025-05-27 RX ORDER — FAMOTIDINE 10 MG/ML
20 INJECTION, SOLUTION INTRAVENOUS AS NEEDED
Status: CANCELLED | OUTPATIENT
Start: 2025-05-27

## 2025-05-27 RX ORDER — SODIUM CHLORIDE 9 MG/ML
20 INJECTION, SOLUTION INTRAVENOUS ONCE
Status: CANCELLED | OUTPATIENT
Start: 2025-05-27

## 2025-05-27 RX ADMIN — FAMOTIDINE 20 MG: 10 INJECTION INTRAVENOUS at 09:39

## 2025-05-27 RX ADMIN — GRANISETRON HYDROCHLORIDE 1 MG: 1 INJECTION, SOLUTION INTRAVENOUS at 09:39

## 2025-05-27 RX ADMIN — DOCETAXEL 155 MG: 20 INJECTION, SOLUTION, CONCENTRATE INTRAVENOUS at 10:25

## 2025-05-27 RX ADMIN — CYCLOPHOSPHAMIDE 1230 MG: 2 INJECTION INTRAVENOUS at 11:26

## 2025-05-27 RX ADMIN — DIPHENHYDRAMINE HYDROCHLORIDE 50 MG: 50 INJECTION, SOLUTION INTRAMUSCULAR; INTRAVENOUS at 09:38

## 2025-05-27 RX ADMIN — PEGFILGRASTIM-CBQV 6 MG: 6 INJECTION, SOLUTION SUBCUTANEOUS at 13:36

## 2025-05-27 RX ADMIN — SODIUM CHLORIDE 20 ML/HR: 9 INJECTION, SOLUTION INTRAVENOUS at 09:38

## 2025-06-02 ENCOUNTER — INFUSION (OUTPATIENT)
Dept: ONCOLOGY | Facility: HOSPITAL | Age: 45
End: 2025-06-02
Payer: COMMERCIAL

## 2025-06-02 VITALS
WEIGHT: 242.2 LBS | RESPIRATION RATE: 16 BRPM | OXYGEN SATURATION: 93 % | SYSTOLIC BLOOD PRESSURE: 136 MMHG | HEART RATE: 110 BPM | TEMPERATURE: 97.8 F | BODY MASS INDEX: 42.91 KG/M2 | DIASTOLIC BLOOD PRESSURE: 83 MMHG

## 2025-06-02 DIAGNOSIS — Z17.0 BILATERAL MALIGNANT NEOPLASM OF BREAST IN FEMALE, ESTROGEN RECEPTOR POSITIVE, UNSPECIFIED SITE OF BREAST: Primary | ICD-10-CM

## 2025-06-02 DIAGNOSIS — C50.911 BILATERAL MALIGNANT NEOPLASM OF BREAST IN FEMALE, ESTROGEN RECEPTOR POSITIVE, UNSPECIFIED SITE OF BREAST: Primary | ICD-10-CM

## 2025-06-02 DIAGNOSIS — C50.912 BILATERAL MALIGNANT NEOPLASM OF BREAST IN FEMALE, ESTROGEN RECEPTOR POSITIVE, UNSPECIFIED SITE OF BREAST: Primary | ICD-10-CM

## 2025-06-02 PROCEDURE — 25810000003 SODIUM CHLORIDE 0.9 % SOLUTION: Performed by: INTERNAL MEDICINE

## 2025-06-02 PROCEDURE — 96360 HYDRATION IV INFUSION INIT: CPT

## 2025-06-02 RX ORDER — SODIUM CHLORIDE 9 MG/ML
1000 INJECTION, SOLUTION INTRAVENOUS ONCE
Status: COMPLETED | OUTPATIENT
Start: 2025-06-02 | End: 2025-06-02

## 2025-06-02 RX ADMIN — SODIUM CHLORIDE 1000 ML: 9 INJECTION, SOLUTION INTRAVENOUS at 11:50

## 2025-06-05 ENCOUNTER — TELEPHONE (OUTPATIENT)
Dept: SURGERY | Facility: CLINIC | Age: 45
End: 2025-06-05
Payer: COMMERCIAL

## 2025-06-05 ENCOUNTER — TELEPHONE (OUTPATIENT)
Dept: ONCOLOGY | Facility: CLINIC | Age: 45
End: 2025-06-05
Payer: COMMERCIAL

## 2025-06-05 DIAGNOSIS — G43.809 OTHER MIGRAINE WITHOUT STATUS MIGRAINOSUS, NOT INTRACTABLE: Primary | ICD-10-CM

## 2025-06-05 RX ORDER — SUMATRIPTAN SUCCINATE 25 MG/1
TABLET ORAL
Qty: 30 TABLET | Refills: 5 | Status: SHIPPED | OUTPATIENT
Start: 2025-06-05

## 2025-06-05 NOTE — TELEPHONE ENCOUNTER
Caller: Mona Castillo    Relationship: Self    Best call back number: [phone]    Which medication are you concerned about: IMITREX 25 MG    Who prescribed you this medication: DR. LEZAMA    What are your concerns: PT STATES [facility] TOLD HER THAT HER INS WILL NOT REFILL THIS PRESP. AS IT IS TOO SOON BUT PT STATES THE INS. COMPANY SAYS 27 PILLS IS SUFFICIENT FOR 90 DAYS BUT PT HAS TO TAKE THEM WAY TO MUCH FOR PILLS TO LAST UP TO 90 DAYS.  CAN YOU HELP WITH THIS?    How long have you had these concerns: TODAY SCRIPT WAS SENT.

## 2025-06-05 NOTE — TELEPHONE ENCOUNTER
Called and informed patient that I have faxed over the prescription request to the [facility].

## 2025-06-05 NOTE — TELEPHONE ENCOUNTER
Patient called and left a voicemail stating she needs a referral sent to the [facility] to get a specialized bra so it can be covered by insurance.     Called and spoke with the radha and they stated they faxed it over to the [phone] twice in April and on 6/2/25, informed them that we have not received it. Provided them with [facility]'s fax number to try to fax the request over.

## 2025-06-06 ENCOUNTER — OFFICE VISIT (OUTPATIENT)
Dept: RADIATION ONCOLOGY | Facility: HOSPITAL | Age: 45
End: 2025-06-06
Payer: COMMERCIAL

## 2025-06-06 VITALS — HEART RATE: 98 BPM | RESPIRATION RATE: 16 BRPM | OXYGEN SATURATION: 98 %

## 2025-06-06 DIAGNOSIS — Z17.0 MALIGNANT NEOPLASM OF LOWER-INNER QUADRANT OF LEFT BREAST IN FEMALE, ESTROGEN RECEPTOR POSITIVE: Primary | ICD-10-CM

## 2025-06-06 DIAGNOSIS — C50.312 MALIGNANT NEOPLASM OF LOWER-INNER QUADRANT OF LEFT BREAST IN FEMALE, ESTROGEN RECEPTOR POSITIVE: Primary | ICD-10-CM

## 2025-06-06 PROCEDURE — G0463 HOSPITAL OUTPT CLINIC VISIT: HCPCS | Performed by: RADIOLOGY

## 2025-06-06 NOTE — TELEPHONE ENCOUNTER
Caller: Mona Castillo    Relationship: Self    Best call back number: [phone]    What test/procedure requested:  PRE-AUTH FOR IMITREX    Additional information or concerns: YOU ALL SENT A NEW SCRIPT BUT [facility] STATES IT NEEDS A P/A

## 2025-06-06 NOTE — PROGRESS NOTES
Cancer Staging   CC: left breast cancer pT2N1a ER pos [place] neg Her 2 negative, evaluate for radiation                              Dear Kate Caldera MD     I had the pleasure of seeing Mona Castillo  today in the [facility].   The patient is a 44 y.o. female with recently diagnosed left breast cancer.  She noticed a mass in the lower inner quadrant of the left breast while she was playing with her 4-year-old. She had a bilateral diagnostic mammogram on 1/20/25 which showed Loosely grouped microcalcifications in the mid to posterior third of the upper outer quadrant of the left breast unchanged.Satellite architectural distortion at 6 o'clock position which is new compared to 7/8/2024.  This is 4 cm from the nipple and measures 1.5 x 1.1 x 1.6 cm.     She had an us guided biopsy of the left breast on 1/27/25 which revealed  Invasive ductal carcinoma, moderately-differentiated; Shahid histologic grade II/III measuring at least 5 mm and involving multiple core fragments. Associated intermediate grade ductal carcinoma in situ, solid type with focal single-cell necrosis. Negative for lymphovascular space invasion. ER % [place] negative and Her 2 negative yo1215%.     She had a breast mri on 1/30/25 which showed a 3.7 cm irregular enhancing mass with architectural distortion at 6:00 in the anterior left breast represents biopsy-proven malignancy and is located within the anteromedial aspect of regional to segmental non-mass enhancement measuring up to 14.4 cm at anterior, middle and posterior depths, which is suspicious for additional malignancy. If breast conservation is  pursued, preoperative stereotactic and/or MRI guided core needle biopsies of the left breast could be performed. Asymmetric left axillary lymph nodes with cortical thickening, including a dominant lymph node with eccentric cortical thickening. Recommend targeted ultrasound, followed by possible ultrasound guided core needle biopsy.  Asymmetric anterior left breast skin thickening, edema, and enhancement, including a relatively more focal area of skin enhancement, as above, some of which may be reactive to recent biopsy but also raises concern for possible inflammatory malignancy and/or direct skin involvement in the appropriate clinical setting. Recommend clinical evaluation by breast surgery, possible skin punch biopsy. Areas of non-mass enhancement measuring 3 cm at 1:00 in the posterior right breast and 8.6 cm at 11:00 in the middle and posterior right breast are suspicious. Recommend further evaluation with 2 siteMRI guided core needle biopsy.     She had a mri guided biopsy of the right breast 2/12/25 with pathology revealing right, site A, 1:00, MRI, biopsy (Infinity clip marker): Benign breast tissue with atypical ductal hyperplasia with microcalcifications. Nonintact intraductal papilloma with ductal hyperplasia the usual type measuring 2 mm.  Biopsy of the right breast, site B, 11:00, biopsy showed lobular carcinoma in situ with atypical lobular hyperplasia.  Biopsy of the left axillary lymph node, biopsy (spiral shaped HydroMARK clip) was benign.     She underwent a left skin sparing mastectomy and sentinel lymph node biopsy with Dr. Claros on 3/7/25 with pathology revealing 37 x 30 x 30 mm. Grade II invasive ductal carcinoma with associated intermediate grade dcis, solid type with associated calcifications present,  spanning 4 contiguous slices, 40 mm maximally (negative EIC). All margins are free of DCIS and invasive carcinoma (closest 30 mm inferior, see synoptic template for remaining margins).DCIS involves the nipple tip (Paget's).Lymphovascular space invasion is present.  -Hormone receptors: ER % positive, NC negative, HER2 1+ negative, Ki67 30%. One of 3 sentinel lymph nodes was positive for metastatic disease measuring 6 mm predominantly within the lymph capsule with eulalia.  pT2N1a.  Her oncotype score was 30.      Pathology from the right breast mastectomy was benign.      She is G:3. P:1 AB:2 with menarche age 13 and first childbirth age 40.  She did not breast feed.  She is premenopausal.  She has used oral contraceptive use: off/on previously.  She has never sued hormone replacement therapy.Patient had to do IVF treatments for 2 years but subsequently conceived naturally after 2 miscarriages after IVF. She had invitae genetic testing which was negative. She denies any family history of breast cancer. Her mother had thyroid cancer in her 30s or 40s.      She is recovering well from her surgery and is referred today for evaluation for adjuvant post mastectomy radiation. She started adjuvant chemotherapy with her first dose of cycle 1 day 1 of docetaxel and Cytoxan on 4/14/25.     Interval hx 6/6/25:  She returns today for re-evaluation.  Her expanders have been filled.  She has one more dose of chemotherapy on June 17.  She has tolerated chemo well.  She has no new complaints.      Review of Systems   Constitutional: Negative.    HENT: Negative.     Respiratory: Negative.     Musculoskeletal: Negative.    Skin: Negative.    Psychiatric/Behavioral: Negative.         Past Medical History:   Diagnosis Date    Allergic rhinitis     Anxiety     Arthritis     Asthma     Breast cancer 1/27/25    Breast mass 1/20/25    Cancer     Breast LEFT    Chronic kidney disease     STAGE 2    DDD (degenerative disc disease), lumbar     GERD (gastroesophageal reflux disease)     H/O HSV-2 infection     Hiatal hernia     History of back pain     History of gestational diabetes     History of miscarriage     Migraine     Pneumonia     PONV (postoperative nausea and vomiting)     Tattoos     Trouble in sleeping          Past Surgical History:   Procedure Laterality Date    APPENDECTOMY      BREAST BIOPSY  1/27/25    Left    BREAST RECONSTRUCTION Bilateral 03/07/2025    Procedure: BILATERAL BREAST TISSUE EXPANDER INSERTION W/ HOWARD AND  ANTIBIOTIC DISK AND ADJACENT TISSUE TRANSFER;  Surgeon: Anderson Hill MD;  Location:  [facility];  Service: Plastics;  Laterality: Bilateral;     SECTION  2020    CHOLECYSTECTOMY      COLONOSCOPY N/A 2019    Procedure: COLONOSCOPY with biopsies;  Surgeon: Philip Winter MD;  Location: [facility] ENDOSCOPY;  Service: Gastroenterology    DILATATION AND CURETTAGE  2020    ENDOSCOPY N/A 2019    Procedure: ESOPHAGOGASTRODUODENOSCOPY with biopsies and aspirate;  Surgeon: Philip Winter MD;  Location: [facility] ENDOSCOPY;  Service: Gastroenterology    MASTECTOMY Bilateral     MASTECTOMY W/ SENTINEL NODE BIOPSY Left 2025    Procedure: bilateral skin sparing mastectomy, left sentinel lymph node biopsy, possible left axillary dissection;  Surgeon: Ruba Claros MD;  Location: [facility] OR [facility] [place];  Service: General;  Laterality: Left;    US GUIDED LYMPH NODE BIOPSY  2025    UTERINE FIBROID SURGERY  2015    myosure removed vaginally    VENOUS ACCESS DEVICE (PORT) INSERTION Right 2025    Procedure: INSERTION VENOUS ACCESS DEVICE;  Surgeon: Ruba Claros MD;  Location: [facility] OR [facility] [place];  Service: General;  Laterality: Right;         Social History     Socioeconomic History    Marital status:      Spouse name: Polo    Number of children: 1    Years of education: College   Tobacco Use    Smoking status: Former     Current packs/day: 0.00     Average packs/day: 0.3 packs/day for 21.9 years (5.6 ttl pk-yrs)     Types: Cigarettes     Start date: 1995     Quit date: 10/24/2013     Years since quittin.6     Passive exposure: Past    Smokeless tobacco: Never    Tobacco comments:     quit 10/2013   Vaping Use    Vaping status: Never Used   Substance and Sexual Activity    Alcohol use: Not Currently     Comment: I have maybe 2 beers a month or a glass a wine a month.    Drug use: Yes     Frequency: 7.0 times per week     Types: Marijuana     Comment: DAILY    Sexual  activity: Yes     Partners: Male     Birth control/protection: None         Family History   Adopted: Yes   Problem Relation Age of Onset    Thyroid disease Mother     Asthma Mother     Cancer Mother         Thyroid cancer. Removed thyroid.    No Known Problems Father     No Known Problems Son     Stroke Maternal Grandmother     Cirrhosis Maternal Grandmother     Anemia Maternal Grandmother     Depression Maternal Grandmother     Hypertension Maternal Grandmother     Mental illness Maternal Grandmother     Alcohol abuse Maternal Grandmother          of cirrhosis of the liver due to alcoholism    Arthritis Maternal Grandmother     No Known Problems Maternal Grandfather     Malig Hyperthermia Neg Hx           Objective    Physical Exam  Constitutional:       Appearance: Normal appearance.   HENT:      Head: Atraumatic.   Eyes:      Extraocular Movements: Extraocular movements intact.   Chest:          Comments: Bilateral expanders in place filled with 950cc, no suspicious lesions or nodules, no axillary adenopathy  Neurological:      General: No focal deficit present.      Mental Status: She is alert.   Psychiatric:         Mood and Affect: Mood normal.         Behavior: Behavior normal.         Current Outpatient Medications on File Prior to Visit   Medication Sig Dispense Refill    albuterol (PROVENTIL) (2.5 MG/3ML) 0.083% nebulizer solution INHALE 1 AMPULE VIA NEBULIZER EVERY 4 HOURS AS NEEDED FOR WHEEZING 360 mL 1    budesonide-formoterol (SYMBICORT) 160-4.5 MCG/ACT inhaler Inhale 2 puffs 2 (Two) Times a Day.      dexAMETHasone (DECADRON) 4 MG tablet Take 1 tablet by mouth 2 (Two) Times a Day With Meals. Taper per written instructions. Take twice daily x 5 days starting the day before chemo. 60 tablet 0    diphenoxylate-atropine (LOMOTIL) 2.5-0.025 MG per tablet Take 1 tablet by mouth 4 (Four) Times a Day As Needed for Diarrhea. 90 tablet 0    famotidine (Pepcid) 20 MG tablet Take 1 tablet by mouth 2 (Two)  Times a Day. 30 tablet 0    lidocaine-prilocaine (EMLA) 2.5-2.5 % cream Apply 1 Application topically to the appropriate area as directed Take As Directed. Apply nickel size amount to port site 30 min before appt time do not rub in cover with plastic wrap 30 g 5    methocarbamol (ROBAXIN) 750 MG tablet Take 1 tablet by mouth 4 (Four) Times a Day As Needed for Muscle Spasms. 20 tablet 0    montelukast (SINGULAIR) 10 MG tablet Take 1 tablet by mouth Every Night.      nystatin-Lidocaine -diphenhydrAMINE in aluminum-magnesium hydroxide-simethicone suspension Swish and spit 5 mL Every 6 (Six) Hours. 470 mL 5    OLANZapine (ZyPREXA) 5 MG tablet Take 1 tablet by mouth Every Night. 30 tablet 3    omeprazole (priLOSEC) 20 MG capsule Take 1 capsule by mouth Daily.      ondansetron (Zofran) 4 MG tablet Take 1 tablet by mouth Daily As Needed for Nausea or Vomiting. 30 tablet 0    ondansetron (ZOFRAN) 8 MG tablet Take 1 tablet by mouth 3 (Three) Times a Day As Needed for Nausea or Vomiting. 30 tablet 5    prochlorperazine (COMPAZINE) 10 MG tablet Take 1 tablet by mouth Every 6 (Six) Hours As Needed for Nausea or Vomiting. 90 tablet 3    SUMAtriptan (Imitrex) 25 MG tablet Take one tablet at onset of headache. May repeat dose one time in 2 hours if headache not relieved. 30 tablet 5    traMADol (ULTRAM) 50 MG tablet Take 1 tablet by mouth Every 8 (Eight) Hours As Needed for Moderate Pain (headache). 30 tablet 0    Ventolin  (90 Base) MCG/ACT inhaler Inhale 2 puffs Every 4 (Four) Hours As Needed for Wheezing. 18 g 3     No current facility-administered medications on file prior to visit.       ALLERGIES:    Allergies   Allergen Reactions    Doxycycline Hives     Could have been rocephin or tordol as well    Glue [Wound Dressing Adhesive] Hives     Had reaction after biopsy    Turkey Itching     SOMETIMES HAS SOA    Gabapentin Mental Status Change       There were no vitals taken for this visit.         6/2/2025    11:37 AM    Current Status   ECOG score 0         Assessment & Plan    44 year old female with pT2N1a left breast cancer ER pos [place] negative Her 2 negative s/p mastectomy and sln biopsy with one of three nodes positive with eulalia.  She has expanders in place which have been filled with 950cc saline.  I spoke with Dr. Hill's nurse practitioner this morning and requested approximately 20% be removed from each expander prior to radiation.  She will receive her last dose of chemo next week.       I reviewed again risks, benefits and rationale of radiation therapy to include but not limited to the following:     Acute: skin erythema, breakdown/moist desquamation, swelling or discomfort of the tissue/expander, fatigue, pneumonitis resulting in shortness of breath, cough or pain, lymphedema of the arm, esophagitis, infection requiring surgery      Late: Permanent skin changes including hyperpigmentation, telangiectasias, fibrosis of the tissue resulting in smaller size or poor cosmetic outcome, late edema or cellulitis, late rib fracture, late pulmonary fibrosis, late lymphedema, hypothyroidism, and the remote risk of late cardiac damage resulting in increased risk of heart attack or second malignancies.       She voiced understanding and was given an opportunity to ask questions which I believe were answered to her satisfaction.  I have scheduled her to return for CT simulation on June 24 and plan to start her treatments on Monday July 7 per her request.  I plan to treat the left chest wall and regional nodes to to a dose of approximately 4256cGy in 16 fractions.    I personally spent greater than 35 minutes today assessing, managing, discussing and documenting my visit with the patient. That time includes review of records, imaging and pathology reports, obtaining my own history, performing a medically appropriate evaluation, counseling and educating the patient, discussing goals, logistics, alternatives and risks of my  recommendations, surveillance options and potential outcomes. It also includes the time documenting the clinical information in the EMR and communicating my recommendations to the other involved physicians.          Thank you very much for allowing me to participate in the care of this very pleasant patient.    Sincerely,      Keiko Alonzo MD

## 2025-06-09 ENCOUNTER — TELEPHONE (OUTPATIENT)
Dept: ONCOLOGY | Facility: CLINIC | Age: 45
End: 2025-06-09
Payer: COMMERCIAL

## 2025-06-09 NOTE — TELEPHONE ENCOUNTER
Follow up call to Mona, advised her the PA was still denied.  Dr Caldera is recommending a referral to neurology to help better manage her migraines.  Explained I would enter the referral and someone from neurology will call her to set up an appointment.  She voiced understanding.

## 2025-06-11 ENCOUNTER — HOSPITAL ENCOUNTER (OUTPATIENT)
Dept: PHYSICAL THERAPY | Facility: HOSPITAL | Age: 45
Setting detail: THERAPIES SERIES
Discharge: HOME | End: 2025-06-11
Payer: COMMERCIAL

## 2025-06-11 DIAGNOSIS — I89.0 LYMPHEDEMA OF LEFT ARM: ICD-10-CM

## 2025-06-11 DIAGNOSIS — Z17.0 MALIGNANT NEOPLASM OF LOWER-INNER QUADRANT OF LEFT BREAST IN FEMALE, ESTROGEN RECEPTOR POSITIVE: Primary | ICD-10-CM

## 2025-06-11 DIAGNOSIS — C50.312 MALIGNANT NEOPLASM OF LOWER-INNER QUADRANT OF LEFT BREAST IN FEMALE, ESTROGEN RECEPTOR POSITIVE: Primary | ICD-10-CM

## 2025-06-11 DIAGNOSIS — Z90.13 S/P BILATERAL MASTECTOMY: ICD-10-CM

## 2025-06-11 PROCEDURE — 97535 SELF CARE MNGMENT TRAINING: CPT

## 2025-06-11 NOTE — THERAPY RE-EVALUATION
Physical Therapy Lymphedema Re-Evaluation  [facility]     Patient Name: Mona Castillo  : 1980  MRN: [id number]  Today's Date: 2025      Visit Date: 2025    Visit Dx:    ICD-10-CM ICD-9-CM   1. Malignant neoplasm of lower-inner quadrant of left breast in female, estrogen receptor positive  C50.312 174.3    Z17.0 V86.0   2. Lymphedema of left arm  I89.0 457.1   3. S/P bilateral mastectomy  Z90.13 V45.71       Patient Active Problem List   Diagnosis    Asthma    Seasonal allergic rhinitis    Dizziness    Missed     Acute renal failure    Abdominal pain    Diarrhea    Bronchitis with asthma, acute    Iron deficiency anemia    Intestinal infection due to enteropathogenic E. coli    Gestational diabetes    Thrombocytosis    Malignant neoplasm of female breast    Malignant neoplasm of lower-inner quadrant of left breast in female, estrogen receptor positive    Encounter for fitting and adjustment of vascular catheter        Past Medical History:   Diagnosis Date    Allergic rhinitis     Anxiety     Arthritis     Asthma     Breast cancer 26    Breast mass 26    Cancer     Breast LEFT    Chronic kidney disease     STAGE 2    DDD (degenerative disc disease), lumbar     GERD (gastroesophageal reflux disease)     H/O HSV-2 infection     Hiatal hernia     History of back pain     History of gestational diabetes     History of miscarriage     Migraine     Pneumonia     PONV (postoperative nausea and vomiting)     Tattoos     Trouble in sleeping         Past Surgical History:   Procedure Laterality Date    APPENDECTOMY      BREAST BIOPSY  25    Left    BREAST RECONSTRUCTION Bilateral 2025    Procedure: BILATERAL BREAST TISSUE EXPANDER INSERTION W/ CORTIVA AND ANTIBIOTIC DISK AND ADJACENT TISSUE TRANSFER;  Surgeon: Anderson Hill MD;  [place] [facility] [place] [facility] [place]  Service: [place]astics;  Laterality: Bilateral;     SECTION  2020    CHOLECYSTECTOMY      COLONOSCOPY  N/A 08/30/2019    Procedure: COLONOSCOPY with biopsies;  Surgeon: Philip Winter MD;  [facility];  Service: Gastroenterology    DILATATION AND CURETTAGE  2018, 2020    ENDOSCOPY N/A 08/30/2019    Procedure: ESOPHAGOGASTRODUODENOSCOPY with biopsies and aspirate;  Surgeon: Philip Winter MD;  [facility];  Service: Gastroenterology    MASTECTOMY Bilateral     MASTECTOMY W/ SENTINEL NODE BIOPSY Left 03/07/2025    Procedure: bilateral skin sparing mastectomy, left sentinel lymph node biopsy, possible left axillary dissection;  Surgeon: Ruba Claros MD;  Location:  [facility];  Service: General;  Laterality: Left;    US GUIDED LYMPH NODE BIOPSY  02/12/2025    UTERINE FIBROID SURGERY  2015    myosure removed vaginally    VENOUS ACCESS DEVICE (PORT) INSERTION Right 4/11/2025    Procedure: INSERTION VENOUS ACCESS DEVICE;  Surgeon: Ruba Claros MD;  [facility];  Service: General;  Laterality: Right;       Visit Dx:    ICD-10-CM ICD-9-CM   1. Malignant neoplasm of lower-inner quadrant of left breast in female, estrogen receptor positive  C50.312 174.3    Z17.0 V86.0   2. Lymphedema of left arm  I89.0 457.1   3. S/P bilateral mastectomy  Z90.13 V45.71            Lymphedema       Row Name 06/11/25 1400             Subjective Pain    Able to rate subjective pain? yes  -LB      Pre-Treatment Pain Level 0  -LB      Post-Treatment Pain Level 0  -LB         Subjective    Subjective Comments I am doing ok.  -LB         Lymphedema Assessment    Lymphedema Classification LUE:;secondary;stage 1 (Spontaneously Reversible)  -LB      Lymphedema Cancer Related Sx bilateral;modified radical mastectomy;left;sentinel node biopsy  -LB      Lymph Nodes Removed # 3  -LB      Positive Lymph Nodes # 11  -LB      Chemo Received yes  -LB      Chemo Treatments #/Timeframe current  -LB      Radiation Therapy Received yes  -LB      Radiation Treatments #/Timeframe upcoming  -LB       Infections or Cellulitis? no  -LB         Posture/Observations    Posture/Observations Comments LUE with obvious edema  -LB         LUE Quick Girth (cm)    Axilla 45.2 cm  -LB      Mid upper arm 45.6 cm  -LB      Elbow 30.3 cm  -LB      Mid forearm 29.2 cm  -LB      Wrist crease 17.6 cm  -LB      Web space 18.9 cm  -LB      Met-heads 18 cm  -LB      LUE Quick Girth Total 204.8  -LB                User Key  (r) = Recorded By, (t) = Taken By, (c) = Cosigned By      Initials Name Provider Type    Norah Gallegos, PT Physical Therapist                                    Therapy Education  Education Details: fitted reduction kit to pt's LUE with aim to reduce LUE and eventually transition to daytime compression garment, reviewed use, wear schedule, donning/doffing, washing suggestions  Given: Symptoms/condition management, Edema management  Program: Reinforced  How Provided: Verbal  Provided to: Patient  Level of Understanding: Verbalized  95756 - PT Self Care/Mgmt Minutes: 30       OP Exercises       Row Name 06/11/25 1400             Subjective    Subjective Comments I am doing ok.  -LB         Subjective Pain    Able to rate subjective pain? yes  -LB      Pre-Treatment Pain Level 0  -LB      Post-Treatment Pain Level 0  -LB                User Key  (r) = Recorded By, (t) = Taken By, (c) = Cosigned By      Initials Name Provider Type    Norah Gallegos, PT Physical Therapist                                 PT OP Goals       Row Name 06/11/25 1400          Long Term Goals    LTG Date to Achieve 07/27/25  -LB     LTG 1 Pt will maintain bioimpedance L-dex WNLs.  -LB     LTG 1 Progress Ongoing  -LB     LTG 2 BUE AROM WNLs or back to baseline  -LB     LTG 2 Progress Ongoing  -LB     LTG 3 Pt will obtain properly fitting compression arm sleeve and verbalize wear schedule.  -LB     LTG 3 Progress Ongoing  -LB     LTG 3 Progress Comments fit for reduction kit today  -LB     LTG 4 Pt will be negative for obvious lymphedema  LUE.  -LB     LTG 4 Progress Ongoing  -LB               User Key  (r) = Recorded By, (t) = Taken By, (c) = Cosigned By      Initials Name Provider Type    LB Norah Herrera, PT Physical Therapist                     PT Assessment/Plan       Row Name 06/11/25 1216          PT Assessment    Functional Limitations Limitations in functional capacity and performance;Other (comment);Limitation in home management;Performance in work activities;Performance in self-care ADL;Performance in leisure activities  -LB     Impairments Edema;Impaired flexibility;Impaired lymphatic circulation;Pain;Muscle strength;Posture;Sensation  -LB     Assessment Comments Pt returns for reassessment with continued LUE lymphedema. Repeat circumferential measurements slightly inc today. She has now obtained reduction kit for LUE and we fitted this today and reviewed wear schedule, use, modifications as needed, wash and donning. Pt agreeable to wear for next few weeks and will adjust as needed. She knows to call if she has issues adjusting on her own. In 2 weeks we will return for measuring prior to radiation and determine if she should remain in reduction kit vs. transition to daytime compression during radiation. She remains appropriate for continued skilled PT services to address deficits as needed and continue to monitor for lymphedema.  -LB     Rehab Potential Good  -LB     Patient/caregiver participated in establishment of treatment plan and goals Yes  -LB     Patient would benefit from skilled therapy intervention Yes  -LB        PT Plan    PT Frequency 1x/week  -LB     Predicted Duration of Therapy Intervention (PT) 8-10 visits  -LB     Planned CPT's? PT EVAL LOW COMPLEXITY: 00914;PT RE-EVAL: 60444;PT THER PROC EA 15 MIN: 85396;PT THER ACT EA 15 MIN: 80049;PT MANUAL THERAPY EA 15 MIN: 26395;PT SELF CARE/HOME MGMT/TRAIN EA 15: 80747;PT BIS XTRACELL FLUID ANALYSIS: 30917  -LB     PT Plan Comments asssess reduction kit, refit if needed, daytime  garment vs. reduction kit for radiation  -               User Key  (r) = Recorded By, (t) = Taken By, (c) = Cosigned By      Initials Name Provider Type    Norah Gallegos, PT Physical Therapist                                 Time Calculation:   Start Time: 1430  Stop Time: 1500  Time Calculation (min): 30 min  Total Timed Code Minutes- PT: 30 minute(s)  Timed Charges  11213 - PT Self Care/Mgmt Minutes: 30  Total Minutes  Timed Charges Total Minutes: 30   Total Minutes: 30   Therapy Charges for Today       Code Description Service Date Service Provider Modifiers Qty    [id number]  PT SELF CARE/MGMT/TRAIN EA 15 MIN 6/11/2025 Norah Herrera, PT GP 2                      Norah Herrera PT  6/11/2025

## 2025-06-12 ENCOUNTER — TELEPHONE (OUTPATIENT)
Dept: FAMILY MEDICINE CLINIC | Facility: CLINIC | Age: 45
End: 2025-06-12
Payer: COMMERCIAL

## 2025-06-12 DIAGNOSIS — G43.709 CHRONIC MIGRAINE WITHOUT AURA WITHOUT STATUS MIGRAINOSUS, NOT INTRACTABLE: Primary | ICD-10-CM

## 2025-06-12 RX ORDER — RIZATRIPTAN BENZOATE 10 MG/1
10 TABLET, ORALLY DISINTEGRATING ORAL DAILY PRN
Qty: 10 TABLET | Refills: 4 | Status: SHIPPED | OUTPATIENT
Start: 2025-06-12

## 2025-06-12 NOTE — TELEPHONE ENCOUNTER
Sure, stop sumatriptan and try rizatriptan, I sent in for her.  She can also take riboflavin 400mg over the counter daily to prevent migraines and would start.  Radha Bhatia MA routed this conversation to Me   Radha White to Radha Marte MA   SM      6/12/25  8:36 AM  SHE HAS A SORE THROAT AND HEAD IS HURTING BAD THINKS SHE NEEDS A NEW MIGRAINE MEDICINE. DON'T WANNA COME IN BECAUSE OF BEING ON CHEMO.  CALL HER AND LET HER KNOW    6/12/25  8:34 AM  Mona Castillo contacted Radha White

## 2025-06-16 ENCOUNTER — INFUSION (OUTPATIENT)
Dept: ONCOLOGY | Facility: HOSPITAL | Age: 45
End: 2025-06-16
Payer: COMMERCIAL

## 2025-06-16 ENCOUNTER — LAB (OUTPATIENT)
Dept: LAB | Facility: HOSPITAL | Age: 45
End: 2025-06-16
Payer: COMMERCIAL

## 2025-06-16 ENCOUNTER — SPECIALTY PHARMACY (OUTPATIENT)
Dept: PHARMACY | Facility: HOSPITAL | Age: 45
End: 2025-06-16
Payer: COMMERCIAL

## 2025-06-16 ENCOUNTER — OFFICE VISIT (OUTPATIENT)
Dept: ONCOLOGY | Facility: CLINIC | Age: 45
End: 2025-06-16
Payer: COMMERCIAL

## 2025-06-16 VITALS
HEART RATE: 83 BPM | BODY MASS INDEX: 43.07 KG/M2 | TEMPERATURE: 97.8 F | HEIGHT: 63 IN | DIASTOLIC BLOOD PRESSURE: 78 MMHG | OXYGEN SATURATION: 97 % | RESPIRATION RATE: 16 BRPM | WEIGHT: 243.1 LBS | SYSTOLIC BLOOD PRESSURE: 122 MMHG

## 2025-06-16 DIAGNOSIS — C50.911 BILATERAL MALIGNANT NEOPLASM OF BREAST IN FEMALE, ESTROGEN RECEPTOR POSITIVE, UNSPECIFIED SITE OF BREAST: ICD-10-CM

## 2025-06-16 DIAGNOSIS — C50.312 MALIGNANT NEOPLASM OF LOWER-INNER QUADRANT OF LEFT BREAST IN FEMALE, ESTROGEN RECEPTOR POSITIVE: ICD-10-CM

## 2025-06-16 DIAGNOSIS — C50.312 MALIGNANT NEOPLASM OF LOWER-INNER QUADRANT OF LEFT BREAST IN FEMALE, ESTROGEN RECEPTOR POSITIVE: Primary | ICD-10-CM

## 2025-06-16 DIAGNOSIS — Z17.0 MALIGNANT NEOPLASM OF LOWER-INNER QUADRANT OF LEFT BREAST IN FEMALE, ESTROGEN RECEPTOR POSITIVE: Primary | ICD-10-CM

## 2025-06-16 DIAGNOSIS — Z17.0 BILATERAL MALIGNANT NEOPLASM OF BREAST IN FEMALE, ESTROGEN RECEPTOR POSITIVE, UNSPECIFIED SITE OF BREAST: ICD-10-CM

## 2025-06-16 DIAGNOSIS — C50.912 BILATERAL MALIGNANT NEOPLASM OF BREAST IN FEMALE, ESTROGEN RECEPTOR POSITIVE, UNSPECIFIED SITE OF BREAST: ICD-10-CM

## 2025-06-16 DIAGNOSIS — G43.809 OTHER MIGRAINE WITHOUT STATUS MIGRAINOSUS, NOT INTRACTABLE: Primary | ICD-10-CM

## 2025-06-16 DIAGNOSIS — Z17.0 MALIGNANT NEOPLASM OF LOWER-INNER QUADRANT OF LEFT BREAST IN FEMALE, ESTROGEN RECEPTOR POSITIVE: ICD-10-CM

## 2025-06-16 LAB
ALBUMIN SERPL-MCNC: 4.1 G/DL (ref 3.5–5.2)
ALBUMIN/GLOB SERPL: 1.5 G/DL
ALP SERPL-CCNC: 88 U/L (ref 39–117)
ALT SERPL W P-5'-P-CCNC: 21 U/L (ref 1–33)
ANION GAP SERPL CALCULATED.3IONS-SCNC: 8.8 MMOL/L (ref 5–15)
AST SERPL-CCNC: 13 U/L (ref 1–32)
BASOPHILS # BLD AUTO: 0.02 10*3/MM3 (ref 0–0.2)
BASOPHILS NFR BLD AUTO: 0.1 % (ref 0–1.5)
BILIRUB SERPL-MCNC: 0.6 MG/DL (ref 0–1.2)
BUN SERPL-MCNC: 9.4 MG/DL (ref 6–20)
BUN/CREAT SERPL: 12.7 (ref 7–25)
CALCIUM SPEC-SCNC: 10.1 MG/DL (ref 8.6–10.5)
CHLORIDE SERPL-SCNC: 109 MMOL/L (ref 98–107)
CO2 SERPL-SCNC: 20.2 MMOL/L (ref 22–29)
CREAT SERPL-MCNC: 0.74 MG/DL (ref 0.57–1)
DEPRECATED RDW RBC AUTO: 56.6 FL (ref 37–54)
EGFRCR SERPLBLD CKD-EPI 2021: 103.1 ML/MIN/1.73
EOSINOPHIL # BLD AUTO: 0.01 10*3/MM3 (ref 0–0.4)
EOSINOPHIL NFR BLD AUTO: 0.1 % (ref 0.3–6.2)
ERYTHROCYTE [DISTWIDTH] IN BLOOD BY AUTOMATED COUNT: 16.4 % (ref 12.3–15.4)
GLOBULIN UR ELPH-MCNC: 2.7 GM/DL
GLUCOSE SERPL-MCNC: 161 MG/DL (ref 65–99)
HCT VFR BLD AUTO: 34.3 % (ref 34–46.6)
HGB BLD-MCNC: 11.2 G/DL (ref 12–15.9)
IMM GRANULOCYTES # BLD AUTO: 0.18 10*3/MM3 (ref 0–0.05)
IMM GRANULOCYTES NFR BLD AUTO: 1.2 % (ref 0–0.5)
LYMPHOCYTES # BLD AUTO: 1.46 10*3/MM3 (ref 0.7–3.1)
LYMPHOCYTES NFR BLD AUTO: 9.4 % (ref 19.6–45.3)
MCH RBC QN AUTO: 31.4 PG (ref 26.6–33)
MCHC RBC AUTO-ENTMCNC: 32.7 G/DL (ref 31.5–35.7)
MCV RBC AUTO: 96.1 FL (ref 79–97)
MONOCYTES # BLD AUTO: 0.65 10*3/MM3 (ref 0.1–0.9)
MONOCYTES NFR BLD AUTO: 4.2 % (ref 5–12)
NEUTROPHILS NFR BLD AUTO: 13.17 10*3/MM3 (ref 1.7–7)
NEUTROPHILS NFR BLD AUTO: 85 % (ref 42.7–76)
NRBC BLD AUTO-RTO: 0 /100 WBC (ref 0–0.2)
PLATELET # BLD AUTO: 309 10*3/MM3 (ref 140–450)
PMV BLD AUTO: 9.6 FL (ref 6–12)
POTASSIUM SERPL-SCNC: 4 MMOL/L (ref 3.5–5.2)
PROT SERPL-MCNC: 6.8 G/DL (ref 6–8.5)
RBC # BLD AUTO: 3.57 10*6/MM3 (ref 3.77–5.28)
SODIUM SERPL-SCNC: 138 MMOL/L (ref 136–145)
WBC NRBC COR # BLD AUTO: 15.49 10*3/MM3 (ref 3.4–10.8)

## 2025-06-16 PROCEDURE — 25010000002 CYCLOPHOSPHAMIDE 2 GM/10ML SOLUTION 10 ML VIAL: Performed by: INTERNAL MEDICINE

## 2025-06-16 PROCEDURE — 96417 CHEMO IV INFUS EACH ADDL SEQ: CPT

## 2025-06-16 PROCEDURE — 25010000002 DOCETAXEL 20 MG/ML SOLUTION 8 ML VIAL: Performed by: INTERNAL MEDICINE

## 2025-06-16 PROCEDURE — 25810000003 SODIUM CHLORIDE 0.9 % SOLUTION 250 ML FLEX CONT: Performed by: INTERNAL MEDICINE

## 2025-06-16 PROCEDURE — 85025 COMPLETE CBC W/AUTO DIFF WBC: CPT

## 2025-06-16 PROCEDURE — 25010000002 GRANISETRON PER 100 MCG: Performed by: INTERNAL MEDICINE

## 2025-06-16 PROCEDURE — 25010000002 PEGFILGRASTIM-CBQV 6 MG/0.6ML SOLUTION PREFILLED SYRINGE: Performed by: INTERNAL MEDICINE

## 2025-06-16 PROCEDURE — 96375 TX/PRO/DX INJ NEW DRUG ADDON: CPT

## 2025-06-16 PROCEDURE — 25010000002 DIPHENHYDRAMINE PER 50 MG: Performed by: INTERNAL MEDICINE

## 2025-06-16 PROCEDURE — 96374 THER/PROPH/DIAG INJ IV PUSH: CPT

## 2025-06-16 PROCEDURE — 80053 COMPREHEN METABOLIC PANEL: CPT

## 2025-06-16 PROCEDURE — 96377 APPLICATON ON-BODY INJECTOR: CPT

## 2025-06-16 PROCEDURE — 25010000002 FAMOTIDINE 10 MG/ML SOLUTION: Performed by: INTERNAL MEDICINE

## 2025-06-16 PROCEDURE — 96413 CHEMO IV INFUSION 1 HR: CPT

## 2025-06-16 PROCEDURE — 25810000003 SODIUM CHLORIDE 0.9 % SOLUTION: Performed by: INTERNAL MEDICINE

## 2025-06-16 PROCEDURE — 25010000002 ALTEPLASE 2 MG RECONSTITUTED SOLUTION: Performed by: INTERNAL MEDICINE

## 2025-06-16 RX ORDER — GRANISETRON HYDROCHLORIDE 1 MG/ML
1 INJECTION INTRAVENOUS ONCE
Status: COMPLETED | OUTPATIENT
Start: 2025-06-16 | End: 2025-06-16

## 2025-06-16 RX ORDER — SODIUM CHLORIDE 9 MG/ML
20 INJECTION, SOLUTION INTRAVENOUS ONCE
Status: COMPLETED | OUTPATIENT
Start: 2025-06-16 | End: 2025-06-16

## 2025-06-16 RX ORDER — HYDROCORTISONE SODIUM SUCCINATE 100 MG/2ML
100 INJECTION INTRAMUSCULAR; INTRAVENOUS AS NEEDED
Status: CANCELLED | OUTPATIENT
Start: 2025-06-16

## 2025-06-16 RX ORDER — SODIUM CHLORIDE 9 MG/ML
20 INJECTION, SOLUTION INTRAVENOUS ONCE
Status: CANCELLED | OUTPATIENT
Start: 2025-06-16

## 2025-06-16 RX ORDER — FAMOTIDINE 10 MG/ML
20 INJECTION, SOLUTION INTRAVENOUS AS NEEDED
Status: CANCELLED | OUTPATIENT
Start: 2025-06-16

## 2025-06-16 RX ORDER — FAMOTIDINE 10 MG/ML
20 INJECTION, SOLUTION INTRAVENOUS ONCE
Status: COMPLETED | OUTPATIENT
Start: 2025-06-16 | End: 2025-06-16

## 2025-06-16 RX ORDER — FAMOTIDINE 10 MG/ML
20 INJECTION, SOLUTION INTRAVENOUS ONCE
Status: CANCELLED | OUTPATIENT
Start: 2025-06-16

## 2025-06-16 RX ORDER — DIPHENHYDRAMINE HYDROCHLORIDE 50 MG/ML
50 INJECTION, SOLUTION INTRAMUSCULAR; INTRAVENOUS AS NEEDED
Status: CANCELLED | OUTPATIENT
Start: 2025-06-16

## 2025-06-16 RX ORDER — GRANISETRON HYDROCHLORIDE 1 MG/ML
1 INJECTION INTRAVENOUS ONCE
Status: CANCELLED | OUTPATIENT
Start: 2025-06-16

## 2025-06-16 RX ADMIN — ALTEPLASE 2 MG: 2.2 INJECTION, POWDER, LYOPHILIZED, FOR SOLUTION INTRAVENOUS at 09:24

## 2025-06-16 RX ADMIN — SODIUM CHLORIDE 20 ML/HR: 9 INJECTION, SOLUTION INTRAVENOUS at 10:24

## 2025-06-16 RX ADMIN — FAMOTIDINE 20 MG: 10 INJECTION INTRAVENOUS at 10:24

## 2025-06-16 RX ADMIN — DIPHENHYDRAMINE HYDROCHLORIDE 50 MG: 50 INJECTION, SOLUTION INTRAMUSCULAR; INTRAVENOUS at 10:26

## 2025-06-16 RX ADMIN — PEGFILGRASTIM-CBQV 6 MG: 6 INJECTION, SOLUTION SUBCUTANEOUS at 14:04

## 2025-06-16 RX ADMIN — SODIUM CHLORIDE 155 MG: 9 INJECTION, SOLUTION INTRAVENOUS at 11:16

## 2025-06-16 RX ADMIN — GRANISETRON HYDROCHLORIDE 1 MG: 1 INJECTION, SOLUTION INTRAVENOUS at 10:24

## 2025-06-16 RX ADMIN — CYCLOPHOSPHAMIDE 1230 MG: 2 INJECTION INTRAVENOUS at 12:16

## 2025-06-16 NOTE — PROGRESS NOTES
Specialty Pharmacy Patient Management Program       Kisqali approved from 6/16/2025-6/16/2026 through [employer].    Test claim ran and it rejected as product/service not appropriate for this location.    She will have to fill at [facility]. She is eligible for the Kisqali copay card to reduce any copay down to $0.    Once she is fully educated the rx will be sent and I will follow for delivery.    Taylor Jordan, Pharmacy Technician  06/16/25  12:22 EDT

## 2025-06-16 NOTE — PROGRESS NOTES
Specialty Pharmacy Patient Management Program       Benefits Investigation Summary    Prescription: New Therapy-Kisqali 400 mg daily for 21 days on then 7 days off.    Dispensing pharmacy: [facility]    Copay amount: TBD with insurance-Pt is eligible for the copay card that will reduce any copay to $0.    PLAN: [facility]  BIN: [id number]  PCN: [id number]  RX GROUP: [id number]    Prior Auth and Med Assistance notes: Kisqali approved from 6/16/2025-6/16/2026 through [employer].     Test claim ran and it rejected as product/service not appropriate for this location.     She will have to fill at [facility]. She is eligible for the Kisqali copay card to reduce any copay down to $0.     Once she is fully educated the rx will be sent and I will follow for delivery.    Taylor Jordan, Pharmacy Technician  06/16/25  12:25 EDT

## 2025-06-16 NOTE — PROGRESS NOTES
Specialty Pharmacy Patient Management Program       Staff message rec from [facility] Pharmacist-Dr Caldera is starting pt on Kisqali.    Dose confirmed for 400 mg daily for 21 days on then 7 days off.    I [facility] through [facility]ymeds. Currently waiting for a decision.    Kate Caldera MD sent to Carolee Bryant [facility]; Adrienne Villarreal, RN; Taylor Jordan, Pharmacy Technician  Yes, thank you          Previous Messages       ----- Message -----  From: Carolee Bryant RPH  Sent: 6/16/2025  10:07 AM EDT  To: Adrienne Villarreal RN; Taylor Jordan*    Will do. Ribo 400 mg 21/28 days?    Carolee Pastor  ----- Message -----  From: Kate Caldera MD  Sent: 6/16/2025  10:00 AM EDT  To: Adrienne Villarreal RN; Craolee Bryant [facility]    Please start the patient on Lupron next week when she comes back for fluids.    I plan to see her back in 6 weeks, prior to that lets go ahead and finish Ribociclib chemotherapy education and have it ready to start on the day she sees me.  Also plan to start anastrozole 1 mg p.o. daily after radiation      Thank you  Kate Jordan, Pharmacy Technician  06/16/25  10:25 EDT

## 2025-06-20 NOTE — PROGRESS NOTES
[facility]  Date of visit: 6/23/2025  Patient ID: Mona Castillo  Age: 44 y.o.   Chief compliant:   Chief Complaint   Patient presents with    Migraine   PCP: David Fernandez DO  Referring MD: Kate Caldera MD    SUBJECTIVE:  Neurological Problem:  Mona Castillo is a 44 y.o. female who is right-handed with a past medical history of asthma, iron deficiency anemia, and left breast cancer post bilateral mastectomy (3/7/2025). She has been reffered by her Hemotologist/Oncologist, Dr. Kate Caldera for initial evaluation of headaches. In January 2025 she was found to have a left breast mass. It was later to be confirmed as invasive ductal carcinoma and she is now status post bilateral mastectomy with sentinel lymph node biopsy on the left. Per Dr. Caldera's note on 6/16/2025, the patient received cycle 4 of docetaxel and Cytoxan which is her final cycle of chemotherapy and she is scheduled to start radiation on 7/7/2025. She is accompanied by her Mom, Rosita, who assists with providing patient history.       Initial HPI (6/24/2025)    Headache:  Onset: Started Chemo 4/7/25 and started having migraines again then. First started when she was in her 20s and improved with pregnancy with her 4 year old son.   Location: Both sides on the back of head or the top of her head.   Character: Pressure.   Intensity: 8/10. Denies Thunderclap onset.  Duration: Longest has lasted 2 days. Usually a day.   Prodrome: Irritable and frustrated, brain fog.   Aura/warning symptoms: No.  Postdrome:  difficulty concentrating, fatigue and tired.   Frequency: Near daily. Severe 4 days a week.   Light sensitivity: No.  Sound sensitivity: No.  Nausea: Yes.  Vomiting: No.   Other associated symptoms: Blurred vision,  neck muscle pain, dizziness, watery eyes.  Denies: nuchal rigidity, shoulder muscle pain, fever, rash, runny nose, , diarrhea, weakness,  loss of vision, nasal congestion, numbness, paralysis, conjunctival  injection, lacrimation, rhinorrhea, eyelid edema, ptosis, hemifacial sweating, and miosis.  Headache free days 4 per month. Severe days 16 per month. Mild/Moderate days 8 per month.  Alleviating: Sumatriptan and Rizatriptan. Lay down.  Exacerbating/triggers: Cold cap used in chemotherapy to stimulate hair growth. High stress.   Aggravated by physical activity: Yes.   Positional: No.   Cardiac history: No.   Head Trauma: No.   Last Eye Exam: February 2025 [facility] on [address]. Eyes were okay.   Occipital notch tenderness: No.   Abortive Medication Use Frequency: Taking Sumatriptan 3 days a week.   Plans for Pregnancy: No.     Current medications:  Rizatriptan 10 mg takes away pain within 2 hours.  Sumatriptan 25 mg. Needs multiple doses.     Prior medications (side effects):  Sumatriptan 100 mg takes pain away and resolve pain within 2 hours.   Tylenol and Ibuprofen, don't help headache.   Zofran 4-8 mg helped nausea.     Other treatments/therapies: No.     Sleep: Not consistent. Wake up often in the middle of the night because she's hot, thinks she is going into menopause now. Maybe 8 hours of sleep but it's broken. New since chemo. She snores sometimes.   Stress: Moderate/ Severe. Chemo. Not working.   Mood: Agitated easily, new since chemo. Anxiety 7/10. Denies depression. No SI/HI.  Caffeine: A Big Red one daily. Energy drink 2 days a week.   Water intake: Could do better. 40 ounces daily.   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   Tobacco: Quit 12 years ago. She smoked for 20 years or so.   Alcohol: Occasionally.   Substance use: Currently smokes weed.   Family history:  Maternal grandmother had migraines and strokes.     Review of Systems   Constitutional:  Negative for chills and fever.   HENT:  Negative for congestion, dental problem, ear pain, hearing loss and tinnitus.    Eyes:  Positive for visual disturbance (blurred vision with HA/.). Negative for  photophobia, pain and redness.   Cardiovascular:  Negative for palpitations.   Gastrointestinal:  Positive for diarrhea (due to chemo) and nausea (w HA). Negative for blood in stool and vomiting.   Genitourinary:  Negative for difficulty urinating and hematuria.   Musculoskeletal:  Positive for neck pain (w HA) and neck stiffness (w HA.). Negative for arthralgias, back pain, gait problem, joint swelling and myalgias.   Skin:  Negative for rash.   Neurological:  Positive for dizziness (w HA) and headaches. Negative for tremors, seizures, syncope, facial asymmetry, speech difficulty, weakness, light-headedness and numbness.   Psychiatric/Behavioral:  Positive for decreased concentration (Around HAs) and sleep disturbance. Negative for confusion, dysphoric mood and hallucinations. The patient is nervous/anxious.       The following portions of the patient's history were reviewed and updated as appropriate: allergies, current medications, past family history, past medical history, past social history, past surgical history and problem list.    Vitals:    06/23/25 1051   BP: 106/78   Pulse: 108   SpO2: 98%     Neurological Exam  Mental Status  Awake, alert and oriented to person, place and time. Oriented to person, place, time and situation. Recent and remote memory are intact. Speech is normal. Language is fluent with no aphasia. Attention and concentration are normal. Fund of knowledge is appropriate for level of education.    Cranial Nerves  CN II: Visual acuity is normal. Visual fields full to confrontation.  CN III, IV, VI: Extraocular movements intact bilaterally. Normal lids and orbits bilaterally. Pupils equal round and reactive to light bilaterally.  CN V: Facial sensation is normal.  CN VII: Full and symmetric facial movement.  CN IX, X: Palate elevates symmetrically. Normal gag reflex.  CN XI: Shoulder shrug strength is normal.  CN XII: Tongue midline without atrophy or fasciculations.    Motor  Normal muscle  bulk throughout. No fasciculations present. Normal muscle tone. No abnormal involuntary movements.                                               Right                     Left   Shoulder abduction               5                          5  Elbow flexion                         5                          5  Elbow extension                    5                          5  Hip flexion                              5                          5  Knee flexion                           5                          5  Knee extension                      5                          5  Plantarflexion                         5                          5  Dorsiflexion                            5                          5    Sensory  Light touch is normal in upper and lower extremities. Pinprick is normal in upper and lower extremities. Temperature is normal in upper and lower extremities. Vibration abnormality: Reduced vibratory sensation at bilateral great toes. Intact bilaterally at ankles..     Reflexes                                            Right                      Left  Brachioradialis                    2+                         2+  Biceps                                 2+                         2+  Triceps                                2+                         2+  Patellar                                2+                         2+  Achilles                                1+                         1+    Right pathological reflexes: Ankle clonus absent.  Left pathological reflexes: Ankle clonus absent.    Coordination  Right: Finger-to-nose normal. Rapid alternating movement normal. Heel-to-shin normal.Left: Finger-to-nose normal. Rapid alternating movement normal. Heel-to-shin normal.    Gait  Casual gait is normal including stance, stride, and arm swing. Romberg is absent.    Physical Exam  HENT:      Head:      Comments: Denies occipital notch and temporal tenderness to palpation bilaterally.   Eyes:       General: Lids are normal.      Extraocular Movements: Extraocular movements intact.      Pupils: Pupils are equal, round, and reactive to light.   Neurological:      Coordination: Romberg sign negative.      Deep Tendon Reflexes:      Reflex Scores:       Tricep reflexes are 2+ on the right side and 2+ on the left side.       Bicep reflexes are 2+ on the right side and 2+ on the left side.       Brachioradialis reflexes are 2+ on the right side and 2+ on the left side.       Patellar reflexes are 2+ on the right side and 2+ on the left side.       Achilles reflexes are 1+ on the right side and 1+ on the left side.  Psychiatric:         Speech: Speech normal.     The following imaging was reviewed by LEYLA Decker on 06/23/2025:  Imaging: Radiology report reviewed: CT Head without contrast 4/10/2019 unremarkable.   Result Review :  The following data was reviewed by: LEYLA Decker on 06/23/2025:  Laboratory Results:         Lab Results   Component Value Date    WBC 7.41 06/23/2025    HGB 10.2 (L) 06/23/2025    HCT 32.1 (L) 06/23/2025    MCV 96.4 06/23/2025     06/23/2025     Lab Results   Component Value Date    GLUCOSE 137 (H) 06/23/2025    BUN 9.1 06/23/2025    CREATININE 0.85 06/23/2025    EGFRIFNONA 71 09/30/2019    EGFRIFAFRI >60 12/03/2020    BCR 10.7 06/23/2025    K 3.4 (L) 06/23/2025    CO2 25.3 06/23/2025    CALCIUM 9.5 06/23/2025    ALBUMIN 3.8 06/23/2025    LABIL2 1.2 12/03/2020    AST 17 06/23/2025    ALT 24 06/23/2025     Lab Results   Component Value Date    HGBA1C 5.20 07/19/2023    HDL 34 (L) 07/19/2023    LDL 97 07/19/2023    TRIG 165 (H) 07/19/2023     Lab Results   Component Value Date    TSH 1.910 06/13/2024     Lab Results   Component Value Date    EFHHALPB73 463 06/13/2024    FOLATE 11.80 07/30/2019    YXWK63NH 24.3 (L) 07/19/2023      Lab Results   Component Value Date    PROTEINTOT 6.0 06/23/2025    ALBUMIN 3.8 06/23/2025    GLOB 2.2 06/23/2025    AGRATIO 1.7  06/23/2025     Data reviewed : Consultant notes 6/16/2025 Oncology note.       Diagnoses and all orders for this visit:    1. Chronic migraine without aura without status migrainosus, not intractable (Primary)  -     MRI Brain With & Without Contrast; Future  -     ubrogepant (Ubrelvy) 100 MG tablet; Take 1 tablet by mouth Daily As Needed (migraine) for up to 3 days. Do not take in same day as other Ubrelvy dose. Do not take in same day as Nurtec  Dispense: 3 tablet; Refill: 0  -     rimegepant sulfate ODT (Nurtec) 75 MG disintegrating tablet; Place 1 tablet under the tongue Every Other Day As Needed (migraine). Do not take in same day as Ubrelvy dose.  Dispense: 2 tablet; Refill: 0  -     MRI Cervical Spine With & Without Contrast; Future    2. Neck pain  -     MRI Cervical Spine With & Without Contrast; Future    3. At risk for obstructive sleep apnea  -     Home Sleep Study; Future    4. Neuropathy         Assessment:  Migraine:  Mona Castillo is a 44 y.o. female who presents for evaluation of headaches. The diagnosis is most consistent with migraine without aura. This is supported by the duration of her headaches lasting several hours into days, severe pain intensity, aggravation by physical activity, and associated nausea, blurred vision,  neck muscle pain, dizziness, and watery eyes.  She is also responsive to triptan medications.  Although, she does lack other typical features of migraine including pulsating quality, unilateral location, and associated photophobia and phonophobia. She could instead be experiencing chronic tension type headache due to the pain lasting hours to days, having bilateral location of pain, pressure quality that is not pulsatile, and not associated with photophobia/phonophobia or moderate/severe nausea or vomiting.  But this may not be as likely either due to the fact that her headaches are aggravated by physical activity and they do have a severe intensity.      The patient has  recently received her last chemotherapy infusion, therefore she is at an increased risk of opportunistic infection.  However she does not show signs of infection currently or concern for meningitis. She denies fever, recent blood count is reassuring, and her neck pain/stiffness is only associated with headache and resolves with Triptans.  Due to the patient having worsening and recent onset of headaches associated with the beginning of her chemotherapy treatment and recent diagnosis of breast cancer, it is imperative that we obtain a MRI of her brain to further rule out structural causes.  Cancer significantly increases the risk of blood clot formation, therefore until she is in remission it is recommended that the patient avoid triptan medications as they cause vasodilation and could further increase the risk of ischemic events.  Due to the frequency of headaches, the patient is appropriate for a daily preventative medication.  However, the patient believes there is an association between either receiving chemotherapy intravenously and or the cold cap that she wears to encourage hair growth during her chemotherapy sessions, and the frequency and severity of her headaches.  Therefore the patient would like to defer starting a daily preventative medication, and will reach out if she feels that her headaches are not improving with the cessation of receiving IV chemotherapy and wearing the cold cap.    Neuropathy:  Upon exam patient was found to have reduced vibratory sensation at her bilateral great toes.  Vibratory and sensation intact at bilateral ankles.  The patient denies any numbness or tingling or associated burning or electric pain.  These findings are likely related to her receiving chemotherapy.  Further causes for neuropathy can be ruled out with lab work at next visit if the patient so wishes.    Plan:    MRI Brain and Cervical Spine with and without contrast.  Samples Dieudonne and Nurtec provided.  Patient  is encouraged to reach out and inform the office of which medication works better for her so that we can send in a prescription.  Avoid Triptans due to increased clot formation until in remission.  Sleep Study to rule out sleep apnea.  Defer starting preventative migraine medication per patient preference.  She is encouraged to reach out if her migraines are to not improve with her no longer receiving IV chemotherapy or the cold cap.    We will see Monabasilio Castillo back in 3 months with Dr. Weeks on 10/13/2025, or sooner if symptoms warrant or if the patient has concerns. The patient is encouraged to reach out via [employer] or by calling the office if there are any questions or concerns with today's plan.       LYELA Decker    I spent 79 minutes caring for this patient on this date of service. This time includes time spent by me in the following activities as necessary: preparing for the visit, reviewing tests, medical records and previous visits, obtaining and/or reviewing a separately obtained history, performing a medically appropriate exam and/or evaluation, counseling and educating the patient, and/or communicating with other healthcare professionals, documenting information in the medical record, independently interpreting results and communicating that information with the patient, and developing a medically appropriate treatment plan with consideration of other conditions, medications, and treatments.

## 2025-06-23 ENCOUNTER — OFFICE VISIT (OUTPATIENT)
Dept: NEUROLOGY | Facility: CLINIC | Age: 45
End: 2025-06-23
Payer: COMMERCIAL

## 2025-06-23 ENCOUNTER — PATIENT ROUNDING (BHMG ONLY) (OUTPATIENT)
Dept: NEUROLOGY | Facility: CLINIC | Age: 45
End: 2025-06-23
Payer: COMMERCIAL

## 2025-06-23 ENCOUNTER — INFUSION (OUTPATIENT)
Dept: ONCOLOGY | Facility: HOSPITAL | Age: 45
End: 2025-06-23
Payer: COMMERCIAL

## 2025-06-23 ENCOUNTER — TELEPHONE (OUTPATIENT)
Dept: RADIATION ONCOLOGY | Facility: HOSPITAL | Age: 45
End: 2025-06-23
Payer: COMMERCIAL

## 2025-06-23 VITALS
DIASTOLIC BLOOD PRESSURE: 84 MMHG | TEMPERATURE: 97.8 F | BODY MASS INDEX: 43.3 KG/M2 | HEART RATE: 117 BPM | WEIGHT: 244.4 LBS | SYSTOLIC BLOOD PRESSURE: 135 MMHG | RESPIRATION RATE: 16 BRPM | OXYGEN SATURATION: 96 %

## 2025-06-23 VITALS
SYSTOLIC BLOOD PRESSURE: 106 MMHG | BODY MASS INDEX: 42.88 KG/M2 | HEIGHT: 63 IN | OXYGEN SATURATION: 98 % | WEIGHT: 242 LBS | DIASTOLIC BLOOD PRESSURE: 78 MMHG | HEART RATE: 108 BPM

## 2025-06-23 DIAGNOSIS — Z17.0 MALIGNANT NEOPLASM OF LOWER-INNER QUADRANT OF LEFT BREAST IN FEMALE, ESTROGEN RECEPTOR POSITIVE: ICD-10-CM

## 2025-06-23 DIAGNOSIS — G62.9 NEUROPATHY: ICD-10-CM

## 2025-06-23 DIAGNOSIS — G43.709 CHRONIC MIGRAINE WITHOUT AURA WITHOUT STATUS MIGRAINOSUS, NOT INTRACTABLE: Primary | ICD-10-CM

## 2025-06-23 DIAGNOSIS — Z45.2 ENCOUNTER FOR FITTING AND ADJUSTMENT OF VASCULAR CATHETER: Primary | ICD-10-CM

## 2025-06-23 DIAGNOSIS — C50.312 MALIGNANT NEOPLASM OF LOWER-INNER QUADRANT OF LEFT BREAST IN FEMALE, ESTROGEN RECEPTOR POSITIVE: ICD-10-CM

## 2025-06-23 DIAGNOSIS — M54.2 NECK PAIN: ICD-10-CM

## 2025-06-23 DIAGNOSIS — Z91.89 AT RISK FOR OBSTRUCTIVE SLEEP APNEA: ICD-10-CM

## 2025-06-23 LAB
ALBUMIN SERPL-MCNC: 3.8 G/DL (ref 3.5–5.2)
ALBUMIN/GLOB SERPL: 1.7 G/DL
ALP SERPL-CCNC: 104 U/L (ref 39–117)
ALT SERPL W P-5'-P-CCNC: 24 U/L (ref 1–33)
ANION GAP SERPL CALCULATED.3IONS-SCNC: 12.7 MMOL/L (ref 5–15)
AST SERPL-CCNC: 17 U/L (ref 1–32)
BASOPHILS # BLD AUTO: 0.07 10*3/MM3 (ref 0–0.2)
BASOPHILS NFR BLD AUTO: 0.9 % (ref 0–1.5)
BILIRUB SERPL-MCNC: 0.5 MG/DL (ref 0–1.2)
BUN SERPL-MCNC: 9.1 MG/DL (ref 6–20)
BUN/CREAT SERPL: 10.7 (ref 7–25)
CALCIUM SPEC-SCNC: 9.5 MG/DL (ref 8.6–10.5)
CHLORIDE SERPL-SCNC: 105 MMOL/L (ref 98–107)
CO2 SERPL-SCNC: 25.3 MMOL/L (ref 22–29)
CREAT SERPL-MCNC: 0.85 MG/DL (ref 0.57–1)
DEPRECATED RDW RBC AUTO: 56.5 FL (ref 37–54)
EGFRCR SERPLBLD CKD-EPI 2021: 87.3 ML/MIN/1.73
EOSINOPHIL # BLD AUTO: 0.21 10*3/MM3 (ref 0–0.4)
EOSINOPHIL NFR BLD AUTO: 2.8 % (ref 0.3–6.2)
ERYTHROCYTE [DISTWIDTH] IN BLOOD BY AUTOMATED COUNT: 16.1 % (ref 12.3–15.4)
GLOBULIN UR ELPH-MCNC: 2.2 GM/DL
GLUCOSE SERPL-MCNC: 137 MG/DL (ref 65–99)
HCT VFR BLD AUTO: 32.1 % (ref 34–46.6)
HGB BLD-MCNC: 10.2 G/DL (ref 12–15.9)
IMM GRANULOCYTES # BLD AUTO: 0.93 10*3/MM3 (ref 0–0.05)
IMM GRANULOCYTES NFR BLD AUTO: 12.6 % (ref 0–0.5)
LYMPHOCYTES # BLD AUTO: 1.76 10*3/MM3 (ref 0.7–3.1)
LYMPHOCYTES NFR BLD AUTO: 23.8 % (ref 19.6–45.3)
MCH RBC QN AUTO: 30.6 PG (ref 26.6–33)
MCHC RBC AUTO-ENTMCNC: 31.8 G/DL (ref 31.5–35.7)
MCV RBC AUTO: 96.4 FL (ref 79–97)
MONOCYTES # BLD AUTO: 1.22 10*3/MM3 (ref 0.1–0.9)
MONOCYTES NFR BLD AUTO: 16.5 % (ref 5–12)
NEUTROPHILS NFR BLD AUTO: 3.22 10*3/MM3 (ref 1.7–7)
NEUTROPHILS NFR BLD AUTO: 43.4 % (ref 42.7–76)
NRBC BLD AUTO-RTO: 2.2 /100 WBC (ref 0–0.2)
PLATELET # BLD AUTO: 299 10*3/MM3 (ref 140–450)
PMV BLD AUTO: 9.8 FL (ref 6–12)
POTASSIUM SERPL-SCNC: 3.4 MMOL/L (ref 3.5–5.2)
PROT SERPL-MCNC: 6 G/DL (ref 6–8.5)
RBC # BLD AUTO: 3.33 10*6/MM3 (ref 3.77–5.28)
SODIUM SERPL-SCNC: 143 MMOL/L (ref 136–145)
WBC NRBC COR # BLD AUTO: 7.41 10*3/MM3 (ref 3.4–10.8)

## 2025-06-23 PROCEDURE — 25010000002 LEUPROLIDE ACETATE (3 MONTH) PER 3.75 MG: Performed by: INTERNAL MEDICINE

## 2025-06-23 PROCEDURE — 96360 HYDRATION IV INFUSION INIT: CPT

## 2025-06-23 PROCEDURE — 99215 OFFICE O/P EST HI 40 MIN: CPT

## 2025-06-23 PROCEDURE — 25810000003 SODIUM CHLORIDE 0.9 % SOLUTION: Performed by: INTERNAL MEDICINE

## 2025-06-23 PROCEDURE — 99417 PROLNG OP E/M EACH 15 MIN: CPT

## 2025-06-23 PROCEDURE — 96402 CHEMO HORMON ANTINEOPL SQ/IM: CPT

## 2025-06-23 PROCEDURE — 85025 COMPLETE CBC W/AUTO DIFF WBC: CPT | Performed by: NURSE PRACTITIONER

## 2025-06-23 PROCEDURE — 80053 COMPREHEN METABOLIC PANEL: CPT | Performed by: NURSE PRACTITIONER

## 2025-06-23 PROCEDURE — 25010000002 HEPARIN LOCK FLUSH PER 10 UNITS: Performed by: INTERNAL MEDICINE

## 2025-06-23 RX ORDER — SODIUM CHLORIDE 9 MG/ML
1000 INJECTION, SOLUTION INTRAVENOUS ONCE
Status: COMPLETED | OUTPATIENT
Start: 2025-06-23 | End: 2025-06-23

## 2025-06-23 RX ORDER — SODIUM CHLORIDE 0.9 % (FLUSH) 0.9 %
10 SYRINGE (ML) INJECTION AS NEEDED
Status: DISCONTINUED | OUTPATIENT
Start: 2025-06-23 | End: 2025-06-23 | Stop reason: HOSPADM

## 2025-06-23 RX ORDER — HEPARIN SODIUM (PORCINE) LOCK FLUSH IV SOLN 100 UNIT/ML 100 UNIT/ML
500 SOLUTION INTRAVENOUS AS NEEDED
Status: DISCONTINUED | OUTPATIENT
Start: 2025-06-23 | End: 2025-06-23 | Stop reason: HOSPADM

## 2025-06-23 RX ORDER — HEPARIN SODIUM (PORCINE) LOCK FLUSH IV SOLN 100 UNIT/ML 100 UNIT/ML
500 SOLUTION INTRAVENOUS AS NEEDED
OUTPATIENT
Start: 2025-06-23

## 2025-06-23 RX ORDER — SODIUM CHLORIDE 0.9 % (FLUSH) 0.9 %
10 SYRINGE (ML) INJECTION AS NEEDED
OUTPATIENT
Start: 2025-06-23

## 2025-06-23 RX ADMIN — Medication 10 ML: at 15:46

## 2025-06-23 RX ADMIN — LEUPROLIDE ACETATE 11.25 MG: KIT at 15:15

## 2025-06-23 RX ADMIN — SODIUM CHLORIDE 1000 ML: 9 INJECTION, SOLUTION INTRAVENOUS at 14:16

## 2025-06-23 RX ADMIN — Medication 500 UNITS: at 15:46

## 2025-06-23 NOTE — PROGRESS NOTES
A MyChart message has been sent to the patient for patient rounding A MyChart message has been sent to the patient for patient rounding

## 2025-06-24 ENCOUNTER — OFFICE VISIT (OUTPATIENT)
Dept: RADIATION ONCOLOGY | Facility: HOSPITAL | Age: 45
End: 2025-06-24
Payer: COMMERCIAL

## 2025-06-24 VITALS
BODY MASS INDEX: 43.23 KG/M2 | HEART RATE: 101 BPM | WEIGHT: 244 LBS | OXYGEN SATURATION: 97 % | DIASTOLIC BLOOD PRESSURE: 78 MMHG | SYSTOLIC BLOOD PRESSURE: 108 MMHG

## 2025-06-24 DIAGNOSIS — C50.312 MALIGNANT NEOPLASM OF LOWER-INNER QUADRANT OF LEFT BREAST IN FEMALE, ESTROGEN RECEPTOR POSITIVE: Primary | ICD-10-CM

## 2025-06-24 DIAGNOSIS — Z17.0 MALIGNANT NEOPLASM OF LOWER-INNER QUADRANT OF LEFT BREAST IN FEMALE, ESTROGEN RECEPTOR POSITIVE: Primary | ICD-10-CM

## 2025-06-24 NOTE — PROGRESS NOTES
Cancer Staging    CC: left breast cancer pT2N1a ER pos SC neg Her 2 negative, evaluate for radiation                              Dear Kate Caldera MD     I had the pleasure of seeing Mona Castillo  today in [facility].   The patient is a 44 y.o. female with recently diagnosed left breast cancer.  She noticed a mass in the lower inner quadrant of the left breast while she was playing with her 4-year-old. She had a bilateral diagnostic mammogram on 1/20/25 which showed Loosely grouped microcalcifications in the mid to posterior third of the upper outer quadrant of the left breast unchanged.Satellite architectural distortion at 6 o'clock position which is new compared to 7/8/2024.  This is 4 cm from the nipple and measures 1.5 x 1.1 x 1.6 cm.     She had an us guided biopsy of the left breast on 1/27/25 which revealed  Invasive ductal carcinoma, moderately-differentiated; Shahid histologic grade II/III measuring at least 5 mm and involving multiple core fragments. Associated intermediate grade ductal carcinoma in situ, solid type with focal single-cell necrosis. Negative for lymphovascular space invasion. ER % SC negative and Her 2 negative [id number]     She had a breast mri on 1/30/25 which showed a 3.7 cm irregular enhancing mass with architectural distortion at 6:00 in the anterior left breast represents biopsy-proven malignancy and is located within the anteromedial aspect of regional to segmental non-mass enhancement measuring up to 14.4 cm at anterior, middle and posterior depths, which is suspicious for additional malignancy. If breast conservation is  pursued, preoperative stereotactic and/or MRI guided core needle biopsies of the left breast could be performed. Asymmetric left axillary lymph nodes with cortical thickening, including a dominant lymph node with eccentric cortical thickening. Recommend targeted ultrasound, followed by possible ultrasound guided core needle biopsy.  Asymmetric anterior left breast skin thickening, edema, and enhancement, including a relatively more focal area of skin enhancement, as above, some of which may be reactive to recent biopsy but also raises concern for possible inflammatory malignancy and/or direct skin involvement in the appropriate clinical setting. Recommend clinical evaluation by breast surgery, possible skin punch biopsy. Areas of non-mass enhancement measuring 3 cm at 1:00 in the posterior right breast and 8.6 cm at 11:00 in the middle and posterior right breast are suspicious. Recommend further evaluation with 2 siteMRI guided core needle biopsy.     She had a mri guided biopsy of the right breast 2/12/25 with pathology revealing right, site A, 1:00, MRI, biopsy (Infinity clip marker): Benign breast tissue with atypical ductal hyperplasia with microcalcifications. Nonintact intraductal papilloma with ductal hyperplasia the usual type measuring 2 mm.  Biopsy of the right breast, site B, 11:00, biopsy showed lobular carcinoma in situ with atypical lobular hyperplasia.  Biopsy of the left axillary lymph node, biopsy (spiral shaped HydroMARK clip) was benign.     She underwent a left skin sparing mastectomy and sentinel lymph node biopsy with Dr. Claros on 3/7/25 with pathology revealing 37 x 30 x 30 mm. Grade II invasive ductal carcinoma with associated intermediate grade dcis, solid type with associated calcifications present,  spanning 4 contiguous slices, 40 mm maximally (negative EIC). All margins are free of DCIS and invasive carcinoma (closest 30 mm inferior, see synoptic template for remaining margins).DCIS involves the nipple tip (Paget's).Lymphovascular space invasion is present.  -Hormone receptors: ER % positive, FL negative, HER2 1+ negative, Ki67 30%. One of 3 sentinel lymph nodes was positive for metastatic disease measuring 6 mm predominantly within the lymph capsule with eulalia.  pT2N1a.  Her oncotype score was 30.    "  Pathology from the right breast mastectomy was benign.      She is G:3. P:1 AB:2 with menarche age 13 and first childbirth age 40.  She did not breast feed.  She is premenopausal.  She has used oral contraceptive use: off/on previously.  She has never sued hormone replacement therapy.Patient had to do IVF treatments for 2 years but subsequently conceived naturally after 2 miscarriages after IVF. She had invitae genetic testing which was negative. She denies any family history of breast cancer. Her mother had thyroid cancer in her 30s or 40s.      She is recovering well from her surgery and is referred today for evaluation for adjuvant post mastectomy radiation. She started adjuvant chemotherapy with her first dose of cycle 1 day 1 of docetaxel and Cytoxan on 4/14/25.      Interval hx 6/6/25:  She returns today for re-evaluation.  Her expanders have been filled.  She has one more dose of chemotherapy on June 17.  She has tolerated chemo well.  She has no new complaints.      Interval hx 6/24/25:  She returns today for re-evaluation.  She completed her last today of chemo on June 17.  She tolerated this well.  She reports a severe migraine headache last night after receiving her first lupron shot and starting a new migraine medicine prescribed by her neurologist.  It is better today but she still does not feel \"great'.  She had 20% of the volume removed from each expander since I last saw her.      Review of Systems   Constitutional:  Positive for fatigue.   Respiratory: Negative.     Gastrointestinal: Negative.    Musculoskeletal: Negative.    Neurological:  Positive for headaches.   Psychiatric/Behavioral: Negative.         Past Medical History:   Diagnosis Date    Allergic rhinitis     Anxiety     Arthritis     Asthma     Breast cancer 1/27/25    Breast mass 1/20/25    Cancer     Breast LEFT    Chronic kidney disease     STAGE 2    DDD (degenerative disc disease), lumbar     GERD (gastroesophageal reflux disease) "     H/O HSV-2 infection     Hiatal hernia     History of back pain     History of gestational diabetes     History of miscarriage     Migraine     Pneumonia     PONV (postoperative nausea and vomiting)     Tattoos     Trouble in sleeping          Past Surgical History:   Procedure Laterality Date    APPENDECTOMY      BREAST BIOPSY  25    Left    BREAST RECONSTRUCTION Bilateral 2025    Procedure: BILATERAL BREAST TISSUE EXPANDER INSERTION W/ CORTIVA AND ANTIBIOTIC DISK AND ADJACENT TISSUE TRANSFER;  Surgeon: Anderson Hill MD;  Location:  [facility];  Service: Plastics;  Laterality: Bilateral;     SECTION  2020    CHOLECYSTECTOMY      COLONOSCOPY N/A 2019    Procedure: COLONOSCOPY with biopsies;  Surgeon: Philip Winter MD;  [facility];  Service: Gastroenterology    DILATATION AND CURETTAGE  2020    ENDOSCOPY N/A 2019    Procedure: ESOPHAGOGASTRODUODENOSCOPY with biopsies and aspirate;  Surgeon: Philip Winter MD;  [facility];  Service: Gastroenterology    MASTECTOMY Bilateral     MASTECTOMY W/ SENTINEL NODE BIOPSY Left 2025    Procedure: bilateral skin sparing mastectomy, left sentinel lymph node biopsy, possible left axillary dissection;  Surgeon: Ruba Claros MD;  Location: [facility] [place];  Service: General;  Laterality: Left;    US GUIDED LYMPH NODE BIOPSY  2025    UTERINE FIBROID SURGERY  2015    myosure removed vaginally    VENOUS ACCESS DEVICE (PORT) INSERTION Right 2025    Procedure: INSERTION VENOUS ACCESS DEVICE;  Surgeon: Ruba Claros MD;  Location: [facility] [place];  Service: General;  Laterality: Right;         Social History     Socioeconomic History    Marital status:      Spouse name: Polo    Number of children: 1    Years of education: College   Tobacco Use    Smoking status: Former     Current packs/day: 0.00     Average packs/day: 0.3 packs/day for 21.9 years (5.6 ttl pk-yrs)      Types: Cigarettes     Start date: 1995     Quit date: 10/24/2013     Years since quittin.6     Passive exposure: Past    Smokeless tobacco: Never    Tobacco comments:     quit 10/2013   Vaping Use    Vaping status: Never Used   Substance and Sexual Activity    Alcohol use: Not Currently     Comment: I have maybe 2 beers a month or a glass a wine a month.    Drug use: Yes     Frequency: 7.0 times per week     Types: Marijuana     Comment: DAILY    Sexual activity: Yes     Partners: Male     Birth control/protection: None         Family History   Adopted: Yes   Problem Relation Age of Onset    Thyroid disease Mother     Asthma Mother     Cancer Mother         Thyroid cancer. Removed thyroid.    No Known Problems Father     No Known Problems Son     Stroke Maternal Grandmother     Cirrhosis Maternal Grandmother     Anemia Maternal Grandmother     Depression Maternal Grandmother     Hypertension Maternal Grandmother     Mental illness Maternal Grandmother     Alcohol abuse Maternal Grandmother          of cirrhosis of the liver due to alcoholism    Arthritis Maternal Grandmother     No Known Problems Maternal Grandfather     Malig Hyperthermia Neg Hx           Objective    Physical Exam  Constitutional:       Appearance: Normal appearance.   Chest:          Comments: Expanders in place,no suspicious lesions or nodules, no adenopathy  Neurological:      General: No focal deficit present.      Mental Status: She is alert and oriented to person, place, and time.         Current Outpatient Medications on File Prior to Visit   Medication Sig Dispense Refill    albuterol (PROVENTIL) (2.5 MG/3ML) 0.083% nebulizer solution INHALE 1 AMPULE VIA NEBULIZER EVERY 4 HOURS AS NEEDED FOR WHEEZING 360 mL 1    budesonide-formoterol (SYMBICORT) 160-4.5 MCG/ACT inhaler Inhale 2 puffs 2 (Two) Times a Day.      dexAMETHasone (DECADRON) 4 MG tablet Take 1 tablet by mouth 2 (Two) Times a Day With Meals. Taper per written  instructions. Take twice daily x 5 days starting the day before chemo. (Patient not taking: Reported on 6/23/2025) 60 tablet 0    diphenoxylate-atropine (LOMOTIL) 2.5-0.025 MG per tablet Take 1 tablet by mouth 4 (Four) Times a Day As Needed for Diarrhea. (Patient not taking: Reported on 6/23/2025) 90 tablet 0    famotidine (Pepcid) 20 MG tablet Take 1 tablet by mouth 2 (Two) Times a Day. (Patient not taking: Reported on 6/23/2025) 30 tablet 0    lidocaine-prilocaine (EMLA) 2.5-2.5 % cream Apply 1 Application topically to the appropriate area as directed Take As Directed. Apply nickel size amount to port site 30 min before appt time do not rub in cover with plastic wrap (Patient not taking: Reported on 6/23/2025) 30 g 5    methocarbamol (ROBAXIN) 750 MG tablet Take 1 tablet by mouth 4 (Four) Times a Day As Needed for Muscle Spasms. 20 tablet 0    montelukast (SINGULAIR) 10 MG tablet Take 1 tablet by mouth Every Night.      nystatin-Lidocaine -diphenhydrAMINE in aluminum-magnesium hydroxide-simethicone suspension Swish and spit 5 mL Every 6 (Six) Hours. (Patient not taking: Reported on 6/23/2025) 470 mL 5    OLANZapine (ZyPREXA) 5 MG tablet Take 1 tablet by mouth Every Night. (Patient not taking: Reported on 6/23/2025) 30 tablet 3    omeprazole (priLOSEC) 20 MG capsule Take 1 capsule by mouth Daily. (Patient not taking: Reported on 6/23/2025)      ondansetron (Zofran) 4 MG tablet Take 1 tablet by mouth Daily As Needed for Nausea or Vomiting. (Patient not taking: Reported on 6/23/2025) 30 tablet 0    ondansetron (ZOFRAN) 8 MG tablet Take 1 tablet by mouth 3 (Three) Times a Day As Needed for Nausea or Vomiting. (Patient not taking: Reported on 6/23/2025) 30 tablet 5    prochlorperazine (COMPAZINE) 10 MG tablet Take 1 tablet by mouth Every 6 (Six) Hours As Needed for Nausea or Vomiting. (Patient not taking: Reported on 6/23/2025) 90 tablet 3    rimegepant sulfate ODT (Nurtec) 75 MG disintegrating tablet Place 1 tablet  under the tongue Every Other Day As Needed (migraine). Do not take in same day as Ubrelvy dose. 2 tablet 0    traMADol (ULTRAM) 50 MG tablet Take 1 tablet by mouth Every 8 (Eight) Hours As Needed for Moderate Pain (headache). (Patient not taking: Reported on 6/23/2025) 30 tablet 0    ubrogepant (Ubrelvy) 100 MG tablet Take 1 tablet by mouth Daily As Needed (migraine) for up to 3 days. Do not take in same day as other Ubrelvy dose. Do not take in same day as Nurtec 3 tablet 0    Ventolin  (90 Base) MCG/ACT inhaler Inhale 2 puffs Every 4 (Four) Hours As Needed for Wheezing. 18 g 3     Current Facility-Administered Medications on File Prior to Visit   Medication Dose Route Frequency Provider Last Rate Last Admin    [COMPLETED] leuprolide (LUPRON) injection 11.25 mg  11.25 mg Intramuscular Once Kate Caldera MD   11.25 mg at 06/23/25 1515    [COMPLETED] sodium chloride 0.9 % infusion 1,000 mL  1,000 mL Intravenous Once Kate Caldera MD   Stopped at 06/23/25 1545    [DISCONTINUED] heparin injection 500 Units  500 Units Intravenous PRN Kate Caldera MD   500 Units at 06/23/25 1546    [DISCONTINUED] sodium chloride 0.9 % flush 10 mL  10 mL Intravenous PRN Kate Caldera MD   10 mL at 06/23/25 1546       ALLERGIES:    Allergies   Allergen Reactions    Doxycycline Hives     Could have been rocephin or tordol as well    Glue [Wound Dressing Adhesive] Hives     Had reaction after biopsy    [place] Itching     SOMETIMES HAS SOA    Gabapentin Mental Status Change       There were no vitals taken for this visit.         6/23/2025     1:45 PM   Current Status   ECOG score 0         Assessment & Plan   44 year old female with pT2N1a left breast cancer ER pos VT negative Her 2 negative s/p mastectomy and sln biopsy with one of three nodes positive with eulalia.  She has expanders in place which have been filled with 950cc saline.  I spoke with Dr. Hill's nurse practitioner this morning and requested approximately  20% be removed from each expander prior to radiation.  She will receive her last dose of chemo next week.       I reviewed again risks, benefits and rationale of radiation therapy to include but not limited to the following:     Acute: skin erythema, breakdown/moist desquamation, swelling or discomfort of the tissue/expander, fatigue, pneumonitis resulting in shortness of breath, cough or pain, lymphedema of the arm, esophagitis, infection requiring surgery      Late: Permanent skin changes including hyperpigmentation, telangiectasias, fibrosis of the tissue resulting in smaller size or poor cosmetic outcome, late edema or cellulitis, late rib fracture, late pulmonary fibrosis, late lymphedema, hypothyroidism, and the remote risk of late cardiac damage resulting in increased risk of heart attack or second malignancies.       She voiced understanding and was given an opportunity to ask questions which I believe were answered to her satisfaction.  I have scheduled her to return for CT simulation on June 26 and plan to start her treatments on Monday July 7 per her request.  I plan to treat the left chest wall and regional nodes to to a dose of approximately 4256cGy in 16 fractions.    I personally spent greater than 20 minutes today assessing, managing, discussing and documenting my visit with the patient. That time includes review of records, imaging and pathology reports, obtaining my own history, performing a medically appropriate evaluation, counseling and educating the patient, discussing goals, logistics, alternatives and risks of my recommendations, surveillance options and potential outcomes. It also includes the time documenting the clinical information in the EMR and communicating my recommendations to the other involved physicians.             Thank you very much for allowing me to participate in the care of this very pleasant patient.    Sincerely,      Keiko Alonzo MD

## 2025-06-25 ENCOUNTER — OFFICE VISIT (OUTPATIENT)
Dept: FAMILY MEDICINE CLINIC | Facility: CLINIC | Age: 45
End: 2025-06-25
Payer: COMMERCIAL

## 2025-06-25 VITALS
DIASTOLIC BLOOD PRESSURE: 68 MMHG | HEIGHT: 62 IN | SYSTOLIC BLOOD PRESSURE: 116 MMHG | BODY MASS INDEX: 44.72 KG/M2 | HEART RATE: 106 BPM | WEIGHT: 243 LBS | OXYGEN SATURATION: 97 %

## 2025-06-25 DIAGNOSIS — G43.019 INTRACTABLE MIGRAINE WITHOUT AURA AND WITHOUT STATUS MIGRAINOSUS: ICD-10-CM

## 2025-06-25 DIAGNOSIS — C50.911 BILATERAL MALIGNANT NEOPLASM OF BREAST IN FEMALE, ESTROGEN RECEPTOR POSITIVE, UNSPECIFIED SITE OF BREAST: ICD-10-CM

## 2025-06-25 DIAGNOSIS — Z17.0 BILATERAL MALIGNANT NEOPLASM OF BREAST IN FEMALE, ESTROGEN RECEPTOR POSITIVE, UNSPECIFIED SITE OF BREAST: ICD-10-CM

## 2025-06-25 DIAGNOSIS — C50.912 BILATERAL MALIGNANT NEOPLASM OF BREAST IN FEMALE, ESTROGEN RECEPTOR POSITIVE, UNSPECIFIED SITE OF BREAST: ICD-10-CM

## 2025-06-25 DIAGNOSIS — Z00.00 WELLNESS EXAMINATION: Primary | ICD-10-CM

## 2025-06-25 PROCEDURE — 99396 PREV VISIT EST AGE 40-64: CPT | Performed by: FAMILY MEDICINE

## 2025-06-25 NOTE — LETTER
June 25, 2025     Patient: Mona Castillo   YOB: 1980   Date of Visit: 6/25/2025       To Whom It May Concern:    Patient Mona Castillo unfortunately has breast cancer, she just completed chemo and now starting radiation treatments.  She has been sick and not feeling well as a result.  I recommend off work until August 26, 2025 to complete treatment and recover.              Sincerely,          David Fernandez, DO    CC: No Recipients

## 2025-06-25 NOTE — PROGRESS NOTES
Subjective   Mona Castillo is a 44 y.o. female. Presents today for   Chief Complaint   Patient presents with    Annual Exam    Breast Cancer         History of Present Illness  History of Present Illness  The patient is a 44-year-old female here for an annual wellness visit. She has positive breast cancer status post mastectomy and chemotherapy. She saw radiation oncology yesterday.    She reports an improvement in her condition, noting that her hair, which had been falling out, is now regrowing. She completed her final cycle of chemotherapy last Monday and received an infusion the following day. She is scheduled to start radiation therapy on 07/07/2025 and will have her markers placed tomorrow. Her radiation therapy will be for 3 weeks and a day, Monday through Friday. She is currently on long-term disability until the end of August 2025.    She has been experiencing migraines, which she attributes to stress and her recent diagnosis of breast cancer. She has been under the care of a neurologist for these headaches. She describes an episode where she experienced severe pain, leading her to contemplate self-harm. She was advised to discontinue a medication that constricts blood vessels and instead try two samples of a different medication to determine which one is more effective. She has been taking riboflavin 400 mg daily and magnesium glycinate. She also took Ubrelvy, which provided relief, but she experienced a severe headache later that night. She is unsure if the headache was a side effect of the medication or the injection she received after her infusion. She has not yet tried Nurtec. She typically experiences migraines three days a week. She reports no visual disturbances but notes that her eyes sometimes become watery. She has been using a cold cap during her chemotherapy sessions and believes this may be triggering her migraines.    She has entered premenopause due to her chemotherapy and has expressed  a preference for tubal ligation over an IUD. She has discussed this with her OB-GYN and is seeking advice on when it would be safe to proceed with the procedure.    She also reports a bug bite on her buttock that has been present for two weeks. The area appears to be healing but then becomes red again. She describes the sensation as two small knots under the skin. The area is not itchy.    PAST SURGICAL HISTORY:  - Mastectomy  Review of Systems   Respiratory:  Negative for shortness of breath.    Cardiovascular:  Negative for chest pain and palpitations.   Neurological:  Positive for headaches.       Patient Active Problem List   Diagnosis    Asthma    Seasonal allergic rhinitis    Dizziness    Missed     Acute renal failure    Abdominal pain    Diarrhea    Bronchitis with asthma, acute    Iron deficiency anemia    Intestinal infection due to enteropathogenic E. coli    Gestational diabetes    Thrombocytosis    Malignant neoplasm of female breast    Malignant neoplasm of lower-inner quadrant of left breast in female, estrogen receptor positive    Encounter for fitting and adjustment of vascular catheter       Social History     Socioeconomic History    Marital status:      Spouse name: Polo    Number of children: 1    Years of education: College   Tobacco Use    Smoking status: Former     Current packs/day: 0.00     Average packs/day: 0.3 packs/day for 18.8 years (4.7 ttl pk-yrs)     Types: Cigarettes     Start date: 1995     Quit date: 10/24/2013     Years since quittin.6     Passive exposure: Past    Smokeless tobacco: Never    Tobacco comments:     quit 10/2013   Vaping Use    Vaping status: Never Used   Substance and Sexual Activity    Alcohol use: Not Currently     Comment: I have maybe 2 beers a month or a glass a wine a month.    Drug use: Yes     Frequency: 7.0 times per week     Types: Marijuana     Comment: DAILY    Sexual activity: Yes     Partners: Male     Birth  "control/protection: None       Allergies   Allergen Reactions    Doxycycline Hives     Could have been rocephin or tordol as well    Glue [Wound Dressing Adhesive] Hives     Had reaction after biopsy    [place] Itching     SOMETIMES HAS SOA    Gabapentin Mental Status Change         Objective   Vitals:    06/25/25 1017   BP: 116/68   Pulse: 106   SpO2: 97%   Weight: 110 kg (243 lb)   Height: 157.5 cm (62\")     Body mass index is 44.45 kg/m².    Physical Exam  Vitals and nursing note reviewed.   Constitutional:       Appearance: She is well-developed.   HENT:      Head: Normocephalic and atraumatic.   Neck:      Thyroid: No thyromegaly.      Vascular: No JVD.   Cardiovascular:      Rate and Rhythm: Normal rate and regular rhythm.      Heart sounds: Normal heart sounds. No murmur heard.     No friction rub. No gallop.   Pulmonary:      Effort: Pulmonary effort is normal. No respiratory distress.      Breath sounds: Normal breath sounds. No wheezing or rales.   Abdominal:      General: Bowel sounds are normal. There is no distension.      Palpations: Abdomen is soft.      Tenderness: There is no abdominal tenderness. There is no guarding or rebound.   Musculoskeletal:      Cervical back: Neck supple.   Skin:     General: Skin is warm and dry.      Comments: Left buttocks arthropod bites   Neurological:      Mental Status: She is alert.      Gait: Gait normal.   Psychiatric:         Behavior: Behavior normal.       Physical Exam  Skin: Two small nodules on the buttock, likely secondary to an insect bite, with no signs of infection or significant redness.    Results       Assessment & Plan   Diagnoses and all orders for this visit:    1. Wellness examination (Primary)    2. Intractable migraine without aura and without status migrainosus    3. Bilateral malignant neoplasm of breast in female, estrogen receptor positive, unspecified site of breast      Counseled on migraine management  Counseled on wellness during cancer " treatment  Migraines - let neurology know on nurtec vs ubrelvy, but likely will need to fail 1 more triptan before insurance will actually cover.     Assessment & Plan  1. Migraines.  - Experiences migraines approximately three days a week, often triggered by stress and the use of a cold cap during chemotherapy infusions.  - Advised to continue taking riboflavin 400 mg daily and magnesium glycinate.  - Given samples of two medications, including Ubrelvy, to determine which works better. May need to fail one more triptan before insurance coverage is approved.  - Advised to monitor for any visual changes or other triggers.    2. Breast cancer.  - Completed chemotherapy and is scheduled to start radiation therapy on 07/07/2025. Will have markers done tomorrow.  - Counseled on self-care while undergoing breast cancer treatment.  - Letter will be provided to extend long-term disability until 08/26/2025.    3. Insect bite.  - Reported an insect bite on buttock that has been present for two weeks. Appears to be healing slowly, likely due to recent chemotherapy.  - Advised to apply antibiotic ointment twice daily.  - If redness spreads, advised to call the clinic.    4. Premenopause.  - Entered premenopause due to chemotherapy and prefers tubal ligation over an IUD.  - Discussed with OB-GYN and seeking advice on when it would be safe to proceed with the procedure.  - Advised that she should be able to proceed with tubal ligation a few weeks post-radiation, provided she has healed appropriately.          -Follow up: 3 months and prn     ________________________________________  David Fernandez DO, MS    Current Outpatient Medications on File Prior to Visit   Medication Sig Dispense Refill    albuterol (PROVENTIL) (2.5 MG/3ML) 0.083% nebulizer solution INHALE 1 AMPULE VIA NEBULIZER EVERY 4 HOURS AS NEEDED FOR WHEEZING 360 mL 1    budesonide-formoterol (SYMBICORT) 160-4.5 MCG/ACT inhaler Inhale 2 puffs 2 (Two) Times a  Day.      montelukast (SINGULAIR) 10 MG tablet Take 1 tablet by mouth Every Night.      nystatin-Lidocaine -diphenhydrAMINE in aluminum-magnesium hydroxide-simethicone suspension Swish and spit 5 mL Every 6 (Six) Hours. 470 mL 5    omeprazole (priLOSEC) 20 MG capsule Take 1 capsule by mouth Daily.      rimegepant sulfate ODT (NURTEC-ODT) 75 MG disintegrating tablet Place 1 tablet under the tongue Every Other Day As Needed (migraine).      ubrogepant (Ubrelvy) 100 MG tablet Take 1 tablet by mouth Daily As Needed (migraine) for up to 3 days. Do not take in same day as other Ubrelvy dose. Do not take in same day as Nurtec 3 tablet 0    Ventolin  (90 Base) MCG/ACT inhaler Inhale 2 puffs Every 4 (Four) Hours As Needed for Wheezing. 18 g 3    [DISCONTINUED] rimegepant sulfate ODT (Nurtec) 75 MG disintegrating tablet Place 1 tablet under the tongue Every Other Day As Needed (migraine). Do not take in same day as Ubrelvy dose. 2 tablet 0    [DISCONTINUED] dexAMETHasone (DECADRON) 4 MG tablet Take 1 tablet by mouth 2 (Two) Times a Day With Meals. Taper per written instructions. Take twice daily x 5 days starting the day before chemo. (Patient not taking: Reported on 6/25/2025) 60 tablet 0    [DISCONTINUED] diphenoxylate-atropine (LOMOTIL) 2.5-0.025 MG per tablet Take 1 tablet by mouth 4 (Four) Times a Day As Needed for Diarrhea. (Patient not taking: Reported on 6/25/2025) 90 tablet 0    [DISCONTINUED] famotidine (Pepcid) 20 MG tablet Take 1 tablet by mouth 2 (Two) Times a Day. (Patient not taking: Reported on 6/25/2025) 30 tablet 0    [DISCONTINUED] lidocaine-prilocaine (EMLA) 2.5-2.5 % cream Apply 1 Application topically to the appropriate area as directed Take As Directed. Apply nickel size amount to port site 30 min before appt time do not rub in cover with plastic wrap (Patient not taking: Reported on 6/25/2025) 30 g 5    [DISCONTINUED] methocarbamol (ROBAXIN) 750 MG tablet Take 1 tablet by mouth 4 (Four) Times a  Day As Needed for Muscle Spasms. (Patient not taking: Reported on 6/25/2025) 20 tablet 0    [DISCONTINUED] OLANZapine (ZyPREXA) 5 MG tablet Take 1 tablet by mouth Every Night. (Patient not taking: Reported on 6/25/2025) 30 tablet 3    [DISCONTINUED] ondansetron (Zofran) 4 MG tablet Take 1 tablet by mouth Daily As Needed for Nausea or Vomiting. (Patient not taking: Reported on 6/25/2025) 30 tablet 0    [DISCONTINUED] ondansetron (ZOFRAN) 8 MG tablet Take 1 tablet by mouth 3 (Three) Times a Day As Needed for Nausea or Vomiting. (Patient not taking: Reported on 6/25/2025) 30 tablet 5    [DISCONTINUED] prochlorperazine (COMPAZINE) 10 MG tablet Take 1 tablet by mouth Every 6 (Six) Hours As Needed for Nausea or Vomiting. (Patient not taking: Reported on 6/25/2025) 90 tablet 3    [DISCONTINUED] traMADol (ULTRAM) 50 MG tablet Take 1 tablet by mouth Every 8 (Eight) Hours As Needed for Moderate Pain (headache). (Patient not taking: Reported on 6/25/2025) 30 tablet 0     No current facility-administered medications on file prior to visit.

## 2025-06-26 ENCOUNTER — HOSPITAL ENCOUNTER (OUTPATIENT)
Dept: PHYSICAL THERAPY | Facility: HOSPITAL | Age: 45
Setting detail: THERAPIES SERIES
Discharge: HOME | End: 2025-06-26
Payer: COMMERCIAL

## 2025-06-26 ENCOUNTER — HOSPITAL ENCOUNTER (OUTPATIENT)
Dept: RADIATION ONCOLOGY | Facility: HOSPITAL | Age: 45
Setting detail: RADIATION/ONCOLOGY SERIES
End: 2025-06-26
Payer: COMMERCIAL

## 2025-06-26 ENCOUNTER — HOSPITAL ENCOUNTER (OUTPATIENT)
Dept: RADIATION ONCOLOGY | Facility: HOSPITAL | Age: 45
Discharge: HOME | End: 2025-06-26

## 2025-06-26 DIAGNOSIS — Z17.0 MALIGNANT NEOPLASM OF LOWER-INNER QUADRANT OF LEFT BREAST IN FEMALE, ESTROGEN RECEPTOR POSITIVE: Primary | ICD-10-CM

## 2025-06-26 DIAGNOSIS — C50.312 MALIGNANT NEOPLASM OF LOWER-INNER QUADRANT OF LEFT BREAST IN FEMALE, ESTROGEN RECEPTOR POSITIVE: Primary | ICD-10-CM

## 2025-06-26 DIAGNOSIS — Z90.13 S/P BILATERAL MASTECTOMY: ICD-10-CM

## 2025-06-26 DIAGNOSIS — I89.0 LYMPHEDEMA OF LEFT ARM: ICD-10-CM

## 2025-06-26 PROCEDURE — 77332 RADIATION TREATMENT AID(S): CPT | Performed by: RADIOLOGY

## 2025-06-26 PROCEDURE — 97535 SELF CARE MNGMENT TRAINING: CPT

## 2025-06-26 NOTE — THERAPY TREATMENT NOTE
Outpatient Physical Therapy Lymphedema Treatment Note  [facility]     Patient Name: Mona Castillo  : 1980  MRN: [id number]  Today's Date: 2025        Visit Date: 2025    Visit Dx:    ICD-10-CM ICD-9-CM   1. Malignant neoplasm of lower-inner quadrant of left breast in female, estrogen receptor positive  C50.312 174.3    Z17.0 V86.0   2. Lymphedema of left arm  I89.0 457.1   3. S/P bilateral mastectomy  Z90.13 V45.71       Patient Active Problem List   Diagnosis    Asthma    Seasonal allergic rhinitis    Dizziness    Missed     Acute renal failure    Abdominal pain    Diarrhea    Bronchitis with asthma, acute    Iron deficiency anemia    Intestinal infection due to enteropathogenic E. coli    Gestational diabetes    Thrombocytosis    Malignant neoplasm of female breast    Malignant neoplasm of lower-inner quadrant of left breast in female, estrogen receptor positive    Encounter for fitting and adjustment of vascular catheter         Lymphedema       Row Name 25 0800             Subjective Pain    Able to rate subjective pain? yes  -LB      Pre-Treatment Pain Level 0  -LB      Post-Treatment Pain Level 0  -LB         Subjective    Subjective Comments I am doing ok. I struggled with my last chemo. I wore the reduction kit pretty well or was in the pool. I start radiation .  -LB         LUE Quick Girth (cm)    Axilla 45 cm  -LB      Mid upper arm 42.5 cm  -LB      Elbow 30.6 cm  -LB      Mid forearm 28.8 cm  -LB      Wrist crease 17.2 cm  -LB      Web space 20.4 cm  -LB      Met-heads 18.4 cm  -LB      LUE Quick Girth Total 202.9  -LB                User Key  (r) = Recorded By, (t) = Taken By, (c) = Cosigned By      Initials Name Provider Type    Norah Gallegos, PT Physical Therapist                                   PT Assessment/Plan       Row Name 25 1235          PT Assessment    Assessment Comments Pt returns wearing LUE compression sleeve and glove. It is well  fitted today. She did well with use of reduction kit and wore most of the time including night time unless she was in the pool. Slight reduction in LUE circumferential measurements today and pt reports she feels it has been smaller over last few days. We reviewed compression use during radiation and will adjust as needed following.  -LB        PT Plan    PT Plan Comments repeat bioimpedance, circumferential measurements, return to reduction kit vs. compression sleeve/glove  -LB               User Key  (r) = Recorded By, (t) = Taken By, (c) = Cosigned By      Initials Name Provider Type    Norah Gallegos, PT Physical Therapist                        OP Exercises       Row Name 06/26/25 0800             Subjective    Subjective Comments I am doing ok. I struggled with my last chemo. I wore the reduction kit pretty well or was in the pool. I start radiation 7/7.  -LB         Subjective Pain    Able to rate subjective pain? yes  -LB      Pre-Treatment Pain Level 0  -LB      Post-Treatment Pain Level 0  -LB                User Key  (r) = Recorded By, (t) = Taken By, (c) = Cosigned By      Initials Name Provider Type    Norah Gallegos, PT Physical Therapist                                 PT OP Goals       Row Name 06/26/25 1200          Long Term Goals    LTG Date to Achieve 07/27/25  -LB     LTG 1 Pt will maintain bioimpedance L-dex WNLs.  -LB     LTG 1 Progress Ongoing  -LB     LTG 2 BUE AROM WNLs or back to baseline  -LB     LTG 2 Progress Ongoing  -LB     LTG 3 Pt will obtain properly fitting compression arm sleeve and verbalize wear schedule.  -LB     LTG 3 Progress Ongoing  -LB     LTG 4 Pt will be negative for obvious lymphedema LUE.  -LB     LTG 4 Progress Ongoing  -LB               User Key  (r) = Recorded By, (t) = Taken By, (c) = Cosigned By      Initials Name Provider Type    Norah Gallegos PT Physical Therapist                    Therapy Education  Education Details: reviewed options for compression  during radiation and after  Given: Symptoms/condition management, Edema management  Program: Reinforced  How Provided: Verbal  Provided to: Patient  Level of Understanding: Verbalized  61693 - PT Self Care/Mgmt Minutes: 25              Time Calculation:   Start Time: 0830  Stop Time: 0855  Time Calculation (min): 25 min  Total Timed Code Minutes- PT: 25 minute(s)  Timed Charges  51681 - PT Self Care/Mgmt Minutes: 25  Total Minutes  Timed Charges Total Minutes: 25   Total Minutes: 25   Therapy Charges for Today       Code Description Service Date Service Provider Modifiers Qty    39441480308 HC PT SELF CARE/MGMT/TRAIN EA 15 MIN 6/26/2025 Norah Herrera, PT GP 2                      Norah Herrera, PT  6/26/2025

## 2025-07-01 ENCOUNTER — APPOINTMENT (OUTPATIENT)
Dept: RADIATION ONCOLOGY | Facility: HOSPITAL | Age: 45
End: 2025-07-01
Payer: COMMERCIAL

## 2025-07-01 ENCOUNTER — HOSPITAL ENCOUNTER (OUTPATIENT)
Dept: RADIATION ONCOLOGY | Facility: HOSPITAL | Age: 45
Setting detail: RADIATION/ONCOLOGY SERIES
End: 2025-07-01
Payer: COMMERCIAL

## 2025-07-02 ENCOUNTER — PATIENT MESSAGE (OUTPATIENT)
Dept: NEUROLOGY | Facility: CLINIC | Age: 45
End: 2025-07-02
Payer: COMMERCIAL

## 2025-07-02 DIAGNOSIS — G43.709 CHRONIC MIGRAINE WITHOUT AURA WITHOUT STATUS MIGRAINOSUS, NOT INTRACTABLE: Primary | ICD-10-CM

## 2025-07-03 ENCOUNTER — TELEPHONE (OUTPATIENT)
Dept: RADIATION ONCOLOGY | Facility: HOSPITAL | Age: 45
End: 2025-07-03
Payer: COMMERCIAL

## 2025-07-03 NOTE — TELEPHONE ENCOUNTER
Relayed that there was a need to move her radiation treatment start date from 7/7/2025 to 7/8/2025.

## 2025-07-08 ENCOUNTER — RADIATION ONCOLOGY WEEKLY ASSESSMENT (OUTPATIENT)
Dept: RADIATION ONCOLOGY | Facility: HOSPITAL | Age: 45
End: 2025-07-08
Payer: COMMERCIAL

## 2025-07-08 ENCOUNTER — HOSPITAL ENCOUNTER (OUTPATIENT)
Dept: RADIATION ONCOLOGY | Facility: HOSPITAL | Age: 45
Discharge: HOME | End: 2025-07-08

## 2025-07-08 VITALS
OXYGEN SATURATION: 99 % | WEIGHT: 242 LBS | BODY MASS INDEX: 44.26 KG/M2 | HEART RATE: 85 BPM | DIASTOLIC BLOOD PRESSURE: 80 MMHG | SYSTOLIC BLOOD PRESSURE: 124 MMHG

## 2025-07-08 DIAGNOSIS — C50.312 MALIGNANT NEOPLASM OF LOWER-INNER QUADRANT OF LEFT BREAST IN FEMALE, ESTROGEN RECEPTOR POSITIVE: Primary | ICD-10-CM

## 2025-07-08 DIAGNOSIS — Z17.0 MALIGNANT NEOPLASM OF LOWER-INNER QUADRANT OF LEFT BREAST IN FEMALE, ESTROGEN RECEPTOR POSITIVE: Primary | ICD-10-CM

## 2025-07-08 LAB
RAD ONC ARIA COURSE ID: NORMAL
RAD ONC ARIA COURSE INTENT: NORMAL
RAD ONC ARIA COURSE LAST TREATMENT DATE: NORMAL
RAD ONC ARIA COURSE START DATE: NORMAL
RAD ONC ARIA COURSE TREATMENT ELAPSED DAYS: 0
RAD ONC ARIA FIRST TREATMENT DATE: NORMAL
RAD ONC ARIA PLAN FRACTIONS TREATED TO DATE: 1
RAD ONC ARIA PLAN ID: NORMAL
RAD ONC ARIA PLAN PRESCRIBED DOSE PER FRACTION: 2.66 GY
RAD ONC ARIA PLAN PRIMARY REFERENCE POINT: NORMAL
RAD ONC ARIA PLAN TOTAL FRACTIONS PRESCRIBED: 16
RAD ONC ARIA PLAN TOTAL PRESCRIBED DOSE: 4256 CGY
RAD ONC ARIA REFERENCE POINT DOSAGE GIVEN TO DATE: 2.66 GY
RAD ONC ARIA REFERENCE POINT ID: NORMAL
RAD ONC ARIA REFERENCE POINT SESSION DOSAGE GIVEN: 2.66 GY

## 2025-07-08 PROCEDURE — 77301 RADIOTHERAPY DOSE PLAN IMRT: CPT | Performed by: RADIOLOGY

## 2025-07-08 PROCEDURE — 77333 RADIATION TREATMENT AID(S): CPT | Performed by: RADIOLOGY

## 2025-07-08 PROCEDURE — 77338 DESIGN MLC DEVICE FOR IMRT: CPT | Performed by: RADIOLOGY

## 2025-07-08 PROCEDURE — 77300 RADIATION THERAPY DOSE PLAN: CPT | Performed by: RADIOLOGY

## 2025-07-08 PROCEDURE — 77427 RADIATION TX MANAGEMENT X5: CPT | Performed by: RADIOLOGY

## 2025-07-08 PROCEDURE — 77385: CPT | Performed by: RADIOLOGY

## 2025-07-08 NOTE — PROGRESS NOTES
Radiation Oncology  On-Treatment Note      Patient: Mona Castillo    MRN: [id number]    Attending Physician: No care team member to display     Diagnosis:     ICD-10-CM ICD-9-CM   1. Malignant neoplasm of lower-inner quadrant of left breast in female, estrogen receptor positive  C50.312 174.3    Z17.0 V86.0       Radiation Therapy Visit:  Continue radiation therapy, Dosimetry plan remains acceptable, Films reviewed and remains acceptable, Pain assessed, Pain management planned, Radiation dose schedule reviewed and remains acceptable, Radiation technique remains acceptable, and Symptoms within expected range    Radiation Treatments       Active   Plans   LtCW+N_[facility]   Most recent treatment: Dose planned: 266 cGy (fraction 1 on 7/8/2025)   Total: Dose planned: 4,256 cGy (16 fractions)   Elapsed Days: 0      Reference Points   [id number]+N   Most recent treatment: Dose given: 266 cGy (on 7/8/2025)   Total: Dose given: 266 cGy   Elapsed Days: 0                      Physical Examination:  Vitals: Blood pressure 124/80, pulse 85, weight 110 kg (242 lb), SpO2 99%, not currently breastfeeding.  Pain Score    07/08/25 1237   PainSc: 0-No pain       Fully active, able to carry on all pre-disease performance without restriction = 0    We examined the relevant areas: yes  Findings are within the expected range for this stage of treatment: yes  -------------------------------------------------------------------------------------------------------------------    ACTION ITEMS:  Patient tolerating treatment well and as expected for this stage in their treatment      Keiko Alonzo MD  Radiation Oncology

## 2025-07-09 ENCOUNTER — HOSPITAL ENCOUNTER (OUTPATIENT)
Dept: RADIATION ONCOLOGY | Facility: HOSPITAL | Age: 45
Discharge: HOME | End: 2025-07-09

## 2025-07-09 LAB
RAD ONC ARIA COURSE ID: NORMAL
RAD ONC ARIA COURSE INTENT: NORMAL
RAD ONC ARIA COURSE LAST TREATMENT DATE: NORMAL
RAD ONC ARIA COURSE START DATE: NORMAL
RAD ONC ARIA COURSE TREATMENT ELAPSED DAYS: 1
RAD ONC ARIA FIRST TREATMENT DATE: NORMAL
RAD ONC ARIA PLAN FRACTIONS TREATED TO DATE: 2
RAD ONC ARIA PLAN ID: NORMAL
RAD ONC ARIA PLAN PRESCRIBED DOSE PER FRACTION: 2.66 GY
RAD ONC ARIA PLAN PRIMARY REFERENCE POINT: NORMAL
RAD ONC ARIA PLAN TOTAL FRACTIONS PRESCRIBED: 16
RAD ONC ARIA PLAN TOTAL PRESCRIBED DOSE: 4256 CGY
RAD ONC ARIA REFERENCE POINT DOSAGE GIVEN TO DATE: 5.32 GY
RAD ONC ARIA REFERENCE POINT ID: NORMAL
RAD ONC ARIA REFERENCE POINT SESSION DOSAGE GIVEN: 2.66 GY

## 2025-07-09 PROCEDURE — 77385: CPT | Performed by: RADIOLOGY

## 2025-07-09 PROCEDURE — 77014 CHG CT GUIDANCE RADIATION THERAPY FLDS PLACEMENT: CPT | Performed by: RADIOLOGY

## 2025-07-10 ENCOUNTER — HOSPITAL ENCOUNTER (OUTPATIENT)
Dept: RADIATION ONCOLOGY | Facility: HOSPITAL | Age: 45
Discharge: HOME | End: 2025-07-10

## 2025-07-10 DIAGNOSIS — Z17.0 MALIGNANT NEOPLASM OF LOWER-INNER QUADRANT OF LEFT BREAST IN FEMALE, ESTROGEN RECEPTOR POSITIVE: Primary | ICD-10-CM

## 2025-07-10 DIAGNOSIS — C50.312 MALIGNANT NEOPLASM OF LOWER-INNER QUADRANT OF LEFT BREAST IN FEMALE, ESTROGEN RECEPTOR POSITIVE: Primary | ICD-10-CM

## 2025-07-10 LAB
RAD ONC ARIA COURSE ID: NORMAL
RAD ONC ARIA COURSE INTENT: NORMAL
RAD ONC ARIA COURSE LAST TREATMENT DATE: NORMAL
RAD ONC ARIA COURSE START DATE: NORMAL
RAD ONC ARIA COURSE TREATMENT ELAPSED DAYS: 2
RAD ONC ARIA FIRST TREATMENT DATE: NORMAL
RAD ONC ARIA PLAN FRACTIONS TREATED TO DATE: 3
RAD ONC ARIA PLAN ID: NORMAL
RAD ONC ARIA PLAN PRESCRIBED DOSE PER FRACTION: 2.66 GY
RAD ONC ARIA PLAN PRIMARY REFERENCE POINT: NORMAL
RAD ONC ARIA PLAN TOTAL FRACTIONS PRESCRIBED: 16
RAD ONC ARIA PLAN TOTAL PRESCRIBED DOSE: 4256 CGY
RAD ONC ARIA REFERENCE POINT DOSAGE GIVEN TO DATE: 7.98 GY
RAD ONC ARIA REFERENCE POINT ID: NORMAL
RAD ONC ARIA REFERENCE POINT SESSION DOSAGE GIVEN: 2.66 GY

## 2025-07-10 PROCEDURE — 77385: CPT | Performed by: RADIOLOGY

## 2025-07-10 PROCEDURE — 77336 RADIATION PHYSICS CONSULT: CPT | Performed by: RADIOLOGY

## 2025-07-10 PROCEDURE — 77014 CHG CT GUIDANCE RADIATION THERAPY FLDS PLACEMENT: CPT | Performed by: RADIOLOGY

## 2025-07-11 ENCOUNTER — HOSPITAL ENCOUNTER (OUTPATIENT)
Dept: RADIATION ONCOLOGY | Facility: HOSPITAL | Age: 45
Discharge: HOME | End: 2025-07-11

## 2025-07-11 LAB
RAD ONC ARIA COURSE ID: NORMAL
RAD ONC ARIA COURSE INTENT: NORMAL
RAD ONC ARIA COURSE LAST TREATMENT DATE: NORMAL
RAD ONC ARIA COURSE START DATE: NORMAL
RAD ONC ARIA COURSE TREATMENT ELAPSED DAYS: 3
RAD ONC ARIA FIRST TREATMENT DATE: NORMAL
RAD ONC ARIA PLAN FRACTIONS TREATED TO DATE: 4
RAD ONC ARIA PLAN ID: NORMAL
RAD ONC ARIA PLAN PRESCRIBED DOSE PER FRACTION: 2.66 GY
RAD ONC ARIA PLAN PRIMARY REFERENCE POINT: NORMAL
RAD ONC ARIA PLAN TOTAL FRACTIONS PRESCRIBED: 16
RAD ONC ARIA PLAN TOTAL PRESCRIBED DOSE: 4256 CGY
RAD ONC ARIA REFERENCE POINT DOSAGE GIVEN TO DATE: 10.64 GY
RAD ONC ARIA REFERENCE POINT ID: NORMAL
RAD ONC ARIA REFERENCE POINT SESSION DOSAGE GIVEN: 2.66 GY

## 2025-07-11 PROCEDURE — 77385: CPT | Performed by: RADIOLOGY

## 2025-07-11 PROCEDURE — 77014 CHG CT GUIDANCE RADIATION THERAPY FLDS PLACEMENT: CPT | Performed by: STUDENT IN AN ORGANIZED HEALTH CARE EDUCATION/TRAINING PROGRAM

## 2025-07-14 ENCOUNTER — HOSPITAL ENCOUNTER (OUTPATIENT)
Dept: RADIATION ONCOLOGY | Facility: HOSPITAL | Age: 45
Discharge: HOME | End: 2025-07-14
Payer: COMMERCIAL

## 2025-07-14 LAB
RAD ONC ARIA COURSE ID: NORMAL
RAD ONC ARIA COURSE INTENT: NORMAL
RAD ONC ARIA COURSE LAST TREATMENT DATE: NORMAL
RAD ONC ARIA COURSE START DATE: NORMAL
RAD ONC ARIA COURSE TREATMENT ELAPSED DAYS: 6
RAD ONC ARIA FIRST TREATMENT DATE: NORMAL
RAD ONC ARIA PLAN FRACTIONS TREATED TO DATE: 5
RAD ONC ARIA PLAN ID: NORMAL
RAD ONC ARIA PLAN PRESCRIBED DOSE PER FRACTION: 2.66 GY
RAD ONC ARIA PLAN PRIMARY REFERENCE POINT: NORMAL
RAD ONC ARIA PLAN TOTAL FRACTIONS PRESCRIBED: 16
RAD ONC ARIA PLAN TOTAL PRESCRIBED DOSE: 4256 CGY
RAD ONC ARIA REFERENCE POINT DOSAGE GIVEN TO DATE: 13.3 GY
RAD ONC ARIA REFERENCE POINT ID: NORMAL
RAD ONC ARIA REFERENCE POINT SESSION DOSAGE GIVEN: 2.66 GY

## 2025-07-14 PROCEDURE — 77385: CPT | Performed by: RADIOLOGY

## 2025-07-14 PROCEDURE — 77014 CHG CT GUIDANCE RADIATION THERAPY FLDS PLACEMENT: CPT | Performed by: RADIOLOGY

## 2025-07-15 ENCOUNTER — HOSPITAL ENCOUNTER (OUTPATIENT)
Dept: RADIATION ONCOLOGY | Facility: HOSPITAL | Age: 45
Discharge: HOME | End: 2025-07-15

## 2025-07-15 ENCOUNTER — SPECIALTY PHARMACY (OUTPATIENT)
Dept: PHARMACY | Facility: HOSPITAL | Age: 45
End: 2025-07-15
Payer: COMMERCIAL

## 2025-07-15 ENCOUNTER — OFFICE VISIT (OUTPATIENT)
Dept: ONCOLOGY | Facility: CLINIC | Age: 45
End: 2025-07-15
Payer: COMMERCIAL

## 2025-07-15 ENCOUNTER — SPECIALTY PHARMACY (OUTPATIENT)
Dept: ONCOLOGY | Facility: HOSPITAL | Age: 45
End: 2025-07-15
Payer: COMMERCIAL

## 2025-07-15 ENCOUNTER — RADIATION ONCOLOGY WEEKLY ASSESSMENT (OUTPATIENT)
Dept: RADIATION ONCOLOGY | Facility: HOSPITAL | Age: 45
End: 2025-07-15
Payer: COMMERCIAL

## 2025-07-15 ENCOUNTER — TELEPHONE (OUTPATIENT)
Dept: ONCOLOGY | Facility: CLINIC | Age: 45
End: 2025-07-15

## 2025-07-15 VITALS
HEIGHT: 62 IN | TEMPERATURE: 98 F | WEIGHT: 239 LBS | DIASTOLIC BLOOD PRESSURE: 75 MMHG | HEART RATE: 93 BPM | OXYGEN SATURATION: 98 % | BODY MASS INDEX: 43.98 KG/M2 | SYSTOLIC BLOOD PRESSURE: 110 MMHG | RESPIRATION RATE: 17 BRPM

## 2025-07-15 VITALS
HEIGHT: 62 IN | OXYGEN SATURATION: 98 % | RESPIRATION RATE: 17 BRPM | TEMPERATURE: 98 F | BODY MASS INDEX: 44.07 KG/M2 | DIASTOLIC BLOOD PRESSURE: 75 MMHG | HEART RATE: 93 BPM | SYSTOLIC BLOOD PRESSURE: 110 MMHG | WEIGHT: 239.5 LBS

## 2025-07-15 VITALS
HEART RATE: 96 BPM | OXYGEN SATURATION: 98 % | BODY MASS INDEX: 44.07 KG/M2 | WEIGHT: 241 LBS | SYSTOLIC BLOOD PRESSURE: 119 MMHG | RESPIRATION RATE: 16 BRPM | DIASTOLIC BLOOD PRESSURE: 79 MMHG

## 2025-07-15 DIAGNOSIS — C50.312 MALIGNANT NEOPLASM OF LOWER-INNER QUADRANT OF LEFT BREAST IN FEMALE, ESTROGEN RECEPTOR POSITIVE: Primary | ICD-10-CM

## 2025-07-15 DIAGNOSIS — Z17.0 MALIGNANT NEOPLASM OF LOWER-INNER QUADRANT OF LEFT BREAST IN FEMALE, ESTROGEN RECEPTOR POSITIVE: Primary | ICD-10-CM

## 2025-07-15 LAB
RAD ONC ARIA COURSE ID: NORMAL
RAD ONC ARIA COURSE INTENT: NORMAL
RAD ONC ARIA COURSE LAST TREATMENT DATE: NORMAL
RAD ONC ARIA COURSE START DATE: NORMAL
RAD ONC ARIA COURSE TREATMENT ELAPSED DAYS: 7
RAD ONC ARIA FIRST TREATMENT DATE: NORMAL
RAD ONC ARIA PLAN FRACTIONS TREATED TO DATE: 6
RAD ONC ARIA PLAN ID: NORMAL
RAD ONC ARIA PLAN PRESCRIBED DOSE PER FRACTION: 2.66 GY
RAD ONC ARIA PLAN PRIMARY REFERENCE POINT: NORMAL
RAD ONC ARIA PLAN TOTAL FRACTIONS PRESCRIBED: 16
RAD ONC ARIA PLAN TOTAL PRESCRIBED DOSE: 4256 CGY
RAD ONC ARIA REFERENCE POINT DOSAGE GIVEN TO DATE: 15.96 GY
RAD ONC ARIA REFERENCE POINT ID: NORMAL
RAD ONC ARIA REFERENCE POINT SESSION DOSAGE GIVEN: 2.66 GY

## 2025-07-15 PROCEDURE — 77014 CHG CT GUIDANCE RADIATION THERAPY FLDS PLACEMENT: CPT | Performed by: RADIOLOGY

## 2025-07-15 PROCEDURE — 77385: CPT | Performed by: RADIOLOGY

## 2025-07-15 PROCEDURE — 77427 RADIATION TX MANAGEMENT X5: CPT | Performed by: RADIOLOGY

## 2025-07-15 RX ORDER — ANASTROZOLE 1 MG/1
1 TABLET ORAL DAILY
Qty: 90 TABLET | Refills: 1 | Status: SHIPPED | OUTPATIENT
Start: 2025-07-15

## 2025-07-15 NOTE — PROGRESS NOTES
Specialty Pharmacy Patient Management Program       Pt's Kisqali copay with [employer] is $100. I have obtained the copay card for her and provided the billing information to Cindi at [employer] [facility].    Taylor Jordan, Pharmacy Technician  07/15/25  15:18 EDT

## 2025-07-15 NOTE — PROGRESS NOTES
Specialty Pharmacy Patient Management Program  Per Protocol Prescription Order or Refill       Requested Prescriptions     Signed Prescriptions Disp Refills    ribociclib succinate, 400 MG Dose, (KISQALI) 200 MG tablet therapy pack tablet 42 tablet 5     Sig: Take 2 tablets by mouth Daily. Take for 21 days on then off 7 days of a 28-day cycle.    anastrozole (ARIMIDEX) 1 MG tablet 90 tablet 1     Sig: Take 1 tablet by mouth Daily.     Prescription orders above were sent to [facility] per Collaborative Care Agreement Protocol.     Completed independent double check on medication order/RX.    Aliyah Goldstein Rph, BCOP  Clinical Specialty Pharmacist, Oncology  7/15/2025  11:50 EDT

## 2025-07-15 NOTE — PROGRESS NOTES
Specialty Pharmacy Patient Management Program  Oncology Initial Assessment     Mona Castillo was referred by their provider to the Oncology Patient Management program offered by [facility] for breast cancer on 07/15/25.  An initial outreach was conducted, including assessment of therapy appropriateness and specialty medication education for Kisqali + anastrozole. The patient was introduced to services offered by [facility], including: regular assessments, refill coordination, curbside pick-up or mail order delivery options, prior authorization maintenance, and financial assistance programs as applicable. The patient was also provided with contact information for the pharmacy team.     Goal of chemotherapy: adjuvant and curative intent    Treatment Medication(s) / Frequency and Dosing    Kisqali 400 mg po daily for 21/28 days  Anastrozole 1 mg po daily    Number of cycles: Kisqali for 3 years    Start date of oral specialty medication: 8/13/25    Follow-up Testing to be determined after TBD cycles by MD.     Items for home use: Imodium AD (for diarrhea)    Rx written for: [] Nausea    [] Pre-Chemo   prochlorperazine 10 mg by mouth every 6 hours as needed for nausea    Completing Pharmacist: Carolee Bryant RPH             Date/time: 07/15/2025 11:44 EDT     Insurance Coverage & Financial Support  Eligible for co-pay card, CC to follow up with specialty pharmacy     Relevant Past Medical History and Comorbidities  Relevant medical history and concomitant health conditions were discussed with the patient. The patient's chart has been reviewed for relevant past medical history and comorbid conditions and updated as necessary.  Past Medical History:   Diagnosis Date    Allergic rhinitis     Anxiety     Arthritis     Asthma     Breast cancer 1/27/25    Breast mass 1/20/25    Cancer     Breast LEFT    Chronic kidney disease     STAGE 2    DDD (degenerative disc disease),  lumbar     GERD (gastroesophageal reflux disease)     H/O HSV-2 infection     Hiatal hernia     History of back pain     History of gestational diabetes     History of miscarriage     Migraine     Pneumonia     PONV (postoperative nausea and vomiting)     Tattoos     Trouble in sleeping      Social History     Socioeconomic History    Marital status:      Spouse name: Polo    Number of children: 1    Years of education: College   Tobacco Use    Smoking status: Former     Current packs/day: 0.00     Average packs/day: 0.3 packs/day for 18.8 years (4.7 ttl pk-yrs)     Types: Cigarettes     Start date: 1995     Quit date: 10/24/2013     Years since quittin.7     Passive exposure: Past    Smokeless tobacco: Never    Tobacco comments:     quit 10/2013   Vaping Use    Vaping status: Never Used   Substance and Sexual Activity    Alcohol use: Not Currently     Comment: I have maybe 2 beers a month or a glass a wine a month.    Drug use: Yes     Frequency: 7.0 times per week     Types: Marijuana     Comment: DAILY    Sexual activity: Yes     Partners: Male     Birth control/protection: None       Problem list reviewed by Carolee Bryant RPH on 7/15/2025 at 11:44 AM    Allergies  Known allergies and reactions were discussed with the patient. The patient's chart has been reviewed for  allergy information and updated as necessary.   Allergies   Allergen Reactions    Doxycycline Hives     Could have been rocephin or tordol as well    Glue [Wound Dressing Adhesive] Hives     Had reaction after biopsy    Turkey Itching     SOMETIMES HAS SOA    Gabapentin Mental Status Change       Allergies reviewed by Carolee Bryant RPH on 7/15/2025 at 11:44 AM    Relevant Laboratory Values  Relevant laboratory values were discussed with the patient. The following specialty medication dose adjustment(s) are recommended: none  Lab Results   Component Value Date    GLUCOSE 137 (H) 2025    CALCIUM 9.5 2025      06/23/2025    K 3.4 (L) 06/23/2025    CO2 25.3 06/23/2025     06/23/2025    BUN 9.1 06/23/2025    CREATININE 0.85 06/23/2025    EGFRIFAFRI >60 12/03/2020    EGFRIFNONA 71 09/30/2019    BCR 10.7 06/23/2025    ANIONGAP 12.7 06/23/2025     Lab Results   Component Value Date    WBC 7.41 06/23/2025    RBC 3.33 (L) 06/23/2025    HGB 10.2 (L) 06/23/2025    HCT 32.1 (L) 06/23/2025    MCV 96.4 06/23/2025    MCH 30.6 06/23/2025    MCHC 31.8 06/23/2025    RDW 16.1 (H) 06/23/2025    RDWSD 56.5 (H) 06/23/2025    MPV 9.8 06/23/2025     06/23/2025    NEUTRORELPCT 43.4 06/23/2025    LYMPHORELPCT 23.8 06/23/2025    MONORELPCT 16.5 (H) 06/23/2025    EOSRELPCT 2.8 06/23/2025    BASORELPCT 0.9 06/23/2025    AUTOIGPER 12.6 (H) 06/23/2025    NEUTROABS 3.22 06/23/2025    LYMPHSABS 1.76 06/23/2025    MONOSABS 1.22 (H) 06/23/2025    EOSABS 0.21 06/23/2025    BASOSABS 0.07 06/23/2025    AUTOIGNUM 0.93 (H) 06/23/2025    NRBC 2.2 (H) 06/23/2025       Current Medication List  This medication list has been reviewed with the patient and evaluated for any interactions or necessary modifications/recommendations, and updated to include all prescription medications, OTC medications, and supplements the patient is currently taking.  This list reflects what is contained in the patient's profile, which has also been marked as reviewed to communicate to other providers it is the most up to date version of the patient's current medication therapy.     Current Outpatient Medications:     albuterol (PROVENTIL) (2.5 MG/3ML) 0.083% nebulizer solution, INHALE 1 AMPULE VIA NEBULIZER EVERY 4 HOURS AS NEEDED FOR WHEEZING, Disp: 360 mL, Rfl: 1    budesonide-formoterol (SYMBICORT) 160-4.5 MCG/ACT inhaler, Inhale 2 puffs 2 (Two) Times a Day., Disp: , Rfl:     montelukast (SINGULAIR) 10 MG tablet, Take 1 tablet by mouth Every Night., Disp: , Rfl:     rimegepant sulfate ODT (NURTEC-ODT) 75 MG disintegrating tablet, Place 1 tablet under the tongue Every  Other Day As Needed (migraine)., Disp: , Rfl:     rimegepant sulfate ODT (NURTEC-ODT) 75 MG disintegrating tablet, Place 1 tablet under the tongue Daily As Needed (migraine)., Disp: 16 tablet, Rfl: 11    Ventolin  (90 Base) MCG/ACT inhaler, Inhale 2 puffs Every 4 (Four) Hours As Needed for Wheezing., Disp: 18 g, Rfl: 3    anastrozole (ARIMIDEX) 1 MG tablet, Take 1 tablet by mouth Daily., Disp: 90 tablet, Rfl: 1    nystatin-Lidocaine -diphenhydrAMINE in aluminum-magnesium hydroxide-simethicone suspension, Swish and spit 5 mL Every 6 (Six) Hours. (Patient not taking: Reported on 7/15/2025), Disp: 470 mL, Rfl: 5    omeprazole (priLOSEC) 20 MG capsule, Take 1 capsule by mouth Daily. (Patient not taking: Reported on 7/15/2025), Disp: , Rfl:     [START ON 8/12/2025] ribociclib succinate, 400 MG Dose, (KISQALI) 200 MG tablet therapy pack tablet, Take 2 tablets by mouth Daily. Take for 21 days on then off 7 days of a 28-day cycle., Disp: 42 tablet, Rfl: 5  No current facility-administered medications for this visit.    Medicines reviewed by Carolee Bryant RPH on 7/15/2025 at 11:44 AM    Drug Interactions  Reviewed concomitant medications, allergies, labs, comorbidities/medical history, quality of life, and immunization history.   Drug-drug interactions noted and discussed during education: no significant drug interactions noted. . Reminded the patient to let us know before making any changes or starting any new prescription or OTC medications so we can first assess drug interactions.  Drug-food interactions noted and discussed during education: Patient was instructed to avoid eating grapefruit and drinking grapefruit juice and eating pomegranate and drinking pomegranate juice    Initial Education Provided for Specialty Medication  The patient has been provided with the following education and any applicable administration techniques (i.e. self-injection) have been demonstrated for the therapies indicated. All  questions and concerns have been addressed prior to the patient receiving the medication, and the patient has verbalized comprehension of the education and any materials provided. Additional patient education shall be provided and documented upon request by the patient, provider, or payer.    Provided patient with:   Education sheets about the medication, 24-hour clinic phone number and my contact information and instructions to call should additional questions arise.     Medication Education Sheets Provided:   Oral Specialty Medication: Arimidex (anastrozole) and Kisqali (ribociclib)    TOPICS COMMENTS   Storage and Handling of Oral Specialty Medication Store in the original container, in a dry location out of direct sunlight, and out of reach of children or pets. Store at room temperature.  Discussed safe handling and what to do with any unused medication.   Administration of Oral Specialty Medication Take with or without food at the same time(s) each day.   Adherence to Oral Specialty Regimen and Handling Missed Doses Patient is likely to have good treatment adherence; reinforced the importance of adherence. Reviewed how to address missed doses and to let us know of any missed doses.   Anemia: role of RBC, cause, s/s, ways to manage, role of transfusion Reviewed the role of RBC and the use of transfusions if hemoglobin decreases too much.  Patient to notify us if they experience shortness of breath, dizziness, or palpitations.  Also let patient know they could feel more tired than usual and to try to stay active, but rest if they need to.    Thrombocytopenia: role of platelet, cause, s/s, ways to prevent bleeding, things to avoid, when to seek help Reviewed the role of platelets in blood clotting and when to call clinic (bloody nose that bleeds for 5 mins despite pressure, a cut that won't stop bleeding despite pressure, gums that bleed excessively with brushing or flossing). Recommended using an electric razor,  soft bristle toothbrush, and blowing your nose gently.    Neutropenia: role of WBC, cause, infection precautions, s/s of infection, when to call MD Reviewed the role of WBC, good infection prevention practices, and when to call the clinic (temperature 100.4F, sore throat, burning urination, etc)   Nutrition and Appetite Changes:  importance of maintaining healthy diet & weight, ways to manage to improve intake, dietary consult, exercise regimen, electrolyte and/or blood glucose abnormalities Decreased Appetite: Discussed the risk of decreased appetite. Recommended eating smaller, more frequent meals. Instructed the patient to contact the clinic if losing weight or having difficulty eating enough to maintain energy level. Lipid Panel Abnormalities:  Explained that the oncology therapy may lead to abnormalities in lipid values, specifically: increased cholesterol with anastrozole   Diarrhea: causes, s/s of dehydration, ways to manage, dietary changes, when to call MD Discussed the risk of diarrhea. Instructed patient on use OTC loperamide with diarrhea onset, but emphasized the importance of calling the clinic if 4-6 episodes in 24 hours not relieved by OTC loperamide.   Constipation: causes, ways to manage, dietary changes, when to call MD Provided supplementary handout with instructions for use of docusate and other OTC therapies to manage constipation.  Instructed to call us if medications aren't working.   Nausea/Vomiting: cause, use of antiemetics, dietary changes, when to call MD Emetic risk: Low-Minimal  PRN home meds: Prochlorperazine 10mg PO every 6 hours PRN nausea / vomiting  Pharmacy home meds sent to: patient has home supply    Instructed the patient to take a dose of the PRN medication at the first onset of nausea and if it's not working to call us for additional medications.  Also provided non-drug measures to mitigate nausea.       Alopecia: cause, ways to manage, resources Discussed the possibility of  hair loss with the patient. Informed patient that they could request a prescription for a wig if desired and most of the cost is usually covered by insurance. Recommended covering the head with a hat and/or protecting the skin on the head with SPF 30 or higher.    Nervous System Changes: causes, s/s, neuropathies, cognitive changes, ways to manage Hot Flashes: Discussed the possibility of hot flashes as well as mitigation strategies.   Pain: causes, ways to manage Discussed muscle and joint aches/pains with therapy, and recommended the use of OTC pain relief with ibuprofen or acetaminophen if needed.   Skin/Nail Changes: cause, s/s, ways to manage Severe skin rash may occur. Please contact the office if you develop a skin rash.        Organ Toxicities: cause, s/s, need for diagnostic tests, labs, when to notify MD Discussed potential effects on organ systems, monitoring, diagnostic tests, labs, and when to notify their MD. Discussed the signs/symptoms of the following: cardiotoxicity, hepatotoxicity, lung changes, and skin changes       Miscellaneous Financial Issues: co-pay card eligible  Lab Draws: per MD discretion   Infertility and Sexuality:  causes, fertility preservation options, sexuality changes, ways to manage, importance of birth control Oral Oncology Therapy: Reviewed safe sex practices and the importance of minimizing exposure to body fluids while on oral oncology therapy.   Home Care: how to manage bodily fluids Counseled on management of soiled linens and proper flush technique.  Discussed how to manage all the side effects at home and advised when to contact the MD office.           Adherence and Self-Administration  Adherence related to the patient's specialty therapy was discussed with the patient. The Adherence segment of this outreach has been reviewed and updated.     Is there a concern with patient's ability to self administer the medication correctly and without issue?: No  Were any potential  barriers to adherence identified during the initial assessment or patient education?: No  Are there any concerns regarding the patient's understanding of the importance of medication adherence?: No  Methods for Supporting Patient Adherence and/or Self-Administration: not needed  Expected duration of therapy: 3 years with Kisqali    Open Medication Therapy Problems  No medication therapy recommendations to display    Goals of Therapy  Goals related to the patient's specialty therapy were discussed with the patient. The Patient Goals segment of this outreach has been reviewed and updated.   Goals Addressed Today        Specialty Pharmacy General Goal      Complete adjuvant treatment and prevent disease recurrence based on scans  7/15/25 Education today for Kisqali + anastrozole            Wrap up  Discussed aforementioned material with patient in person, face-to-face, in clinic.   Chemo consents/CCA were signed at today's visit.  Chemo consents placed in MD folder for signature.  Medication availability: patient is aware [facility] will be contacting them to arrange delivery  Patient expressed understanding.   Patient demonstrates ability to self-administer medication. No barriers to adherence identified.  All questions and concerns addressed.     Reassessment Plan & Follow-Up  1. Medication Therapy Changes: none  2. Related Plans, Therapy Recommendations, or Therapy Problems to Be Addressed: none  3. Pharmacist to perform regular assessments no more than (6) months from the previous assessment.  4. Care Coordinator to set up future refill outreaches, coordinate prescription delivery, and escalate clinical questions to pharmacist.  5. Welcome information and patient satisfaction survey to be sent by specialty pharmacy team with patient's initial fill.    Attestation  Therapeutic appropriateness: Appropriate   I attest the patient was actively involved in and has agreed to the above plan of care. If the  prescribed therapy is at any point deemed not appropriate based on the current or future assessments, a consultation will be initiated with the patient's specialty care provider to determine the best course of action. The revised plan of therapy will be documented along with any required assessments and/or additional patient education provided.     Carolee Bryant, PharmD, [facility]  Clinical Specialty Pharmacist, Oncology  7/15/2025  11:44 EDT

## 2025-07-15 NOTE — PROGRESS NOTES
Specialty Pharmacy Patient Management Program  Per Protocol Prescription Order or Refill     Patient will be filling or currently fills medications at [facility] and is enrolled in the Patient Management Program.    Requested Prescriptions     Signed Prescriptions Disp Refills    ribociclib succinate, 400 MG Dose, (KISQALI) 200 MG tablet therapy pack tablet 42 tablet 5     Sig: Take 2 tablets by mouth Daily. Take for 21 days on then off 7 days of a 28-day cycle.    anastrozole (ARIMIDEX) 1 MG tablet 90 tablet 1     Sig: Take 1 tablet by mouth Daily.     Prescription orders above were sent to the pharmacy per Collaborative Care Agreement Protocol.     Carolee Bryant, PharmD, [facility]  Clinical Specialty Pharmacist, Oncology  7/15/2025  11:45 EDT

## 2025-07-15 NOTE — PROGRESS NOTES
Specialty Pharmacy Patient Management Program       Pt had her education today with [facility] Pharmacist. The Kisqali rx was sent to [facility]. I have sent the office note and medication list to [facility] at [facility].    Taylor Jordan, Pharmacy Technician  07/15/25  11:59 EDT

## 2025-07-15 NOTE — PROGRESS NOTES
TREATMENT  PREPARATION    Mona Castillo  [id number]  1980    Chief Complaint: Treatment preparation and needs assessment    History of present illness:  Mona Castillo is a 44 y.o. year old female who is here today for treatment preparation and needs assessment.  The patient has been diagnosed with   Encounter Diagnosis   Name Primary?    Malignant neoplasm of lower-inner quadrant of left breast in female, estrogen receptor positive Yes    and is scheduled to begin treatment with:     Oncology History:    Oncology/Hematology History   Malignant neoplasm of lower-inner quadrant of left breast in female, estrogen receptor positive   2/7/2025 Initial Diagnosis    Breast cancer     4/14/2025 - 6/23/2025 Chemotherapy    OP BREAST TC DOCEtaxel / Cyclophosphamide     6/23/2025 -  Chemotherapy    OP SUPPORTIVE LEUPROLIDE 11.25 MG Q3M     8/13/2025 -  Chemotherapy    OP BREAST Anastrozole / Ribociclib 400mg         The current medication list and allergy list were reviewed and reconciled.     Past Medical History, Past Surgical History, Social History, Family History have been reviewed and are without significant changes except as mentioned.    Physical Exam:    Vitals:    07/15/25 1109   BP: 110/75   Pulse: 93   Resp: 17   Temp: 98 °F (36.7 °C)   SpO2: 98%     Vitals:    07/15/25 1109   PainSc: 0-No pain        ECOG score: 0             Physical Exam      NEEDS ASSESSMENTS    Genetics  The patient's new diagnosis and family history have been reviewed for genetic counseling needs. The patient will not be referred..     Psychosocial and Barriers to care  The patient has completed a PHQ-9 Depression Screening and the Distress Thermometer (DT) today.  PHQ-9 results show PHQ-2 Total Score: 5 PHQ-9 Total Score: 5      The patient scored their distress today as   on a scale of 0-10 with 0 being no distress and 10 being extreme distress. Problems marked by the patient as being an issue for them within the last week  include   .      Results were reviewed along with psychosocial resources offered by our cancer center.  Our Supportive Oncology team will be flagged for a score of 4 or above, and a same day call will be made for a score of 9 or 10.  A mental health referral is offered at that time. Patients who score less than 4 have been educated on our support services and can be referred to our  upon request.  The patient is already working with behavioral health.     Nutrition  The patient has completed the malnutrition screening today. They scored Malnutrition Screening Tool  Have you recently lost weight without trying?  If yes, how much weight have you lost?: 0--> No  Have you been eating poorly because of a decreased appetite?: 0--> No  MST score: 0   with a score of 0-1 meaning not at risk in a score of 2 or greater meaning at risk.  Patients with a score of 3 or higher will be referred to our oncology dietitian for support. Patients beginning at risk treatment regimens or who have dietary concerns will also be referred to our oncology dietitian. The patient will not be referred.    Intravenous Access Assessment  The patient and I discussed planned intravenous chemo/biotherapy as well as other IV treatments that are often needed throughout the course of treatment. These may include, but are not limited to blood transfusions, antibiotics, and IV hydration. Discussed that depending on selected treatment and vein assessment, patient may require venous access device (VAD) which could include but not limited to a Mediport or PICC line. Risks and benefits of VADs reviewed. The patient will be treated via Oral Treatment.    Reproductive/Sexual Activity   People should avoid becoming pregnant and should not get a partner pregnant while undergoing chemo/biotherapy.  People of childbearing age should use effective contraception during active therapy. The best recommendation for all people is to use a barrier method for  "a minimum of 1 week after the last infusion of chemo/biotherapy to prevent your partner being exposed to byproducts from treatment medications in bodily fluids. Effective contraception should be discussed with your oncology team to make sure it is safe to take based on your diagnosis. Possible options include oral contraceptives, barrier methods. Chemo/biotherapy can change your ability to reproduce children in the future.  There are options for fertility preservation. The patient will not be referred.    Advanced Care Planning  Advance Care Planning   The patient and I discussed advanced care planning, \"Conversations that Matter\".   This service is offered for development of advance directives with a certified ACP facilitator.  The patient does not have an up-to-date advanced directive. This document is not on file with our office. The patient is not interested in an appointment with one of our facilitators to create or update their advanced directives.    Have you reviewed your Advance Directive and is it valid for this stay?: Not applicable          Smoking cessation  Tobacco Use: Medium Risk (7/15/2025)    Patient History     Smoking Tobacco Use: Former     Smokeless Tobacco Use: Never     Passive Exposure: Past       Patient and I discussed their tobacco use history. Referral will not be made for smoking cessation.      Palliative Care  When appropriate, the patient and I discussed the availability palliative care services and when appropriate Hospice care. Palliative care is not the same as Hospice care which was explained to the patient.NOT APPLICABLE.    Survivorship   When appropriate, we discussed that we will refer the patient to survivorship clinic to discuss next steps following completion of planned treatment.  Reviewed this visit will include assessment of your physical, psychological, functional, and spiritual needs as a survivor and the need at attend this visit when scheduled.    TREATMENT " EDUCATION    Today I met with the patient to discuss the chemo/biotherapy regimen recommended for treatment of Malignant neoplasm of lower-inner quadrant of left breast in female, estrogen receptor positive  .  The patient was given explanation of treatment premed side effects including office policy that prohibits patients to drive if sedating medications are administered, MD explanation given regarding benefits, side effects, toxicities and goals of treatment.  The patient received a Chemotherapy/Biotherapy Plan Summary including diagnosis and explanation of specific treatment plan.    SIDE EFFECTS:  Common side effects were discussed with the patient and/or significant other.  Discussion included where applicable hair loss/discoloration, anemia/fatigue, infection/chills/fever, appetite, bleeding risk/precautions, constipation, diarrhea, mouth sores, taste alteration, loss of appetite, nausea/vomiting, peripheral neuropathy, skin/nail changes, rash, muscle aches/weakness, photosensitivity, weight gain/loss, hearing loss, dizziness, menopausal symptoms, menstrual irregularity, sterility, high blood pressure, heart damage, liver damage, lung damage, kidney damage, DVT/PE risk, fluid retention, pleural/pericardial effusion, somnolence, electrolyte/LFT imbalance, vein exercises and/or the possible need for vascular access/port placement.  The patient was advised that although uncommon, leakage of an infused medication from the vein or venous access device may lead to skin breakdown and/or other tissue damage.  The patient was advised that he/she may have pain, bleeding, and/or bruising from the insertion of a needle in their vein or venous access device (port).  The patient was further advised that, in spite of proper technique, infection with redness and irritation may rarely occur at the site where the needle was inserted.  The patient was advised that if complications occur, additional medical treatment is  available.  Finally, where applicable we have reviewed rare but potential immune mediated side effects including shortness of breath, cough, chest pain (pneumonitis), abdominal pain, diarrhea (colitis), thyroiditis (hypothyroid or hyperthyroid), hepatitis and liver dysfunction, nephritis and renal dysfunction.    Discussion also included side effects specific to drugs in the treatment plan, specifically:    Treatment Plans       Name Type Plan Dates Plan Provider         Active    OP SUPPORTIVE LEUPROLIDE 11.25 MG Q3M ONCOLOGY SUPPORTIVE CARE 1 6/22/2025 - Present Kate Caldera MD     OP BREAST TC DOCEtaxel / Cyclophosphamide ONCOLOGY TREATMENT 4/9/2025 - Present Kate Caldera MD                      Questions answered and additional information discussed on topics including:  Nutrition and appetite changes, Diarrhea, Nausea & vomiting, Skin & nail changes, Organ toxicities, Home care, and Oral medication handling and disposal       Assessment and Plan:    Diagnoses and all orders for this visit:    1. Malignant neoplasm of lower-inner quadrant of left breast in female, estrogen receptor positive (Primary)      No orders of the defined types were placed in this encounter.        The patient and I have reviewed their diagnosis and scheduled treatment plan. Needs assessment was completed where applicable including genetics, psychosocial needs, barriers to care, VAD evaluation, advanced care planning, survivorship, and palliative care services where indicated. Referrals have been ordered as appropriate based upon evaluation today and patient desires.   Chemo/biotherapy teaching was completed today and consent obtained. See separate documentation for further details.  Adequate time was given to answer questions.  Patient made aware of their care team members and contact information if they have questions or problems throughout the treatment course.  Discussion held and written information provided describing  frequency of office visits and ongoing monitoring throughout the treatment plan.     Reviewed with patient any prescribed medication sent to pharmacy.  Education provided regarding proper storage, safe handling, and proper disposal of unused medication.  Proper handling of body fluids and waste discussed and written information provided.  If appropriate, patient had pretreatment labs drawn today.    Learning assessment completed at initial patient encounter. See separate flowsheet. Chemo/biotherapy education comprehension assessed at today's visit.    I spent 10 minutes caring for Mona on this date of service. This time includes time spent by me in the following activities: preparing for the visit, obtaining and/or reviewing a separately obtained history, counseling and educating the patient/family/caregiver, referring and communicating with other health care professionals, and documenting information in the medical record.     Nena Bobby, APRN   07/15/25

## 2025-07-15 NOTE — PROGRESS NOTES
Radiation Oncology  On-Treatment Note      Patient: Mona Castillo    MRN: [id number]    Attending Physician: No care team member to display     Diagnosis:     ICD-10-CM ICD-9-CM   1. Malignant neoplasm of lower-inner quadrant of left breast in female, estrogen receptor positive  C50.312 174.3    Z17.0 V86.0       Radiation Therapy Visit:  Continue radiation therapy, Dosimetry plan remains acceptable, Films reviewed and remains acceptable, Pain assessed, Pain management planned, Radiation dose schedule reviewed and remains acceptable, Radiation technique remains acceptable, and Symptoms within expected range    Radiation Treatments       Active   Plans   L[facility]+N_[facility]   Most recent treatment: Dose planned: 266 cGy (fraction 6 on 7/15/2025)   Total: Dose planned: 4,256 cGy (16 fractions)   Elapsed Days: 7      Reference Points   Rx_LtCW+N   Most recent treatment: Dose given: 266 cGy (on 7/15/2025)   Total: Dose given: 1,596 cGy   Elapsed Days: 7                      Physical Examination:  Vitals: Blood pressure 119/79, pulse 96, resp. rate 16, weight 109 kg (241 lb), SpO2 98%, not currently breastfeeding.  Pain Score    07/15/25 1536   PainSc: 0-No pain       Fully active, able to carry on all pre-disease performance without restriction = 0    We examined the relevant areas: yes  Findings are within the expected range for this stage of treatment: yes  -------------------------------------------------------------------------------------------------------------------    ACTION ITEMS:  Patient tolerating treatment well and as expected for this stage in their treatment      Keiko Alonzo MD  Radiation Oncology

## 2025-07-16 ENCOUNTER — HOSPITAL ENCOUNTER (OUTPATIENT)
Dept: RADIATION ONCOLOGY | Facility: HOSPITAL | Age: 45
Discharge: HOME | End: 2025-07-16

## 2025-07-16 LAB
RAD ONC ARIA COURSE ID: NORMAL
RAD ONC ARIA COURSE INTENT: NORMAL
RAD ONC ARIA COURSE LAST TREATMENT DATE: NORMAL
RAD ONC ARIA COURSE START DATE: NORMAL
RAD ONC ARIA COURSE TREATMENT ELAPSED DAYS: 8
RAD ONC ARIA FIRST TREATMENT DATE: NORMAL
RAD ONC ARIA PLAN FRACTIONS TREATED TO DATE: 7
RAD ONC ARIA PLAN ID: NORMAL
RAD ONC ARIA PLAN PRESCRIBED DOSE PER FRACTION: 2.66 GY
RAD ONC ARIA PLAN PRIMARY REFERENCE POINT: NORMAL
RAD ONC ARIA PLAN TOTAL FRACTIONS PRESCRIBED: 16
RAD ONC ARIA PLAN TOTAL PRESCRIBED DOSE: 4256 CGY
RAD ONC ARIA REFERENCE POINT DOSAGE GIVEN TO DATE: 18.62 GY
RAD ONC ARIA REFERENCE POINT ID: NORMAL
RAD ONC ARIA REFERENCE POINT SESSION DOSAGE GIVEN: 2.66 GY

## 2025-07-16 PROCEDURE — 77385: CPT | Performed by: RADIOLOGY

## 2025-07-16 PROCEDURE — 77014 CHG CT GUIDANCE RADIATION THERAPY FLDS PLACEMENT: CPT | Performed by: RADIOLOGY

## 2025-07-17 ENCOUNTER — TELEMEDICINE (OUTPATIENT)
Dept: PSYCHIATRY | Facility: HOSPITAL | Age: 45
End: 2025-07-17
Payer: COMMERCIAL

## 2025-07-17 ENCOUNTER — HOSPITAL ENCOUNTER (OUTPATIENT)
Dept: RADIATION ONCOLOGY | Facility: HOSPITAL | Age: 45
Discharge: HOME | End: 2025-07-17

## 2025-07-17 DIAGNOSIS — C50.911 BILATERAL MALIGNANT NEOPLASM OF BREAST IN FEMALE, ESTROGEN RECEPTOR POSITIVE, UNSPECIFIED SITE OF BREAST: ICD-10-CM

## 2025-07-17 DIAGNOSIS — Z17.0 BILATERAL MALIGNANT NEOPLASM OF BREAST IN FEMALE, ESTROGEN RECEPTOR POSITIVE, UNSPECIFIED SITE OF BREAST: ICD-10-CM

## 2025-07-17 DIAGNOSIS — C50.912 BILATERAL MALIGNANT NEOPLASM OF BREAST IN FEMALE, ESTROGEN RECEPTOR POSITIVE, UNSPECIFIED SITE OF BREAST: ICD-10-CM

## 2025-07-17 DIAGNOSIS — F43.23 ADJUSTMENT DISORDER WITH MIXED ANXIETY AND DEPRESSED MOOD: Primary | ICD-10-CM

## 2025-07-17 LAB
RAD ONC ARIA COURSE ID: NORMAL
RAD ONC ARIA COURSE INTENT: NORMAL
RAD ONC ARIA COURSE LAST TREATMENT DATE: NORMAL
RAD ONC ARIA COURSE START DATE: NORMAL
RAD ONC ARIA COURSE TREATMENT ELAPSED DAYS: 9
RAD ONC ARIA FIRST TREATMENT DATE: NORMAL
RAD ONC ARIA PLAN FRACTIONS TREATED TO DATE: 8
RAD ONC ARIA PLAN ID: NORMAL
RAD ONC ARIA PLAN PRESCRIBED DOSE PER FRACTION: 2.66 GY
RAD ONC ARIA PLAN PRIMARY REFERENCE POINT: NORMAL
RAD ONC ARIA PLAN TOTAL FRACTIONS PRESCRIBED: 16
RAD ONC ARIA PLAN TOTAL PRESCRIBED DOSE: 4256 CGY
RAD ONC ARIA REFERENCE POINT DOSAGE GIVEN TO DATE: 21.28 GY
RAD ONC ARIA REFERENCE POINT ID: NORMAL
RAD ONC ARIA REFERENCE POINT SESSION DOSAGE GIVEN: 2.66 GY

## 2025-07-17 PROCEDURE — 77385: CPT | Performed by: RADIOLOGY

## 2025-07-17 PROCEDURE — 77014 CHG CT GUIDANCE RADIATION THERAPY FLDS PLACEMENT: CPT | Performed by: RADIOLOGY

## 2025-07-17 RX ORDER — HYDROXYZINE HYDROCHLORIDE 25 MG/1
25 TABLET, FILM COATED ORAL DAILY PRN
Qty: 30 TABLET | Refills: 0 | Status: SHIPPED | OUTPATIENT
Start: 2025-07-17
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
## 2025-07-17 NOTE — Clinical Note
Diana Lobo, I know this patient and I discussed FU (which would have passed). Could you check in with her and let her and attempt to schedule? Thank you! Miranda

## 2025-07-18 ENCOUNTER — HOSPITAL ENCOUNTER (OUTPATIENT)
Dept: RADIATION ONCOLOGY | Facility: HOSPITAL | Age: 45
Discharge: HOME | End: 2025-07-18

## 2025-07-18 LAB
RAD ONC ARIA COURSE ID: NORMAL
RAD ONC ARIA COURSE INTENT: NORMAL
RAD ONC ARIA COURSE LAST TREATMENT DATE: NORMAL
RAD ONC ARIA COURSE START DATE: NORMAL
RAD ONC ARIA COURSE TREATMENT ELAPSED DAYS: 10
RAD ONC ARIA FIRST TREATMENT DATE: NORMAL
RAD ONC ARIA PLAN FRACTIONS TREATED TO DATE: 9
RAD ONC ARIA PLAN ID: NORMAL
RAD ONC ARIA PLAN PRESCRIBED DOSE PER FRACTION: 2.66 GY
RAD ONC ARIA PLAN PRIMARY REFERENCE POINT: NORMAL
RAD ONC ARIA PLAN TOTAL FRACTIONS PRESCRIBED: 16
RAD ONC ARIA PLAN TOTAL PRESCRIBED DOSE: 4256 CGY
RAD ONC ARIA REFERENCE POINT DOSAGE GIVEN TO DATE: 23.94 GY
RAD ONC ARIA REFERENCE POINT ID: NORMAL
RAD ONC ARIA REFERENCE POINT SESSION DOSAGE GIVEN: 2.66 GY

## 2025-07-18 PROCEDURE — 77014 CHG CT GUIDANCE RADIATION THERAPY FLDS PLACEMENT: CPT | Performed by: RADIOLOGY

## 2025-07-18 PROCEDURE — 77336 RADIATION PHYSICS CONSULT: CPT | Performed by: RADIOLOGY

## 2025-07-18 PROCEDURE — 77385: CPT | Performed by: RADIOLOGY

## 2025-07-21 ENCOUNTER — SPECIALTY PHARMACY (OUTPATIENT)
Dept: PHARMACY | Facility: HOSPITAL | Age: 45
End: 2025-07-21
Payer: COMMERCIAL

## 2025-07-21 ENCOUNTER — HOSPITAL ENCOUNTER (OUTPATIENT)
Dept: RADIATION ONCOLOGY | Facility: HOSPITAL | Age: 45
Discharge: HOME | End: 2025-07-21
Payer: COMMERCIAL

## 2025-07-21 LAB
RAD ONC ARIA COURSE ID: NORMAL
RAD ONC ARIA COURSE INTENT: NORMAL
RAD ONC ARIA COURSE LAST TREATMENT DATE: NORMAL
RAD ONC ARIA COURSE START DATE: NORMAL
RAD ONC ARIA COURSE TREATMENT ELAPSED DAYS: 13
RAD ONC ARIA FIRST TREATMENT DATE: NORMAL
RAD ONC ARIA PLAN FRACTIONS TREATED TO DATE: 10
RAD ONC ARIA PLAN ID: NORMAL
RAD ONC ARIA PLAN PRESCRIBED DOSE PER FRACTION: 2.66 GY
RAD ONC ARIA PLAN PRIMARY REFERENCE POINT: NORMAL
RAD ONC ARIA PLAN TOTAL FRACTIONS PRESCRIBED: 16
RAD ONC ARIA PLAN TOTAL PRESCRIBED DOSE: 4256 CGY
RAD ONC ARIA REFERENCE POINT DOSAGE GIVEN TO DATE: 26.6 GY
RAD ONC ARIA REFERENCE POINT ID: NORMAL
RAD ONC ARIA REFERENCE POINT SESSION DOSAGE GIVEN: 2.66 GY

## 2025-07-21 PROCEDURE — 77385: CPT | Performed by: RADIOLOGY

## 2025-07-21 PROCEDURE — 77014 CHG CT GUIDANCE RADIATION THERAPY FLDS PLACEMENT: CPT | Performed by: RADIOLOGY

## 2025-07-21 NOTE — PROGRESS NOTES
Specialty Pharmacy Patient Management Program       Pt has scheduled her Kisqali delivery for 7/25/2025 with [employer].    I will obtain tracking on day of delivery.    Taylor Jordan, Pharmacy Technician  07/21/25  08:22 EDT

## 2025-07-22 ENCOUNTER — RADIATION ONCOLOGY WEEKLY ASSESSMENT (OUTPATIENT)
Dept: RADIATION ONCOLOGY | Facility: HOSPITAL | Age: 45
End: 2025-07-22
Payer: COMMERCIAL

## 2025-07-22 ENCOUNTER — HOSPITAL ENCOUNTER (OUTPATIENT)
Dept: RADIATION ONCOLOGY | Facility: HOSPITAL | Age: 45
Discharge: HOME | End: 2025-07-22

## 2025-07-22 DIAGNOSIS — Z17.0 MALIGNANT NEOPLASM OF LOWER-INNER QUADRANT OF LEFT BREAST IN FEMALE, ESTROGEN RECEPTOR POSITIVE: Primary | ICD-10-CM

## 2025-07-22 DIAGNOSIS — C50.312 MALIGNANT NEOPLASM OF LOWER-INNER QUADRANT OF LEFT BREAST IN FEMALE, ESTROGEN RECEPTOR POSITIVE: Primary | ICD-10-CM

## 2025-07-22 LAB
RAD ONC ARIA COURSE ID: NORMAL
RAD ONC ARIA COURSE INTENT: NORMAL
RAD ONC ARIA COURSE LAST TREATMENT DATE: NORMAL
RAD ONC ARIA COURSE START DATE: NORMAL
RAD ONC ARIA COURSE TREATMENT ELAPSED DAYS: 14
RAD ONC ARIA FIRST TREATMENT DATE: NORMAL
RAD ONC ARIA PLAN FRACTIONS TREATED TO DATE: 11
RAD ONC ARIA PLAN ID: NORMAL
RAD ONC ARIA PLAN PRESCRIBED DOSE PER FRACTION: 2.66 GY
RAD ONC ARIA PLAN PRIMARY REFERENCE POINT: NORMAL
RAD ONC ARIA PLAN TOTAL FRACTIONS PRESCRIBED: 16
RAD ONC ARIA PLAN TOTAL PRESCRIBED DOSE: 4256 CGY
RAD ONC ARIA REFERENCE POINT DOSAGE GIVEN TO DATE: 29.26 GY
RAD ONC ARIA REFERENCE POINT ID: NORMAL
RAD ONC ARIA REFERENCE POINT SESSION DOSAGE GIVEN: 2.66 GY

## 2025-07-22 PROCEDURE — 77014 CHG CT GUIDANCE RADIATION THERAPY FLDS PLACEMENT: CPT | Performed by: RADIOLOGY

## 2025-07-22 PROCEDURE — 77427 RADIATION TX MANAGEMENT X5: CPT | Performed by: RADIOLOGY

## 2025-07-22 PROCEDURE — 77385: CPT | Performed by: RADIOLOGY

## 2025-07-22 NOTE — PROGRESS NOTES
Radiation Oncology  On-Treatment Note      Patient: Mona Castillo    MRN: [id number]    Attending Physician: No care team member to display     Diagnosis:     ICD-10-CM ICD-9-CM   1. Malignant neoplasm of lower-inner quadrant of left breast in female, estrogen receptor positive  C50.312 174.3    Z17.0 V86.0       Radiation Therapy Visit:  Continue radiation therapy, Dosimetry plan remains acceptable, Films reviewed and remains acceptable, Pain assessed, Pain management planned, Radiation dose schedule reviewed and remains acceptable, Radiation technique remains acceptable, and Symptoms within expected range    Radiation Treatments       Active   Plans   LtCW+N_[facility]   Most recent treatment: Dose planned: 266 cGy (fraction 11 on 7/22/2025)   Total: Dose planned: 4,256 cGy (16 fractions)   Elapsed Days: 14      Reference Points   Rx_LtCW+N   Most recent treatment: Dose given: 266 cGy (on 7/22/2025)   Total: Dose given: 2,926 cGy   Elapsed Days: 14                      Physical Examination:  Vitals: not currently breastfeeding.  There were no vitals filed for this visit.    Fully active, able to carry on all pre-disease performance without restriction = 0    We examined the relevant areas: yes  Findings are within the expected range for this stage of treatment: yes  -------------------------------------------------------------------------------------------------------------------    ACTION ITEMS:  Patient tolerating treatment well and as expected for this stage in their treatment      Keiko Alonzo MD  Radiation Oncology

## 2025-07-23 ENCOUNTER — HOSPITAL ENCOUNTER (OUTPATIENT)
Dept: RADIATION ONCOLOGY | Facility: HOSPITAL | Age: 45
Discharge: HOME | End: 2025-07-23

## 2025-07-23 DIAGNOSIS — Z17.0 MALIGNANT NEOPLASM OF LOWER-INNER QUADRANT OF LEFT BREAST IN FEMALE, ESTROGEN RECEPTOR POSITIVE: Primary | ICD-10-CM

## 2025-07-23 DIAGNOSIS — C50.312 MALIGNANT NEOPLASM OF LOWER-INNER QUADRANT OF LEFT BREAST IN FEMALE, ESTROGEN RECEPTOR POSITIVE: Primary | ICD-10-CM

## 2025-07-23 LAB
RAD ONC ARIA COURSE ID: NORMAL
RAD ONC ARIA COURSE INTENT: NORMAL
RAD ONC ARIA COURSE LAST TREATMENT DATE: NORMAL
RAD ONC ARIA COURSE START DATE: NORMAL
RAD ONC ARIA COURSE TREATMENT ELAPSED DAYS: 15
RAD ONC ARIA FIRST TREATMENT DATE: NORMAL
RAD ONC ARIA PLAN FRACTIONS TREATED TO DATE: 12
RAD ONC ARIA PLAN ID: NORMAL
RAD ONC ARIA PLAN PRESCRIBED DOSE PER FRACTION: 2.66 GY
RAD ONC ARIA PLAN PRIMARY REFERENCE POINT: NORMAL
RAD ONC ARIA PLAN TOTAL FRACTIONS PRESCRIBED: 16
RAD ONC ARIA PLAN TOTAL PRESCRIBED DOSE: 4256 CGY
RAD ONC ARIA REFERENCE POINT DOSAGE GIVEN TO DATE: 31.92 GY
RAD ONC ARIA REFERENCE POINT ID: NORMAL
RAD ONC ARIA REFERENCE POINT SESSION DOSAGE GIVEN: 2.66 GY

## 2025-07-23 PROCEDURE — 77385: CPT | Performed by: RADIOLOGY

## 2025-07-23 PROCEDURE — 77014 CHG CT GUIDANCE RADIATION THERAPY FLDS PLACEMENT: CPT | Performed by: RADIOLOGY

## 2025-07-23 RX ORDER — SUCRALFATE ORAL 1 G/10ML
1 SUSPENSION ORAL 4 TIMES DAILY
Qty: 420 ML | Refills: 1 | Status: SHIPPED | OUTPATIENT
Start: 2025-07-23

## 2025-07-24 ENCOUNTER — HOSPITAL ENCOUNTER (OUTPATIENT)
Dept: RADIATION ONCOLOGY | Facility: HOSPITAL | Age: 45
Discharge: HOME | End: 2025-07-24

## 2025-07-24 LAB
RAD ONC ARIA COURSE ID: NORMAL
RAD ONC ARIA COURSE INTENT: NORMAL
RAD ONC ARIA COURSE LAST TREATMENT DATE: NORMAL
RAD ONC ARIA COURSE START DATE: NORMAL
RAD ONC ARIA COURSE TREATMENT ELAPSED DAYS: 16
RAD ONC ARIA FIRST TREATMENT DATE: NORMAL
RAD ONC ARIA PLAN FRACTIONS TREATED TO DATE: 13
RAD ONC ARIA PLAN ID: NORMAL
RAD ONC ARIA PLAN PRESCRIBED DOSE PER FRACTION: 2.66 GY
RAD ONC ARIA PLAN PRIMARY REFERENCE POINT: NORMAL
RAD ONC ARIA PLAN TOTAL FRACTIONS PRESCRIBED: 16
RAD ONC ARIA PLAN TOTAL PRESCRIBED DOSE: 4256 CGY
RAD ONC ARIA REFERENCE POINT DOSAGE GIVEN TO DATE: 34.58 GY
RAD ONC ARIA REFERENCE POINT ID: NORMAL
RAD ONC ARIA REFERENCE POINT SESSION DOSAGE GIVEN: 2.66 GY

## 2025-07-24 PROCEDURE — 77336 RADIATION PHYSICS CONSULT: CPT | Performed by: RADIOLOGY

## 2025-07-24 PROCEDURE — 77385: CPT | Performed by: RADIOLOGY

## 2025-07-24 PROCEDURE — 77014 CHG CT GUIDANCE RADIATION THERAPY FLDS PLACEMENT: CPT | Performed by: RADIOLOGY

## 2025-07-25 ENCOUNTER — HOSPITAL ENCOUNTER (OUTPATIENT)
Dept: RADIATION ONCOLOGY | Facility: HOSPITAL | Age: 45
Discharge: HOME | End: 2025-07-25

## 2025-07-25 LAB
RAD ONC ARIA COURSE ID: NORMAL
RAD ONC ARIA COURSE INTENT: NORMAL
RAD ONC ARIA COURSE LAST TREATMENT DATE: NORMAL
RAD ONC ARIA COURSE START DATE: NORMAL
RAD ONC ARIA COURSE TREATMENT ELAPSED DAYS: 17
RAD ONC ARIA FIRST TREATMENT DATE: NORMAL
RAD ONC ARIA PLAN FRACTIONS TREATED TO DATE: 14
RAD ONC ARIA PLAN ID: NORMAL
RAD ONC ARIA PLAN PRESCRIBED DOSE PER FRACTION: 2.66 GY
RAD ONC ARIA PLAN PRIMARY REFERENCE POINT: NORMAL
RAD ONC ARIA PLAN TOTAL FRACTIONS PRESCRIBED: 16
RAD ONC ARIA PLAN TOTAL PRESCRIBED DOSE: 4256 CGY
RAD ONC ARIA REFERENCE POINT DOSAGE GIVEN TO DATE: 37.24 GY
RAD ONC ARIA REFERENCE POINT ID: NORMAL
RAD ONC ARIA REFERENCE POINT SESSION DOSAGE GIVEN: 2.66 GY

## 2025-07-25 PROCEDURE — 77385: CPT | Performed by: RADIOLOGY

## 2025-07-25 PROCEDURE — 77014 CHG CT GUIDANCE RADIATION THERAPY FLDS PLACEMENT: CPT | Performed by: STUDENT IN AN ORGANIZED HEALTH CARE EDUCATION/TRAINING PROGRAM

## 2025-07-28 ENCOUNTER — SPECIALTY PHARMACY (OUTPATIENT)
Dept: PHARMACY | Facility: HOSPITAL | Age: 45
End: 2025-07-28
Payer: COMMERCIAL

## 2025-07-28 ENCOUNTER — HOSPITAL ENCOUNTER (OUTPATIENT)
Dept: RADIATION ONCOLOGY | Facility: HOSPITAL | Age: 45
Discharge: HOME | End: 2025-07-28
Payer: COMMERCIAL

## 2025-07-28 LAB
RAD ONC ARIA COURSE ID: NORMAL
RAD ONC ARIA COURSE INTENT: NORMAL
RAD ONC ARIA COURSE LAST TREATMENT DATE: NORMAL
RAD ONC ARIA COURSE START DATE: NORMAL
RAD ONC ARIA COURSE TREATMENT ELAPSED DAYS: 20
RAD ONC ARIA FIRST TREATMENT DATE: NORMAL
RAD ONC ARIA PLAN FRACTIONS TREATED TO DATE: 15
RAD ONC ARIA PLAN ID: NORMAL
RAD ONC ARIA PLAN PRESCRIBED DOSE PER FRACTION: 2.66 GY
RAD ONC ARIA PLAN PRIMARY REFERENCE POINT: NORMAL
RAD ONC ARIA PLAN TOTAL FRACTIONS PRESCRIBED: 16
RAD ONC ARIA PLAN TOTAL PRESCRIBED DOSE: 4256 CGY
RAD ONC ARIA REFERENCE POINT DOSAGE GIVEN TO DATE: 39.9 GY
RAD ONC ARIA REFERENCE POINT ID: NORMAL
RAD ONC ARIA REFERENCE POINT SESSION DOSAGE GIVEN: 2.66 GY

## 2025-07-28 PROCEDURE — 77014 CHG CT GUIDANCE RADIATION THERAPY FLDS PLACEMENT: CPT | Performed by: RADIOLOGY

## 2025-07-28 PROCEDURE — 77385: CPT | Performed by: RADIOLOGY

## 2025-07-28 NOTE — PROGRESS NOTES
Specialty Pharmacy Patient Management Program       Kisqali supply from [facility] delivered to pt on 7/25/2025 at 4:24 pm.    UPS tracking # [id number]    Taylor Jordan, Pharmacy Technician  07/28/25  08:11 EDT

## 2025-07-29 ENCOUNTER — RADIATION ONCOLOGY WEEKLY ASSESSMENT (OUTPATIENT)
Dept: RADIATION ONCOLOGY | Facility: HOSPITAL | Age: 45
End: 2025-07-29
Payer: COMMERCIAL

## 2025-07-29 ENCOUNTER — TELEPHONE (OUTPATIENT)
Dept: OTHER | Facility: HOSPITAL | Age: 45
End: 2025-07-29
Payer: COMMERCIAL

## 2025-07-29 ENCOUNTER — HOSPITAL ENCOUNTER (OUTPATIENT)
Dept: RADIATION ONCOLOGY | Facility: HOSPITAL | Age: 45
Discharge: HOME | End: 2025-07-29

## 2025-07-29 VITALS
OXYGEN SATURATION: 99 % | HEART RATE: 92 BPM | RESPIRATION RATE: 20 BRPM | BODY MASS INDEX: 43.7 KG/M2 | DIASTOLIC BLOOD PRESSURE: 88 MMHG | SYSTOLIC BLOOD PRESSURE: 122 MMHG | WEIGHT: 239 LBS

## 2025-07-29 DIAGNOSIS — Z17.0 MALIGNANT NEOPLASM OF LOWER-INNER QUADRANT OF LEFT BREAST IN FEMALE, ESTROGEN RECEPTOR POSITIVE: Primary | ICD-10-CM

## 2025-07-29 DIAGNOSIS — C50.312 MALIGNANT NEOPLASM OF LOWER-INNER QUADRANT OF LEFT BREAST IN FEMALE, ESTROGEN RECEPTOR POSITIVE: Primary | ICD-10-CM

## 2025-07-29 LAB
RAD ONC ARIA COURSE ID: NORMAL
RAD ONC ARIA COURSE INTENT: NORMAL
RAD ONC ARIA COURSE LAST TREATMENT DATE: NORMAL
RAD ONC ARIA COURSE START DATE: NORMAL
RAD ONC ARIA COURSE TREATMENT ELAPSED DAYS: 21
RAD ONC ARIA FIRST TREATMENT DATE: NORMAL
RAD ONC ARIA PLAN FRACTIONS TREATED TO DATE: 16
RAD ONC ARIA PLAN ID: NORMAL
RAD ONC ARIA PLAN PRESCRIBED DOSE PER FRACTION: 2.66 GY
RAD ONC ARIA PLAN PRIMARY REFERENCE POINT: NORMAL
RAD ONC ARIA PLAN TOTAL FRACTIONS PRESCRIBED: 16
RAD ONC ARIA PLAN TOTAL PRESCRIBED DOSE: 4256 CGY
RAD ONC ARIA REFERENCE POINT DOSAGE GIVEN TO DATE: 42.56 GY
RAD ONC ARIA REFERENCE POINT ID: NORMAL
RAD ONC ARIA REFERENCE POINT SESSION DOSAGE GIVEN: 2.66 GY

## 2025-07-29 PROCEDURE — 77385: CPT | Performed by: RADIOLOGY

## 2025-07-29 PROCEDURE — 77014 CHG CT GUIDANCE RADIATION THERAPY FLDS PLACEMENT: CPT | Performed by: RADIOLOGY

## 2025-07-29 NOTE — PROGRESS NOTES
Radiation Oncology  On-Treatment Note      Patient: Mona Castillo    MRN: [id number]    Attending Physician: No care team member to display     Diagnosis:     ICD-10-CM ICD-9-CM   1. Malignant neoplasm of lower-inner quadrant of left breast in female, estrogen receptor positive  C50.312 174.3    Z17.0 V86.0       Radiation Therapy Visit:  Continue radiation therapy, Dosimetry plan remains acceptable, Films reviewed and remains acceptable, Pain assessed, Pain management planned, Radiation dose schedule reviewed and remains acceptable, Radiation technique remains acceptable, and Symptoms within expected range    Radiation Treatments       Active   Reference Points   Rx_LtCW+N   Most recent treatment: Dose given: 266 cGy (on 7/29/2025)   Total: Dose given: 4,256 cGy   Elapsed Days: 21                      Physical Examination:  Vitals: Blood pressure 122/88, pulse 92, resp. rate 20, weight 108 kg (239 lb), SpO2 99%, not currently breastfeeding.  Pain Score    07/29/25 1552   PainSc: 0-No pain       Fully active, able to carry on all pre-disease performance without restriction = 0    We examined the relevant areas: yes  Findings are within the expected range for this stage of treatment: yes  -------------------------------------------------------------------------------------------------------------------    ACTION ITEMS:  Patient tolerating treatment well and as expected for this stage in their treatment    Keiko Alonzo MD  Radiation Oncology

## 2025-07-29 NOTE — TELEPHONE ENCOUNTER
Oncology Social Work  Distress Screening Follow-up    Diagnosis: Breast cancer    Location of Distress Screening: Medical Oncology    Distress Level: 4 (7/15/2025 11:00 AM)    Physical Concerns:    Fatigue: Y  Pain: N  Sleep: Y  Substance abuse: N  Tobacco use: N  Memory or concentration: N  Sexual health: Y  Changes in eating: N  Loss or change of physical abilities: N    Practical Problems:    : N  Housing/Utilities: N  Transportation: N  Treatment decisions: Y  Taking care of myself: N  Taking care of others: N  Work: N  School: N  Finances: N  Insurance: N  Having enough food: N  Access to medicine: N    Emotional Concerns:    Worry or anxiety: Y  Sadness or depression: Y  Loss of interest or enjoyment: N  Grief or loss: N  Fear: Y  Loneliness: N  Anger: Y  Changes in appearance: Y  Feelings of worthlessness or being a burden: N    Family Concerns:    Ability to have children: N  Relationship with spouse or partner: N  Relationship with children: N  Relationship with family members: N  Relationship with friends or coworkers: N  Communication with health care team: N    Spiritual Concerns:    Sense of meaning or purpose: N  Changes in ezequiel or beliefs: N  Death, dying or afterlife: N  Conflict between beliefs and cancer treatments: N  Relationship with the sacred: N  Ritual or dietary needs: N     Interventions:   Emotional support/coping strategies     Comments:  Spoke to the patient via telephone; explained role of OSW and supportive oncology services. Discussed with the patient her distress screening score of 4 and her concerns.  The patient attributed her score to having a difficult time with the effects of the radiation.  The patient states that she completed her last radiation treatment today and should be fine. The patient stated that she is working with behavioral health APRN and feels as though she is addressing a lot of the other concerns. The patient denied any supportive  oncology needs currently and states that she is will inform OSW if any needs arise.  RASHEED Goodman

## 2025-07-29 NOTE — TELEPHONE ENCOUNTER
Oncology Social Work  Voicemail message was left for the patient to return call to OSW. RASHEED Goodman

## 2025-08-04 LAB
RAD ONC ARIA COURSE END DATE: NORMAL
RAD ONC ARIA COURSE ID: NORMAL
RAD ONC ARIA COURSE INTENT: NORMAL
RAD ONC ARIA COURSE LAST TREATMENT DATE: NORMAL
RAD ONC ARIA COURSE START DATE: NORMAL
RAD ONC ARIA COURSE TREATMENT ELAPSED DAYS: 21
RAD ONC ARIA FIRST TREATMENT DATE: NORMAL
RAD ONC ARIA PLAN FRACTIONS TREATED TO DATE: 16
RAD ONC ARIA PLAN ID: NORMAL
RAD ONC ARIA PLAN NAME: NORMAL
RAD ONC ARIA PLAN PRESCRIBED DOSE PER FRACTION: 2.66 GY
RAD ONC ARIA PLAN PRIMARY REFERENCE POINT: NORMAL
RAD ONC ARIA PLAN TOTAL FRACTIONS PRESCRIBED: 16
RAD ONC ARIA PLAN TOTAL PRESCRIBED DOSE: 4256 CGY
RAD ONC ARIA REFERENCE POINT DOSAGE GIVEN TO DATE: 42.56 GY
RAD ONC ARIA REFERENCE POINT ID: NORMAL

## 2025-08-07 ENCOUNTER — TELEMEDICINE (OUTPATIENT)
Dept: PSYCHIATRY | Facility: HOSPITAL | Age: 45
End: 2025-08-07
Payer: COMMERCIAL

## 2025-08-07 DIAGNOSIS — Z17.0 BILATERAL MALIGNANT NEOPLASM OF BREAST IN FEMALE, ESTROGEN RECEPTOR POSITIVE, UNSPECIFIED SITE OF BREAST: Primary | ICD-10-CM

## 2025-08-07 DIAGNOSIS — C50.912 BILATERAL MALIGNANT NEOPLASM OF BREAST IN FEMALE, ESTROGEN RECEPTOR POSITIVE, UNSPECIFIED SITE OF BREAST: Primary | ICD-10-CM

## 2025-08-07 DIAGNOSIS — C50.911 BILATERAL MALIGNANT NEOPLASM OF BREAST IN FEMALE, ESTROGEN RECEPTOR POSITIVE, UNSPECIFIED SITE OF BREAST: Primary | ICD-10-CM

## 2025-08-07 RX ORDER — HYDROXYZINE HYDROCHLORIDE 25 MG/1
25 TABLET, FILM COATED ORAL DAILY PRN
Qty: 90 TABLET | Refills: 0 | Status: SHIPPED | OUTPATIENT
Start: 2025-08-07

## 2025-08-08 ENCOUNTER — OFFICE VISIT (OUTPATIENT)
Dept: RADIATION ONCOLOGY | Facility: HOSPITAL | Age: 45
End: 2025-08-08
Payer: COMMERCIAL

## 2025-08-08 DIAGNOSIS — Z17.0 MALIGNANT NEOPLASM OF LOWER-INNER QUADRANT OF LEFT BREAST IN FEMALE, ESTROGEN RECEPTOR POSITIVE: Primary | ICD-10-CM

## 2025-08-08 DIAGNOSIS — C50.312 MALIGNANT NEOPLASM OF LOWER-INNER QUADRANT OF LEFT BREAST IN FEMALE, ESTROGEN RECEPTOR POSITIVE: Primary | ICD-10-CM

## 2025-08-08 RX ORDER — LIDOCAINE AND PRILOCAINE 25; 25 MG/G; MG/G
CREAM TOPICAL AS NEEDED
Qty: 1 KIT | Refills: 0 | Status: SHIPPED | OUTPATIENT
Start: 2025-08-08 | End: 2025-08-19

## 2025-08-08 RX ORDER — HYDROCODONE BITARTRATE AND ACETAMINOPHEN 5; 325 MG/1; MG/1
1 TABLET ORAL EVERY 6 HOURS PRN
Qty: 12 TABLET | Refills: 0 | Status: SHIPPED | OUTPATIENT
Start: 2025-08-08 | End: 2025-08-11

## 2025-08-11 ENCOUNTER — HOSPITAL ENCOUNTER (OUTPATIENT)
Dept: PHYSICAL THERAPY | Facility: HOSPITAL | Age: 45
Setting detail: THERAPIES SERIES
Discharge: HOME | End: 2025-08-11
Payer: COMMERCIAL

## 2025-08-11 DIAGNOSIS — C50.312 MALIGNANT NEOPLASM OF LOWER-INNER QUADRANT OF LEFT BREAST IN FEMALE, ESTROGEN RECEPTOR POSITIVE: Primary | ICD-10-CM

## 2025-08-11 DIAGNOSIS — Z90.13 S/P BILATERAL MASTECTOMY: ICD-10-CM

## 2025-08-11 DIAGNOSIS — Z17.0 MALIGNANT NEOPLASM OF LOWER-INNER QUADRANT OF LEFT BREAST IN FEMALE, ESTROGEN RECEPTOR POSITIVE: Primary | ICD-10-CM

## 2025-08-11 DIAGNOSIS — I89.0 LYMPHEDEMA OF LEFT ARM: ICD-10-CM

## 2025-08-11 PROCEDURE — 93702 BIS XTRACELL FLUID ANALYSIS: CPT

## 2025-08-11 PROCEDURE — 97535 SELF CARE MNGMENT TRAINING: CPT

## 2025-08-13 ENCOUNTER — LAB (OUTPATIENT)
Dept: LAB | Facility: HOSPITAL | Age: 45
End: 2025-08-13
Payer: COMMERCIAL

## 2025-08-13 ENCOUNTER — OFFICE VISIT (OUTPATIENT)
Dept: ONCOLOGY | Facility: CLINIC | Age: 45
End: 2025-08-13
Payer: COMMERCIAL

## 2025-08-13 ENCOUNTER — APPOINTMENT (OUTPATIENT)
Dept: ONCOLOGY | Facility: HOSPITAL | Age: 45
End: 2025-08-13
Payer: COMMERCIAL

## 2025-08-13 ENCOUNTER — SPECIALTY PHARMACY (OUTPATIENT)
Dept: PHARMACY | Facility: HOSPITAL | Age: 45
End: 2025-08-13
Payer: COMMERCIAL

## 2025-08-13 VITALS
OXYGEN SATURATION: 97 % | HEART RATE: 79 BPM | HEIGHT: 62 IN | TEMPERATURE: 98.3 F | BODY MASS INDEX: 43.58 KG/M2 | DIASTOLIC BLOOD PRESSURE: 79 MMHG | SYSTOLIC BLOOD PRESSURE: 118 MMHG | WEIGHT: 236.8 LBS

## 2025-08-13 DIAGNOSIS — C50.312 MALIGNANT NEOPLASM OF LOWER-INNER QUADRANT OF LEFT BREAST IN FEMALE, ESTROGEN RECEPTOR POSITIVE: ICD-10-CM

## 2025-08-13 DIAGNOSIS — Z17.0 MALIGNANT NEOPLASM OF LOWER-INNER QUADRANT OF LEFT BREAST IN FEMALE, ESTROGEN RECEPTOR POSITIVE: Primary | ICD-10-CM

## 2025-08-13 DIAGNOSIS — C50.312 MALIGNANT NEOPLASM OF LOWER-INNER QUADRANT OF LEFT BREAST IN FEMALE, ESTROGEN RECEPTOR POSITIVE: Primary | ICD-10-CM

## 2025-08-13 DIAGNOSIS — Z17.0 MALIGNANT NEOPLASM OF LOWER-INNER QUADRANT OF LEFT BREAST IN FEMALE, ESTROGEN RECEPTOR POSITIVE: ICD-10-CM

## 2025-08-13 LAB
ALBUMIN SERPL-MCNC: 4.3 G/DL (ref 3.5–5.2)
ALBUMIN/GLOB SERPL: 1.6 G/DL
ALP SERPL-CCNC: 94 U/L (ref 39–117)
ALT SERPL W P-5'-P-CCNC: 26 U/L (ref 1–33)
ANION GAP SERPL CALCULATED.3IONS-SCNC: 11 MMOL/L (ref 5–15)
AST SERPL-CCNC: 18 U/L (ref 1–32)
B-HCG UR QL: NEGATIVE
BASOPHILS # BLD AUTO: 0.04 10*3/MM3 (ref 0–0.2)
BASOPHILS NFR BLD AUTO: 0.8 % (ref 0–1.5)
BILIRUB SERPL-MCNC: 0.5 MG/DL (ref 0–1.2)
BUN SERPL-MCNC: 8.5 MG/DL (ref 6–20)
BUN/CREAT SERPL: 9.2 (ref 7–25)
CALCIUM SPEC-SCNC: 10.1 MG/DL (ref 8.6–10.5)
CHLORIDE SERPL-SCNC: 102 MMOL/L (ref 98–107)
CHOLEST SERPL-MCNC: 189 MG/DL (ref 0–200)
CO2 SERPL-SCNC: 26 MMOL/L (ref 22–29)
CREAT SERPL-MCNC: 0.92 MG/DL (ref 0.57–1)
DEPRECATED RDW RBC AUTO: 49.1 FL (ref 37–54)
EGFRCR SERPLBLD CKD-EPI 2021: 79.4 ML/MIN/1.73
EOSINOPHIL # BLD AUTO: 0.17 10*3/MM3 (ref 0–0.4)
EOSINOPHIL NFR BLD AUTO: 3.5 % (ref 0.3–6.2)
ERYTHROCYTE [DISTWIDTH] IN BLOOD BY AUTOMATED COUNT: 14.6 % (ref 12.3–15.4)
GLOBULIN UR ELPH-MCNC: 2.7 GM/DL
GLUCOSE SERPL-MCNC: 105 MG/DL (ref 65–99)
HCT VFR BLD AUTO: 38.5 % (ref 34–46.6)
HDLC SERPL-MCNC: 35 MG/DL (ref 40–60)
HGB BLD-MCNC: 12.5 G/DL (ref 12–15.9)
IMM GRANULOCYTES # BLD AUTO: 0 10*3/MM3 (ref 0–0.05)
IMM GRANULOCYTES NFR BLD AUTO: 0 % (ref 0–0.5)
LDLC SERPL CALC-MCNC: 120 MG/DL (ref 0–100)
LDLC/HDLC SERPL: 3.31 {RATIO}
LYMPHOCYTES # BLD AUTO: 0.49 10*3/MM3 (ref 0.7–3.1)
LYMPHOCYTES NFR BLD AUTO: 10.1 % (ref 19.6–45.3)
MAGNESIUM SERPL-MCNC: 1.9 MG/DL (ref 1.6–2.6)
MCH RBC QN AUTO: 30.3 PG (ref 26.6–33)
MCHC RBC AUTO-ENTMCNC: 32.5 G/DL (ref 31.5–35.7)
MCV RBC AUTO: 93.4 FL (ref 79–97)
MONOCYTES # BLD AUTO: 0.43 10*3/MM3 (ref 0.1–0.9)
MONOCYTES NFR BLD AUTO: 8.9 % (ref 5–12)
NEUTROPHILS NFR BLD AUTO: 3.7 10*3/MM3 (ref 1.7–7)
NEUTROPHILS NFR BLD AUTO: 76.7 % (ref 42.7–76)
NRBC BLD AUTO-RTO: 0 /100 WBC (ref 0–0.2)
PHOSPHATE SERPL-MCNC: 4 MG/DL (ref 2.5–4.5)
PLATELET # BLD AUTO: 300 10*3/MM3 (ref 140–450)
PMV BLD AUTO: 8.9 FL (ref 6–12)
POTASSIUM SERPL-SCNC: 3.9 MMOL/L (ref 3.5–5.2)
PROT SERPL-MCNC: 7 G/DL (ref 6–8.5)
RBC # BLD AUTO: 4.12 10*6/MM3 (ref 3.77–5.28)
SODIUM SERPL-SCNC: 139 MMOL/L (ref 136–145)
TRIGL SERPL-MCNC: 191 MG/DL (ref 0–150)
VLDLC SERPL-MCNC: 34 MG/DL (ref 5–40)
WBC NRBC COR # BLD AUTO: 4.83 10*3/MM3 (ref 3.4–10.8)

## 2025-08-13 PROCEDURE — 85025 COMPLETE CBC W/AUTO DIFF WBC: CPT

## 2025-08-13 PROCEDURE — 83735 ASSAY OF MAGNESIUM: CPT

## 2025-08-13 PROCEDURE — 36415 COLL VENOUS BLD VENIPUNCTURE: CPT

## 2025-08-13 PROCEDURE — 84100 ASSAY OF PHOSPHORUS: CPT

## 2025-08-13 PROCEDURE — 80061 LIPID PANEL: CPT | Performed by: INTERNAL MEDICINE

## 2025-08-13 PROCEDURE — 80053 COMPREHEN METABOLIC PANEL: CPT

## 2025-08-13 PROCEDURE — 81025 URINE PREGNANCY TEST: CPT

## 2025-08-19 ENCOUNTER — OFFICE VISIT (OUTPATIENT)
Dept: FAMILY MEDICINE CLINIC | Facility: CLINIC | Age: 45
End: 2025-08-19
Payer: COMMERCIAL

## 2025-08-19 VITALS
HEART RATE: 82 BPM | HEIGHT: 62 IN | SYSTOLIC BLOOD PRESSURE: 112 MMHG | WEIGHT: 235 LBS | OXYGEN SATURATION: 92 % | BODY MASS INDEX: 43.24 KG/M2 | DIASTOLIC BLOOD PRESSURE: 76 MMHG

## 2025-08-19 DIAGNOSIS — Z85.3 HISTORY OF BREAST CANCER: ICD-10-CM

## 2025-08-19 DIAGNOSIS — K21.9 GASTROESOPHAGEAL REFLUX DISEASE, UNSPECIFIED WHETHER ESOPHAGITIS PRESENT: Primary | ICD-10-CM

## 2025-08-19 DIAGNOSIS — L58.0 ACUTE RADIATION DERMATITIS: ICD-10-CM

## 2025-08-19 DIAGNOSIS — M79.2 NEUROPATHIC PAIN DUE TO RADIATION: ICD-10-CM

## 2025-08-19 PROCEDURE — 99214 OFFICE O/P EST MOD 30 MIN: CPT | Performed by: FAMILY MEDICINE

## 2025-08-19 RX ORDER — OMEPRAZOLE 40 MG/1
40 CAPSULE, DELAYED RELEASE ORAL DAILY
Qty: 90 CAPSULE | Refills: 3 | Status: SHIPPED | OUTPATIENT
Start: 2025-08-19

## 2025-08-19 RX ORDER — PREGABALIN 25 MG/1
25 CAPSULE ORAL 2 TIMES DAILY
Qty: 60 CAPSULE | Refills: 4 | Status: SHIPPED | OUTPATIENT
Start: 2025-08-19

## 2025-08-22 PROBLEM — Z79.899 HIGH RISK MEDICATION USE: Status: ACTIVE | Noted: 2025-08-22

## 2025-08-22 RX ORDER — ALBUTEROL SULFATE 90 UG/1
2 AEROSOL, METERED RESPIRATORY (INHALATION) EVERY 4 HOURS PRN
Qty: 18 G | Refills: 0 | Status: SHIPPED | OUTPATIENT
Start: 2025-08-22

## 2025-08-25 ENCOUNTER — TELEPHONE (OUTPATIENT)
Dept: RADIATION ONCOLOGY | Facility: HOSPITAL | Age: 45
End: 2025-08-25
Payer: COMMERCIAL

## 2025-08-26 ENCOUNTER — INFUSION (OUTPATIENT)
Dept: ONCOLOGY | Facility: HOSPITAL | Age: 45
End: 2025-08-26
Payer: COMMERCIAL

## 2025-08-26 ENCOUNTER — OFFICE VISIT (OUTPATIENT)
Dept: ONCOLOGY | Facility: CLINIC | Age: 45
End: 2025-08-26
Payer: COMMERCIAL

## 2025-08-26 ENCOUNTER — CLINICAL SUPPORT (OUTPATIENT)
Dept: ONCOLOGY | Facility: HOSPITAL | Age: 45
End: 2025-08-26
Payer: COMMERCIAL

## 2025-08-26 ENCOUNTER — NUTRITION (OUTPATIENT)
Dept: OTHER | Facility: HOSPITAL | Age: 45
End: 2025-08-26
Payer: COMMERCIAL

## 2025-08-26 ENCOUNTER — OFFICE VISIT (OUTPATIENT)
Dept: RADIATION ONCOLOGY | Facility: HOSPITAL | Age: 45
End: 2025-08-26
Payer: COMMERCIAL

## 2025-08-26 VITALS
SYSTOLIC BLOOD PRESSURE: 119 MMHG | HEIGHT: 62 IN | TEMPERATURE: 98.1 F | OXYGEN SATURATION: 100 % | DIASTOLIC BLOOD PRESSURE: 79 MMHG | HEART RATE: 70 BPM | BODY MASS INDEX: 44.46 KG/M2 | WEIGHT: 241.6 LBS

## 2025-08-26 VITALS
BODY MASS INDEX: 44.07 KG/M2 | SYSTOLIC BLOOD PRESSURE: 119 MMHG | OXYGEN SATURATION: 100 % | DIASTOLIC BLOOD PRESSURE: 79 MMHG | WEIGHT: 241 LBS | RESPIRATION RATE: 18 BRPM | HEART RATE: 70 BPM

## 2025-08-26 DIAGNOSIS — Z17.0 MALIGNANT NEOPLASM OF LOWER-INNER QUADRANT OF LEFT BREAST IN FEMALE, ESTROGEN RECEPTOR POSITIVE: ICD-10-CM

## 2025-08-26 DIAGNOSIS — C50.312 MALIGNANT NEOPLASM OF LOWER-INNER QUADRANT OF LEFT BREAST IN FEMALE, ESTROGEN RECEPTOR POSITIVE: Primary | ICD-10-CM

## 2025-08-26 DIAGNOSIS — Z17.0 MALIGNANT NEOPLASM OF LOWER-INNER QUADRANT OF LEFT BREAST IN FEMALE, ESTROGEN RECEPTOR POSITIVE: Primary | ICD-10-CM

## 2025-08-26 DIAGNOSIS — C50.312 MALIGNANT NEOPLASM OF LOWER-INNER QUADRANT OF LEFT BREAST IN FEMALE, ESTROGEN RECEPTOR POSITIVE: ICD-10-CM

## 2025-08-26 DIAGNOSIS — Z45.2 ENCOUNTER FOR FITTING AND ADJUSTMENT OF VASCULAR CATHETER: Primary | ICD-10-CM

## 2025-08-26 DIAGNOSIS — Z79.899 HIGH RISK MEDICATION USE: ICD-10-CM

## 2025-08-26 DIAGNOSIS — Z79.899 HIGH RISK MEDICATION USE: Primary | ICD-10-CM

## 2025-08-26 DIAGNOSIS — Z79.811 AROMATASE INHIBITOR USE: ICD-10-CM

## 2025-08-26 LAB
ALBUMIN SERPL-MCNC: 4 G/DL (ref 3.5–5.2)
ALBUMIN/GLOB SERPL: 1.7 G/DL
ALP SERPL-CCNC: 71 U/L (ref 39–117)
ALT SERPL W P-5'-P-CCNC: 21 U/L (ref 1–33)
ANION GAP SERPL CALCULATED.3IONS-SCNC: 8.8 MMOL/L (ref 5–15)
AST SERPL-CCNC: 20 U/L (ref 1–32)
BASOPHILS # BLD AUTO: 0.03 10*3/MM3 (ref 0–0.2)
BASOPHILS NFR BLD AUTO: 0.5 % (ref 0–1.5)
BILIRUB SERPL-MCNC: 0.5 MG/DL (ref 0–1.2)
BUN SERPL-MCNC: 7.6 MG/DL (ref 6–20)
BUN/CREAT SERPL: 8.4 (ref 7–25)
CALCIUM SPEC-SCNC: 9.4 MG/DL (ref 8.6–10.5)
CHLORIDE SERPL-SCNC: 108 MMOL/L (ref 98–107)
CO2 SERPL-SCNC: 25.2 MMOL/L (ref 22–29)
CREAT SERPL-MCNC: 0.91 MG/DL (ref 0.57–1)
DEPRECATED RDW RBC AUTO: 47.7 FL (ref 37–54)
EGFRCR SERPLBLD CKD-EPI 2021: 80.4 ML/MIN/1.73
EOSINOPHIL # BLD AUTO: 0.17 10*3/MM3 (ref 0–0.4)
EOSINOPHIL NFR BLD AUTO: 3.1 % (ref 0.3–6.2)
ERYTHROCYTE [DISTWIDTH] IN BLOOD BY AUTOMATED COUNT: 14.4 % (ref 12.3–15.4)
GLOBULIN UR ELPH-MCNC: 2.3 GM/DL
GLUCOSE SERPL-MCNC: 111 MG/DL (ref 65–99)
HCT VFR BLD AUTO: 34.9 % (ref 34–46.6)
HGB BLD-MCNC: 11.6 G/DL (ref 12–15.9)
IMM GRANULOCYTES # BLD AUTO: 0.02 10*3/MM3 (ref 0–0.05)
IMM GRANULOCYTES NFR BLD AUTO: 0.4 % (ref 0–0.5)
LYMPHOCYTES # BLD AUTO: 0.65 10*3/MM3 (ref 0.7–3.1)
LYMPHOCYTES NFR BLD AUTO: 11.8 % (ref 19.6–45.3)
MAGNESIUM SERPL-MCNC: 1.9 MG/DL (ref 1.6–2.6)
MCH RBC QN AUTO: 30.3 PG (ref 26.6–33)
MCHC RBC AUTO-ENTMCNC: 33.2 G/DL (ref 31.5–35.7)
MCV RBC AUTO: 91.1 FL (ref 79–97)
MONOCYTES # BLD AUTO: 0.54 10*3/MM3 (ref 0.1–0.9)
MONOCYTES NFR BLD AUTO: 9.8 % (ref 5–12)
NEUTROPHILS NFR BLD AUTO: 4.09 10*3/MM3 (ref 1.7–7)
NEUTROPHILS NFR BLD AUTO: 74.4 % (ref 42.7–76)
NRBC BLD AUTO-RTO: 0 /100 WBC (ref 0–0.2)
PLATELET # BLD AUTO: 279 10*3/MM3 (ref 140–450)
PMV BLD AUTO: 9.5 FL (ref 6–12)
POTASSIUM SERPL-SCNC: 3.8 MMOL/L (ref 3.5–5.2)
PROT SERPL-MCNC: 6.3 G/DL (ref 6–8.5)
RBC # BLD AUTO: 3.83 10*6/MM3 (ref 3.77–5.28)
SODIUM SERPL-SCNC: 142 MMOL/L (ref 136–145)
WBC NRBC COR # BLD AUTO: 5.5 10*3/MM3 (ref 3.4–10.8)

## 2025-08-26 PROCEDURE — 25010000002 HEPARIN LOCK FLUSH PER 10 UNITS: Performed by: INTERNAL MEDICINE

## 2025-08-26 PROCEDURE — 93005 ELECTROCARDIOGRAM TRACING: CPT | Performed by: NURSE PRACTITIONER

## 2025-08-26 PROCEDURE — 36591 DRAW BLOOD OFF VENOUS DEVICE: CPT

## 2025-08-26 PROCEDURE — 83735 ASSAY OF MAGNESIUM: CPT | Performed by: NURSE PRACTITIONER

## 2025-08-26 PROCEDURE — G0463 HOSPITAL OUTPT CLINIC VISIT: HCPCS | Performed by: RADIOLOGY

## 2025-08-26 PROCEDURE — 85025 COMPLETE CBC W/AUTO DIFF WBC: CPT

## 2025-08-26 PROCEDURE — 80053 COMPREHEN METABOLIC PANEL: CPT

## 2025-08-26 RX ORDER — HEPARIN SODIUM (PORCINE) LOCK FLUSH IV SOLN 100 UNIT/ML 100 UNIT/ML
500 SOLUTION INTRAVENOUS AS NEEDED
Status: DISCONTINUED | OUTPATIENT
Start: 2025-08-26 | End: 2025-08-26 | Stop reason: HOSPADM

## 2025-08-26 RX ORDER — SODIUM CHLORIDE 0.9 % (FLUSH) 0.9 %
10 SYRINGE (ML) INJECTION AS NEEDED
Status: DISCONTINUED | OUTPATIENT
Start: 2025-08-26 | End: 2025-08-26 | Stop reason: HOSPADM

## 2025-08-26 RX ORDER — HEPARIN SODIUM (PORCINE) LOCK FLUSH IV SOLN 100 UNIT/ML 100 UNIT/ML
500 SOLUTION INTRAVENOUS AS NEEDED
OUTPATIENT
Start: 2025-08-26

## 2025-08-26 RX ORDER — SODIUM CHLORIDE 0.9 % (FLUSH) 0.9 %
10 SYRINGE (ML) INJECTION AS NEEDED
OUTPATIENT
Start: 2025-08-26

## 2025-08-26 RX ADMIN — Medication 500 UNITS: at 08:43

## 2025-08-26 RX ADMIN — Medication 10 ML: at 08:43

## 2025-08-27 ENCOUNTER — SPECIALTY PHARMACY (OUTPATIENT)
Dept: PHARMACY | Facility: HOSPITAL | Age: 45
End: 2025-08-27
Payer: COMMERCIAL

## (undated) DEVICE — LEGGINGS, PAIR, 31X48, STERILE: Brand: MEDLINE

## (undated) DEVICE — 1LYRTR 16FR10ML100%SIL UMS SNP: Brand: MEDLINE INDUSTRIES, INC.

## (undated) DEVICE — NDL HYPO PRECISIONGLIDE/REG 18G 11/2 PNK

## (undated) DEVICE — TUBING, SUCTION, 1/4" X 10', STRAIGHT: Brand: MEDLINE

## (undated) DEVICE — EXOFIN PRECISION PEN HIGH VISCOSITY TOPICAL SKIN ADHESIVE: Brand: EXOFIN PRECISION PEN, 1G

## (undated) DEVICE — SUT MNCRYL 4/0 PS2 18 IN

## (undated) DEVICE — SUT PROLN 2/0 SH 36IN 8523H

## (undated) DEVICE — CONTAINER,SPECIMEN,OR STERILE,4OZ: Brand: MEDLINE

## (undated) DEVICE — PK PROC MINOR TOWER 40

## (undated) DEVICE — SYRINGE, LUER LOCK, 60ML: Brand: MEDLINE

## (undated) DEVICE — PK UNIV COMPL 40

## (undated) DEVICE — BNDG,ELSTC,MATRIX,STRL,6"X5YD,LF,HOOK&LP: Brand: MEDLINE

## (undated) DEVICE — PATIENT RETURN ELECTRODE, SINGLE-USE, CONTACT QUALITY MONITORING, ADULT, WITH 9FT CORD, FOR PATIENTS WEIGING OVER 33LBS. (15KG): Brand: MEGADYNE

## (undated) DEVICE — INTENDED FOR TISSUE SEPARATION, AND OTHER PROCEDURES THAT REQUIRE A SHARP SURGICAL BLADE TO PUNCTURE OR CUT.: Brand: BARD-PARKER ® CARBON RIB-BACK BLADES

## (undated) DEVICE — ANTIBACTERIAL UNDYED BRAIDED (POLYGLACTIN 910), SYNTHETIC ABSORBABLE SUTURE: Brand: COATED VICRYL

## (undated) DEVICE — THE TORRENT IRRIGATION SCOPE CONNECTOR IS USED WITH THE TORRENT IRRIGATION TUBING TO PROVIDE IRRIGATION FLUIDS SUCH AS STERILE WATER DURING GASTROINTESTINAL ENDOSCOPIC PROCEDURES WHEN USED IN CONJUNCTION WITH AN IRRIGATION PUMP (OR ELECTROSURGICAL UNIT).: Brand: TORRENT

## (undated) DEVICE — SOL NACL 0.9PCT 100ML SGL

## (undated) DEVICE — CVR TRANSD CIV FLX TPR 11.9 TO 3.8X61CM

## (undated) DEVICE — GLV SURG SENSICARE PI PF LF 8.5 GRN STRL

## (undated) DEVICE — INTENDED FOR TISSUE SEPARATION, AND OTHER PROCEDURES THAT REQUIRE A SHARP SURGICAL BLADE TO PUNCTURE OR CUT.: Brand: BARD-PARKER ® STAINLESS STEEL BLADES

## (undated) DEVICE — SUT ETHLN 3/0 PS1 18IN 1663H

## (undated) DEVICE — Device: Brand: DEFENDO AIR/WATER/SUCTION AND BIOPSY VALVE

## (undated) DEVICE — SUT PDS 2 0 CT1 27IN CLR Z259H

## (undated) DEVICE — NDL HYPO PRECISIONGLIDE REG 25G 1 1/2

## (undated) DEVICE — SUT MNCRYL 3/0 PS2 18IN MCP497G

## (undated) DEVICE — SYR LL TP 10ML STRL

## (undated) DEVICE — GLV SURG BIOGEL LTX PF 8

## (undated) DEVICE — CATH ASP DUODENAL 2.5MM 180CM

## (undated) DEVICE — SYR LUERLOK 5CC

## (undated) DEVICE — YANKAUER,BULB TIP,W/O VENT,RIGID,STERILE: Brand: MEDLINE

## (undated) DEVICE — SYR LUERLOK 20CC BX/50

## (undated) DEVICE — TOWEL,OR,DSP,ST,BLUE,STD,4/PK,20PK/CS: Brand: MEDLINE

## (undated) DEVICE — APPL CHLORAPREP HI/LITE 26ML ORNG

## (undated) DEVICE — EXTRCT STPL SKIN STRL BX/12

## (undated) DEVICE — GLV SURG BIOGEL LTX PF 6 1/2

## (undated) DEVICE — TRAP FLD MINIVAC MEGADYNE 100ML

## (undated) DEVICE — CANN NASL CO2 TRULINK W/O2 A/

## (undated) DEVICE — LOU MINOR PROCEDURE: Brand: MEDLINE INDUSTRIES, INC.

## (undated) DEVICE — SUT MNCRYL PLS ANTIB UD 4/0 PS2 18IN

## (undated) DEVICE — SENSR O2 OXIMAX FNGR A/ 18IN NONSTR

## (undated) DEVICE — SUT PROLN 2/0 CT2 30IN 8411H

## (undated) DEVICE — PENCL SMOKE/EVAC MEGADYNE TELESCP 10FT

## (undated) DEVICE — SPNG GZ WOVN 4X4IN 12PLY 10/BX STRL

## (undated) DEVICE — DECANTER BAG 9": Brand: MEDLINE INDUSTRIES, INC.

## (undated) DEVICE — STPLR SKIN VISISTAT WD 35CT

## (undated) DEVICE — SPONGE,LAP,18"X18",DLX,XR,ST,5/PK,40/PK: Brand: MEDLINE

## (undated) DEVICE — FRCP BX RADJAW4 NDL 2.8 240CM LG OG BX40

## (undated) DEVICE — COVER,MAYO STAND,STERILE: Brand: MEDLINE

## (undated) DEVICE — COVER,C-ARM,41X74: Brand: MEDLINE

## (undated) DEVICE — CONTN STRL 32OZ

## (undated) DEVICE — PAD,ABDOMINAL,8"X10",ST,LF: Brand: MEDLINE

## (undated) DEVICE — Device

## (undated) DEVICE — BITEBLOCK OMNI BLOC

## (undated) DEVICE — SUT SILK 2/0 FS BLK 18IN 685G

## (undated) DEVICE — 3 BONE CEMENT MIXER: Brand: MIXEVAC

## (undated) DEVICE — CVR PROB GEN PURP W ISOSILK 6X48